# Patient Record
Sex: FEMALE | Race: WHITE | Employment: UNEMPLOYED | URBAN - METROPOLITAN AREA
[De-identification: names, ages, dates, MRNs, and addresses within clinical notes are randomized per-mention and may not be internally consistent; named-entity substitution may affect disease eponyms.]

---

## 2017-01-01 ENCOUNTER — TRANSFERRED RECORDS (OUTPATIENT)
Dept: HEALTH INFORMATION MANAGEMENT | Facility: CLINIC | Age: 82
End: 2017-01-01

## 2018-01-01 ENCOUNTER — CARE COORDINATION (OUTPATIENT)
Dept: CARDIOLOGY | Facility: CLINIC | Age: 83
End: 2018-01-01

## 2018-01-01 ENCOUNTER — APPOINTMENT (OUTPATIENT)
Dept: CT IMAGING | Facility: CLINIC | Age: 83
DRG: 871 | End: 2018-01-01
Attending: INTERNAL MEDICINE
Payer: MEDICARE

## 2018-01-01 ENCOUNTER — APPOINTMENT (OUTPATIENT)
Dept: CT IMAGING | Facility: CLINIC | Age: 83
DRG: 291 | End: 2018-01-01
Attending: EMERGENCY MEDICINE
Payer: MEDICARE

## 2018-01-01 ENCOUNTER — APPOINTMENT (OUTPATIENT)
Dept: CT IMAGING | Facility: CLINIC | Age: 83
DRG: 193 | End: 2018-01-01
Attending: EMERGENCY MEDICINE
Payer: MEDICARE

## 2018-01-01 ENCOUNTER — APPOINTMENT (OUTPATIENT)
Dept: GENERAL RADIOLOGY | Facility: CLINIC | Age: 83
DRG: 193 | End: 2018-01-01
Attending: INTERNAL MEDICINE
Payer: MEDICARE

## 2018-01-01 ENCOUNTER — HOSPITAL ENCOUNTER (INPATIENT)
Facility: CLINIC | Age: 83
LOS: 10 days | Discharge: NURSING, REHAB OR LONG-TERM CARE | DRG: 291 | End: 2018-01-31
Attending: EMERGENCY MEDICINE | Admitting: INTERNAL MEDICINE
Payer: MEDICARE

## 2018-01-01 ENCOUNTER — APPOINTMENT (OUTPATIENT)
Dept: PHYSICAL THERAPY | Facility: CLINIC | Age: 83
DRG: 291 | End: 2018-01-01
Payer: MEDICARE

## 2018-01-01 ENCOUNTER — APPOINTMENT (OUTPATIENT)
Dept: OCCUPATIONAL THERAPY | Facility: CLINIC | Age: 83
DRG: 193 | End: 2018-01-01
Payer: MEDICARE

## 2018-01-01 ENCOUNTER — APPOINTMENT (OUTPATIENT)
Dept: PHYSICAL THERAPY | Facility: CLINIC | Age: 83
DRG: 291 | End: 2018-01-01
Attending: INTERNAL MEDICINE
Payer: MEDICARE

## 2018-01-01 ENCOUNTER — HOSPITAL ENCOUNTER (INPATIENT)
Facility: CLINIC | Age: 83
LOS: 9 days | Discharge: NURSING, REHAB OR LONG-TERM CARE | DRG: 291 | End: 2018-03-23
Attending: EMERGENCY MEDICINE | Admitting: INTERNAL MEDICINE
Payer: MEDICARE

## 2018-01-01 ENCOUNTER — APPOINTMENT (OUTPATIENT)
Dept: GENERAL RADIOLOGY | Facility: CLINIC | Age: 83
DRG: 291 | End: 2018-01-01
Attending: INTERNAL MEDICINE
Payer: MEDICARE

## 2018-01-01 ENCOUNTER — APPOINTMENT (OUTPATIENT)
Dept: CARDIOLOGY | Facility: CLINIC | Age: 83
DRG: 193 | End: 2018-01-01
Attending: INTERNAL MEDICINE
Payer: MEDICARE

## 2018-01-01 ENCOUNTER — APPOINTMENT (OUTPATIENT)
Dept: PHYSICAL THERAPY | Facility: CLINIC | Age: 83
DRG: 193 | End: 2018-01-01
Payer: MEDICARE

## 2018-01-01 ENCOUNTER — APPOINTMENT (OUTPATIENT)
Dept: GENERAL RADIOLOGY | Facility: CLINIC | Age: 83
DRG: 193 | End: 2018-01-01
Attending: EMERGENCY MEDICINE
Payer: MEDICARE

## 2018-01-01 ENCOUNTER — APPOINTMENT (OUTPATIENT)
Dept: GENERAL RADIOLOGY | Facility: CLINIC | Age: 83
DRG: 871 | End: 2018-01-01
Attending: INTERNAL MEDICINE
Payer: MEDICARE

## 2018-01-01 ENCOUNTER — TRANSFERRED RECORDS (OUTPATIENT)
Dept: HEALTH INFORMATION MANAGEMENT | Facility: CLINIC | Age: 83
End: 2018-01-01

## 2018-01-01 ENCOUNTER — HOSPITAL ENCOUNTER (INPATIENT)
Facility: CLINIC | Age: 83
LOS: 8 days | Discharge: NURSING, REHAB OR LONG-TERM CARE | DRG: 193 | End: 2018-01-15
Attending: EMERGENCY MEDICINE | Admitting: INTERNAL MEDICINE
Payer: MEDICARE

## 2018-01-01 ENCOUNTER — HOSPITAL ENCOUNTER (EMERGENCY)
Facility: CLINIC | Age: 83
Discharge: HOME | End: 2018-02-18
Attending: EMERGENCY MEDICINE | Admitting: EMERGENCY MEDICINE
Payer: MEDICARE

## 2018-01-01 ENCOUNTER — APPOINTMENT (OUTPATIENT)
Dept: OCCUPATIONAL THERAPY | Facility: CLINIC | Age: 83
DRG: 193 | End: 2018-01-01
Attending: INTERNAL MEDICINE
Payer: MEDICARE

## 2018-01-01 ENCOUNTER — APPOINTMENT (OUTPATIENT)
Dept: PHYSICAL THERAPY | Facility: CLINIC | Age: 83
DRG: 193 | End: 2018-01-01
Attending: INTERNAL MEDICINE
Payer: MEDICARE

## 2018-01-01 ENCOUNTER — APPOINTMENT (OUTPATIENT)
Dept: GENERAL RADIOLOGY | Facility: CLINIC | Age: 83
DRG: 291 | End: 2018-01-01
Attending: EMERGENCY MEDICINE
Payer: MEDICARE

## 2018-01-01 ENCOUNTER — APPOINTMENT (OUTPATIENT)
Dept: CT IMAGING | Facility: CLINIC | Age: 83
End: 2018-01-01
Attending: EMERGENCY MEDICINE
Payer: MEDICARE

## 2018-01-01 ENCOUNTER — HOSPITAL ENCOUNTER (INPATIENT)
Facility: CLINIC | Age: 83
LOS: 1 days | DRG: 871 | End: 2018-04-14
Attending: INTERNAL MEDICINE | Admitting: INTERNAL MEDICINE
Payer: MEDICARE

## 2018-01-01 VITALS
SYSTOLIC BLOOD PRESSURE: 130 MMHG | BODY MASS INDEX: 31.54 KG/M2 | DIASTOLIC BLOOD PRESSURE: 69 MMHG | TEMPERATURE: 95.8 F | WEIGHT: 208.1 LBS | HEART RATE: 71 BPM | OXYGEN SATURATION: 96 % | RESPIRATION RATE: 20 BRPM | HEIGHT: 68 IN

## 2018-01-01 VITALS
RESPIRATION RATE: 40 BRPM | DIASTOLIC BLOOD PRESSURE: 35 MMHG | SYSTOLIC BLOOD PRESSURE: 99 MMHG | WEIGHT: 205.9 LBS | OXYGEN SATURATION: 87 % | HEIGHT: 68 IN | TEMPERATURE: 97.7 F | BODY MASS INDEX: 31.2 KG/M2

## 2018-01-01 VITALS
RESPIRATION RATE: 16 BRPM | HEART RATE: 84 BPM | WEIGHT: 195.5 LBS | BODY MASS INDEX: 29.63 KG/M2 | DIASTOLIC BLOOD PRESSURE: 77 MMHG | HEIGHT: 68 IN | TEMPERATURE: 97.5 F | SYSTOLIC BLOOD PRESSURE: 171 MMHG | OXYGEN SATURATION: 93 %

## 2018-01-01 VITALS
TEMPERATURE: 97.6 F | SYSTOLIC BLOOD PRESSURE: 125 MMHG | DIASTOLIC BLOOD PRESSURE: 50 MMHG | BODY MASS INDEX: 29.7 KG/M2 | WEIGHT: 196 LBS | OXYGEN SATURATION: 91 % | HEIGHT: 68 IN | HEART RATE: 87 BPM | RESPIRATION RATE: 20 BRPM

## 2018-01-01 VITALS
HEIGHT: 68 IN | OXYGEN SATURATION: 97 % | DIASTOLIC BLOOD PRESSURE: 65 MMHG | RESPIRATION RATE: 18 BRPM | TEMPERATURE: 97.5 F | SYSTOLIC BLOOD PRESSURE: 123 MMHG

## 2018-01-01 DIAGNOSIS — S32.591A PUBIC RAMUS FRACTURE, RIGHT, CLOSED, INITIAL ENCOUNTER (H): ICD-10-CM

## 2018-01-01 DIAGNOSIS — S42.001A CLOSED NONDISPLACED FRACTURE OF RIGHT CLAVICLE, UNSPECIFIED PART OF CLAVICLE, INITIAL ENCOUNTER: ICD-10-CM

## 2018-01-01 DIAGNOSIS — J18.9 HEALTHCARE-ASSOCIATED PNEUMONIA: ICD-10-CM

## 2018-01-01 DIAGNOSIS — I50.31 ACUTE DIASTOLIC CONGESTIVE HEART FAILURE (H): ICD-10-CM

## 2018-01-01 DIAGNOSIS — E87.1 HYPONATREMIA: ICD-10-CM

## 2018-01-01 DIAGNOSIS — I48.91 NEW ONSET ATRIAL FIBRILLATION (H): ICD-10-CM

## 2018-01-01 DIAGNOSIS — N17.9 ACUTE RENAL FAILURE, UNSPECIFIED ACUTE RENAL FAILURE TYPE (H): ICD-10-CM

## 2018-01-01 DIAGNOSIS — J96.01 ACUTE RESPIRATORY FAILURE WITH HYPOXIA (H): ICD-10-CM

## 2018-01-01 DIAGNOSIS — I50.31 ACUTE DIASTOLIC CONGESTIVE HEART FAILURE (H): Primary | ICD-10-CM

## 2018-01-01 DIAGNOSIS — I48.0 PAROXYSMAL ATRIAL FIBRILLATION (H): Primary | ICD-10-CM

## 2018-01-01 DIAGNOSIS — I50.9 ACUTE CONGESTIVE HEART FAILURE, UNSPECIFIED CONGESTIVE HEART FAILURE TYPE: ICD-10-CM

## 2018-01-01 DIAGNOSIS — R10.10 PAIN OF UPPER ABDOMEN: ICD-10-CM

## 2018-01-01 DIAGNOSIS — I50.32 CHRONIC DIASTOLIC CONGESTIVE HEART FAILURE (H): ICD-10-CM

## 2018-01-01 DIAGNOSIS — J18.9 PNEUMONIA OF RIGHT MIDDLE LOBE DUE TO INFECTIOUS ORGANISM: ICD-10-CM

## 2018-01-01 DIAGNOSIS — I48.20 CHRONIC ATRIAL FIBRILLATION (H): ICD-10-CM

## 2018-01-01 LAB
ABO + RH BLD: NORMAL
ABO + RH BLD: NORMAL
ALBUMIN SERPL ELPH-MCNC: 3 G/DL (ref 3.7–5.1)
ALBUMIN SERPL-MCNC: 2.7 G/DL (ref 3.4–5)
ALBUMIN SERPL-MCNC: 2.8 G/DL (ref 3.4–5)
ALBUMIN SERPL-MCNC: 3 G/DL (ref 3.4–5)
ALBUMIN SERPL-MCNC: 3.1 G/DL (ref 3.4–5)
ALBUMIN UR-MCNC: 30 MG/DL
ALBUMIN UR-MCNC: NEGATIVE MG/DL
ALBUMIN UR-MCNC: NEGATIVE MG/DL
ALP SERPL-CCNC: 196 U/L (ref 40–150)
ALP SERPL-CCNC: 275 U/L (ref 40–150)
ALP SERPL-CCNC: 321 U/L (ref 40–150)
ALP SERPL-CCNC: 88 U/L (ref 40–150)
ALPHA1 GLOB SERPL ELPH-MCNC: 0.6 G/DL (ref 0.2–0.4)
ALPHA2 GLOB SERPL ELPH-MCNC: 1 G/DL (ref 0.5–0.9)
ALT SERPL W P-5'-P-CCNC: 18 U/L (ref 0–50)
ALT SERPL W P-5'-P-CCNC: 24 U/L (ref 0–50)
ALT SERPL W P-5'-P-CCNC: 26 U/L (ref 0–50)
ALT SERPL W P-5'-P-CCNC: 60 U/L (ref 0–50)
ANION GAP SERPL CALCULATED.3IONS-SCNC: 10 MMOL/L (ref 3–14)
ANION GAP SERPL CALCULATED.3IONS-SCNC: 10 MMOL/L (ref 3–14)
ANION GAP SERPL CALCULATED.3IONS-SCNC: 11 MMOL/L (ref 6–17)
ANION GAP SERPL CALCULATED.3IONS-SCNC: 13 MMOL/L
ANION GAP SERPL CALCULATED.3IONS-SCNC: 15 MMOL/L (ref 3–14)
ANION GAP SERPL CALCULATED.3IONS-SCNC: 4 MMOL/L (ref 3–14)
ANION GAP SERPL CALCULATED.3IONS-SCNC: 5 MMOL/L (ref 3–14)
ANION GAP SERPL CALCULATED.3IONS-SCNC: 6 MMOL/L (ref 3–14)
ANION GAP SERPL CALCULATED.3IONS-SCNC: 7 MMOL/L (ref 3–14)
ANION GAP SERPL CALCULATED.3IONS-SCNC: 8 MMOL/L (ref 3–14)
ANION GAP SERPL CALCULATED.3IONS-SCNC: 9 MMOL/L (ref 3–14)
ANION GAP SERPL CALCULATED.3IONS-SCNC: 9 MMOL/L (ref 5–18)
ANISOCYTOSIS BLD QL SMEAR: SLIGHT
APPEARANCE UR: ABNORMAL
APPEARANCE UR: CLEAR
APPEARANCE UR: CLEAR
AST SERPL W P-5'-P-CCNC: 18 U/L (ref 0–45)
AST SERPL W P-5'-P-CCNC: 27 U/L (ref 0–45)
AST SERPL W P-5'-P-CCNC: 44 U/L (ref 0–45)
AST SERPL W P-5'-P-CCNC: 48 U/L (ref 0–45)
B-GLOBULIN SERPL ELPH-MCNC: 0.8 G/DL (ref 0.6–1)
BACTERIA #/AREA URNS HPF: ABNORMAL /HPF
BACTERIA SPEC CULT: ABNORMAL
BACTERIA SPEC CULT: NO GROWTH
BASE EXCESS BLDV CALC-SCNC: 3.9 MMOL/L
BASOPHILS # BLD AUTO: 0 10E9/L (ref 0–0.2)
BASOPHILS # BLD AUTO: 0.1 10E9/L (ref 0–0.2)
BASOPHILS # BLD AUTO: 0.1 10E9/L (ref 0–0.2)
BASOPHILS NFR BLD AUTO: 0 %
BASOPHILS NFR BLD AUTO: 0 %
BASOPHILS NFR BLD AUTO: 0.1 %
BASOPHILS NFR BLD AUTO: 0.2 %
BASOPHILS NFR BLD AUTO: 0.4 %
BASOPHILS NFR BLD AUTO: 0.4 %
BILIRUB SERPL-MCNC: 0.2 MG/DL (ref 0.2–1.3)
BILIRUB SERPL-MCNC: 0.3 MG/DL (ref 0.2–1.3)
BILIRUB SERPL-MCNC: 0.5 MG/DL (ref 0.2–1.3)
BILIRUB SERPL-MCNC: 0.7 MG/DL (ref 0.2–1.3)
BILIRUB UR QL STRIP: NEGATIVE
BLD GP AB SCN SERPL QL: NORMAL
BLD PROD TYP BPU: NORMAL
BLD UNIT ID BPU: 0
BLD UNIT ID BPU: 0
BLOOD BANK CMNT PATIENT-IMP: NORMAL
BLOOD PRODUCT CODE: NORMAL
BLOOD PRODUCT CODE: NORMAL
BPU ID: NORMAL
BPU ID: NORMAL
BUN SERPL-MCNC: 10 MG/DL (ref 7–30)
BUN SERPL-MCNC: 16 MG/DL (ref 7–30)
BUN SERPL-MCNC: 17 MG/DL (ref 7–30)
BUN SERPL-MCNC: 18 MG/DL (ref 7–30)
BUN SERPL-MCNC: 19 MG/DL
BUN SERPL-MCNC: 19 MG/DL (ref 7–30)
BUN SERPL-MCNC: 19 MG/DL (ref 7–30)
BUN SERPL-MCNC: 20 MG/DL (ref 7–30)
BUN SERPL-MCNC: 20 MG/DL (ref 7–30)
BUN SERPL-MCNC: 21 MG/DL (ref 7–30)
BUN SERPL-MCNC: 21 MG/DL (ref 7–30)
BUN SERPL-MCNC: 22 MG/DL (ref 7–30)
BUN SERPL-MCNC: 22 MG/DL (ref 8–28)
BUN SERPL-MCNC: 24 MG/DL (ref 7–30)
BUN SERPL-MCNC: 24 MG/DL (ref 7–30)
BUN SERPL-MCNC: 25 MG/DL (ref 7–30)
BUN SERPL-MCNC: 25 MG/DL (ref 7–30)
BUN SERPL-MCNC: 26 MG/DL (ref 7–30)
BUN SERPL-MCNC: 50 MG/DL (ref 7–30)
BUN SERPL-MCNC: 66 MG/DL (ref 7–30)
BUN SERPL-MCNC: 8 MG/DL (ref 7–30)
BUN SERPL-MCNC: 8 MG/DL (ref 7–30)
CA-I BLD-SCNC: 4.3 MG/DL (ref 4.4–5.2)
CALCIUM SERPL-MCNC: 7.4 MG/DL (ref 8.5–10.1)
CALCIUM SERPL-MCNC: 7.7 MG/DL (ref 8.5–10.1)
CALCIUM SERPL-MCNC: 7.9 MG/DL (ref 8.5–10.1)
CALCIUM SERPL-MCNC: 8 MG/DL (ref 8.5–10.1)
CALCIUM SERPL-MCNC: 8.1 MG/DL (ref 8.5–10.1)
CALCIUM SERPL-MCNC: 8.2 MG/DL (ref 8.5–10.1)
CALCIUM SERPL-MCNC: 8.3 MG/DL (ref 8.5–10.1)
CALCIUM SERPL-MCNC: 8.4 MG/DL (ref 8.5–10.1)
CALCIUM SERPL-MCNC: 8.5 MG/DL (ref 8.5–10.1)
CALCIUM SERPL-MCNC: 8.6 MG/DL (ref 8.5–10.1)
CALCIUM SERPL-MCNC: 8.7 MG/DL (ref 8.5–10.1)
CALCIUM SERPL-MCNC: 8.8 MG/DL (ref 8.5–10.1)
CALCIUM SERPL-MCNC: 8.8 MG/DL (ref 8.5–10.1)
CALCIUM SERPL-MCNC: 8.9 MG/DL (ref 8.5–10.1)
CALCIUM SERPL-MCNC: 9 MG/DL (ref 8.5–10.1)
CALCIUM SERPL-MCNC: 9.1 MG/DL (ref 8.5–10.1)
CALCIUM SERPL-MCNC: 9.2 MG/DL (ref 8.5–10.5)
CALCIUM SERPL-MCNC: 9.3 MG/DL (ref 8.5–10.1)
CALCIUM SERPL-MCNC: 9.4 MG/DL
CHLORIDE BLD-SCNC: 89 MMOL/L (ref 94–109)
CHLORIDE SERPL-SCNC: 100 MMOL/L (ref 94–109)
CHLORIDE SERPL-SCNC: 101 MMOL/L (ref 94–109)
CHLORIDE SERPL-SCNC: 90 MMOL/L (ref 94–109)
CHLORIDE SERPL-SCNC: 90 MMOL/L (ref 94–109)
CHLORIDE SERPL-SCNC: 91 MMOL/L (ref 94–109)
CHLORIDE SERPL-SCNC: 92 MMOL/L (ref 94–109)
CHLORIDE SERPL-SCNC: 93 MMOL/L (ref 94–109)
CHLORIDE SERPL-SCNC: 94 MMOL/L (ref 94–109)
CHLORIDE SERPL-SCNC: 95 MMOL/L (ref 94–109)
CHLORIDE SERPL-SCNC: 96 MMOL/L (ref 94–109)
CHLORIDE SERPL-SCNC: 96 MMOL/L (ref 94–109)
CHLORIDE SERPL-SCNC: 97 MMOL/L (ref 94–109)
CHLORIDE SERPL-SCNC: 97 MMOL/L (ref 94–109)
CHLORIDE SERPL-SCNC: 98 MMOL/L (ref 94–109)
CHLORIDE SERPLBLD-SCNC: 95 MMOL/L
CHLORIDE SERPLBLD-SCNC: 99 MMOL/L (ref 98–107)
CO2 BLD-SCNC: 22 MMOL/L (ref 20–32)
CO2 BLDCOV-SCNC: 25 MMOL/L (ref 21–28)
CO2 BLDCOV-SCNC: 25 MMOL/L (ref 21–28)
CO2 SERPL-SCNC: 18 MMOL/L (ref 20–32)
CO2 SERPL-SCNC: 22 MMOL/L (ref 20–32)
CO2 SERPL-SCNC: 23 MMOL/L (ref 20–32)
CO2 SERPL-SCNC: 24 MMOL/L (ref 20–32)
CO2 SERPL-SCNC: 25 MMOL/L (ref 20–32)
CO2 SERPL-SCNC: 26 MMOL/L (ref 20–32)
CO2 SERPL-SCNC: 27 MMOL/L
CO2 SERPL-SCNC: 27 MMOL/L (ref 20–32)
CO2 SERPL-SCNC: 28 MMOL/L (ref 20–32)
CO2 SERPL-SCNC: 28 MMOL/L (ref 22–31)
CO2 SERPL-SCNC: 29 MMOL/L (ref 20–32)
CO2 SERPL-SCNC: 29 MMOL/L (ref 20–32)
CO2 SERPL-SCNC: 30 MMOL/L (ref 20–32)
CO2 SERPL-SCNC: 31 MMOL/L (ref 20–32)
CO2 SERPL-SCNC: 32 MMOL/L (ref 20–32)
CO2 SERPL-SCNC: 33 MMOL/L (ref 20–32)
CO2 SERPL-SCNC: 34 MMOL/L (ref 20–32)
COLOR UR AUTO: NORMAL
COLOR UR AUTO: YELLOW
COLOR UR AUTO: YELLOW
COPATH REPORT: NORMAL
CREAT BLD-MCNC: 1.2 MG/DL (ref 0.52–1.04)
CREAT SERPL-MCNC: 0.65 MG/DL (ref 0.52–1.04)
CREAT SERPL-MCNC: 0.66 MG/DL (ref 0.52–1.04)
CREAT SERPL-MCNC: 0.68 MG/DL (ref 0.52–1.04)
CREAT SERPL-MCNC: 0.71 MG/DL (ref 0.52–1.04)
CREAT SERPL-MCNC: 0.72 MG/DL (ref 0.52–1.04)
CREAT SERPL-MCNC: 0.73 MG/DL (ref 0.52–1.04)
CREAT SERPL-MCNC: 0.73 MG/DL (ref 0.52–1.04)
CREAT SERPL-MCNC: 0.73 MG/DL (ref 0.6–1.1)
CREAT SERPL-MCNC: 0.74 MG/DL
CREAT SERPL-MCNC: 0.76 MG/DL (ref 0.6–1.1)
CREAT SERPL-MCNC: 0.81 MG/DL (ref 0.52–1.04)
CREAT SERPL-MCNC: 0.82 MG/DL (ref 0.52–1.04)
CREAT SERPL-MCNC: 0.83 MG/DL (ref 0.52–1.04)
CREAT SERPL-MCNC: 0.83 MG/DL (ref 0.52–1.04)
CREAT SERPL-MCNC: 0.84 MG/DL (ref 0.52–1.04)
CREAT SERPL-MCNC: 0.84 MG/DL (ref 0.52–1.04)
CREAT SERPL-MCNC: 0.88 MG/DL (ref 0.52–1.04)
CREAT SERPL-MCNC: 0.89 MG/DL (ref 0.52–1.04)
CREAT SERPL-MCNC: 0.89 MG/DL (ref 0.52–1.04)
CREAT SERPL-MCNC: 0.91 MG/DL (ref 0.52–1.04)
CREAT SERPL-MCNC: 0.92 MG/DL (ref 0.52–1.04)
CREAT SERPL-MCNC: 0.93 MG/DL (ref 0.52–1.04)
CREAT SERPL-MCNC: 0.99 MG/DL (ref 0.52–1.04)
CREAT SERPL-MCNC: 1.01 MG/DL (ref 0.52–1.04)
CREAT SERPL-MCNC: 1.03 MG/DL (ref 0.52–1.04)
CREAT SERPL-MCNC: 1.03 MG/DL (ref 0.52–1.04)
CREAT SERPL-MCNC: 1.08 MG/DL (ref 0.52–1.04)
CREAT SERPL-MCNC: 1.08 MG/DL (ref 0.52–1.04)
CREAT SERPL-MCNC: 1.13 MG/DL (ref 0.52–1.04)
CREAT SERPL-MCNC: 1.19 MG/DL (ref 0.52–1.04)
CREAT SERPL-MCNC: 1.57 MG/DL (ref 0.6–1.1)
CREAT SERPL-MCNC: 1.86 MG/DL (ref 0.52–1.04)
CREAT SERPL-MCNC: 2.57 MG/DL (ref 0.52–1.04)
D DIMER PPP FEU-MCNC: 5 UG/ML FEU (ref 0–0.5)
DEPRECATED CALCIDIOL+CALCIFEROL SERPL-MC: 34 UG/L (ref 20–75)
DIFFERENTIAL METHOD BLD: ABNORMAL
EOSINOPHIL # BLD AUTO: 0 10E9/L (ref 0–0.7)
EOSINOPHIL # BLD AUTO: 0.1 10E9/L (ref 0–0.7)
EOSINOPHIL # BLD AUTO: 0.2 10E9/L (ref 0–0.7)
EOSINOPHIL NFR BLD AUTO: 0 %
EOSINOPHIL NFR BLD AUTO: 0.1 %
EOSINOPHIL NFR BLD AUTO: 0.2 %
EOSINOPHIL NFR BLD AUTO: 0.3 %
EOSINOPHIL NFR BLD AUTO: 0.4 %
EOSINOPHIL NFR BLD AUTO: 1.4 %
EOSINOPHIL NFR BLD AUTO: 1.6 %
EOSINOPHIL NFR BLD AUTO: 2 %
EOSINOPHIL NFR BLD AUTO: 2 %
EOSINOPHIL NFR BLD AUTO: 2.6 %
ERYTHROCYTE [DISTWIDTH] IN BLOOD BY AUTOMATED COUNT: 12.8 % (ref 10–15)
ERYTHROCYTE [DISTWIDTH] IN BLOOD BY AUTOMATED COUNT: 12.9 % (ref 10–15)
ERYTHROCYTE [DISTWIDTH] IN BLOOD BY AUTOMATED COUNT: 13 % (ref 10–15)
ERYTHROCYTE [DISTWIDTH] IN BLOOD BY AUTOMATED COUNT: 13 % (ref 10–15)
ERYTHROCYTE [DISTWIDTH] IN BLOOD BY AUTOMATED COUNT: 13.2 % (ref 10–15)
ERYTHROCYTE [DISTWIDTH] IN BLOOD BY AUTOMATED COUNT: 14.3 % (ref 10–15)
ERYTHROCYTE [DISTWIDTH] IN BLOOD BY AUTOMATED COUNT: 14.5 % (ref 10–15)
ERYTHROCYTE [DISTWIDTH] IN BLOOD BY AUTOMATED COUNT: 14.6 % (ref 10–15)
ERYTHROCYTE [DISTWIDTH] IN BLOOD BY AUTOMATED COUNT: 14.9 % (ref 10–15)
ERYTHROCYTE [DISTWIDTH] IN BLOOD BY AUTOMATED COUNT: 15.6 % (ref 10–15)
ERYTHROCYTE [DISTWIDTH] IN BLOOD BY AUTOMATED COUNT: 15.6 % (ref 10–15)
ERYTHROCYTE [DISTWIDTH] IN BLOOD BY AUTOMATED COUNT: 15.7 % (ref 10–15)
ERYTHROCYTE [DISTWIDTH] IN BLOOD BY AUTOMATED COUNT: 16 % (ref 10–15)
ERYTHROCYTE [DISTWIDTH] IN BLOOD BY AUTOMATED COUNT: 16.3 % (ref 10–15)
ERYTHROCYTE [DISTWIDTH] IN BLOOD BY AUTOMATED COUNT: 16.7 % (ref 10–15)
ERYTHROCYTE [DISTWIDTH] IN BLOOD BY AUTOMATED COUNT: 16.9 % (ref 10–15)
FERRITIN SERPL-MCNC: 139 NG/ML (ref 8–252)
FERRITIN SERPL-MCNC: 58 NG/ML (ref 8–252)
FLUAV+FLUBV AG SPEC QL: NEGATIVE
FLUAV+FLUBV AG SPEC QL: NEGATIVE
FLUAV+FLUBV RNA SPEC QL NAA+PROBE: NEGATIVE
FLUAV+FLUBV RNA SPEC QL NAA+PROBE: POSITIVE
FOLATE SERPL-MCNC: 24.8 NG/ML
GAMMA GLOB SERPL ELPH-MCNC: 1 G/DL (ref 0.7–1.6)
GFR SERPL CREATININE-BSD FRML MDRD: 18 ML/MIN/1.7M2
GFR SERPL CREATININE-BSD FRML MDRD: 26 ML/MIN/1.7M2
GFR SERPL CREATININE-BSD FRML MDRD: 31 ML/MIN/1.73M2
GFR SERPL CREATININE-BSD FRML MDRD: 42 ML/MIN/1.7M2
GFR SERPL CREATININE-BSD FRML MDRD: 43 ML/MIN/1.7M2
GFR SERPL CREATININE-BSD FRML MDRD: 46 ML/MIN/1.7M2
GFR SERPL CREATININE-BSD FRML MDRD: 48 ML/MIN/1.7M2
GFR SERPL CREATININE-BSD FRML MDRD: 48 ML/MIN/1.7M2
GFR SERPL CREATININE-BSD FRML MDRD: 51 ML/MIN/1.7M2
GFR SERPL CREATININE-BSD FRML MDRD: 51 ML/MIN/1.7M2
GFR SERPL CREATININE-BSD FRML MDRD: 52 ML/MIN/1.7M2
GFR SERPL CREATININE-BSD FRML MDRD: 53 ML/MIN/1.7M2
GFR SERPL CREATININE-BSD FRML MDRD: 57 ML/MIN/1.7M2
GFR SERPL CREATININE-BSD FRML MDRD: 58 ML/MIN/1.7M2
GFR SERPL CREATININE-BSD FRML MDRD: 58 ML/MIN/1.7M2
GFR SERPL CREATININE-BSD FRML MDRD: 60 ML/MIN/1.7M2
GFR SERPL CREATININE-BSD FRML MDRD: 60 ML/MIN/1.7M2
GFR SERPL CREATININE-BSD FRML MDRD: 61 ML/MIN/1.7M2
GFR SERPL CREATININE-BSD FRML MDRD: 64 ML/MIN/1.7M2
GFR SERPL CREATININE-BSD FRML MDRD: 65 ML/MIN/1.7M2
GFR SERPL CREATININE-BSD FRML MDRD: 66 ML/MIN/1.7M2
GFR SERPL CREATININE-BSD FRML MDRD: 75 ML/MIN/1.7M2
GFR SERPL CREATININE-BSD FRML MDRD: 75 ML/MIN/1.7M2
GFR SERPL CREATININE-BSD FRML MDRD: 77 ML/MIN/1.7M2
GFR SERPL CREATININE-BSD FRML MDRD: 77 ML/MIN/1.7M2
GFR SERPL CREATININE-BSD FRML MDRD: 82 ML/MIN/1.7M2
GFR SERPL CREATININE-BSD FRML MDRD: 85 ML/MIN/1.7M2
GFR SERPL CREATININE-BSD FRML MDRD: 86 ML/MIN/1.7M2
GFR SERPL CREATININE-BSD FRML MDRD: >60 ML/MIN/1.73M2
GLUCOSE BLD-MCNC: 142 MG/DL (ref 70–99)
GLUCOSE BLDC GLUCOMTR-MCNC: 105 MG/DL (ref 70–99)
GLUCOSE BLDC GLUCOMTR-MCNC: 108 MG/DL (ref 70–99)
GLUCOSE BLDC GLUCOMTR-MCNC: 123 MG/DL (ref 70–99)
GLUCOSE BLDC GLUCOMTR-MCNC: 123 MG/DL (ref 70–99)
GLUCOSE BLDC GLUCOMTR-MCNC: 93 MG/DL (ref 70–99)
GLUCOSE BLDC GLUCOMTR-MCNC: 95 MG/DL (ref 70–99)
GLUCOSE BLDC GLUCOMTR-MCNC: 95 MG/DL (ref 70–99)
GLUCOSE SERPL-MCNC: 101 MG/DL (ref 70–99)
GLUCOSE SERPL-MCNC: 115 MG/DL (ref 70–99)
GLUCOSE SERPL-MCNC: 116 MG/DL (ref 70–99)
GLUCOSE SERPL-MCNC: 117 MG/DL (ref 70–99)
GLUCOSE SERPL-MCNC: 119 MG/DL (ref 70–99)
GLUCOSE SERPL-MCNC: 125 MG/DL (ref 70–99)
GLUCOSE SERPL-MCNC: 126 MG/DL (ref 70–99)
GLUCOSE SERPL-MCNC: 126 MG/DL (ref 70–99)
GLUCOSE SERPL-MCNC: 132 MG/DL (ref 70–99)
GLUCOSE SERPL-MCNC: 145 MG/DL (ref 70–99)
GLUCOSE SERPL-MCNC: 151 MG/DL (ref 70–99)
GLUCOSE SERPL-MCNC: 81 MG/DL (ref 70–125)
GLUCOSE SERPL-MCNC: 84 MG/DL (ref 70–99)
GLUCOSE SERPL-MCNC: 85 MG/DL (ref 70–125)
GLUCOSE SERPL-MCNC: 88 MG/DL (ref 70–99)
GLUCOSE SERPL-MCNC: 89 MG/DL (ref 70–99)
GLUCOSE SERPL-MCNC: 89 MG/DL (ref 70–99)
GLUCOSE SERPL-MCNC: 90 MG/DL (ref 70–99)
GLUCOSE SERPL-MCNC: 90 MG/DL (ref 70–99)
GLUCOSE SERPL-MCNC: 92 MG/DL (ref 70–99)
GLUCOSE SERPL-MCNC: 93 MG/DL (ref 70–99)
GLUCOSE SERPL-MCNC: 94 MG/DL (ref 70–125)
GLUCOSE SERPL-MCNC: 95 MG/DL (ref 70–99)
GLUCOSE SERPL-MCNC: 95 MG/DL (ref 70–99)
GLUCOSE SERPL-MCNC: 97 MG/DL (ref 70–99)
GLUCOSE SERPL-MCNC: 98 MG/DL (ref 70–99)
GLUCOSE SERPL-MCNC: 99 MG/DL (ref 70–99)
GLUCOSE SERPL-MCNC: 99 MG/DL (ref 70–99)
GLUCOSE UR STRIP-MCNC: NEGATIVE MG/DL
GRAM STN SPEC: ABNORMAL
HCO3 BLDV-SCNC: 30 MMOL/L (ref 21–28)
HCT VFR BLD AUTO: 21.2 % (ref 35–47)
HCT VFR BLD AUTO: 22.1 % (ref 35–47)
HCT VFR BLD AUTO: 22.7 % (ref 35–47)
HCT VFR BLD AUTO: 23.4 % (ref 35–47)
HCT VFR BLD AUTO: 23.6 % (ref 35–47)
HCT VFR BLD AUTO: 23.7 % (ref 35–47)
HCT VFR BLD AUTO: 25 % (ref 35–47)
HCT VFR BLD AUTO: 25.1 % (ref 35–47)
HCT VFR BLD AUTO: 25.3 % (ref 35–47)
HCT VFR BLD AUTO: 25.3 % (ref 35–47)
HCT VFR BLD AUTO: 26.2 % (ref 35–47)
HCT VFR BLD AUTO: 27.3 % (ref 35–47)
HCT VFR BLD AUTO: 27.5 % (ref 35–47)
HCT VFR BLD AUTO: 29.4 % (ref 35–47)
HCT VFR BLD AUTO: 29.5 % (ref 35–47)
HCT VFR BLD AUTO: 29.5 % (ref 35–47)
HCT VFR BLD AUTO: 29.7 % (ref 35–47)
HCT VFR BLD AUTO: 29.7 % (ref 35–47)
HCT VFR BLD AUTO: 29.8 % (ref 35–47)
HCT VFR BLD AUTO: 29.9 % (ref 35–47)
HCT VFR BLD AUTO: 30.9 % (ref 35–47)
HCT VFR BLD CALC: 24 %PCV (ref 35–47)
HGB BLD CALC-MCNC: 8.2 G/DL (ref 11.7–15.7)
HGB BLD-MCNC: 10 G/DL (ref 11.7–15.7)
HGB BLD-MCNC: 6.3 G/DL (ref 11.7–15.7)
HGB BLD-MCNC: 7.1 G/DL (ref 11.7–15.7)
HGB BLD-MCNC: 7.3 G/DL (ref 11.7–15.7)
HGB BLD-MCNC: 7.5 G/DL (ref 11.7–15.7)
HGB BLD-MCNC: 7.5 G/DL (ref 11.7–15.7)
HGB BLD-MCNC: 7.6 G/DL (ref 11.7–15.7)
HGB BLD-MCNC: 7.6 G/DL (ref 11.7–15.7)
HGB BLD-MCNC: 7.7 G/DL (ref 11.7–15.7)
HGB BLD-MCNC: 7.8 G/DL (ref 11.7–15.7)
HGB BLD-MCNC: 8.2 G/DL (ref 11.7–15.7)
HGB BLD-MCNC: 8.3 G/DL (ref 11.7–15.7)
HGB BLD-MCNC: 8.4 G/DL (ref 11.7–15.7)
HGB BLD-MCNC: 8.4 G/DL (ref 11.7–15.7)
HGB BLD-MCNC: 8.5 G/DL (ref 11.7–15.7)
HGB BLD-MCNC: 8.6 G/DL (ref 11.7–15.7)
HGB BLD-MCNC: 8.6 G/DL (ref 11.7–15.7)
HGB BLD-MCNC: 8.8 G/DL (ref 11.7–15.7)
HGB BLD-MCNC: 9.1 G/DL (ref 11.7–15.7)
HGB BLD-MCNC: 9.3 G/DL (ref 11.7–15.7)
HGB BLD-MCNC: 9.5 G/DL (ref 11.7–15.7)
HGB BLD-MCNC: 9.5 G/DL (ref 11.7–15.7)
HGB BLD-MCNC: 9.6 G/DL (ref 11.7–15.7)
HGB BLD-MCNC: 9.7 G/DL (ref 11.7–15.7)
HGB BLD-MCNC: 9.7 G/DL (ref 11.7–15.7)
HGB BLD-MCNC: 9.9 G/DL (ref 11.7–15.7)
HGB BLD-MCNC: 9.9 G/DL (ref 11.7–15.7)
HGB UR QL STRIP: ABNORMAL
HGB UR QL STRIP: NEGATIVE
HGB UR QL STRIP: NEGATIVE
IMM GRANULOCYTES # BLD: 0 10E9/L (ref 0–0.4)
IMM GRANULOCYTES # BLD: 0.1 10E9/L (ref 0–0.4)
IMM GRANULOCYTES # BLD: 0.2 10E9/L (ref 0–0.4)
IMM GRANULOCYTES # BLD: 0.3 10E9/L (ref 0–0.4)
IMM GRANULOCYTES # BLD: 0.4 10E9/L (ref 0–0.4)
IMM GRANULOCYTES # BLD: 1.2 10E9/L (ref 0–0.4)
IMM GRANULOCYTES NFR BLD: 0.2 %
IMM GRANULOCYTES NFR BLD: 0.2 %
IMM GRANULOCYTES NFR BLD: 0.3 %
IMM GRANULOCYTES NFR BLD: 0.3 %
IMM GRANULOCYTES NFR BLD: 0.5 %
IMM GRANULOCYTES NFR BLD: 0.5 %
IMM GRANULOCYTES NFR BLD: 0.6 %
IMM GRANULOCYTES NFR BLD: 0.6 %
IMM GRANULOCYTES NFR BLD: 0.7 %
IMM GRANULOCYTES NFR BLD: 1 %
IMM GRANULOCYTES NFR BLD: 1 %
IMM GRANULOCYTES NFR BLD: 1.2 %
IMM GRANULOCYTES NFR BLD: 1.2 %
IMM GRANULOCYTES NFR BLD: 1.3 %
IMM GRANULOCYTES NFR BLD: 1.4 %
IMM GRANULOCYTES NFR BLD: 2.6 %
INR PPP: 2.15 (ref 0.86–1.14)
INR PPP: 2.16 (ref 0.86–1.14)
INR PPP: 2.47 (ref 0.86–1.14)
INR PPP: 2.9 (ref 0.86–1.14)
INR PPP: 3.39 (ref 0.86–1.14)
INR PPP: 3.57 (ref 0.86–1.14)
INR PPP: 3.83 (ref 0.86–1.14)
INR PPP: 3.84 (ref 0.86–1.14)
INR PPP: 4.39 (ref 0.86–1.14)
INR PPP: 4.51 (ref 0.86–1.14)
INR PPP: 4.65 (ref 0.86–1.14)
INR PPP: 6.89 (ref 0.86–1.14)
INTERPRETATION ECG - MUSE: NORMAL
IRON SATN MFR SERPL: 9 % (ref 15–46)
IRON SATN MFR SERPL: 9 % (ref 15–46)
IRON SERPL-MCNC: 25 UG/DL (ref 35–180)
IRON SERPL-MCNC: 26 UG/DL (ref 35–180)
KETONES UR STRIP-MCNC: NEGATIVE MG/DL
LACTATE BLD-SCNC: 0.5 MMOL/L (ref 0.7–2)
LACTATE BLD-SCNC: 1.5 MMOL/L (ref 0.7–2)
LACTATE BLD-SCNC: 1.6 MMOL/L (ref 0.7–2)
LACTATE BLD-SCNC: 1.6 MMOL/L (ref 0.7–2.1)
LACTATE BLD-SCNC: 1.7 MMOL/L (ref 0.7–2)
LACTATE BLD-SCNC: 1.8 MMOL/L (ref 0.7–2.1)
LACTATE BLD-SCNC: 1.9 MMOL/L (ref 0.7–2)
LACTATE BLD-SCNC: 2.1 MMOL/L (ref 0.7–2)
LACTATE BLD-SCNC: 2.4 MMOL/L (ref 0.7–2)
LACTATE BLD-SCNC: 3.7 MMOL/L (ref 0.7–2)
LACTATE BLD-SCNC: 4.9 MMOL/L (ref 0.7–2)
LACTATE BLD-SCNC: 8.8 MMOL/L (ref 0.7–2)
LEUKOCYTE ESTERASE UR QL STRIP: ABNORMAL
LEUKOCYTE ESTERASE UR QL STRIP: NEGATIVE
LEUKOCYTE ESTERASE UR QL STRIP: NEGATIVE
LIPASE SERPL-CCNC: 134 U/L (ref 73–393)
LYMPHOCYTES # BLD AUTO: 0.6 10E9/L (ref 0.8–5.3)
LYMPHOCYTES # BLD AUTO: 0.7 10E9/L (ref 0.8–5.3)
LYMPHOCYTES # BLD AUTO: 0.8 10E9/L (ref 0.8–5.3)
LYMPHOCYTES # BLD AUTO: 0.9 10E9/L (ref 0.8–5.3)
LYMPHOCYTES # BLD AUTO: 1 10E9/L (ref 0.8–5.3)
LYMPHOCYTES # BLD AUTO: 1 10E9/L (ref 0.8–5.3)
LYMPHOCYTES # BLD AUTO: 1.1 10E9/L (ref 0.8–5.3)
LYMPHOCYTES # BLD AUTO: 1.2 10E9/L (ref 0.8–5.3)
LYMPHOCYTES # BLD AUTO: 1.5 10E9/L (ref 0.8–5.3)
LYMPHOCYTES # BLD AUTO: 1.5 10E9/L (ref 0.8–5.3)
LYMPHOCYTES # BLD AUTO: 1.8 10E9/L (ref 0.8–5.3)
LYMPHOCYTES # BLD AUTO: 1.8 10E9/L (ref 0.8–5.3)
LYMPHOCYTES # BLD AUTO: 2.1 10E9/L (ref 0.8–5.3)
LYMPHOCYTES # BLD AUTO: 2.2 10E9/L (ref 0.8–5.3)
LYMPHOCYTES # BLD AUTO: 2.9 10E9/L (ref 0.8–5.3)
LYMPHOCYTES # BLD AUTO: 3 10E9/L (ref 0.8–5.3)
LYMPHOCYTES NFR BLD AUTO: 11.2 %
LYMPHOCYTES NFR BLD AUTO: 12.7 %
LYMPHOCYTES NFR BLD AUTO: 13.5 %
LYMPHOCYTES NFR BLD AUTO: 14.5 %
LYMPHOCYTES NFR BLD AUTO: 14.6 %
LYMPHOCYTES NFR BLD AUTO: 15.1 %
LYMPHOCYTES NFR BLD AUTO: 15.6 %
LYMPHOCYTES NFR BLD AUTO: 16.7 %
LYMPHOCYTES NFR BLD AUTO: 17.1 %
LYMPHOCYTES NFR BLD AUTO: 19.9 %
LYMPHOCYTES NFR BLD AUTO: 2 %
LYMPHOCYTES NFR BLD AUTO: 2.2 %
LYMPHOCYTES NFR BLD AUTO: 20.8 %
LYMPHOCYTES NFR BLD AUTO: 24.1 %
LYMPHOCYTES NFR BLD AUTO: 9.4 %
LYMPHOCYTES NFR BLD AUTO: 9.8 %
Lab: ABNORMAL
Lab: NORMAL
M PROTEIN SERPL ELPH-MCNC: 0 G/DL
MAGNESIUM SERPL-MCNC: 2.1 MG/DL (ref 1.6–2.3)
MAGNESIUM SERPL-MCNC: 2.1 MG/DL (ref 1.6–2.3)
MAGNESIUM SERPL-MCNC: 2.3 MG/DL (ref 1.6–2.3)
MAGNESIUM SERPL-MCNC: 2.5 MG/DL (ref 1.6–2.3)
MCH RBC QN AUTO: 29.3 PG (ref 26.5–33)
MCH RBC QN AUTO: 29.5 PG (ref 26.5–33)
MCH RBC QN AUTO: 29.6 PG (ref 26.5–33)
MCH RBC QN AUTO: 30 PG (ref 26.5–33)
MCH RBC QN AUTO: 30.2 PG (ref 26.5–33)
MCH RBC QN AUTO: 30.4 PG (ref 26.5–33)
MCH RBC QN AUTO: 30.4 PG (ref 26.5–33)
MCH RBC QN AUTO: 30.6 PG (ref 26.5–33)
MCH RBC QN AUTO: 30.7 PG (ref 26.5–33)
MCH RBC QN AUTO: 30.7 PG (ref 26.5–33)
MCH RBC QN AUTO: 30.8 PG (ref 26.5–33)
MCH RBC QN AUTO: 30.8 PG (ref 26.5–33)
MCH RBC QN AUTO: 30.9 PG (ref 26.5–33)
MCH RBC QN AUTO: 31 PG (ref 26.5–33)
MCH RBC QN AUTO: 31.2 PG (ref 26.5–33)
MCH RBC QN AUTO: 31.5 PG (ref 26.5–33)
MCH RBC QN AUTO: 31.6 PG (ref 26.5–33)
MCH RBC QN AUTO: 31.6 PG (ref 26.5–33)
MCH RBC QN AUTO: 31.9 PG (ref 26.5–33)
MCHC RBC AUTO-ENTMCNC: 31.5 G/DL (ref 31.5–36.5)
MCHC RBC AUTO-ENTMCNC: 31.9 G/DL (ref 31.5–36.5)
MCHC RBC AUTO-ENTMCNC: 32 G/DL (ref 31.5–36.5)
MCHC RBC AUTO-ENTMCNC: 32.2 G/DL (ref 31.5–36.5)
MCHC RBC AUTO-ENTMCNC: 32.2 G/DL (ref 31.5–36.5)
MCHC RBC AUTO-ENTMCNC: 32.5 G/DL (ref 31.5–36.5)
MCHC RBC AUTO-ENTMCNC: 32.5 G/DL (ref 31.5–36.5)
MCHC RBC AUTO-ENTMCNC: 32.7 G/DL (ref 31.5–36.5)
MCHC RBC AUTO-ENTMCNC: 33 G/DL (ref 31.5–36.5)
MCHC RBC AUTO-ENTMCNC: 33.1 G/DL (ref 31.5–36.5)
MCHC RBC AUTO-ENTMCNC: 33.1 G/DL (ref 31.5–36.5)
MCHC RBC AUTO-ENTMCNC: 33.3 G/DL (ref 31.5–36.5)
MCHC RBC AUTO-ENTMCNC: 33.5 G/DL (ref 31.5–36.5)
MCHC RBC AUTO-ENTMCNC: 33.6 G/DL (ref 31.5–36.5)
MCHC RBC AUTO-ENTMCNC: 33.7 G/DL (ref 31.5–36.5)
MCHC RBC AUTO-ENTMCNC: 33.9 G/DL (ref 31.5–36.5)
MCHC RBC AUTO-ENTMCNC: 34 G/DL (ref 31.5–36.5)
MCHC RBC AUTO-ENTMCNC: 34.3 G/DL (ref 31.5–36.5)
MCHC RBC AUTO-ENTMCNC: 34.8 G/DL (ref 31.5–36.5)
MCV RBC AUTO: 85 FL (ref 78–100)
MCV RBC AUTO: 87 FL (ref 78–100)
MCV RBC AUTO: 91 FL (ref 78–100)
MCV RBC AUTO: 92 FL (ref 78–100)
MCV RBC AUTO: 93 FL (ref 78–100)
MCV RBC AUTO: 93 FL (ref 78–100)
MCV RBC AUTO: 94 FL (ref 78–100)
MCV RBC AUTO: 95 FL (ref 78–100)
MCV RBC AUTO: 97 FL (ref 78–100)
MCV RBC AUTO: 97 FL (ref 78–100)
MCV RBC AUTO: 98 FL (ref 78–100)
MONOCYTES # BLD AUTO: 0.6 10E9/L (ref 0–1.3)
MONOCYTES # BLD AUTO: 0.7 10E9/L (ref 0–1.3)
MONOCYTES # BLD AUTO: 0.8 10E9/L (ref 0–1.3)
MONOCYTES # BLD AUTO: 0.8 10E9/L (ref 0–1.3)
MONOCYTES # BLD AUTO: 1 10E9/L (ref 0–1.3)
MONOCYTES # BLD AUTO: 1 10E9/L (ref 0–1.3)
MONOCYTES # BLD AUTO: 1.1 10E9/L (ref 0–1.3)
MONOCYTES # BLD AUTO: 1.2 10E9/L (ref 0–1.3)
MONOCYTES # BLD AUTO: 1.3 10E9/L (ref 0–1.3)
MONOCYTES # BLD AUTO: 1.3 10E9/L (ref 0–1.3)
MONOCYTES # BLD AUTO: 1.5 10E9/L (ref 0–1.3)
MONOCYTES # BLD AUTO: 1.7 10E9/L (ref 0–1.3)
MONOCYTES # BLD AUTO: 1.8 10E9/L (ref 0–1.3)
MONOCYTES # BLD AUTO: 2.4 10E9/L (ref 0–1.3)
MONOCYTES NFR BLD AUTO: 10 %
MONOCYTES NFR BLD AUTO: 10.2 %
MONOCYTES NFR BLD AUTO: 10.4 %
MONOCYTES NFR BLD AUTO: 10.4 %
MONOCYTES NFR BLD AUTO: 10.9 %
MONOCYTES NFR BLD AUTO: 11.4 %
MONOCYTES NFR BLD AUTO: 14 %
MONOCYTES NFR BLD AUTO: 16 %
MONOCYTES NFR BLD AUTO: 16.1 %
MONOCYTES NFR BLD AUTO: 2.2 %
MONOCYTES NFR BLD AUTO: 4.5 %
MONOCYTES NFR BLD AUTO: 5.2 %
MONOCYTES NFR BLD AUTO: 6.7 %
MONOCYTES NFR BLD AUTO: 9.7 %
MRSA DNA SPEC QL NAA+PROBE: NEGATIVE
MUCOUS THREADS #/AREA URNS LPF: PRESENT /LPF
NEUTROPHILS # BLD AUTO: 11 10E9/L (ref 1.6–8.3)
NEUTROPHILS # BLD AUTO: 11.4 10E9/L (ref 1.6–8.3)
NEUTROPHILS # BLD AUTO: 11.9 10E9/L (ref 1.6–8.3)
NEUTROPHILS # BLD AUTO: 12.4 10E9/L (ref 1.6–8.3)
NEUTROPHILS # BLD AUTO: 17.3 10E9/L (ref 1.6–8.3)
NEUTROPHILS # BLD AUTO: 2.7 10E9/L (ref 1.6–8.3)
NEUTROPHILS # BLD AUTO: 3 10E9/L (ref 1.6–8.3)
NEUTROPHILS # BLD AUTO: 3.5 10E9/L (ref 1.6–8.3)
NEUTROPHILS # BLD AUTO: 3.7 10E9/L (ref 1.6–8.3)
NEUTROPHILS # BLD AUTO: 34.8 10E9/L (ref 1.6–8.3)
NEUTROPHILS # BLD AUTO: 4.1 10E9/L (ref 1.6–8.3)
NEUTROPHILS # BLD AUTO: 41.5 10E9/L (ref 1.6–8.3)
NEUTROPHILS # BLD AUTO: 8 10E9/L (ref 1.6–8.3)
NEUTROPHILS # BLD AUTO: 8.2 10E9/L (ref 1.6–8.3)
NEUTROPHILS # BLD AUTO: 8.6 10E9/L (ref 1.6–8.3)
NEUTROPHILS # BLD AUTO: 9.1 10E9/L (ref 1.6–8.3)
NEUTROPHILS NFR BLD AUTO: 61 %
NEUTROPHILS NFR BLD AUTO: 64.3 %
NEUTROPHILS NFR BLD AUTO: 65.8 %
NEUTROPHILS NFR BLD AUTO: 68 %
NEUTROPHILS NFR BLD AUTO: 68.6 %
NEUTROPHILS NFR BLD AUTO: 69.9 %
NEUTROPHILS NFR BLD AUTO: 71.5 %
NEUTROPHILS NFR BLD AUTO: 71.5 %
NEUTROPHILS NFR BLD AUTO: 75.8 %
NEUTROPHILS NFR BLD AUTO: 75.9 %
NEUTROPHILS NFR BLD AUTO: 77.6 %
NEUTROPHILS NFR BLD AUTO: 78.3 %
NEUTROPHILS NFR BLD AUTO: 78.3 %
NEUTROPHILS NFR BLD AUTO: 82.8 %
NEUTROPHILS NFR BLD AUTO: 91.9 %
NEUTROPHILS NFR BLD AUTO: 93 %
NITRATE UR QL: NEGATIVE
NITRATE UR QL: NEGATIVE
NITRATE UR QL: POSITIVE
NRBC # BLD AUTO: 0 10*3/UL
NRBC BLD AUTO-RTO: 0 /100
NRBC BLD AUTO-RTO: 1 /100
NT-PROBNP SERPL-MCNC: 2899 PG/ML (ref 0–1800)
NT-PROBNP SERPL-MCNC: 4442 PG/ML (ref 0–1800)
NT-PROBNP SERPL-MCNC: 5522 PG/ML (ref 0–1800)
NT-PROBNP SERPL-MCNC: ABNORMAL PG/ML (ref 0–1800)
NUM BPU REQUESTED: 2
O2/TOTAL GAS SETTING VFR VENT: ABNORMAL %
OSMOLALITY SERPL: 277 MMOL/KG (ref 280–301)
OSMOLALITY UR: 269 MMOL/KG (ref 100–1200)
OSMOLALITY UR: 345 MMOL/KG (ref 100–1200)
OXYHGB MFR BLDV: 76 %
PCO2 BLDV: 33 MM HG (ref 40–50)
PCO2 BLDV: 40 MM HG (ref 40–50)
PCO2 BLDV: 53 MM HG (ref 40–50)
PH BLDV: 7.37 PH (ref 7.32–7.43)
PH BLDV: 7.39 PH (ref 7.32–7.43)
PH BLDV: 7.49 PH (ref 7.32–7.43)
PH UR STRIP: 6 PH (ref 5–7)
PH UR STRIP: 7 PH (ref 5–7)
PH UR STRIP: 9 PH (ref 5–7)
PLATELET # BLD AUTO: 117 10E9/L (ref 150–450)
PLATELET # BLD AUTO: 117 10E9/L (ref 150–450)
PLATELET # BLD AUTO: 118 10E9/L (ref 150–450)
PLATELET # BLD AUTO: 135 10E9/L (ref 150–450)
PLATELET # BLD AUTO: 169 10E9/L (ref 150–450)
PLATELET # BLD AUTO: 206 10E9/L (ref 150–450)
PLATELET # BLD AUTO: 263 10E9/L (ref 150–450)
PLATELET # BLD AUTO: 271 10E9/L (ref 150–450)
PLATELET # BLD AUTO: 277 10E9/L (ref 150–450)
PLATELET # BLD AUTO: 289 10E9/L (ref 150–450)
PLATELET # BLD AUTO: 292 10E9/L (ref 150–450)
PLATELET # BLD AUTO: 316 10E9/L (ref 150–450)
PLATELET # BLD AUTO: 337 10E9/L (ref 150–450)
PLATELET # BLD AUTO: 368 10E9/L (ref 150–450)
PLATELET # BLD AUTO: 377 10E9/L (ref 150–450)
PLATELET # BLD AUTO: 382 10E9/L (ref 150–450)
PLATELET # BLD AUTO: 407 10E9/L (ref 150–450)
PLATELET # BLD AUTO: 409 10E9/L (ref 150–450)
PLATELET # BLD AUTO: 434 10E9/L (ref 150–450)
PLATELET # BLD AUTO: 442 10E9/L (ref 150–450)
PLATELET # BLD AUTO: 459 10E9/L (ref 150–450)
PLATELET # BLD AUTO: 462 10E9/L (ref 150–450)
PLATELET # BLD AUTO: 476 10E9/L (ref 150–450)
PLATELET # BLD EST: ABNORMAL 10*3/UL
PO2 BLDV: 28 MM HG (ref 25–47)
PO2 BLDV: 30 MM HG (ref 25–47)
PO2 BLDV: 43 MM HG (ref 25–47)
POTASSIUM BLD-SCNC: 4.5 MMOL/L (ref 3.4–5.3)
POTASSIUM SERPL-SCNC: 3.1 MMOL/L (ref 3.4–5.3)
POTASSIUM SERPL-SCNC: 3.1 MMOL/L (ref 3.4–5.3)
POTASSIUM SERPL-SCNC: 3.4 MMOL/L (ref 3.4–5.3)
POTASSIUM SERPL-SCNC: 3.5 MMOL/L (ref 3.4–5.3)
POTASSIUM SERPL-SCNC: 3.6 MMOL/L (ref 3.4–5.3)
POTASSIUM SERPL-SCNC: 3.7 MMOL/L (ref 3.4–5.3)
POTASSIUM SERPL-SCNC: 3.8 MMOL/L (ref 3.4–5.3)
POTASSIUM SERPL-SCNC: 3.9 MMOL/L (ref 3.4–5.3)
POTASSIUM SERPL-SCNC: 3.9 MMOL/L (ref 3.5–5)
POTASSIUM SERPL-SCNC: 4 MMOL/L (ref 3.4–5.3)
POTASSIUM SERPL-SCNC: 4.1 MMOL/L
POTASSIUM SERPL-SCNC: 4.1 MMOL/L (ref 3.4–5.3)
POTASSIUM SERPL-SCNC: 4.2 MMOL/L (ref 3.4–5.3)
POTASSIUM SERPL-SCNC: 4.2 MMOL/L (ref 3.4–5.3)
POTASSIUM SERPL-SCNC: 4.3 MMOL/L (ref 3.4–5.3)
POTASSIUM SERPL-SCNC: 4.4 MMOL/L (ref 3.4–5.3)
POTASSIUM SERPL-SCNC: 4.5 MMOL/L (ref 3.4–5.3)
POTASSIUM SERPL-SCNC: 4.5 MMOL/L (ref 3.5–5)
POTASSIUM SERPL-SCNC: 4.6 MMOL/L (ref 3.5–5)
POTASSIUM SERPL-SCNC: 4.7 MMOL/L (ref 3.4–5.3)
POTASSIUM SERPL-SCNC: 4.7 MMOL/L (ref 3.4–5.3)
POTASSIUM SERPL-SCNC: 4.8 MMOL/L (ref 3.4–5.3)
POTASSIUM SERPL-SCNC: 4.9 MMOL/L (ref 3.4–5.3)
POTASSIUM SERPL-SCNC: 5.3 MMOL/L (ref 3.4–5.3)
POTASSIUM SERPL-SCNC: 6.3 MMOL/L (ref 3.4–5.3)
PROCALCITONIN SERPL-MCNC: 0.11 NG/ML
PROCALCITONIN SERPL-MCNC: 0.13 NG/ML
PROT PATTERN SERPL ELPH-IMP: ABNORMAL
PROT SERPL-MCNC: 6.5 G/DL (ref 6.8–8.8)
PROT SERPL-MCNC: 7.1 G/DL (ref 6.8–8.8)
PROT SERPL-MCNC: 7.1 G/DL (ref 6.8–8.8)
PROT SERPL-MCNC: 7.3 G/DL (ref 6.8–8.8)
RBC # BLD AUTO: 2.25 10E12/L (ref 3.8–5.2)
RBC # BLD AUTO: 2.31 10E12/L (ref 3.8–5.2)
RBC # BLD AUTO: 2.38 10E12/L (ref 3.8–5.2)
RBC # BLD AUTO: 2.5 10E12/L (ref 3.8–5.2)
RBC # BLD AUTO: 2.52 10E12/L (ref 3.8–5.2)
RBC # BLD AUTO: 2.57 10E12/L (ref 3.8–5.2)
RBC # BLD AUTO: 2.73 10E12/L (ref 3.8–5.2)
RBC # BLD AUTO: 2.77 10E12/L (ref 3.8–5.2)
RBC # BLD AUTO: 2.77 10E12/L (ref 3.8–5.2)
RBC # BLD AUTO: 2.79 10E12/L (ref 3.8–5.2)
RBC # BLD AUTO: 2.92 10E12/L (ref 3.8–5.2)
RBC # BLD AUTO: 3 10E12/L (ref 3.8–5.2)
RBC # BLD AUTO: 3.04 10E12/L (ref 3.8–5.2)
RBC # BLD AUTO: 3.06 10E12/L (ref 3.8–5.2)
RBC # BLD AUTO: 3.1 10E12/L (ref 3.8–5.2)
RBC # BLD AUTO: 3.14 10E12/L (ref 3.8–5.2)
RBC # BLD AUTO: 3.22 10E12/L (ref 3.8–5.2)
RBC # BLD AUTO: 3.23 10E12/L (ref 3.8–5.2)
RBC # BLD AUTO: 3.23 10E12/L (ref 3.8–5.2)
RBC # BLD AUTO: 3.24 10E12/L (ref 3.8–5.2)
RBC # BLD AUTO: 3.28 10E12/L (ref 3.8–5.2)
RBC #/AREA URNS AUTO: 1 /HPF (ref 0–2)
RBC #/AREA URNS AUTO: 20 /HPF (ref 0–2)
RBC #/AREA URNS AUTO: <1 /HPF (ref 0–2)
RETICS # AUTO: 34 10E9/L (ref 25–95)
RETICS # AUTO: 39.4 10E9/L (ref 25–95)
RETICS/RBC NFR AUTO: 1.1 % (ref 0.5–2)
RETICS/RBC NFR AUTO: 1.4 % (ref 0.5–2)
RSV RNA SPEC NAA+PROBE: NEGATIVE
SAO2 % BLDV FROM PO2: 52 %
SAO2 % BLDV FROM PO2: 65 %
SODIUM BLD-SCNC: 122 MMOL/L (ref 133–144)
SODIUM SERPL-SCNC: 122 MMOL/L (ref 133–144)
SODIUM SERPL-SCNC: 123 MMOL/L (ref 133–144)
SODIUM SERPL-SCNC: 125 MMOL/L (ref 133–144)
SODIUM SERPL-SCNC: 126 MMOL/L (ref 133–144)
SODIUM SERPL-SCNC: 127 MMOL/L (ref 133–144)
SODIUM SERPL-SCNC: 128 MMOL/L (ref 133–144)
SODIUM SERPL-SCNC: 129 MMOL/L (ref 133–144)
SODIUM SERPL-SCNC: 130 MMOL/L (ref 133–144)
SODIUM SERPL-SCNC: 131 MMOL/L (ref 133–144)
SODIUM SERPL-SCNC: 132 MMOL/L (ref 133–144)
SODIUM SERPL-SCNC: 133 MMOL/L (ref 133–144)
SODIUM SERPL-SCNC: 133 MMOL/L (ref 133–144)
SODIUM SERPL-SCNC: 134 MMOL/L (ref 133–144)
SODIUM SERPL-SCNC: 134 MMOL/L (ref 133–144)
SODIUM SERPL-SCNC: 135 MMOL/L
SODIUM SERPL-SCNC: 135 MMOL/L (ref 133–144)
SODIUM SERPL-SCNC: 136 MMOL/L (ref 136–145)
SODIUM UR-SCNC: 11 MMOL/L
SODIUM UR-SCNC: 75 MMOL/L
SOURCE: ABNORMAL
SOURCE: NORMAL
SOURCE: NORMAL
SP GR UR STRIP: 1.01 (ref 1–1.03)
SPECIMEN EXP DATE BLD: NORMAL
SPECIMEN SOURCE: ABNORMAL
SPECIMEN SOURCE: NORMAL
SQUAMOUS #/AREA URNS AUTO: <1 /HPF (ref 0–1)
TARGETS BLD QL SMEAR: SLIGHT
TIBC SERPL-MCNC: 278 UG/DL (ref 240–430)
TIBC SERPL-MCNC: 279 UG/DL (ref 240–430)
TRANS CELLS #/AREA URNS HPF: 1 /HPF (ref 0–1)
TRANSFUSION STATUS PATIENT QL: NORMAL
TROPONIN I SERPL-MCNC: <0.015 UG/L (ref 0–0.04)
TSH SERPL DL<=0.005 MIU/L-ACNC: 1.63 MU/L (ref 0.4–4)
TSH SERPL DL<=0.005 MIU/L-ACNC: 2.06 MU/L (ref 0.4–4)
URATE SERPL-MCNC: 3.6 MG/DL (ref 2.6–6)
UROBILINOGEN UR STRIP-MCNC: 0 MG/DL (ref 0–2)
VARIANT LYMPHS BLD QL SMEAR: PRESENT
VIT B12 SERPL-MCNC: 1121 PG/ML (ref 193–986)
VIT B12 SERPL-MCNC: 1650 PG/ML (ref 193–986)
WBC # BLD AUTO: 10.4 10E9/L (ref 4–11)
WBC # BLD AUTO: 11.3 10E9/L (ref 4–11)
WBC # BLD AUTO: 11.5 10E9/L (ref 4–11)
WBC # BLD AUTO: 11.6 10E9/L (ref 4–11)
WBC # BLD AUTO: 12.5 10E9/L (ref 4–11)
WBC # BLD AUTO: 13.7 10E9/L (ref 4–11)
WBC # BLD AUTO: 14.1 10E9/L (ref 4–11)
WBC # BLD AUTO: 14.5 10E9/L (ref 4–11)
WBC # BLD AUTO: 15.3 10E9/L (ref 4–11)
WBC # BLD AUTO: 17.3 10E9/L (ref 4–11)
WBC # BLD AUTO: 22.2 10E9/L (ref 4–11)
WBC # BLD AUTO: 3.9 10E9/L (ref 4–11)
WBC # BLD AUTO: 37.9 10E9/L (ref 4–11)
WBC # BLD AUTO: 4.6 10E9/L (ref 4–11)
WBC # BLD AUTO: 4.9 10E9/L (ref 4–11)
WBC # BLD AUTO: 44.6 10E9/L (ref 4–11)
WBC # BLD AUTO: 5.1 10E9/L (ref 4–11)
WBC # BLD AUTO: 5.6 10E9/L (ref 4–11)
WBC # BLD AUTO: 5.8 10E9/L (ref 4–11)
WBC # BLD AUTO: 9.2 10E9/L (ref 4–11)
WBC # BLD AUTO: 9.9 10E9/L (ref 4–11)
WBC #/AREA URNS AUTO: 1 /HPF (ref 0–2)
WBC #/AREA URNS AUTO: 1 /HPF (ref 0–5)
WBC #/AREA URNS AUTO: 63 /HPF (ref 0–2)
WBC CLUMPS #/AREA URNS HPF: PRESENT /HPF

## 2018-01-01 PROCEDURE — 85014 HEMATOCRIT: CPT

## 2018-01-01 PROCEDURE — A9270 NON-COVERED ITEM OR SERVICE: HCPCS | Mod: GY | Performed by: INTERNAL MEDICINE

## 2018-01-01 PROCEDURE — 97530 THERAPEUTIC ACTIVITIES: CPT | Mod: GP | Performed by: PHYSICAL THERAPIST

## 2018-01-01 PROCEDURE — 25000125 ZZHC RX 250: Performed by: INTERNAL MEDICINE

## 2018-01-01 PROCEDURE — 83605 ASSAY OF LACTIC ACID: CPT

## 2018-01-01 PROCEDURE — 83605 ASSAY OF LACTIC ACID: CPT | Performed by: INTERNAL MEDICINE

## 2018-01-01 PROCEDURE — 83880 ASSAY OF NATRIURETIC PEPTIDE: CPT | Performed by: EMERGENCY MEDICINE

## 2018-01-01 PROCEDURE — 12000007 ZZH R&B INTERMEDIATE

## 2018-01-01 PROCEDURE — 25000132 ZZH RX MED GY IP 250 OP 250 PS 637: Mod: GY | Performed by: INTERNAL MEDICINE

## 2018-01-01 PROCEDURE — A9270 NON-COVERED ITEM OR SERVICE: HCPCS | Mod: GY | Performed by: PHYSICIAN ASSISTANT

## 2018-01-01 PROCEDURE — 99233 SBSQ HOSP IP/OBS HIGH 50: CPT | Performed by: INTERNAL MEDICINE

## 2018-01-01 PROCEDURE — 36415 COLL VENOUS BLD VENIPUNCTURE: CPT | Performed by: INTERNAL MEDICINE

## 2018-01-01 PROCEDURE — 00000146 ZZHCL STATISTIC GLUCOSE BY METER IP

## 2018-01-01 PROCEDURE — 85025 COMPLETE CBC W/AUTO DIFF WBC: CPT | Performed by: PHYSICIAN ASSISTANT

## 2018-01-01 PROCEDURE — 40000275 ZZH STATISTIC RCP TIME EA 10 MIN

## 2018-01-01 PROCEDURE — 40000133 ZZH STATISTIC OT WARD VISIT: Performed by: OCCUPATIONAL THERAPIST

## 2018-01-01 PROCEDURE — 25000132 ZZH RX MED GY IP 250 OP 250 PS 637: Mod: GY | Performed by: PHYSICIAN ASSISTANT

## 2018-01-01 PROCEDURE — 94640 AIRWAY INHALATION TREATMENT: CPT

## 2018-01-01 PROCEDURE — 87040 BLOOD CULTURE FOR BACTERIA: CPT | Performed by: INTERNAL MEDICINE

## 2018-01-01 PROCEDURE — 74176 CT ABD & PELVIS W/O CONTRAST: CPT

## 2018-01-01 PROCEDURE — 99223 1ST HOSP IP/OBS HIGH 75: CPT | Mod: AI | Performed by: INTERNAL MEDICINE

## 2018-01-01 PROCEDURE — 96366 THER/PROPH/DIAG IV INF ADDON: CPT

## 2018-01-01 PROCEDURE — 23570 CLTX SCAPULAR FX W/O MNPJ: CPT | Mod: RT

## 2018-01-01 PROCEDURE — 40000193 ZZH STATISTIC PT WARD VISIT: Performed by: PHYSICAL THERAPIST

## 2018-01-01 PROCEDURE — 25000132 ZZH RX MED GY IP 250 OP 250 PS 637: Mod: GY | Performed by: EMERGENCY MEDICINE

## 2018-01-01 PROCEDURE — 94640 AIRWAY INHALATION TREATMENT: CPT | Mod: 76

## 2018-01-01 PROCEDURE — A9270 NON-COVERED ITEM OR SERVICE: HCPCS | Mod: GY | Performed by: HOSPITALIST

## 2018-01-01 PROCEDURE — 84295 ASSAY OF SERUM SODIUM: CPT | Performed by: INTERNAL MEDICINE

## 2018-01-01 PROCEDURE — 25000128 H RX IP 250 OP 636: Performed by: INTERNAL MEDICINE

## 2018-01-01 PROCEDURE — 71045 X-RAY EXAM CHEST 1 VIEW: CPT

## 2018-01-01 PROCEDURE — 97116 GAIT TRAINING THERAPY: CPT | Mod: GP

## 2018-01-01 PROCEDURE — 83880 ASSAY OF NATRIURETIC PEPTIDE: CPT | Performed by: INTERNAL MEDICINE

## 2018-01-01 PROCEDURE — 40000193 ZZH STATISTIC PT WARD VISIT

## 2018-01-01 PROCEDURE — 80048 BASIC METABOLIC PNL TOTAL CA: CPT | Performed by: PHYSICIAN ASSISTANT

## 2018-01-01 PROCEDURE — 94667 MNPJ CHEST WALL 1ST: CPT

## 2018-01-01 PROCEDURE — 36416 COLLJ CAPILLARY BLOOD SPEC: CPT | Performed by: INTERNAL MEDICINE

## 2018-01-01 PROCEDURE — 80048 BASIC METABOLIC PNL TOTAL CA: CPT | Performed by: INTERNAL MEDICINE

## 2018-01-01 PROCEDURE — 85027 COMPLETE CBC AUTOMATED: CPT | Performed by: INTERNAL MEDICINE

## 2018-01-01 PROCEDURE — 99232 SBSQ HOSP IP/OBS MODERATE 35: CPT | Performed by: INTERNAL MEDICINE

## 2018-01-01 PROCEDURE — 85027 COMPLETE CBC AUTOMATED: CPT | Performed by: PHYSICIAN ASSISTANT

## 2018-01-01 PROCEDURE — 94660 CPAP INITIATION&MGMT: CPT

## 2018-01-01 PROCEDURE — 99285 EMERGENCY DEPT VISIT HI MDM: CPT | Mod: 25

## 2018-01-01 PROCEDURE — 40000894 ZZH STATISTIC OT IP EVAL DEFER: Performed by: STUDENT IN AN ORGANIZED HEALTH CARE EDUCATION/TRAINING PROGRAM

## 2018-01-01 PROCEDURE — 85610 PROTHROMBIN TIME: CPT | Performed by: PHYSICIAN ASSISTANT

## 2018-01-01 PROCEDURE — 99239 HOSP IP/OBS DSCHRG MGMT >30: CPT | Performed by: INTERNAL MEDICINE

## 2018-01-01 PROCEDURE — 84145 PROCALCITONIN (PCT): CPT | Performed by: INTERNAL MEDICINE

## 2018-01-01 PROCEDURE — 85018 HEMOGLOBIN: CPT | Performed by: INTERNAL MEDICINE

## 2018-01-01 PROCEDURE — 97116 GAIT TRAINING THERAPY: CPT | Mod: GP | Performed by: PHYSICAL THERAPIST

## 2018-01-01 PROCEDURE — 83935 ASSAY OF URINE OSMOLALITY: CPT | Performed by: INTERNAL MEDICINE

## 2018-01-01 PROCEDURE — 36415 COLL VENOUS BLD VENIPUNCTURE: CPT | Performed by: PHYSICIAN ASSISTANT

## 2018-01-01 PROCEDURE — 83690 ASSAY OF LIPASE: CPT | Performed by: EMERGENCY MEDICINE

## 2018-01-01 PROCEDURE — 82803 BLOOD GASES ANY COMBINATION: CPT

## 2018-01-01 PROCEDURE — 93306 TTE W/DOPPLER COMPLETE: CPT | Mod: 26 | Performed by: INTERNAL MEDICINE

## 2018-01-01 PROCEDURE — 83735 ASSAY OF MAGNESIUM: CPT | Performed by: EMERGENCY MEDICINE

## 2018-01-01 PROCEDURE — A9270 NON-COVERED ITEM OR SERVICE: HCPCS | Mod: GY | Performed by: EMERGENCY MEDICINE

## 2018-01-01 PROCEDURE — 82728 ASSAY OF FERRITIN: CPT | Performed by: PHYSICIAN ASSISTANT

## 2018-01-01 PROCEDURE — 85049 AUTOMATED PLATELET COUNT: CPT | Performed by: INTERNAL MEDICINE

## 2018-01-01 PROCEDURE — 25000128 H RX IP 250 OP 636: Performed by: HOSPITALIST

## 2018-01-01 PROCEDURE — 25000125 ZZHC RX 250: Performed by: PHYSICIAN ASSISTANT

## 2018-01-01 PROCEDURE — 97535 SELF CARE MNGMENT TRAINING: CPT | Mod: GO | Performed by: OCCUPATIONAL THERAPIST

## 2018-01-01 PROCEDURE — 25000131 ZZH RX MED GY IP 250 OP 636 PS 637: Mod: GY | Performed by: INTERNAL MEDICINE

## 2018-01-01 PROCEDURE — 85025 COMPLETE CBC W/AUTO DIFF WBC: CPT | Performed by: INTERNAL MEDICINE

## 2018-01-01 PROCEDURE — 84484 ASSAY OF TROPONIN QUANT: CPT | Performed by: EMERGENCY MEDICINE

## 2018-01-01 PROCEDURE — 80053 COMPREHEN METABOLIC PANEL: CPT | Performed by: PHYSICIAN ASSISTANT

## 2018-01-01 PROCEDURE — 97110 THERAPEUTIC EXERCISES: CPT | Mod: GP

## 2018-01-01 PROCEDURE — 97530 THERAPEUTIC ACTIVITIES: CPT | Mod: GP

## 2018-01-01 PROCEDURE — 85025 COMPLETE CBC W/AUTO DIFF WBC: CPT | Performed by: EMERGENCY MEDICINE

## 2018-01-01 PROCEDURE — 97110 THERAPEUTIC EXERCISES: CPT | Mod: GP | Performed by: PHYSICAL THERAPIST

## 2018-01-01 PROCEDURE — 83735 ASSAY OF MAGNESIUM: CPT | Performed by: INTERNAL MEDICINE

## 2018-01-01 PROCEDURE — 85045 AUTOMATED RETICULOCYTE COUNT: CPT | Performed by: EMERGENCY MEDICINE

## 2018-01-01 PROCEDURE — 99221 1ST HOSP IP/OBS SF/LOW 40: CPT | Performed by: SURGERY

## 2018-01-01 PROCEDURE — 96360 HYDRATION IV INFUSION INIT: CPT | Mod: 59

## 2018-01-01 PROCEDURE — 84484 ASSAY OF TROPONIN QUANT: CPT | Performed by: INTERNAL MEDICINE

## 2018-01-01 PROCEDURE — 25000128 H RX IP 250 OP 636: Performed by: EMERGENCY MEDICINE

## 2018-01-01 PROCEDURE — 80053 COMPREHEN METABOLIC PANEL: CPT | Performed by: EMERGENCY MEDICINE

## 2018-01-01 PROCEDURE — 84443 ASSAY THYROID STIM HORMONE: CPT | Performed by: PHYSICIAN ASSISTANT

## 2018-01-01 PROCEDURE — 82607 VITAMIN B-12: CPT | Performed by: EMERGENCY MEDICINE

## 2018-01-01 PROCEDURE — 87070 CULTURE OTHR SPECIMN AEROBIC: CPT | Performed by: INTERNAL MEDICINE

## 2018-01-01 PROCEDURE — 96374 THER/PROPH/DIAG INJ IV PUSH: CPT

## 2018-01-01 PROCEDURE — P9016 RBC LEUKOCYTES REDUCED: HCPCS | Performed by: INTERNAL MEDICINE

## 2018-01-01 PROCEDURE — 83540 ASSAY OF IRON: CPT | Performed by: INTERNAL MEDICINE

## 2018-01-01 PROCEDURE — 87631 RESP VIRUS 3-5 TARGETS: CPT | Performed by: INTERNAL MEDICINE

## 2018-01-01 PROCEDURE — 81001 URINALYSIS AUTO W/SCOPE: CPT | Performed by: EMERGENCY MEDICINE

## 2018-01-01 PROCEDURE — 81001 URINALYSIS AUTO W/SCOPE: CPT | Performed by: INTERNAL MEDICINE

## 2018-01-01 PROCEDURE — 82565 ASSAY OF CREATININE: CPT | Performed by: INTERNAL MEDICINE

## 2018-01-01 PROCEDURE — 84132 ASSAY OF SERUM POTASSIUM: CPT | Performed by: INTERNAL MEDICINE

## 2018-01-01 PROCEDURE — 87205 SMEAR GRAM STAIN: CPT | Performed by: INTERNAL MEDICINE

## 2018-01-01 PROCEDURE — 70450 CT HEAD/BRAIN W/O DYE: CPT

## 2018-01-01 PROCEDURE — 82746 ASSAY OF FOLIC ACID SERUM: CPT | Performed by: PHYSICIAN ASSISTANT

## 2018-01-01 PROCEDURE — 25000128 H RX IP 250 OP 636: Performed by: PHYSICIAN ASSISTANT

## 2018-01-01 PROCEDURE — 97161 PT EVAL LOW COMPLEX 20 MIN: CPT | Mod: GP

## 2018-01-01 PROCEDURE — 85610 PROTHROMBIN TIME: CPT | Performed by: INTERNAL MEDICINE

## 2018-01-01 PROCEDURE — 71260 CT THORAX DX C+: CPT

## 2018-01-01 PROCEDURE — 83735 ASSAY OF MAGNESIUM: CPT | Performed by: PHYSICIAN ASSISTANT

## 2018-01-01 PROCEDURE — 97161 PT EVAL LOW COMPLEX 20 MIN: CPT | Mod: GP | Performed by: PHYSICAL THERAPIST

## 2018-01-01 PROCEDURE — 87186 SC STD MICRODIL/AGAR DIL: CPT | Performed by: INTERNAL MEDICINE

## 2018-01-01 PROCEDURE — 71046 X-RAY EXAM CHEST 2 VIEWS: CPT

## 2018-01-01 PROCEDURE — 97162 PT EVAL MOD COMPLEX 30 MIN: CPT | Mod: GP | Performed by: PHYSICAL THERAPIST

## 2018-01-01 PROCEDURE — 40000274 ZZH STATISTIC RCP CONSULT EA 30 MIN

## 2018-01-01 PROCEDURE — 99222 1ST HOSP IP/OBS MODERATE 55: CPT | Mod: AI | Performed by: INTERNAL MEDICINE

## 2018-01-01 PROCEDURE — 93005 ELECTROCARDIOGRAM TRACING: CPT

## 2018-01-01 PROCEDURE — 20000003 ZZH R&B ICU

## 2018-01-01 PROCEDURE — 83930 ASSAY OF BLOOD OSMOLALITY: CPT | Performed by: INTERNAL MEDICINE

## 2018-01-01 PROCEDURE — 83550 IRON BINDING TEST: CPT | Performed by: PHYSICIAN ASSISTANT

## 2018-01-01 PROCEDURE — 97140 MANUAL THERAPY 1/> REGIONS: CPT | Mod: GP

## 2018-01-01 PROCEDURE — 96368 THER/DIAG CONCURRENT INF: CPT

## 2018-01-01 PROCEDURE — 86923 COMPATIBILITY TEST ELECTRIC: CPT | Performed by: INTERNAL MEDICINE

## 2018-01-01 PROCEDURE — 82728 ASSAY OF FERRITIN: CPT | Performed by: EMERGENCY MEDICINE

## 2018-01-01 PROCEDURE — 40000133 ZZH STATISTIC OT WARD VISIT

## 2018-01-01 PROCEDURE — 80048 BASIC METABOLIC PNL TOTAL CA: CPT | Performed by: EMERGENCY MEDICINE

## 2018-01-01 PROCEDURE — 27210300 ZZH CANNULA HIGH FLOW, ADULT

## 2018-01-01 PROCEDURE — 73030 X-RAY EXAM OF SHOULDER: CPT | Mod: RT

## 2018-01-01 PROCEDURE — 00000402 ZZHCL STATISTIC TOTAL PROTEIN: Performed by: INTERNAL MEDICINE

## 2018-01-01 PROCEDURE — 84300 ASSAY OF URINE SODIUM: CPT | Performed by: INTERNAL MEDICINE

## 2018-01-01 PROCEDURE — 86850 RBC ANTIBODY SCREEN: CPT | Performed by: INTERNAL MEDICINE

## 2018-01-01 PROCEDURE — 82306 VITAMIN D 25 HYDROXY: CPT | Performed by: INTERNAL MEDICINE

## 2018-01-01 PROCEDURE — P9047 ALBUMIN (HUMAN), 25%, 50ML: HCPCS | Performed by: INTERNAL MEDICINE

## 2018-01-01 PROCEDURE — 12000047 ZZH R&B IMCU

## 2018-01-01 PROCEDURE — 40000847 ZZHCL STATISTIC MORPHOLOGY W/INTERP HISTOLOGY TC 85060: Performed by: INTERNAL MEDICINE

## 2018-01-01 PROCEDURE — 86900 BLOOD TYPING SEROLOGIC ABO: CPT | Performed by: INTERNAL MEDICINE

## 2018-01-01 PROCEDURE — 83540 ASSAY OF IRON: CPT | Performed by: EMERGENCY MEDICINE

## 2018-01-01 PROCEDURE — 85379 FIBRIN DEGRADATION QUANT: CPT | Performed by: INTERNAL MEDICINE

## 2018-01-01 PROCEDURE — 84550 ASSAY OF BLOOD/URIC ACID: CPT | Performed by: INTERNAL MEDICINE

## 2018-01-01 PROCEDURE — 40000983 ZZH STATISTIC HFNC ADULT NON-CPAP

## 2018-01-01 PROCEDURE — 36415 COLL VENOUS BLD VENIPUNCTURE: CPT | Performed by: EMERGENCY MEDICINE

## 2018-01-01 PROCEDURE — 87088 URINE BACTERIA CULTURE: CPT | Performed by: INTERNAL MEDICINE

## 2018-01-01 PROCEDURE — 87640 STAPH A DNA AMP PROBE: CPT | Performed by: INTERNAL MEDICINE

## 2018-01-01 PROCEDURE — 86901 BLOOD TYPING SEROLOGIC RH(D): CPT | Performed by: INTERNAL MEDICINE

## 2018-01-01 PROCEDURE — 25000125 ZZHC RX 250: Performed by: EMERGENCY MEDICINE

## 2018-01-01 PROCEDURE — 80047 BASIC METABLC PNL IONIZED CA: CPT

## 2018-01-01 PROCEDURE — 73502 X-RAY EXAM HIP UNI 2-3 VIEWS: CPT

## 2018-01-01 PROCEDURE — 87804 INFLUENZA ASSAY W/OPTIC: CPT | Performed by: EMERGENCY MEDICINE

## 2018-01-01 PROCEDURE — 40000281 ZZH STATISTIC TRANSPORT TIME EA 15 MIN

## 2018-01-01 PROCEDURE — 87086 URINE CULTURE/COLONY COUNT: CPT | Performed by: INTERNAL MEDICINE

## 2018-01-01 PROCEDURE — 85610 PROTHROMBIN TIME: CPT | Performed by: EMERGENCY MEDICINE

## 2018-01-01 PROCEDURE — 25000132 ZZH RX MED GY IP 250 OP 250 PS 637: Mod: GY | Performed by: HOSPITALIST

## 2018-01-01 PROCEDURE — 82607 VITAMIN B-12: CPT | Performed by: PHYSICIAN ASSISTANT

## 2018-01-01 PROCEDURE — 94799 UNLISTED PULMONARY SVC/PX: CPT

## 2018-01-01 PROCEDURE — 87040 BLOOD CULTURE FOR BACTERIA: CPT | Performed by: EMERGENCY MEDICINE

## 2018-01-01 PROCEDURE — 84443 ASSAY THYROID STIM HORMONE: CPT | Performed by: EMERGENCY MEDICINE

## 2018-01-01 PROCEDURE — 85060 BLOOD SMEAR INTERPRETATION: CPT | Performed by: INTERNAL MEDICINE

## 2018-01-01 PROCEDURE — 85045 AUTOMATED RETICULOCYTE COUNT: CPT | Performed by: INTERNAL MEDICINE

## 2018-01-01 PROCEDURE — 87641 MR-STAPH DNA AMP PROBE: CPT | Performed by: INTERNAL MEDICINE

## 2018-01-01 PROCEDURE — 96365 THER/PROPH/DIAG IV INF INIT: CPT

## 2018-01-01 PROCEDURE — 83550 IRON BINDING TEST: CPT | Performed by: EMERGENCY MEDICINE

## 2018-01-01 PROCEDURE — 25500064 ZZH RX 255 OP 636: Performed by: INTERNAL MEDICINE

## 2018-01-01 PROCEDURE — 96365 THER/PROPH/DIAG IV INF INIT: CPT | Mod: 59

## 2018-01-01 PROCEDURE — 83540 ASSAY OF IRON: CPT | Performed by: PHYSICIAN ASSISTANT

## 2018-01-01 PROCEDURE — 97535 SELF CARE MNGMENT TRAINING: CPT | Mod: GO

## 2018-01-01 PROCEDURE — 84165 PROTEIN E-PHORESIS SERUM: CPT | Performed by: INTERNAL MEDICINE

## 2018-01-01 PROCEDURE — 40000809 ZZH STATISTIC NO DOCUMENTATION TO SUPPORT CHARGE

## 2018-01-01 PROCEDURE — 97165 OT EVAL LOW COMPLEX 30 MIN: CPT | Mod: GO

## 2018-01-01 PROCEDURE — 82805 BLOOD GASES W/O2 SATURATION: CPT | Performed by: INTERNAL MEDICINE

## 2018-01-01 RX ORDER — ASPIRIN 81 MG/1
TABLET, CHEWABLE ORAL
Status: DISCONTINUED
Start: 2018-01-01 | End: 2018-01-01 | Stop reason: HOSPADM

## 2018-01-01 RX ORDER — LORAZEPAM 0.5 MG/1
0.5 TABLET ORAL AT BEDTIME
Qty: 30 TABLET | Refills: 0 | Status: ON HOLD | OUTPATIENT
Start: 2018-01-01 | End: 2018-01-01

## 2018-01-01 RX ORDER — ROPINIROLE 0.25 MG/1
0.25 TABLET, FILM COATED ORAL DAILY
COMMUNITY

## 2018-01-01 RX ORDER — SODIUM CHLORIDE 9 MG/ML
1000 INJECTION, SOLUTION INTRAVENOUS CONTINUOUS
Status: DISCONTINUED | OUTPATIENT
Start: 2018-01-01 | End: 2018-01-01 | Stop reason: HOSPADM

## 2018-01-01 RX ORDER — FUROSEMIDE 10 MG/ML
20 INJECTION INTRAMUSCULAR; INTRAVENOUS
Status: DISCONTINUED | OUTPATIENT
Start: 2018-01-01 | End: 2018-01-01

## 2018-01-01 RX ORDER — HYDROCODONE BITARTRATE AND ACETAMINOPHEN 5; 325 MG/1; MG/1
1 TABLET ORAL EVERY 4 HOURS PRN
Qty: 30 TABLET | Refills: 0 | Status: SHIPPED | OUTPATIENT
Start: 2018-01-01 | End: 2018-01-01

## 2018-01-01 RX ORDER — WARFARIN SODIUM 2.5 MG/1
2.5 TABLET ORAL
Status: COMPLETED | OUTPATIENT
Start: 2018-01-01 | End: 2018-01-01

## 2018-01-01 RX ORDER — IPRATROPIUM BROMIDE AND ALBUTEROL SULFATE 2.5; .5 MG/3ML; MG/3ML
3 SOLUTION RESPIRATORY (INHALATION) EVERY 4 HOURS PRN
Status: DISCONTINUED | OUTPATIENT
Start: 2018-01-01 | End: 2018-01-01

## 2018-01-01 RX ORDER — ONDANSETRON 2 MG/ML
4 INJECTION INTRAMUSCULAR; INTRAVENOUS EVERY 6 HOURS PRN
Status: DISCONTINUED | OUTPATIENT
Start: 2018-01-01 | End: 2018-01-01 | Stop reason: HOSPADM

## 2018-01-01 RX ORDER — HYDROCHLOROTHIAZIDE 25 MG/1
25 TABLET ORAL ONCE
Status: ON HOLD | COMMUNITY
End: 2018-01-01

## 2018-01-01 RX ORDER — HYDROCODONE BITARTRATE AND ACETAMINOPHEN 5; 325 MG/1; MG/1
1 TABLET ORAL EVERY 4 HOURS PRN
Qty: 30 TABLET | Refills: 0 | Status: ON HOLD | OUTPATIENT
Start: 2018-01-01 | End: 2018-01-01

## 2018-01-01 RX ORDER — METOPROLOL TARTRATE 50 MG
50 TABLET ORAL 2 TIMES DAILY
Qty: 60 TABLET | Refills: 0 | Status: ON HOLD | DISCHARGE
Start: 2018-01-01 | End: 2018-01-01

## 2018-01-01 RX ORDER — SODIUM CHLORIDE 9 MG/ML
INJECTION, SOLUTION INTRAVENOUS CONTINUOUS
Status: DISCONTINUED | OUTPATIENT
Start: 2018-01-01 | End: 2018-01-01

## 2018-01-01 RX ORDER — LIDOCAINE 40 MG/G
CREAM TOPICAL
Status: DISCONTINUED | OUTPATIENT
Start: 2018-01-01 | End: 2018-01-01 | Stop reason: HOSPADM

## 2018-01-01 RX ORDER — PROCHLORPERAZINE MALEATE 5 MG
5 TABLET ORAL EVERY 6 HOURS PRN
Status: DISCONTINUED | OUTPATIENT
Start: 2018-01-01 | End: 2018-01-01 | Stop reason: HOSPADM

## 2018-01-01 RX ORDER — ONDANSETRON 4 MG/1
4 TABLET, ORALLY DISINTEGRATING ORAL EVERY 6 HOURS PRN
Status: DISCONTINUED | OUTPATIENT
Start: 2018-01-01 | End: 2018-01-01 | Stop reason: HOSPADM

## 2018-01-01 RX ORDER — WARFARIN SODIUM 2.5 MG/1
2.5 TABLET ORAL DAILY
COMMUNITY

## 2018-01-01 RX ORDER — CALCIUM CARBONATE 500 MG/1
500-1000 TABLET, CHEWABLE ORAL
Status: DISCONTINUED | OUTPATIENT
Start: 2018-01-01 | End: 2018-01-01 | Stop reason: HOSPADM

## 2018-01-01 RX ORDER — POLYETHYLENE GLYCOL 3350 17 G/17G
17 POWDER, FOR SOLUTION ORAL DAILY PRN
Status: DISCONTINUED | OUTPATIENT
Start: 2018-01-01 | End: 2018-01-01 | Stop reason: HOSPADM

## 2018-01-01 RX ORDER — DIPHENHYDRAMINE HYDROCHLORIDE 50 MG/ML
50 INJECTION INTRAMUSCULAR; INTRAVENOUS
Status: ACTIVE | OUTPATIENT
Start: 2018-01-01 | End: 2018-01-01

## 2018-01-01 RX ORDER — POLYETHYLENE GLYCOL 3350 17 G/17G
17 POWDER, FOR SOLUTION ORAL DAILY PRN
Qty: 30 PACKET | DISCHARGE
Start: 2018-01-01

## 2018-01-01 RX ORDER — NIFEDIPINE 90 MG/1
90 TABLET, FILM COATED, EXTENDED RELEASE ORAL DAILY
Status: ON HOLD | COMMUNITY
End: 2018-01-01

## 2018-01-01 RX ORDER — FUROSEMIDE 10 MG/ML
40 INJECTION INTRAMUSCULAR; INTRAVENOUS ONCE
Status: COMPLETED | OUTPATIENT
Start: 2018-01-01 | End: 2018-01-01

## 2018-01-01 RX ORDER — HYDROCODONE BITARTRATE AND ACETAMINOPHEN 5; 325 MG/1; MG/1
1 TABLET ORAL EVERY 4 HOURS PRN
Status: DISCONTINUED | OUTPATIENT
Start: 2018-01-01 | End: 2018-01-01 | Stop reason: HOSPADM

## 2018-01-01 RX ORDER — IPRATROPIUM BROMIDE AND ALBUTEROL SULFATE 2.5; .5 MG/3ML; MG/3ML
1 SOLUTION RESPIRATORY (INHALATION) 4 TIMES DAILY
COMMUNITY

## 2018-01-01 RX ORDER — PREDNISONE 20 MG/1
40 TABLET ORAL DAILY
Status: COMPLETED | OUTPATIENT
Start: 2018-01-01 | End: 2018-01-01

## 2018-01-01 RX ORDER — AMOXICILLIN 250 MG
2 CAPSULE ORAL 2 TIMES DAILY PRN
Status: DISCONTINUED | OUTPATIENT
Start: 2018-01-01 | End: 2018-01-01 | Stop reason: HOSPADM

## 2018-01-01 RX ORDER — NIFEDIPINE 90 MG/1
90 TABLET, EXTENDED RELEASE ORAL DAILY
Status: DISCONTINUED | OUTPATIENT
Start: 2018-01-01 | End: 2018-01-01 | Stop reason: HOSPADM

## 2018-01-01 RX ORDER — CEFTRIAXONE SODIUM 2 G/50ML
2 INJECTION, SOLUTION INTRAVENOUS ONCE
Status: COMPLETED | OUTPATIENT
Start: 2018-01-01 | End: 2018-01-01

## 2018-01-01 RX ORDER — POLYETHYLENE GLYCOL 3350 17 G/17G
1 POWDER, FOR SOLUTION ORAL DAILY
Qty: 527 G | Refills: 0 | Status: SHIPPED | OUTPATIENT
Start: 2018-01-01 | End: 2018-01-01

## 2018-01-01 RX ORDER — METOPROLOL TARTRATE 50 MG
50 TABLET ORAL 2 TIMES DAILY
Status: DISCONTINUED | OUTPATIENT
Start: 2018-01-01 | End: 2018-01-01 | Stop reason: HOSPADM

## 2018-01-01 RX ORDER — WARFARIN SODIUM 1 MG/1
1 TABLET ORAL
Status: COMPLETED | OUTPATIENT
Start: 2018-01-01 | End: 2018-01-01

## 2018-01-01 RX ORDER — HYDROXYZINE HYDROCHLORIDE 25 MG/1
25 TABLET, FILM COATED ORAL EVERY 6 HOURS PRN
Status: DISCONTINUED | OUTPATIENT
Start: 2018-01-01 | End: 2018-01-01 | Stop reason: HOSPADM

## 2018-01-01 RX ORDER — LORAZEPAM 0.5 MG/1
0.5 TABLET ORAL AT BEDTIME
Status: DISCONTINUED | OUTPATIENT
Start: 2018-01-01 | End: 2018-01-01 | Stop reason: HOSPADM

## 2018-01-01 RX ORDER — CEFTRIAXONE SODIUM 1 G/50ML
1 INJECTION, SOLUTION INTRAVENOUS EVERY 24 HOURS
Status: DISCONTINUED | OUTPATIENT
Start: 2018-01-01 | End: 2018-01-01

## 2018-01-01 RX ORDER — NITROGLYCERIN 0.4 MG/1
0.4 TABLET SUBLINGUAL EVERY 5 MIN PRN
Status: DISCONTINUED | OUTPATIENT
Start: 2018-01-01 | End: 2018-01-01 | Stop reason: HOSPADM

## 2018-01-01 RX ORDER — LIDOCAINE 40 MG/G
CREAM TOPICAL
Status: DISCONTINUED | OUTPATIENT
Start: 2018-01-01 | End: 2018-01-01

## 2018-01-01 RX ORDER — SPIRONOLACTONE 25 MG/1
25 TABLET ORAL DAILY
Qty: 30 TABLET | DISCHARGE
Start: 2018-01-01

## 2018-01-01 RX ORDER — METHYLPREDNISOLONE SODIUM SUCCINATE 125 MG/2ML
125 INJECTION, POWDER, LYOPHILIZED, FOR SOLUTION INTRAMUSCULAR; INTRAVENOUS
Status: ACTIVE | OUTPATIENT
Start: 2018-01-01 | End: 2018-01-01

## 2018-01-01 RX ORDER — TRAMADOL HYDROCHLORIDE 50 MG/1
50 TABLET ORAL ONCE
Status: COMPLETED | OUTPATIENT
Start: 2018-01-01 | End: 2018-01-01

## 2018-01-01 RX ORDER — IPRATROPIUM BROMIDE AND ALBUTEROL SULFATE 2.5; .5 MG/3ML; MG/3ML
3 SOLUTION RESPIRATORY (INHALATION)
Status: DISCONTINUED | OUTPATIENT
Start: 2018-01-01 | End: 2018-01-01

## 2018-01-01 RX ORDER — HYDROCODONE BITARTRATE AND ACETAMINOPHEN 5; 325 MG/1; MG/1
1-2 TABLET ORAL EVERY 4 HOURS PRN
Status: DISCONTINUED | OUTPATIENT
Start: 2018-01-01 | End: 2018-01-01 | Stop reason: HOSPADM

## 2018-01-01 RX ORDER — CEFTRIAXONE SODIUM 1 G/50ML
1 INJECTION, SOLUTION INTRAVENOUS EVERY 24 HOURS
Status: DISCONTINUED | OUTPATIENT
Start: 2018-01-01 | End: 2018-01-01 | Stop reason: HOSPADM

## 2018-01-01 RX ORDER — CALCIUM CARBONATE 500 MG/1
1000 TABLET, CHEWABLE ORAL
Status: DISCONTINUED | OUTPATIENT
Start: 2018-01-01 | End: 2018-01-01

## 2018-01-01 RX ORDER — LOSARTAN POTASSIUM 25 MG/1
12.5 TABLET ORAL DAILY
Qty: 30 TABLET | DISCHARGE
Start: 2018-01-01

## 2018-01-01 RX ORDER — OSELTAMIVIR PHOSPHATE 30 MG/1
30 CAPSULE ORAL DAILY
Status: DISCONTINUED | OUTPATIENT
Start: 2018-01-01 | End: 2018-01-01

## 2018-01-01 RX ORDER — LOSARTAN POTASSIUM 100 MG/1
100 TABLET ORAL DAILY
Status: DISCONTINUED | OUTPATIENT
Start: 2018-01-01 | End: 2018-01-01 | Stop reason: HOSPADM

## 2018-01-01 RX ORDER — ACETAMINOPHEN 325 MG/1
650 TABLET ORAL EVERY 4 HOURS PRN
Status: DISCONTINUED | OUTPATIENT
Start: 2018-01-01 | End: 2018-01-01 | Stop reason: HOSPADM

## 2018-01-01 RX ORDER — FUROSEMIDE 40 MG
40 TABLET ORAL DAILY
Status: DISCONTINUED | OUTPATIENT
Start: 2018-01-01 | End: 2018-01-01 | Stop reason: HOSPADM

## 2018-01-01 RX ORDER — FUROSEMIDE 20 MG
60 TABLET ORAL DAILY
COMMUNITY

## 2018-01-01 RX ORDER — LOSARTAN POTASSIUM 100 MG/1
50 TABLET ORAL DAILY
Status: ON HOLD | COMMUNITY
End: 2018-01-01

## 2018-01-01 RX ORDER — HYDROMORPHONE HYDROCHLORIDE 1 MG/ML
0.2 INJECTION, SOLUTION INTRAMUSCULAR; INTRAVENOUS; SUBCUTANEOUS
Status: DISCONTINUED | OUTPATIENT
Start: 2018-01-01 | End: 2018-01-01 | Stop reason: HOSPADM

## 2018-01-01 RX ORDER — SODIUM CHLORIDE FOR INHALATION 3 %
3 VIAL, NEBULIZER (ML) INHALATION
Status: DISCONTINUED | OUTPATIENT
Start: 2018-01-01 | End: 2018-01-01

## 2018-01-01 RX ORDER — ALBUTEROL SULFATE 0.83 MG/ML
2.5 SOLUTION RESPIRATORY (INHALATION)
Status: DISCONTINUED | OUTPATIENT
Start: 2018-01-01 | End: 2018-01-01 | Stop reason: HOSPADM

## 2018-01-01 RX ORDER — FUROSEMIDE 40 MG
40 TABLET ORAL DAILY
DISCHARGE
Start: 2018-01-01 | End: 2018-01-01

## 2018-01-01 RX ORDER — ALBUTEROL SULFATE 0.83 MG/ML
2.5 SOLUTION RESPIRATORY (INHALATION)
Status: DISCONTINUED | OUTPATIENT
Start: 2018-01-01 | End: 2018-01-01

## 2018-01-01 RX ORDER — FUROSEMIDE 10 MG/ML
40 INJECTION INTRAMUSCULAR; INTRAVENOUS
Status: DISCONTINUED | OUTPATIENT
Start: 2018-01-01 | End: 2018-01-01 | Stop reason: HOSPADM

## 2018-01-01 RX ORDER — FUROSEMIDE 10 MG/ML
20 INJECTION INTRAMUSCULAR; INTRAVENOUS EVERY 8 HOURS
Status: COMPLETED | OUTPATIENT
Start: 2018-01-01 | End: 2018-01-01

## 2018-01-01 RX ORDER — METHYLPREDNISOLONE SODIUM SUCCINATE 125 MG/2ML
60 INJECTION, POWDER, LYOPHILIZED, FOR SOLUTION INTRAMUSCULAR; INTRAVENOUS EVERY 12 HOURS
Status: DISCONTINUED | OUTPATIENT
Start: 2018-01-01 | End: 2018-01-01

## 2018-01-01 RX ORDER — ASPIRIN 81 MG/1
81 TABLET ORAL DAILY
Status: DISCONTINUED | OUTPATIENT
Start: 2018-01-01 | End: 2018-01-01 | Stop reason: HOSPADM

## 2018-01-01 RX ORDER — HYDROCHLOROTHIAZIDE 25 MG/1
25 TABLET ORAL ONCE
Status: DISCONTINUED | OUTPATIENT
Start: 2018-01-01 | End: 2018-01-01 | Stop reason: CLARIF

## 2018-01-01 RX ORDER — NIFEDIPINE 30 MG/1
60 TABLET, EXTENDED RELEASE ORAL DAILY
Status: DISCONTINUED | OUTPATIENT
Start: 2018-01-01 | End: 2018-01-01 | Stop reason: HOSPADM

## 2018-01-01 RX ORDER — HYDROMORPHONE HCL/0.9% NACL/PF 0.2MG/0.2
0.2 SYRINGE (ML) INTRAVENOUS ONCE
Status: COMPLETED | OUTPATIENT
Start: 2018-01-01 | End: 2018-01-01

## 2018-01-01 RX ORDER — FUROSEMIDE 10 MG/ML
40 INJECTION INTRAMUSCULAR; INTRAVENOUS
Status: DISCONTINUED | OUTPATIENT
Start: 2018-01-01 | End: 2018-01-01

## 2018-01-01 RX ORDER — METOPROLOL TARTRATE 50 MG
50 TABLET ORAL 2 TIMES DAILY
Status: DISCONTINUED | OUTPATIENT
Start: 2018-01-01 | End: 2018-01-01

## 2018-01-01 RX ORDER — SODIUM CHLORIDE FOR INHALATION 3 %
5 VIAL, NEBULIZER (ML) INHALATION
Status: DISCONTINUED | OUTPATIENT
Start: 2018-01-01 | End: 2018-01-01 | Stop reason: ALTCHOICE

## 2018-01-01 RX ORDER — ACETAMINOPHEN 325 MG/1
650 TABLET ORAL EVERY 4 HOURS PRN
Status: DISCONTINUED | OUTPATIENT
Start: 2018-01-01 | End: 2018-01-01

## 2018-01-01 RX ORDER — HALOPERIDOL 5 MG/ML
2 INJECTION INTRAMUSCULAR ONCE
Status: COMPLETED | OUTPATIENT
Start: 2018-01-01 | End: 2018-01-01

## 2018-01-01 RX ORDER — HYDROXYZINE HYDROCHLORIDE 50 MG/1
50 TABLET, FILM COATED ORAL EVERY 6 HOURS PRN
Status: DISCONTINUED | OUTPATIENT
Start: 2018-01-01 | End: 2018-01-01 | Stop reason: HOSPADM

## 2018-01-01 RX ORDER — LORAZEPAM 2 MG/ML
1 INJECTION INTRAMUSCULAR EVERY 4 HOURS PRN
Status: DISCONTINUED | OUTPATIENT
Start: 2018-01-01 | End: 2018-01-01 | Stop reason: HOSPADM

## 2018-01-01 RX ORDER — METOPROLOL TARTRATE 100 MG
100 TABLET ORAL 2 TIMES DAILY
Status: DISCONTINUED | OUTPATIENT
Start: 2018-01-01 | End: 2018-01-01 | Stop reason: HOSPADM

## 2018-01-01 RX ORDER — ROPINIROLE 0.25 MG/1
0.25 TABLET, FILM COATED ORAL AT BEDTIME
Status: DISCONTINUED | OUTPATIENT
Start: 2018-01-01 | End: 2018-01-01 | Stop reason: HOSPADM

## 2018-01-01 RX ORDER — TRAMADOL HYDROCHLORIDE 50 MG/1
50 TABLET ORAL 3 TIMES DAILY
Status: DISCONTINUED | OUTPATIENT
Start: 2018-01-01 | End: 2018-01-01

## 2018-01-01 RX ORDER — IPRATROPIUM BROMIDE AND ALBUTEROL SULFATE 2.5; .5 MG/3ML; MG/3ML
3 SOLUTION RESPIRATORY (INHALATION) 4 TIMES DAILY
Status: DISCONTINUED | OUTPATIENT
Start: 2018-01-01 | End: 2018-01-01 | Stop reason: HOSPADM

## 2018-01-01 RX ORDER — PREDNISONE 20 MG/1
40 TABLET ORAL DAILY
Status: DISCONTINUED | OUTPATIENT
Start: 2018-01-01 | End: 2018-01-01

## 2018-01-01 RX ORDER — TRAMADOL HYDROCHLORIDE 50 MG/1
50 TABLET ORAL 3 TIMES DAILY
COMMUNITY

## 2018-01-01 RX ORDER — METOPROLOL TARTRATE 25 MG/1
25 TABLET, FILM COATED ORAL 2 TIMES DAILY
Status: DISCONTINUED | OUTPATIENT
Start: 2018-01-01 | End: 2018-01-01

## 2018-01-01 RX ORDER — POTASSIUM CHLORIDE 7.45 MG/ML
10 INJECTION INTRAVENOUS
Status: DISCONTINUED | OUTPATIENT
Start: 2018-01-01 | End: 2018-01-01 | Stop reason: HOSPADM

## 2018-01-01 RX ORDER — ALUMINA, MAGNESIA, AND SIMETHICONE 2400; 2400; 240 MG/30ML; MG/30ML; MG/30ML
30 SUSPENSION ORAL EVERY 4 HOURS PRN
Status: DISCONTINUED | OUTPATIENT
Start: 2018-01-01 | End: 2018-01-01 | Stop reason: HOSPADM

## 2018-01-01 RX ORDER — TRAMADOL HYDROCHLORIDE 50 MG/1
50 TABLET ORAL EVERY 6 HOURS PRN
Status: DISCONTINUED | OUTPATIENT
Start: 2018-01-01 | End: 2018-01-01

## 2018-01-01 RX ORDER — FUROSEMIDE 40 MG
40 TABLET ORAL DAILY
Status: DISCONTINUED | OUTPATIENT
Start: 2018-01-01 | End: 2018-01-01

## 2018-01-01 RX ORDER — FUROSEMIDE 10 MG/ML
40 INJECTION INTRAMUSCULAR; INTRAVENOUS 2 TIMES DAILY
Status: DISCONTINUED | OUTPATIENT
Start: 2018-01-01 | End: 2018-01-01 | Stop reason: ALTCHOICE

## 2018-01-01 RX ORDER — NALOXONE HYDROCHLORIDE 0.4 MG/ML
.1-.4 INJECTION, SOLUTION INTRAMUSCULAR; INTRAVENOUS; SUBCUTANEOUS
Status: DISCONTINUED | OUTPATIENT
Start: 2018-01-01 | End: 2018-01-01 | Stop reason: HOSPADM

## 2018-01-01 RX ORDER — SODIUM CHLORIDE 9 MG/ML
INJECTION, SOLUTION INTRAVENOUS CONTINUOUS
Status: DISCONTINUED | OUTPATIENT
Start: 2018-01-01 | End: 2018-01-01 | Stop reason: CLARIF

## 2018-01-01 RX ORDER — POTASSIUM CHLORIDE 1.5 G/1.58G
20-40 POWDER, FOR SOLUTION ORAL
Status: DISCONTINUED | OUTPATIENT
Start: 2018-01-01 | End: 2018-01-01 | Stop reason: HOSPADM

## 2018-01-01 RX ORDER — LORAZEPAM 0.5 MG/1
0.5 TABLET ORAL AT BEDTIME
Status: ON HOLD | COMMUNITY
End: 2018-01-01

## 2018-01-01 RX ORDER — IPRATROPIUM BROMIDE AND ALBUTEROL SULFATE 2.5; .5 MG/3ML; MG/3ML
3 SOLUTION RESPIRATORY (INHALATION) EVERY 4 HOURS PRN
Status: DISCONTINUED | OUTPATIENT
Start: 2018-01-01 | End: 2018-01-01 | Stop reason: HOSPADM

## 2018-01-01 RX ORDER — ALBUTEROL SULFATE 0.83 MG/ML
2.5 SOLUTION RESPIRATORY (INHALATION)
Status: DISCONTINUED | OUTPATIENT
Start: 2018-01-01 | End: 2018-01-01 | Stop reason: ALTCHOICE

## 2018-01-01 RX ORDER — MAGNESIUM SULFATE HEPTAHYDRATE 40 MG/ML
4 INJECTION, SOLUTION INTRAVENOUS EVERY 4 HOURS PRN
Status: DISCONTINUED | OUTPATIENT
Start: 2018-01-01 | End: 2018-01-01 | Stop reason: HOSPADM

## 2018-01-01 RX ORDER — FUROSEMIDE 10 MG/ML
40 INJECTION INTRAMUSCULAR; INTRAVENOUS EVERY 8 HOURS
Status: COMPLETED | OUTPATIENT
Start: 2018-01-01 | End: 2018-01-01

## 2018-01-01 RX ORDER — SODIUM POLYSTYRENE SULFONATE 15 G/60ML
30 SUSPENSION ORAL; RECTAL ONCE
Status: DISCONTINUED | OUTPATIENT
Start: 2018-01-01 | End: 2018-01-01

## 2018-01-01 RX ORDER — LEVALBUTEROL INHALATION SOLUTION 0.63 MG/3ML
0.63 SOLUTION RESPIRATORY (INHALATION) 4 TIMES DAILY
Status: DISCONTINUED | OUTPATIENT
Start: 2018-01-01 | End: 2018-01-01

## 2018-01-01 RX ORDER — BENZONATATE 100 MG/1
100 CAPSULE ORAL 3 TIMES DAILY PRN
Status: DISCONTINUED | OUTPATIENT
Start: 2018-01-01 | End: 2018-01-01 | Stop reason: HOSPADM

## 2018-01-01 RX ORDER — HYDROCHLOROTHIAZIDE 25 MG/1
25 TABLET ORAL ONCE
Status: COMPLETED | OUTPATIENT
Start: 2018-01-01 | End: 2018-01-01

## 2018-01-01 RX ORDER — HYDROMORPHONE HYDROCHLORIDE 1 MG/ML
0.2 INJECTION, SOLUTION INTRAMUSCULAR; INTRAVENOUS; SUBCUTANEOUS
Status: DISCONTINUED | OUTPATIENT
Start: 2018-01-01 | End: 2018-01-01

## 2018-01-01 RX ORDER — OSELTAMIVIR PHOSPHATE 75 MG/1
75 CAPSULE ORAL DAILY
COMMUNITY

## 2018-01-01 RX ORDER — PROCHLORPERAZINE 25 MG
12.5 SUPPOSITORY, RECTAL RECTAL EVERY 12 HOURS PRN
Status: DISCONTINUED | OUTPATIENT
Start: 2018-01-01 | End: 2018-01-01 | Stop reason: HOSPADM

## 2018-01-01 RX ORDER — POTASSIUM CHLORIDE 1500 MG/1
20-40 TABLET, EXTENDED RELEASE ORAL
Status: DISCONTINUED | OUTPATIENT
Start: 2018-01-01 | End: 2018-01-01 | Stop reason: HOSPADM

## 2018-01-01 RX ORDER — POTASSIUM CHLORIDE 29.8 MG/ML
20 INJECTION INTRAVENOUS
Status: DISCONTINUED | OUTPATIENT
Start: 2018-01-01 | End: 2018-01-01 | Stop reason: HOSPADM

## 2018-01-01 RX ORDER — LORAZEPAM 2 MG/ML
0.5 INJECTION INTRAMUSCULAR EVERY 4 HOURS PRN
Status: DISCONTINUED | OUTPATIENT
Start: 2018-01-01 | End: 2018-01-01

## 2018-01-01 RX ORDER — FUROSEMIDE 10 MG/ML
40 INJECTION INTRAMUSCULAR; INTRAVENOUS ONCE
Status: DISCONTINUED | OUTPATIENT
Start: 2018-01-01 | End: 2018-01-01

## 2018-01-01 RX ORDER — DOCUSATE SODIUM 100 MG/1
100 CAPSULE, LIQUID FILLED ORAL 2 TIMES DAILY
Status: DISCONTINUED | OUTPATIENT
Start: 2018-01-01 | End: 2018-01-01 | Stop reason: HOSPADM

## 2018-01-01 RX ORDER — FUROSEMIDE 10 MG/ML
20 INJECTION INTRAMUSCULAR; INTRAVENOUS EVERY 8 HOURS
Status: DISCONTINUED | OUTPATIENT
Start: 2018-01-01 | End: 2018-01-01

## 2018-01-01 RX ORDER — SULFAMETHOXAZOLE/TRIMETHOPRIM 800-160 MG
1 TABLET ORAL 2 TIMES DAILY
Qty: 14 TABLET | Refills: 0 | DISCHARGE
Start: 2018-01-01 | End: 2018-01-01

## 2018-01-01 RX ORDER — HYDROMORPHONE HYDROCHLORIDE 1 MG/ML
.2-.4 INJECTION, SOLUTION INTRAMUSCULAR; INTRAVENOUS; SUBCUTANEOUS
Status: DISCONTINUED | OUTPATIENT
Start: 2018-01-01 | End: 2018-01-01

## 2018-01-01 RX ORDER — AZITHROMYCIN 250 MG/1
250 TABLET, FILM COATED ORAL ONCE
Status: COMPLETED | OUTPATIENT
Start: 2018-01-01 | End: 2018-01-01

## 2018-01-01 RX ORDER — LIDOCAINE 4 G/G
1 PATCH TOPICAL
Status: DISCONTINUED | OUTPATIENT
Start: 2018-01-01 | End: 2018-01-01 | Stop reason: HOSPADM

## 2018-01-01 RX ORDER — IPRATROPIUM BROMIDE AND ALBUTEROL SULFATE 2.5; .5 MG/3ML; MG/3ML
3 SOLUTION RESPIRATORY (INHALATION) EVERY 6 HOURS PRN
Status: DISCONTINUED | OUTPATIENT
Start: 2018-01-01 | End: 2018-01-01

## 2018-01-01 RX ORDER — POTASSIUM CL/LIDO/0.9 % NACL 10MEQ/0.1L
10 INTRAVENOUS SOLUTION, PIGGYBACK (ML) INTRAVENOUS
Status: DISCONTINUED | OUTPATIENT
Start: 2018-01-01 | End: 2018-01-01 | Stop reason: HOSPADM

## 2018-01-01 RX ORDER — CALCIUM CARBONATE 500 MG/1
500 TABLET, CHEWABLE ORAL DAILY PRN
Status: DISCONTINUED | OUTPATIENT
Start: 2018-01-01 | End: 2018-01-01 | Stop reason: HOSPADM

## 2018-01-01 RX ORDER — TRAMADOL HYDROCHLORIDE 50 MG/1
TABLET ORAL 3 TIMES DAILY
Status: ON HOLD | COMMUNITY
End: 2018-01-01

## 2018-01-01 RX ORDER — LORAZEPAM 0.5 MG/1
0.25 TABLET ORAL AT BEDTIME
Status: DISCONTINUED | OUTPATIENT
Start: 2018-01-01 | End: 2018-01-01 | Stop reason: HOSPADM

## 2018-01-01 RX ORDER — FUROSEMIDE 20 MG
20 TABLET ORAL DAILY
Qty: 30 TABLET | Refills: 0 | Status: SHIPPED | OUTPATIENT
Start: 2018-01-01 | End: 2018-01-01

## 2018-01-01 RX ORDER — TRAMADOL HYDROCHLORIDE 50 MG/1
50 TABLET ORAL DAILY PRN
Status: DISCONTINUED | OUTPATIENT
Start: 2018-01-01 | End: 2018-01-01

## 2018-01-01 RX ORDER — TRAMADOL HYDROCHLORIDE 50 MG/1
50 TABLET ORAL DAILY PRN
COMMUNITY

## 2018-01-01 RX ORDER — AMOXICILLIN 250 MG
1 CAPSULE ORAL 2 TIMES DAILY PRN
Status: DISCONTINUED | OUTPATIENT
Start: 2018-01-01 | End: 2018-01-01 | Stop reason: HOSPADM

## 2018-01-01 RX ORDER — IPRATROPIUM BROMIDE AND ALBUTEROL SULFATE 2.5; .5 MG/3ML; MG/3ML
1 SOLUTION RESPIRATORY (INHALATION) 3 TIMES DAILY
Qty: 360 ML | DISCHARGE
Start: 2018-01-01 | End: 2018-01-01

## 2018-01-01 RX ORDER — POLYETHYLENE GLYCOL 3350 17 G/17G
1 POWDER, FOR SOLUTION ORAL
Status: ON HOLD | COMMUNITY
End: 2018-01-01

## 2018-01-01 RX ORDER — POTASSIUM CHLORIDE 1.5 G/1.58G
20 POWDER, FOR SOLUTION ORAL ONCE
Status: DISCONTINUED | OUTPATIENT
Start: 2018-01-01 | End: 2018-01-01 | Stop reason: CLARIF

## 2018-01-01 RX ORDER — PREDNISONE 10 MG/1
TABLET ORAL
Qty: 20 TABLET | Refills: 0 | DISCHARGE
Start: 2018-01-01 | End: 2018-01-01

## 2018-01-01 RX ORDER — SPIRONOLACTONE 25 MG/1
25 TABLET ORAL DAILY
Status: DISCONTINUED | OUTPATIENT
Start: 2018-01-01 | End: 2018-01-01 | Stop reason: HOSPADM

## 2018-01-01 RX ORDER — NIFEDIPINE 30 MG/1
90 TABLET, EXTENDED RELEASE ORAL DAILY
Status: DISCONTINUED | OUTPATIENT
Start: 2018-01-01 | End: 2018-01-01 | Stop reason: HOSPADM

## 2018-01-01 RX ORDER — HYDROCHLOROTHIAZIDE 25 MG/1
25 TABLET ORAL DAILY
Status: ON HOLD | COMMUNITY
End: 2018-01-01

## 2018-01-01 RX ORDER — LOSARTAN POTASSIUM 50 MG/1
50 TABLET ORAL DAILY
Status: DISCONTINUED | OUTPATIENT
Start: 2018-01-01 | End: 2018-01-01

## 2018-01-01 RX ORDER — SODIUM CHLORIDE FOR INHALATION 3 %
3 VIAL, NEBULIZER (ML) INHALATION 2 TIMES DAILY
Status: DISCONTINUED | OUTPATIENT
Start: 2018-01-01 | End: 2018-01-01 | Stop reason: HOSPADM

## 2018-01-01 RX ORDER — FUROSEMIDE 10 MG/ML
20 INJECTION INTRAMUSCULAR; INTRAVENOUS ONCE
Status: COMPLETED | OUTPATIENT
Start: 2018-01-01 | End: 2018-01-01

## 2018-01-01 RX ORDER — METOPROLOL TARTRATE 100 MG
100 TABLET ORAL 2 TIMES DAILY
Qty: 60 TABLET | DISCHARGE
Start: 2018-01-01

## 2018-01-01 RX ORDER — IPRATROPIUM BROMIDE AND ALBUTEROL SULFATE 2.5; .5 MG/3ML; MG/3ML
1 SOLUTION RESPIRATORY (INHALATION) 3 TIMES DAILY
Status: DISCONTINUED | OUTPATIENT
Start: 2018-01-01 | End: 2018-01-01 | Stop reason: HOSPADM

## 2018-01-01 RX ORDER — IOPAMIDOL 755 MG/ML
500 INJECTION, SOLUTION INTRAVASCULAR ONCE
Status: COMPLETED | OUTPATIENT
Start: 2018-01-01 | End: 2018-01-01

## 2018-01-01 RX ORDER — ACETAMINOPHEN 325 MG/1
650 TABLET ORAL ONCE
Status: COMPLETED | OUTPATIENT
Start: 2018-01-01 | End: 2018-01-01

## 2018-01-01 RX ORDER — ACETAMINOPHEN 500 MG
1000 TABLET ORAL 3 TIMES DAILY
Status: DISCONTINUED | OUTPATIENT
Start: 2018-01-01 | End: 2018-01-01 | Stop reason: HOSPADM

## 2018-01-01 RX ORDER — BISACODYL 10 MG
10 SUPPOSITORY, RECTAL RECTAL DAILY PRN
Status: DISCONTINUED | OUTPATIENT
Start: 2018-01-01 | End: 2018-01-01 | Stop reason: HOSPADM

## 2018-01-01 RX ORDER — HYDROCHLOROTHIAZIDE 25 MG/1
25 TABLET ORAL DAILY
Status: DISCONTINUED | OUTPATIENT
Start: 2018-01-01 | End: 2018-01-01

## 2018-01-01 RX ORDER — LOSARTAN POTASSIUM 50 MG/1
50 TABLET ORAL DAILY
Status: DISCONTINUED | OUTPATIENT
Start: 2018-01-01 | End: 2018-01-01 | Stop reason: HOSPADM

## 2018-01-01 RX ORDER — IPRATROPIUM BROMIDE AND ALBUTEROL SULFATE 2.5; .5 MG/3ML; MG/3ML
3 SOLUTION RESPIRATORY (INHALATION) ONCE
Status: COMPLETED | OUTPATIENT
Start: 2018-01-01 | End: 2018-01-01

## 2018-01-01 RX ORDER — OSELTAMIVIR PHOSPHATE 75 MG/1
75 CAPSULE ORAL 2 TIMES DAILY
Status: DISCONTINUED | OUTPATIENT
Start: 2018-01-01 | End: 2018-01-01

## 2018-01-01 RX ORDER — ALBUMIN (HUMAN) 12.5 G/50ML
12.5 SOLUTION INTRAVENOUS ONCE
Status: COMPLETED | OUTPATIENT
Start: 2018-01-01 | End: 2018-01-01

## 2018-01-01 RX ORDER — TRAMADOL HYDROCHLORIDE 50 MG/1
50 TABLET ORAL 3 TIMES DAILY PRN
Status: DISCONTINUED | OUTPATIENT
Start: 2018-01-01 | End: 2018-01-01 | Stop reason: HOSPADM

## 2018-01-01 RX ORDER — AZITHROMYCIN 250 MG/1
250 TABLET, FILM COATED ORAL DAILY
Status: DISCONTINUED | OUTPATIENT
Start: 2018-01-01 | End: 2018-01-01

## 2018-01-01 RX ORDER — NIFEDIPINE 30 MG/1
90 TABLET, EXTENDED RELEASE ORAL DAILY
Status: DISCONTINUED | OUTPATIENT
Start: 2018-01-01 | End: 2018-01-01

## 2018-01-01 RX ORDER — CEFTRIAXONE SODIUM 2 G/50ML
2 INJECTION, SOLUTION INTRAVENOUS EVERY 24 HOURS
Status: DISCONTINUED | OUTPATIENT
Start: 2018-01-01 | End: 2018-01-01

## 2018-01-01 RX ORDER — ROPINIROLE 0.25 MG/1
0.25 TABLET, FILM COATED ORAL AT BEDTIME
Qty: 30 TABLET | DISCHARGE
Start: 2018-01-01 | End: 2018-01-01

## 2018-01-01 RX ADMIN — IPRATROPIUM BROMIDE AND ALBUTEROL SULFATE 3 ML: .5; 3 SOLUTION RESPIRATORY (INHALATION) at 15:13

## 2018-01-01 RX ADMIN — IPRATROPIUM BROMIDE AND ALBUTEROL SULFATE 3 ML: .5; 3 SOLUTION RESPIRATORY (INHALATION) at 19:30

## 2018-01-01 RX ADMIN — RANITIDINE 150 MG: 150 TABLET ORAL at 08:32

## 2018-01-01 RX ADMIN — NIFEDIPINE 90 MG: 30 TABLET, FILM COATED, EXTENDED RELEASE ORAL at 08:56

## 2018-01-01 RX ADMIN — HYDROCODONE BITARTRATE AND ACETAMINOPHEN 1 TABLET: 5; 325 TABLET ORAL at 04:14

## 2018-01-01 RX ADMIN — FUROSEMIDE 20 MG: 10 INJECTION, SOLUTION INTRAVENOUS at 21:09

## 2018-01-01 RX ADMIN — LOSARTAN POTASSIUM 12.5 MG: 25 TABLET, FILM COATED ORAL at 09:43

## 2018-01-01 RX ADMIN — IPRATROPIUM BROMIDE AND ALBUTEROL SULFATE 3 ML: .5; 3 SOLUTION RESPIRATORY (INHALATION) at 09:19

## 2018-01-01 RX ADMIN — PREDNISONE 40 MG: 20 TABLET ORAL at 10:29

## 2018-01-01 RX ADMIN — TAZOBACTAM SODIUM AND PIPERACILLIN SODIUM 3.38 G: 375; 3 INJECTION, SOLUTION INTRAVENOUS at 22:49

## 2018-01-01 RX ADMIN — NIFEDIPINE 90 MG: 90 TABLET, FILM COATED, EXTENDED RELEASE ORAL at 08:40

## 2018-01-01 RX ADMIN — NIFEDIPINE 90 MG: 30 TABLET, FILM COATED, EXTENDED RELEASE ORAL at 08:04

## 2018-01-01 RX ADMIN — LORAZEPAM 0.5 MG: 0.5 TABLET ORAL at 21:11

## 2018-01-01 RX ADMIN — HYDROCODONE BITARTRATE AND ACETAMINOPHEN 1 TABLET: 5; 325 TABLET ORAL at 09:01

## 2018-01-01 RX ADMIN — HYDROCODONE BITARTRATE AND ACETAMINOPHEN 2 TABLET: 5; 325 TABLET ORAL at 19:45

## 2018-01-01 RX ADMIN — FUROSEMIDE 20 MG: 10 INJECTION, SOLUTION INTRAVENOUS at 08:04

## 2018-01-01 RX ADMIN — POTASSIUM CHLORIDE 40 MEQ: 1500 TABLET, EXTENDED RELEASE ORAL at 08:56

## 2018-01-01 RX ADMIN — HYDROMORPHONE HYDROCHLORIDE 0.2 MG: 1 INJECTION, SOLUTION INTRAMUSCULAR; INTRAVENOUS; SUBCUTANEOUS at 20:23

## 2018-01-01 RX ADMIN — TAZOBACTAM SODIUM AND PIPERACILLIN SODIUM 4.5 G: 500; 4 INJECTION, SOLUTION INTRAVENOUS at 16:06

## 2018-01-01 RX ADMIN — HYDROCODONE BITARTRATE AND ACETAMINOPHEN 2 TABLET: 5; 325 TABLET ORAL at 04:13

## 2018-01-01 RX ADMIN — SODIUM CHLORIDE SOLN NEBU 3% 3 ML: 3 NEBU SOLN at 07:13

## 2018-01-01 RX ADMIN — SODIUM CHLORIDE: 9 INJECTION, SOLUTION INTRAVENOUS at 01:36

## 2018-01-01 RX ADMIN — IPRATROPIUM BROMIDE AND ALBUTEROL SULFATE 3 ML: .5; 3 SOLUTION RESPIRATORY (INHALATION) at 15:12

## 2018-01-01 RX ADMIN — LOSARTAN POTASSIUM 50 MG: 50 TABLET, FILM COATED ORAL at 08:04

## 2018-01-01 RX ADMIN — AZITHROMYCIN 250 MG: 250 TABLET, FILM COATED ORAL at 08:32

## 2018-01-01 RX ADMIN — LORAZEPAM 0.5 MG: 2 INJECTION INTRAMUSCULAR; INTRAVENOUS at 23:15

## 2018-01-01 RX ADMIN — HYDROCODONE BITARTRATE AND ACETAMINOPHEN 2 TABLET: 5; 325 TABLET ORAL at 08:33

## 2018-01-01 RX ADMIN — RANITIDINE 150 MG: 150 TABLET ORAL at 20:17

## 2018-01-01 RX ADMIN — IRON SUCROSE 200 MG: 20 INJECTION, SOLUTION INTRAVENOUS at 20:26

## 2018-01-01 RX ADMIN — HYDROCODONE BITARTRATE AND ACETAMINOPHEN 1 TABLET: 5; 325 TABLET ORAL at 04:27

## 2018-01-01 RX ADMIN — METOPROLOL TARTRATE 25 MG: 25 TABLET ORAL at 08:41

## 2018-01-01 RX ADMIN — SPIRONOLACTONE 25 MG: 25 TABLET ORAL at 16:36

## 2018-01-01 RX ADMIN — SODIUM CHLORIDE: 9 INJECTION, SOLUTION INTRAVENOUS at 18:35

## 2018-01-01 RX ADMIN — FUROSEMIDE 40 MG: 10 INJECTION, SOLUTION INTRAMUSCULAR; INTRAVENOUS at 16:06

## 2018-01-01 RX ADMIN — FUROSEMIDE 20 MG: 10 INJECTION, SOLUTION INTRAMUSCULAR; INTRAVENOUS at 10:16

## 2018-01-01 RX ADMIN — OMEPRAZOLE 20 MG: 20 CAPSULE, DELAYED RELEASE ORAL at 08:14

## 2018-01-01 RX ADMIN — LORAZEPAM 0.5 MG: 0.5 TABLET ORAL at 21:51

## 2018-01-01 RX ADMIN — HYDROCODONE BITARTRATE AND ACETAMINOPHEN 2 TABLET: 5; 325 TABLET ORAL at 22:46

## 2018-01-01 RX ADMIN — HYDROCODONE BITARTRATE AND ACETAMINOPHEN 1 TABLET: 5; 325 TABLET ORAL at 08:27

## 2018-01-01 RX ADMIN — TAZOBACTAM SODIUM AND PIPERACILLIN SODIUM 3.38 G: 375; 3 INJECTION, SOLUTION INTRAVENOUS at 04:11

## 2018-01-01 RX ADMIN — BENZONATATE 100 MG: 100 CAPSULE ORAL at 10:29

## 2018-01-01 RX ADMIN — HYDROCODONE BITARTRATE AND ACETAMINOPHEN 1 TABLET: 5; 325 TABLET ORAL at 20:23

## 2018-01-01 RX ADMIN — NIFEDIPINE 60 MG: 30 TABLET, FILM COATED, EXTENDED RELEASE ORAL at 09:41

## 2018-01-01 RX ADMIN — AZITHROMYCIN 250 MG: 250 TABLET, FILM COATED ORAL at 11:11

## 2018-01-01 RX ADMIN — DOCUSATE SODIUM 100 MG: 100 CAPSULE, LIQUID FILLED ORAL at 08:14

## 2018-01-01 RX ADMIN — OSELTAMIVIR PHOSPHATE 75 MG: 75 CAPSULE ORAL at 09:01

## 2018-01-01 RX ADMIN — IPRATROPIUM BROMIDE AND ALBUTEROL SULFATE 3 ML: .5; 3 SOLUTION RESPIRATORY (INHALATION) at 15:20

## 2018-01-01 RX ADMIN — IPRATROPIUM BROMIDE AND ALBUTEROL SULFATE 3 ML: .5; 3 SOLUTION RESPIRATORY (INHALATION) at 20:05

## 2018-01-01 RX ADMIN — OMEPRAZOLE 20 MG: 20 CAPSULE, DELAYED RELEASE ORAL at 08:57

## 2018-01-01 RX ADMIN — TAZOBACTAM SODIUM AND PIPERACILLIN SODIUM 4.5 G: 500; 4 INJECTION, SOLUTION INTRAVENOUS at 21:26

## 2018-01-01 RX ADMIN — FLUTICASONE FUROATE AND VILANTEROL TRIFENATATE 1 PUFF: 200; 25 POWDER RESPIRATORY (INHALATION) at 09:19

## 2018-01-01 RX ADMIN — LORAZEPAM 0.5 MG: 0.5 TABLET ORAL at 20:17

## 2018-01-01 RX ADMIN — FUROSEMIDE 40 MG: 10 INJECTION, SOLUTION INTRAMUSCULAR; INTRAVENOUS at 16:03

## 2018-01-01 RX ADMIN — IPRATROPIUM BROMIDE AND ALBUTEROL SULFATE 3 ML: .5; 3 SOLUTION RESPIRATORY (INHALATION) at 11:52

## 2018-01-01 RX ADMIN — ACETAMINOPHEN 1000 MG: 500 TABLET, FILM COATED ORAL at 21:46

## 2018-01-01 RX ADMIN — HYDROCODONE BITARTRATE AND ACETAMINOPHEN 1 TABLET: 5; 325 TABLET ORAL at 22:44

## 2018-01-01 RX ADMIN — METOPROLOL TARTRATE 50 MG: 50 TABLET, FILM COATED ORAL at 08:04

## 2018-01-01 RX ADMIN — POLYETHYLENE GLYCOL 3350 17 G: 17 POWDER, FOR SOLUTION ORAL at 09:10

## 2018-01-01 RX ADMIN — ACETAMINOPHEN 1000 MG: 500 TABLET, FILM COATED ORAL at 08:56

## 2018-01-01 RX ADMIN — LOSARTAN POTASSIUM 50 MG: 50 TABLET, FILM COATED ORAL at 08:57

## 2018-01-01 RX ADMIN — ACETAMINOPHEN 650 MG: 325 TABLET, FILM COATED ORAL at 03:45

## 2018-01-01 RX ADMIN — PREDNISONE 60 MG: 10 TABLET ORAL at 08:14

## 2018-01-01 RX ADMIN — HYDROCODONE BITARTRATE AND ACETAMINOPHEN 1 TABLET: 5; 325 TABLET ORAL at 09:52

## 2018-01-01 RX ADMIN — METOPROLOL TARTRATE 50 MG: 50 TABLET, FILM COATED ORAL at 09:58

## 2018-01-01 RX ADMIN — IPRATROPIUM BROMIDE AND ALBUTEROL SULFATE 3 ML: .5; 3 SOLUTION RESPIRATORY (INHALATION) at 20:24

## 2018-01-01 RX ADMIN — LIDOCAINE 1 PATCH: 560 PATCH PERCUTANEOUS; TOPICAL; TRANSDERMAL at 19:33

## 2018-01-01 RX ADMIN — IPRATROPIUM BROMIDE AND ALBUTEROL SULFATE 3 ML: .5; 3 SOLUTION RESPIRATORY (INHALATION) at 07:12

## 2018-01-01 RX ADMIN — ACETAMINOPHEN 1000 MG: 500 TABLET, FILM COATED ORAL at 16:16

## 2018-01-01 RX ADMIN — POTASSIUM CHLORIDE 20 MEQ: 1500 TABLET, EXTENDED RELEASE ORAL at 14:35

## 2018-01-01 RX ADMIN — DOCUSATE SODIUM 100 MG: 100 CAPSULE, LIQUID FILLED ORAL at 20:19

## 2018-01-01 RX ADMIN — ACETAMINOPHEN 1000 MG: 500 TABLET, FILM COATED ORAL at 07:47

## 2018-01-01 RX ADMIN — TAZOBACTAM SODIUM AND PIPERACILLIN SODIUM 4.5 G: 500; 4 INJECTION, SOLUTION INTRAVENOUS at 15:05

## 2018-01-01 RX ADMIN — OSELTAMIVIR PHOSPHATE 75 MG: 75 CAPSULE ORAL at 21:14

## 2018-01-01 RX ADMIN — SODIUM CHLORIDE SOLN NEBU 3% 3 ML: 3 NEBU SOLN at 20:05

## 2018-01-01 RX ADMIN — LOSARTAN POTASSIUM 100 MG: 100 TABLET, FILM COATED ORAL at 13:26

## 2018-01-01 RX ADMIN — METOPROLOL TARTRATE 50 MG: 50 TABLET, FILM COATED ORAL at 08:32

## 2018-01-01 RX ADMIN — HYDROXYZINE HYDROCHLORIDE 25 MG: 25 TABLET ORAL at 10:30

## 2018-01-01 RX ADMIN — OMEPRAZOLE 20 MG: 20 CAPSULE, DELAYED RELEASE ORAL at 20:29

## 2018-01-01 RX ADMIN — CALCIUM CARBONATE (ANTACID) CHEW TAB 500 MG 1000 MG: 500 CHEW TAB at 03:26

## 2018-01-01 RX ADMIN — LORAZEPAM 0.5 MG: 0.5 TABLET ORAL at 21:50

## 2018-01-01 RX ADMIN — OMEPRAZOLE 20 MG: 20 CAPSULE, DELAYED RELEASE ORAL at 09:12

## 2018-01-01 RX ADMIN — HYDROCODONE BITARTRATE AND ACETAMINOPHEN 2 TABLET: 5; 325 TABLET ORAL at 15:56

## 2018-01-01 RX ADMIN — HYDROCODONE BITARTRATE AND ACETAMINOPHEN 1 TABLET: 5; 325 TABLET ORAL at 17:59

## 2018-01-01 RX ADMIN — OSELTAMIVIR PHOSPHATE 75 MG: 75 CAPSULE ORAL at 21:19

## 2018-01-01 RX ADMIN — IPRATROPIUM BROMIDE AND ALBUTEROL SULFATE 3 ML: .5; 3 SOLUTION RESPIRATORY (INHALATION) at 11:16

## 2018-01-01 RX ADMIN — HYDROCODONE BITARTRATE AND ACETAMINOPHEN 2 TABLET: 5; 325 TABLET ORAL at 05:12

## 2018-01-01 RX ADMIN — METOPROLOL TARTRATE 50 MG: 50 TABLET, FILM COATED ORAL at 08:14

## 2018-01-01 RX ADMIN — ACETAMINOPHEN 1000 MG: 500 TABLET, FILM COATED ORAL at 21:50

## 2018-01-01 RX ADMIN — ACETAMINOPHEN 1000 MG: 500 TABLET, FILM COATED ORAL at 16:06

## 2018-01-01 RX ADMIN — METOPROLOL TARTRATE 50 MG: 50 TABLET, FILM COATED ORAL at 20:29

## 2018-01-01 RX ADMIN — FLUTICASONE FUROATE AND VILANTEROL TRIFENATATE 1 PUFF: 200; 25 POWDER RESPIRATORY (INHALATION) at 07:45

## 2018-01-01 RX ADMIN — SODIUM CHLORIDE SOLN NEBU 3% 3 ML: 3 NEBU SOLN at 20:24

## 2018-01-01 RX ADMIN — SODIUM CHLORIDE SOLN NEBU 3% 3 ML: 3 NEBU SOLN at 07:20

## 2018-01-01 RX ADMIN — LORAZEPAM 0.5 MG: 0.5 TABLET ORAL at 20:26

## 2018-01-01 RX ADMIN — PREDNISONE 40 MG: 20 TABLET ORAL at 08:25

## 2018-01-01 RX ADMIN — SODIUM CHLORIDE SOLN NEBU 3% 3 ML: 3 NEBU SOLN at 07:42

## 2018-01-01 RX ADMIN — SENNOSIDES AND DOCUSATE SODIUM 2 TABLET: 8.6; 5 TABLET ORAL at 21:24

## 2018-01-01 RX ADMIN — ACETAMINOPHEN 1000 MG: 500 TABLET, FILM COATED ORAL at 17:22

## 2018-01-01 RX ADMIN — TRAMADOL HYDROCHLORIDE 50 MG: 50 TABLET, COATED ORAL at 23:56

## 2018-01-01 RX ADMIN — ACETAMINOPHEN 1000 MG: 500 TABLET, FILM COATED ORAL at 16:36

## 2018-01-01 RX ADMIN — MICONAZOLE NITRATE: 2 POWDER TOPICAL at 15:38

## 2018-01-01 RX ADMIN — HYDROCODONE BITARTRATE AND ACETAMINOPHEN 2 TABLET: 5; 325 TABLET ORAL at 15:51

## 2018-01-01 RX ADMIN — METOPROLOL TARTRATE 50 MG: 50 TABLET, FILM COATED ORAL at 07:50

## 2018-01-01 RX ADMIN — HYDROCODONE BITARTRATE AND ACETAMINOPHEN 1 TABLET: 5; 325 TABLET ORAL at 04:47

## 2018-01-01 RX ADMIN — IPRATROPIUM BROMIDE AND ALBUTEROL SULFATE 3 ML: .5; 3 SOLUTION RESPIRATORY (INHALATION) at 19:31

## 2018-01-01 RX ADMIN — ALBUTEROL SULFATE 2.5 MG: 2.5 SOLUTION RESPIRATORY (INHALATION) at 15:03

## 2018-01-01 RX ADMIN — IPRATROPIUM BROMIDE AND ALBUTEROL SULFATE 3 ML: .5; 3 SOLUTION RESPIRATORY (INHALATION) at 16:11

## 2018-01-01 RX ADMIN — LIDOCAINE 1 PATCH: 560 PATCH PERCUTANEOUS; TOPICAL; TRANSDERMAL at 20:16

## 2018-01-01 RX ADMIN — CALCIUM CARBONATE (ANTACID) CHEW TAB 500 MG 500 MG: 500 CHEW TAB at 08:43

## 2018-01-01 RX ADMIN — METOPROLOL TARTRATE 25 MG: 25 TABLET ORAL at 21:14

## 2018-01-01 RX ADMIN — ACETAMINOPHEN 1000 MG: 500 TABLET, FILM COATED ORAL at 15:53

## 2018-01-01 RX ADMIN — LEVALBUTEROL HYDROCHLORIDE 0.63 MG: 0.63 SOLUTION RESPIRATORY (INHALATION) at 07:16

## 2018-01-01 RX ADMIN — POLYETHYLENE GLYCOL 3350 17 G: 17 POWDER, FOR SOLUTION ORAL at 09:13

## 2018-01-01 RX ADMIN — LOSARTAN POTASSIUM 50 MG: 50 TABLET, FILM COATED ORAL at 09:00

## 2018-01-01 RX ADMIN — HYDROCODONE BITARTRATE AND ACETAMINOPHEN 1 TABLET: 5; 325 TABLET ORAL at 00:05

## 2018-01-01 RX ADMIN — Medication 2.5 MG: at 01:31

## 2018-01-01 RX ADMIN — ACETAMINOPHEN 1000 MG: 500 TABLET, FILM COATED ORAL at 22:26

## 2018-01-01 RX ADMIN — LOSARTAN POTASSIUM 100 MG: 100 TABLET, FILM COATED ORAL at 08:44

## 2018-01-01 RX ADMIN — IPRATROPIUM BROMIDE AND ALBUTEROL SULFATE 3 ML: .5; 3 SOLUTION RESPIRATORY (INHALATION) at 07:13

## 2018-01-01 RX ADMIN — SULFUR HEXAFLUORIDE 2 ML: KIT at 08:01

## 2018-01-01 RX ADMIN — AZITHROMYCIN MONOHYDRATE 250 MG: 500 INJECTION, POWDER, LYOPHILIZED, FOR SOLUTION INTRAVENOUS at 13:09

## 2018-01-01 RX ADMIN — OSELTAMIVIR PHOSPHATE 75 MG: 75 CAPSULE ORAL at 08:44

## 2018-01-01 RX ADMIN — LORAZEPAM 0.5 MG: 0.5 TABLET ORAL at 22:49

## 2018-01-01 RX ADMIN — TRAMADOL HYDROCHLORIDE 50 MG: 50 TABLET, COATED ORAL at 18:09

## 2018-01-01 RX ADMIN — ENOXAPARIN SODIUM 90 MG: 100 INJECTION SUBCUTANEOUS at 21:36

## 2018-01-01 RX ADMIN — FUROSEMIDE 40 MG: 10 INJECTION, SOLUTION INTRAMUSCULAR; INTRAVENOUS at 00:57

## 2018-01-01 RX ADMIN — Medication 2.5 MG: at 01:20

## 2018-01-01 RX ADMIN — METOPROLOL TARTRATE 50 MG: 50 TABLET, FILM COATED ORAL at 21:11

## 2018-01-01 RX ADMIN — HYDROCODONE BITARTRATE AND ACETAMINOPHEN 1 TABLET: 5; 325 TABLET ORAL at 00:26

## 2018-01-01 RX ADMIN — LORAZEPAM 0.5 MG: 0.5 TABLET ORAL at 21:06

## 2018-01-01 RX ADMIN — HYDROCODONE BITARTRATE AND ACETAMINOPHEN 1 TABLET: 5; 325 TABLET ORAL at 08:46

## 2018-01-01 RX ADMIN — ACETAMINOPHEN 650 MG: 325 TABLET, FILM COATED ORAL at 00:23

## 2018-01-01 RX ADMIN — ACETAMINOPHEN 1000 MG: 500 TABLET, FILM COATED ORAL at 15:46

## 2018-01-01 RX ADMIN — VANCOMYCIN HYDROCHLORIDE 1750 MG: 1 INJECTION, POWDER, LYOPHILIZED, FOR SOLUTION INTRAVENOUS at 16:48

## 2018-01-01 RX ADMIN — SODIUM CHLORIDE SOLN NEBU 3% 3 ML: 3 NEBU SOLN at 07:12

## 2018-01-01 RX ADMIN — ACETAMINOPHEN 1000 MG: 500 TABLET, FILM COATED ORAL at 08:13

## 2018-01-01 RX ADMIN — ONDANSETRON 4 MG: 2 INJECTION INTRAMUSCULAR; INTRAVENOUS at 16:27

## 2018-01-01 RX ADMIN — IPRATROPIUM BROMIDE AND ALBUTEROL SULFATE 3 ML: .5; 3 SOLUTION RESPIRATORY (INHALATION) at 15:21

## 2018-01-01 RX ADMIN — OMEPRAZOLE 20 MG: 20 CAPSULE, DELAYED RELEASE ORAL at 07:48

## 2018-01-01 RX ADMIN — Medication 2.5 MG: at 22:16

## 2018-01-01 RX ADMIN — FUROSEMIDE 20 MG: 10 INJECTION, SOLUTION INTRAVENOUS at 04:27

## 2018-01-01 RX ADMIN — HYDROCODONE BITARTRATE AND ACETAMINOPHEN 1 TABLET: 5; 325 TABLET ORAL at 08:43

## 2018-01-01 RX ADMIN — METOPROLOL TARTRATE 50 MG: 50 TABLET, FILM COATED ORAL at 08:47

## 2018-01-01 RX ADMIN — POTASSIUM CHLORIDE 20 MEQ: 1500 TABLET, EXTENDED RELEASE ORAL at 14:20

## 2018-01-01 RX ADMIN — FUROSEMIDE 40 MG: 10 INJECTION, SOLUTION INTRAMUSCULAR; INTRAVENOUS at 14:32

## 2018-01-01 RX ADMIN — METOPROLOL TARTRATE 50 MG: 50 TABLET, FILM COATED ORAL at 21:46

## 2018-01-01 RX ADMIN — MICONAZOLE NITRATE: 2 POWDER TOPICAL at 21:19

## 2018-01-01 RX ADMIN — LOSARTAN POTASSIUM 100 MG: 100 TABLET, FILM COATED ORAL at 09:40

## 2018-01-01 RX ADMIN — PREDNISONE 40 MG: 20 TABLET ORAL at 09:00

## 2018-01-01 RX ADMIN — CALCIUM CARBONATE (ANTACID) CHEW TAB 500 MG 1000 MG: 500 CHEW TAB at 17:35

## 2018-01-01 RX ADMIN — SODIUM CHLORIDE: 9 INJECTION, SOLUTION INTRAVENOUS at 09:18

## 2018-01-01 RX ADMIN — HYDROCODONE BITARTRATE AND ACETAMINOPHEN 2 TABLET: 5; 325 TABLET ORAL at 12:56

## 2018-01-01 RX ADMIN — TRAMADOL HYDROCHLORIDE 50 MG: 50 TABLET, COATED ORAL at 20:22

## 2018-01-01 RX ADMIN — LOSARTAN POTASSIUM 50 MG: 50 TABLET, FILM COATED ORAL at 08:49

## 2018-01-01 RX ADMIN — LOSARTAN POTASSIUM 50 MG: 50 TABLET, FILM COATED ORAL at 08:32

## 2018-01-01 RX ADMIN — HYDROCODONE BITARTRATE AND ACETAMINOPHEN 2 TABLET: 5; 325 TABLET ORAL at 09:00

## 2018-01-01 RX ADMIN — IPRATROPIUM BROMIDE AND ALBUTEROL SULFATE 3 ML: .5; 3 SOLUTION RESPIRATORY (INHALATION) at 19:18

## 2018-01-01 RX ADMIN — OMEPRAZOLE 20 MG: 20 CAPSULE, DELAYED RELEASE ORAL at 09:44

## 2018-01-01 RX ADMIN — AZITHROMYCIN MONOHYDRATE 500 MG: 500 INJECTION, POWDER, LYOPHILIZED, FOR SOLUTION INTRAVENOUS at 12:13

## 2018-01-01 RX ADMIN — METOPROLOL TARTRATE 50 MG: 50 TABLET, FILM COATED ORAL at 09:00

## 2018-01-01 RX ADMIN — FUROSEMIDE 20 MG: 10 INJECTION, SOLUTION INTRAVENOUS at 11:55

## 2018-01-01 RX ADMIN — OSELTAMIVIR PHOSPHATE 75 MG: 75 CAPSULE ORAL at 21:03

## 2018-01-01 RX ADMIN — METOPROLOL TARTRATE 50 MG: 50 TABLET, FILM COATED ORAL at 20:05

## 2018-01-01 RX ADMIN — PROCHLORPERAZINE EDISYLATE 5 MG: 5 INJECTION INTRAMUSCULAR; INTRAVENOUS at 05:18

## 2018-01-01 RX ADMIN — IOPAMIDOL 71 ML: 755 INJECTION, SOLUTION INTRAVENOUS at 14:19

## 2018-01-01 RX ADMIN — HYDROCODONE BITARTRATE AND ACETAMINOPHEN 1 TABLET: 5; 325 TABLET ORAL at 21:14

## 2018-01-01 RX ADMIN — DOCUSATE SODIUM 100 MG: 100 CAPSULE, LIQUID FILLED ORAL at 20:05

## 2018-01-01 RX ADMIN — METOPROLOL TARTRATE 50 MG: 50 TABLET, FILM COATED ORAL at 08:01

## 2018-01-01 RX ADMIN — IPRATROPIUM BROMIDE AND ALBUTEROL SULFATE 3 ML: .5; 3 SOLUTION RESPIRATORY (INHALATION) at 07:47

## 2018-01-01 RX ADMIN — ACETAMINOPHEN 1000 MG: 500 TABLET, FILM COATED ORAL at 09:12

## 2018-01-01 RX ADMIN — TAZOBACTAM SODIUM AND PIPERACILLIN SODIUM 4.5 G: 500; 4 INJECTION, SOLUTION INTRAVENOUS at 04:35

## 2018-01-01 RX ADMIN — ENOXAPARIN SODIUM 90 MG: 100 INJECTION SUBCUTANEOUS at 08:36

## 2018-01-01 RX ADMIN — IPRATROPIUM BROMIDE AND ALBUTEROL SULFATE 3 ML: .5; 3 SOLUTION RESPIRATORY (INHALATION) at 15:27

## 2018-01-01 RX ADMIN — OSELTAMIVIR PHOSPHATE 75 MG: 75 CAPSULE ORAL at 09:05

## 2018-01-01 RX ADMIN — NIFEDIPINE 90 MG: 90 TABLET, FILM COATED, EXTENDED RELEASE ORAL at 09:05

## 2018-01-01 RX ADMIN — DOCUSATE SODIUM 100 MG: 100 CAPSULE, LIQUID FILLED ORAL at 08:01

## 2018-01-01 RX ADMIN — IPRATROPIUM BROMIDE AND ALBUTEROL SULFATE 3 ML: .5; 3 SOLUTION RESPIRATORY (INHALATION) at 07:09

## 2018-01-01 RX ADMIN — WARFARIN SODIUM 2.5 MG: 2.5 TABLET ORAL at 19:05

## 2018-01-01 RX ADMIN — OSELTAMIVIR PHOSPHATE 75 MG: 75 CAPSULE ORAL at 21:54

## 2018-01-01 RX ADMIN — IPRATROPIUM BROMIDE AND ALBUTEROL SULFATE 3 ML: .5; 3 SOLUTION RESPIRATORY (INHALATION) at 13:47

## 2018-01-01 RX ADMIN — FUROSEMIDE 40 MG: 10 INJECTION, SOLUTION INTRAMUSCULAR; INTRAVENOUS at 16:10

## 2018-01-01 RX ADMIN — OMEPRAZOLE 20 MG: 20 CAPSULE, DELAYED RELEASE ORAL at 08:13

## 2018-01-01 RX ADMIN — ACETAMINOPHEN 1000 MG: 500 TABLET, FILM COATED ORAL at 21:52

## 2018-01-01 RX ADMIN — TAZOBACTAM SODIUM AND PIPERACILLIN SODIUM 3.38 G: 375; 3 INJECTION, SOLUTION INTRAVENOUS at 03:19

## 2018-01-01 RX ADMIN — ASPIRIN 81 MG: 81 TABLET, COATED ORAL at 08:44

## 2018-01-01 RX ADMIN — LORAZEPAM 0.5 MG: 0.5 TABLET ORAL at 23:04

## 2018-01-01 RX ADMIN — NIFEDIPINE 90 MG: 90 TABLET, FILM COATED, EXTENDED RELEASE ORAL at 08:44

## 2018-01-01 RX ADMIN — ONDANSETRON 4 MG: 4 TABLET, ORALLY DISINTEGRATING ORAL at 17:35

## 2018-01-01 RX ADMIN — AZITHROMYCIN MONOHYDRATE 500 MG: 500 INJECTION, POWDER, LYOPHILIZED, FOR SOLUTION INTRAVENOUS at 16:02

## 2018-01-01 RX ADMIN — TAZOBACTAM SODIUM AND PIPERACILLIN SODIUM 4.5 G: 500; 4 INJECTION, SOLUTION INTRAVENOUS at 22:00

## 2018-01-01 RX ADMIN — SPIRONOLACTONE 25 MG: 25 TABLET ORAL at 09:43

## 2018-01-01 RX ADMIN — FUROSEMIDE 40 MG: 10 INJECTION, SOLUTION INTRAMUSCULAR; INTRAVENOUS at 09:03

## 2018-01-01 RX ADMIN — AMOXICILLIN AND CLAVULANATE POTASSIUM 1 TABLET: 875; 125 TABLET, FILM COATED ORAL at 20:02

## 2018-01-01 RX ADMIN — SODIUM CHLORIDE SOLN NEBU 3% 3 ML: 3 NEBU SOLN at 15:15

## 2018-01-01 RX ADMIN — METOPROLOL TARTRATE 50 MG: 50 TABLET, FILM COATED ORAL at 21:03

## 2018-01-01 RX ADMIN — HYDROCODONE BITARTRATE AND ACETAMINOPHEN 2 TABLET: 5; 325 TABLET ORAL at 16:07

## 2018-01-01 RX ADMIN — HYDROCODONE BITARTRATE AND ACETAMINOPHEN 1 TABLET: 5; 325 TABLET ORAL at 22:53

## 2018-01-01 RX ADMIN — ASPIRIN 81 MG: 81 TABLET, COATED ORAL at 08:02

## 2018-01-01 RX ADMIN — HYDROCODONE BITARTRATE AND ACETAMINOPHEN 1 TABLET: 5; 325 TABLET ORAL at 05:44

## 2018-01-01 RX ADMIN — IPRATROPIUM BROMIDE AND ALBUTEROL SULFATE 3 ML: .5; 3 SOLUTION RESPIRATORY (INHALATION) at 07:21

## 2018-01-01 RX ADMIN — HYDROCODONE BITARTRATE AND ACETAMINOPHEN 1 TABLET: 5; 325 TABLET ORAL at 02:03

## 2018-01-01 RX ADMIN — DOCUSATE SODIUM 100 MG: 100 CAPSULE, LIQUID FILLED ORAL at 21:11

## 2018-01-01 RX ADMIN — HYDROCODONE BITARTRATE AND ACETAMINOPHEN 1 TABLET: 5; 325 TABLET ORAL at 05:10

## 2018-01-01 RX ADMIN — ENOXAPARIN SODIUM 90 MG: 100 INJECTION SUBCUTANEOUS at 08:14

## 2018-01-01 RX ADMIN — IPRATROPIUM BROMIDE AND ALBUTEROL SULFATE 3 ML: .5; 3 SOLUTION RESPIRATORY (INHALATION) at 18:30

## 2018-01-01 RX ADMIN — DOCUSATE SODIUM 100 MG: 100 CAPSULE, LIQUID FILLED ORAL at 08:46

## 2018-01-01 RX ADMIN — NIFEDIPINE 90 MG: 30 TABLET, FILM COATED, EXTENDED RELEASE ORAL at 08:13

## 2018-01-01 RX ADMIN — METOPROLOL TARTRATE 75 MG: 50 TABLET, FILM COATED ORAL at 08:14

## 2018-01-01 RX ADMIN — IPRATROPIUM BROMIDE AND ALBUTEROL SULFATE 3 ML: .5; 3 SOLUTION RESPIRATORY (INHALATION) at 00:39

## 2018-01-01 RX ADMIN — IPRATROPIUM BROMIDE AND ALBUTEROL SULFATE 3 ML: .5; 3 SOLUTION RESPIRATORY (INHALATION) at 16:05

## 2018-01-01 RX ADMIN — METOPROLOL TARTRATE 50 MG: 50 TABLET, FILM COATED ORAL at 08:17

## 2018-01-01 RX ADMIN — TAZOBACTAM SODIUM AND PIPERACILLIN SODIUM 4.5 G: 500; 4 INJECTION, SOLUTION INTRAVENOUS at 10:00

## 2018-01-01 RX ADMIN — HYDROCODONE BITARTRATE AND ACETAMINOPHEN 1 TABLET: 5; 325 TABLET ORAL at 20:40

## 2018-01-01 RX ADMIN — Medication 0.25 MG: at 21:46

## 2018-01-01 RX ADMIN — TRAMADOL HYDROCHLORIDE 50 MG: 50 TABLET, COATED ORAL at 16:23

## 2018-01-01 RX ADMIN — METOPROLOL TARTRATE 50 MG: 50 TABLET, FILM COATED ORAL at 09:12

## 2018-01-01 RX ADMIN — AZITHROMYCIN 250 MG: 250 TABLET, FILM COATED ORAL at 09:00

## 2018-01-01 RX ADMIN — MAGNESIUM HYDROXIDE 30 ML: 400 SUSPENSION ORAL at 12:43

## 2018-01-01 RX ADMIN — PREDNISONE 60 MG: 10 TABLET ORAL at 13:27

## 2018-01-01 RX ADMIN — TAZOBACTAM SODIUM AND PIPERACILLIN SODIUM 4.5 G: 500; 4 INJECTION, SOLUTION INTRAVENOUS at 04:56

## 2018-01-01 RX ADMIN — SODIUM CHLORIDE SOLN NEBU 3% 3 ML: 3 NEBU SOLN at 20:00

## 2018-01-01 RX ADMIN — HALOPERIDOL LACTATE 2 MG: 5 INJECTION, SOLUTION INTRAMUSCULAR at 04:42

## 2018-01-01 RX ADMIN — CALCIUM CARBONATE (ANTACID) CHEW TAB 500 MG 500 MG: 500 CHEW TAB at 02:59

## 2018-01-01 RX ADMIN — IPRATROPIUM BROMIDE AND ALBUTEROL SULFATE 3 ML: .5; 3 SOLUTION RESPIRATORY (INHALATION) at 07:17

## 2018-01-01 RX ADMIN — ACETAMINOPHEN 1000 MG: 500 TABLET, FILM COATED ORAL at 09:00

## 2018-01-01 RX ADMIN — SODIUM CHLORIDE SOLN NEBU 3% 3 ML: 3 NEBU SOLN at 07:48

## 2018-01-01 RX ADMIN — ACETAMINOPHEN 1000 MG: 500 TABLET, FILM COATED ORAL at 16:11

## 2018-01-01 RX ADMIN — ONDANSETRON 4 MG: 2 INJECTION INTRAMUSCULAR; INTRAVENOUS at 04:33

## 2018-01-01 RX ADMIN — IPRATROPIUM BROMIDE AND ALBUTEROL SULFATE 3 ML: .5; 3 SOLUTION RESPIRATORY (INHALATION) at 07:36

## 2018-01-01 RX ADMIN — LORAZEPAM 0.5 MG: 0.5 TABLET ORAL at 20:36

## 2018-01-01 RX ADMIN — FLUTICASONE FUROATE AND VILANTEROL TRIFENATATE 1 PUFF: 200; 25 POWDER RESPIRATORY (INHALATION) at 07:42

## 2018-01-01 RX ADMIN — IPRATROPIUM BROMIDE AND ALBUTEROL SULFATE 3 ML: .5; 3 SOLUTION RESPIRATORY (INHALATION) at 08:21

## 2018-01-01 RX ADMIN — HYDROXYZINE HYDROCHLORIDE 25 MG: 25 TABLET ORAL at 00:46

## 2018-01-01 RX ADMIN — HYDROCODONE BITARTRATE AND ACETAMINOPHEN 1 TABLET: 5; 325 TABLET ORAL at 01:07

## 2018-01-01 RX ADMIN — METOPROLOL TARTRATE 50 MG: 50 TABLET, FILM COATED ORAL at 20:22

## 2018-01-01 RX ADMIN — LORAZEPAM 0.5 MG: 0.5 TABLET ORAL at 20:39

## 2018-01-01 RX ADMIN — TRAMADOL HYDROCHLORIDE 50 MG: 50 TABLET, COATED ORAL at 07:47

## 2018-01-01 RX ADMIN — FUROSEMIDE 40 MG: 10 INJECTION, SOLUTION INTRAMUSCULAR; INTRAVENOUS at 08:57

## 2018-01-01 RX ADMIN — ACETAMINOPHEN 1000 MG: 500 TABLET, FILM COATED ORAL at 16:09

## 2018-01-01 RX ADMIN — TRAMADOL HYDROCHLORIDE 50 MG: 50 TABLET, COATED ORAL at 14:56

## 2018-01-01 RX ADMIN — HYDROCODONE BITARTRATE AND ACETAMINOPHEN 1 TABLET: 5; 325 TABLET ORAL at 08:49

## 2018-01-01 RX ADMIN — NIFEDIPINE 90 MG: 30 TABLET, FILM COATED, EXTENDED RELEASE ORAL at 09:00

## 2018-01-01 RX ADMIN — TRAMADOL HYDROCHLORIDE 50 MG: 50 TABLET, COATED ORAL at 03:40

## 2018-01-01 RX ADMIN — METOPROLOL TARTRATE 25 MG: 25 TABLET ORAL at 20:21

## 2018-01-01 RX ADMIN — IPRATROPIUM BROMIDE AND ALBUTEROL SULFATE 3 ML: .5; 3 SOLUTION RESPIRATORY (INHALATION) at 19:15

## 2018-01-01 RX ADMIN — NIFEDIPINE 90 MG: 90 TABLET, FILM COATED, EXTENDED RELEASE ORAL at 08:02

## 2018-01-01 RX ADMIN — HYDROCODONE BITARTRATE AND ACETAMINOPHEN 2 TABLET: 5; 325 TABLET ORAL at 08:01

## 2018-01-01 RX ADMIN — HYDROCODONE BITARTRATE AND ACETAMINOPHEN 1 TABLET: 5; 325 TABLET ORAL at 04:17

## 2018-01-01 RX ADMIN — SENNOSIDES AND DOCUSATE SODIUM 1 TABLET: 8.6; 5 TABLET ORAL at 21:24

## 2018-01-01 RX ADMIN — TRAMADOL HYDROCHLORIDE 50 MG: 50 TABLET, COATED ORAL at 09:47

## 2018-01-01 RX ADMIN — CEFTRIAXONE SODIUM 1 G: 1 INJECTION, SOLUTION INTRAVENOUS at 15:55

## 2018-01-01 RX ADMIN — IPRATROPIUM BROMIDE AND ALBUTEROL SULFATE 3 ML: .5; 3 SOLUTION RESPIRATORY (INHALATION) at 19:14

## 2018-01-01 RX ADMIN — LOSARTAN POTASSIUM 50 MG: 50 TABLET, FILM COATED ORAL at 08:17

## 2018-01-01 RX ADMIN — ENOXAPARIN SODIUM 90 MG: 100 INJECTION SUBCUTANEOUS at 09:10

## 2018-01-01 RX ADMIN — SODIUM CHLORIDE SOLN NEBU 3% 3 ML: 3 NEBU SOLN at 20:49

## 2018-01-01 RX ADMIN — OSELTAMIVIR PHOSPHATE 75 MG: 75 CAPSULE ORAL at 08:02

## 2018-01-01 RX ADMIN — SODIUM CHLORIDE SOLN NEBU 3% 3 ML: 3 NEBU SOLN at 11:18

## 2018-01-01 RX ADMIN — ONDANSETRON 4 MG: 4 TABLET, ORALLY DISINTEGRATING ORAL at 05:55

## 2018-01-01 RX ADMIN — FLUTICASONE FUROATE AND VILANTEROL TRIFENATATE 1 PUFF: 200; 25 POWDER RESPIRATORY (INHALATION) at 07:21

## 2018-01-01 RX ADMIN — FUROSEMIDE 20 MG: 10 INJECTION, SOLUTION INTRAMUSCULAR; INTRAVENOUS at 08:36

## 2018-01-01 RX ADMIN — AZITHROMYCIN MONOHYDRATE 250 MG: 500 INJECTION, POWDER, LYOPHILIZED, FOR SOLUTION INTRAVENOUS at 11:59

## 2018-01-01 RX ADMIN — ASPIRIN 81 MG: 81 TABLET, COATED ORAL at 09:40

## 2018-01-01 RX ADMIN — METOPROLOL TARTRATE 50 MG: 50 TABLET, FILM COATED ORAL at 21:19

## 2018-01-01 RX ADMIN — SENNOSIDES AND DOCUSATE SODIUM 2 TABLET: 8.6; 5 TABLET ORAL at 22:46

## 2018-01-01 RX ADMIN — IPRATROPIUM BROMIDE AND ALBUTEROL SULFATE 3 ML: .5; 3 SOLUTION RESPIRATORY (INHALATION) at 07:37

## 2018-01-01 RX ADMIN — NIFEDIPINE 90 MG: 90 TABLET, FILM COATED, EXTENDED RELEASE ORAL at 08:25

## 2018-01-01 RX ADMIN — METOPROLOL TARTRATE 50 MG: 50 TABLET, FILM COATED ORAL at 20:20

## 2018-01-01 RX ADMIN — FUROSEMIDE 40 MG: 40 TABLET ORAL at 08:14

## 2018-01-01 RX ADMIN — POTASSIUM CHLORIDE 20 MEQ: 1500 TABLET, EXTENDED RELEASE ORAL at 16:49

## 2018-01-01 RX ADMIN — METOPROLOL TARTRATE 50 MG: 50 TABLET, FILM COATED ORAL at 20:41

## 2018-01-01 RX ADMIN — ALBUTEROL SULFATE 2.5 MG: 2.5 SOLUTION RESPIRATORY (INHALATION) at 20:05

## 2018-01-01 RX ADMIN — OSELTAMIVIR PHOSPHATE 30 MG: 30 CAPSULE ORAL at 09:02

## 2018-01-01 RX ADMIN — ACETAMINOPHEN 1000 MG: 500 TABLET, FILM COATED ORAL at 22:05

## 2018-01-01 RX ADMIN — NIFEDIPINE 90 MG: 30 TABLET, FILM COATED, EXTENDED RELEASE ORAL at 09:12

## 2018-01-01 RX ADMIN — OMEPRAZOLE 20 MG: 20 CAPSULE, DELAYED RELEASE ORAL at 09:58

## 2018-01-01 RX ADMIN — ACETAMINOPHEN 1000 MG: 500 TABLET, FILM COATED ORAL at 21:06

## 2018-01-01 RX ADMIN — PREDNISONE 60 MG: 10 TABLET ORAL at 09:43

## 2018-01-01 RX ADMIN — CEFTRIAXONE SODIUM 1 G: 1 INJECTION, SOLUTION INTRAVENOUS at 16:17

## 2018-01-01 RX ADMIN — SODIUM CHLORIDE SOLN NEBU 3% 3 ML: 3 NEBU SOLN at 07:09

## 2018-01-01 RX ADMIN — POTASSIUM CHLORIDE 20 MEQ: 1500 TABLET, EXTENDED RELEASE ORAL at 09:47

## 2018-01-01 RX ADMIN — RANITIDINE 150 MG: 150 TABLET ORAL at 08:49

## 2018-01-01 RX ADMIN — FUROSEMIDE 40 MG: 10 INJECTION, SOLUTION INTRAMUSCULAR; INTRAVENOUS at 08:13

## 2018-01-01 RX ADMIN — TAZOBACTAM SODIUM AND PIPERACILLIN SODIUM 4.5 G: 500; 4 INJECTION, SOLUTION INTRAVENOUS at 11:42

## 2018-01-01 RX ADMIN — HYDROCODONE BITARTRATE AND ACETAMINOPHEN 1 TABLET: 5; 325 TABLET ORAL at 17:16

## 2018-01-01 RX ADMIN — METOPROLOL TARTRATE 50 MG: 50 TABLET, FILM COATED ORAL at 20:17

## 2018-01-01 RX ADMIN — OSELTAMIVIR PHOSPHATE 75 MG: 75 CAPSULE ORAL at 08:25

## 2018-01-01 RX ADMIN — HYDROCODONE BITARTRATE AND ACETAMINOPHEN 1 TABLET: 5; 325 TABLET ORAL at 21:31

## 2018-01-01 RX ADMIN — ENOXAPARIN SODIUM 90 MG: 100 INJECTION SUBCUTANEOUS at 20:36

## 2018-01-01 RX ADMIN — LORAZEPAM 0.5 MG: 0.5 TABLET ORAL at 21:53

## 2018-01-01 RX ADMIN — IPRATROPIUM BROMIDE AND ALBUTEROL SULFATE 3 ML: .5; 3 SOLUTION RESPIRATORY (INHALATION) at 11:17

## 2018-01-01 RX ADMIN — ACETAMINOPHEN 1000 MG: 500 TABLET, FILM COATED ORAL at 09:58

## 2018-01-01 RX ADMIN — OSELTAMIVIR PHOSPHATE 75 MG: 75 CAPSULE ORAL at 21:07

## 2018-01-01 RX ADMIN — IPRATROPIUM BROMIDE AND ALBUTEROL SULFATE 3 ML: .5; 3 SOLUTION RESPIRATORY (INHALATION) at 12:15

## 2018-01-01 RX ADMIN — IPRATROPIUM BROMIDE AND ALBUTEROL SULFATE 3 ML: .5; 3 SOLUTION RESPIRATORY (INHALATION) at 19:59

## 2018-01-01 RX ADMIN — FUROSEMIDE 40 MG: 40 TABLET ORAL at 08:04

## 2018-01-01 RX ADMIN — TRAMADOL HYDROCHLORIDE 25 MG: 50 TABLET, FILM COATED ORAL at 12:45

## 2018-01-01 RX ADMIN — FUROSEMIDE 20 MG: 10 INJECTION, SOLUTION INTRAVENOUS at 17:04

## 2018-01-01 RX ADMIN — RANITIDINE 150 MG: 150 TABLET ORAL at 08:01

## 2018-01-01 RX ADMIN — HYDROCODONE BITARTRATE AND ACETAMINOPHEN 1 TABLET: 5; 325 TABLET ORAL at 00:32

## 2018-01-01 RX ADMIN — HYDROCODONE BITARTRATE AND ACETAMINOPHEN 1 TABLET: 5; 325 TABLET ORAL at 13:48

## 2018-01-01 RX ADMIN — SODIUM CHLORIDE SOLN NEBU 3% 3 ML: 3 NEBU SOLN at 11:16

## 2018-01-01 RX ADMIN — FUROSEMIDE 40 MG: 10 INJECTION, SOLUTION INTRAMUSCULAR; INTRAVENOUS at 15:53

## 2018-01-01 RX ADMIN — IPRATROPIUM BROMIDE AND ALBUTEROL SULFATE 3 ML: .5; 3 SOLUTION RESPIRATORY (INHALATION) at 20:44

## 2018-01-01 RX ADMIN — NIFEDIPINE 90 MG: 30 TABLET, FILM COATED, EXTENDED RELEASE ORAL at 08:35

## 2018-01-01 RX ADMIN — SODIUM CHLORIDE SOLN NEBU 3% 3 ML: 3 NEBU SOLN at 07:17

## 2018-01-01 RX ADMIN — IRON SUCROSE 200 MG: 20 INJECTION, SOLUTION INTRAVENOUS at 20:54

## 2018-01-01 RX ADMIN — RANITIDINE 150 MG: 150 TABLET ORAL at 20:05

## 2018-01-01 RX ADMIN — FUROSEMIDE 40 MG: 10 INJECTION, SOLUTION INTRAMUSCULAR; INTRAVENOUS at 08:17

## 2018-01-01 RX ADMIN — MAGNESIUM HYDROXIDE 30 ML: 400 SUSPENSION ORAL at 05:46

## 2018-01-01 RX ADMIN — IPRATROPIUM BROMIDE AND ALBUTEROL SULFATE 3 ML: .5; 3 SOLUTION RESPIRATORY (INHALATION) at 13:48

## 2018-01-01 RX ADMIN — HYDROCODONE BITARTRATE AND ACETAMINOPHEN 2 TABLET: 5; 325 TABLET ORAL at 03:58

## 2018-01-01 RX ADMIN — IPRATROPIUM BROMIDE AND ALBUTEROL SULFATE 3 ML: .5; 3 SOLUTION RESPIRATORY (INHALATION) at 11:28

## 2018-01-01 RX ADMIN — IPRATROPIUM BROMIDE AND ALBUTEROL SULFATE 3 ML: .5; 3 SOLUTION RESPIRATORY (INHALATION) at 19:35

## 2018-01-01 RX ADMIN — Medication 2.5 MG: at 00:46

## 2018-01-01 RX ADMIN — RANITIDINE 150 MG: 150 TABLET ORAL at 08:15

## 2018-01-01 RX ADMIN — TAZOBACTAM SODIUM AND PIPERACILLIN SODIUM 4.5 G: 500; 4 INJECTION, SOLUTION INTRAVENOUS at 11:35

## 2018-01-01 RX ADMIN — HYDROCODONE BITARTRATE AND ACETAMINOPHEN 1 TABLET: 5; 325 TABLET ORAL at 03:06

## 2018-01-01 RX ADMIN — FUROSEMIDE 20 MG: 10 INJECTION, SOLUTION INTRAVENOUS at 09:10

## 2018-01-01 RX ADMIN — FUROSEMIDE 20 MG: 10 INJECTION, SOLUTION INTRAMUSCULAR; INTRAVENOUS at 16:11

## 2018-01-01 RX ADMIN — RANITIDINE 150 MG: 150 TABLET ORAL at 08:04

## 2018-01-01 RX ADMIN — LOSARTAN POTASSIUM 50 MG: 50 TABLET, FILM COATED ORAL at 08:13

## 2018-01-01 RX ADMIN — TAZOBACTAM SODIUM AND PIPERACILLIN SODIUM 3.38 G: 375; 3 INJECTION, SOLUTION INTRAVENOUS at 16:20

## 2018-01-01 RX ADMIN — Medication 0.25 MG: at 21:19

## 2018-01-01 RX ADMIN — RANITIDINE 150 MG: 150 TABLET ORAL at 08:05

## 2018-01-01 RX ADMIN — HYDROCODONE BITARTRATE AND ACETAMINOPHEN 1 TABLET: 5; 325 TABLET ORAL at 13:59

## 2018-01-01 RX ADMIN — HYDROCODONE BITARTRATE AND ACETAMINOPHEN 1 TABLET: 5; 325 TABLET ORAL at 13:33

## 2018-01-01 RX ADMIN — METOPROLOL TARTRATE 100 MG: 100 TABLET, FILM COATED ORAL at 09:43

## 2018-01-01 RX ADMIN — HYDROCODONE BITARTRATE AND ACETAMINOPHEN 1 TABLET: 5; 325 TABLET ORAL at 14:39

## 2018-01-01 RX ADMIN — HYDROCODONE BITARTRATE AND ACETAMINOPHEN 2 TABLET: 5; 325 TABLET ORAL at 01:22

## 2018-01-01 RX ADMIN — IPRATROPIUM BROMIDE AND ALBUTEROL SULFATE 3 ML: .5; 3 SOLUTION RESPIRATORY (INHALATION) at 20:16

## 2018-01-01 RX ADMIN — FUROSEMIDE 20 MG: 10 INJECTION, SOLUTION INTRAMUSCULAR; INTRAVENOUS at 16:16

## 2018-01-01 RX ADMIN — METOPROLOL TARTRATE 50 MG: 50 TABLET, FILM COATED ORAL at 21:20

## 2018-01-01 RX ADMIN — FLUTICASONE FUROATE AND VILANTEROL TRIFENATATE 1 PUFF: 200; 25 POWDER RESPIRATORY (INHALATION) at 07:29

## 2018-01-01 RX ADMIN — ACETAMINOPHEN 1000 MG: 500 TABLET, FILM COATED ORAL at 22:49

## 2018-01-01 RX ADMIN — SODIUM CHLORIDE: 9 INJECTION, SOLUTION INTRAVENOUS at 06:31

## 2018-01-01 RX ADMIN — METOPROLOL TARTRATE 50 MG: 50 TABLET, FILM COATED ORAL at 08:44

## 2018-01-01 RX ADMIN — LIDOCAINE 1 PATCH: 560 PATCH PERCUTANEOUS; TOPICAL; TRANSDERMAL at 19:43

## 2018-01-01 RX ADMIN — DOCUSATE SODIUM 100 MG: 100 CAPSULE, LIQUID FILLED ORAL at 20:17

## 2018-01-01 RX ADMIN — METOPROLOL TARTRATE 50 MG: 50 TABLET, FILM COATED ORAL at 08:13

## 2018-01-01 RX ADMIN — ACETAMINOPHEN 650 MG: 325 TABLET, FILM COATED ORAL at 16:07

## 2018-01-01 RX ADMIN — HYDROCODONE BITARTRATE AND ACETAMINOPHEN 2 TABLET: 5; 325 TABLET ORAL at 01:23

## 2018-01-01 RX ADMIN — ASPIRIN 81 MG: 81 TABLET, COATED ORAL at 13:33

## 2018-01-01 RX ADMIN — IPRATROPIUM BROMIDE AND ALBUTEROL SULFATE 3 ML: .5; 3 SOLUTION RESPIRATORY (INHALATION) at 15:18

## 2018-01-01 RX ADMIN — TAZOBACTAM SODIUM AND PIPERACILLIN SODIUM 4.5 G: 500; 4 INJECTION, SOLUTION INTRAVENOUS at 05:07

## 2018-01-01 RX ADMIN — LORAZEPAM 0.5 MG: 0.5 TABLET ORAL at 22:26

## 2018-01-01 RX ADMIN — LOSARTAN POTASSIUM 100 MG: 100 TABLET, FILM COATED ORAL at 09:01

## 2018-01-01 RX ADMIN — AZITHROMYCIN MONOHYDRATE 250 MG: 500 INJECTION, POWDER, LYOPHILIZED, FOR SOLUTION INTRAVENOUS at 12:35

## 2018-01-01 RX ADMIN — SODIUM CHLORIDE, POTASSIUM CHLORIDE, SODIUM LACTATE AND CALCIUM CHLORIDE 500 ML: 600; 310; 30; 20 INJECTION, SOLUTION INTRAVENOUS at 15:59

## 2018-01-01 RX ADMIN — METOPROLOL TARTRATE 50 MG: 50 TABLET, FILM COATED ORAL at 09:01

## 2018-01-01 RX ADMIN — DOCUSATE SODIUM 100 MG: 100 CAPSULE, LIQUID FILLED ORAL at 08:04

## 2018-01-01 RX ADMIN — CALCIUM CARBONATE (ANTACID) CHEW TAB 500 MG 1000 MG: 500 CHEW TAB at 03:57

## 2018-01-01 RX ADMIN — ENOXAPARIN SODIUM 90 MG: 100 INJECTION SUBCUTANEOUS at 11:55

## 2018-01-01 RX ADMIN — HYDROCODONE BITARTRATE AND ACETAMINOPHEN 2 TABLET: 5; 325 TABLET ORAL at 03:26

## 2018-01-01 RX ADMIN — NIFEDIPINE 90 MG: 90 TABLET, FILM COATED, EXTENDED RELEASE ORAL at 07:50

## 2018-01-01 RX ADMIN — HYDROCODONE BITARTRATE AND ACETAMINOPHEN 2 TABLET: 5; 325 TABLET ORAL at 09:05

## 2018-01-01 RX ADMIN — SODIUM CHLORIDE: 9 INJECTION, SOLUTION INTRAVENOUS at 13:36

## 2018-01-01 RX ADMIN — HYDROXYZINE HYDROCHLORIDE 25 MG: 25 TABLET ORAL at 00:25

## 2018-01-01 RX ADMIN — BENZONATATE 100 MG: 100 CAPSULE ORAL at 08:21

## 2018-01-01 RX ADMIN — LOSARTAN POTASSIUM 100 MG: 100 TABLET, FILM COATED ORAL at 08:02

## 2018-01-01 RX ADMIN — SODIUM CHLORIDE SOLN NEBU 3% 3 ML: 3 NEBU SOLN at 19:31

## 2018-01-01 RX ADMIN — HYDROCODONE BITARTRATE AND ACETAMINOPHEN 1 TABLET: 5; 325 TABLET ORAL at 22:06

## 2018-01-01 RX ADMIN — LORAZEPAM 0.5 MG: 0.5 TABLET ORAL at 20:19

## 2018-01-01 RX ADMIN — FUROSEMIDE 20 MG: 10 INJECTION, SOLUTION INTRAVENOUS at 16:06

## 2018-01-01 RX ADMIN — TRAMADOL HYDROCHLORIDE 25 MG: 50 TABLET, FILM COATED ORAL at 01:07

## 2018-01-01 RX ADMIN — HYDROXYZINE HYDROCHLORIDE 25 MG: 25 TABLET ORAL at 05:10

## 2018-01-01 RX ADMIN — Medication 0.25 MG: at 21:02

## 2018-01-01 RX ADMIN — ASPIRIN 81 MG: 81 TABLET, COATED ORAL at 07:50

## 2018-01-01 RX ADMIN — IRON SUCROSE 200 MG: 20 INJECTION, SOLUTION INTRAVENOUS at 20:42

## 2018-01-01 RX ADMIN — IPRATROPIUM BROMIDE AND ALBUTEROL SULFATE 3 ML: .5; 3 SOLUTION RESPIRATORY (INHALATION) at 07:15

## 2018-01-01 RX ADMIN — HYDROXYZINE HYDROCHLORIDE 50 MG: 25 TABLET ORAL at 04:17

## 2018-01-01 RX ADMIN — ALBUMIN HUMAN 12.5 G: 0.25 SOLUTION INTRAVENOUS at 05:55

## 2018-01-01 RX ADMIN — IRON SUCROSE 200 MG: 20 INJECTION, SOLUTION INTRAVENOUS at 22:45

## 2018-01-01 RX ADMIN — CEFTRIAXONE SODIUM 1 G: 1 INJECTION, SOLUTION INTRAVENOUS at 16:06

## 2018-01-01 RX ADMIN — IPRATROPIUM BROMIDE AND ALBUTEROL SULFATE 3 ML: .5; 3 SOLUTION RESPIRATORY (INHALATION) at 11:25

## 2018-01-01 RX ADMIN — WARFARIN SODIUM 2.5 MG: 2.5 TABLET ORAL at 20:29

## 2018-01-01 RX ADMIN — METOPROLOL TARTRATE 50 MG: 50 TABLET, FILM COATED ORAL at 21:37

## 2018-01-01 RX ADMIN — Medication 0.25 MG: at 21:14

## 2018-01-01 RX ADMIN — HYDROCODONE BITARTRATE AND ACETAMINOPHEN 2 TABLET: 5; 325 TABLET ORAL at 16:22

## 2018-01-01 RX ADMIN — METHYLPREDNISOLONE SODIUM SUCCINATE 62.5 MG: 125 INJECTION, POWDER, FOR SOLUTION INTRAMUSCULAR; INTRAVENOUS at 23:17

## 2018-01-01 RX ADMIN — CALCIUM CARBONATE (ANTACID) CHEW TAB 500 MG 1000 MG: 500 CHEW TAB at 09:17

## 2018-01-01 RX ADMIN — SODIUM CHLORIDE 85 ML: 9 INJECTION, SOLUTION INTRAVENOUS at 14:19

## 2018-01-01 RX ADMIN — WARFARIN SODIUM 2.5 MG: 2.5 TABLET ORAL at 17:55

## 2018-01-01 RX ADMIN — HYDROCODONE BITARTRATE AND ACETAMINOPHEN 1 TABLET: 5; 325 TABLET ORAL at 04:42

## 2018-01-01 RX ADMIN — LOSARTAN POTASSIUM 100 MG: 100 TABLET, FILM COATED ORAL at 09:05

## 2018-01-01 RX ADMIN — IPRATROPIUM BROMIDE AND ALBUTEROL SULFATE 3 ML: .5; 3 SOLUTION RESPIRATORY (INHALATION) at 13:50

## 2018-01-01 RX ADMIN — NIFEDIPINE 90 MG: 30 TABLET, FILM COATED, EXTENDED RELEASE ORAL at 08:33

## 2018-01-01 RX ADMIN — IPRATROPIUM BROMIDE AND ALBUTEROL SULFATE 3 ML: .5; 3 SOLUTION RESPIRATORY (INHALATION) at 20:11

## 2018-01-01 RX ADMIN — NIFEDIPINE 90 MG: 90 TABLET, FILM COATED, EXTENDED RELEASE ORAL at 16:27

## 2018-01-01 RX ADMIN — ENOXAPARIN SODIUM 90 MG: 100 INJECTION SUBCUTANEOUS at 20:26

## 2018-01-01 RX ADMIN — IPRATROPIUM BROMIDE AND ALBUTEROL SULFATE 3 ML: .5; 3 SOLUTION RESPIRATORY (INHALATION) at 19:06

## 2018-01-01 RX ADMIN — METOPROLOL TARTRATE 50 MG: 50 TABLET, FILM COATED ORAL at 20:26

## 2018-01-01 RX ADMIN — LOSARTAN POTASSIUM 100 MG: 100 TABLET, FILM COATED ORAL at 07:50

## 2018-01-01 RX ADMIN — LOSARTAN POTASSIUM 50 MG: 50 TABLET, FILM COATED ORAL at 07:48

## 2018-01-01 RX ADMIN — TAZOBACTAM SODIUM AND PIPERACILLIN SODIUM 3.38 G: 375; 3 INJECTION, SOLUTION INTRAVENOUS at 20:26

## 2018-01-01 RX ADMIN — FUROSEMIDE 40 MG: 10 INJECTION, SOLUTION INTRAMUSCULAR; INTRAVENOUS at 21:06

## 2018-01-01 RX ADMIN — TRAMADOL HYDROCHLORIDE 50 MG: 50 TABLET, COATED ORAL at 16:04

## 2018-01-01 RX ADMIN — METOPROLOL TARTRATE 50 MG: 50 TABLET, FILM COATED ORAL at 08:49

## 2018-01-01 RX ADMIN — Medication: at 10:41

## 2018-01-01 RX ADMIN — IPRATROPIUM BROMIDE AND ALBUTEROL SULFATE 3 ML: .5; 3 SOLUTION RESPIRATORY (INHALATION) at 08:05

## 2018-01-01 RX ADMIN — DOCUSATE SODIUM 100 MG: 100 CAPSULE, LIQUID FILLED ORAL at 20:39

## 2018-01-01 RX ADMIN — IPRATROPIUM BROMIDE AND ALBUTEROL SULFATE 3 ML: .5; 3 SOLUTION RESPIRATORY (INHALATION) at 11:24

## 2018-01-01 RX ADMIN — IPRATROPIUM BROMIDE AND ALBUTEROL SULFATE 3 ML: .5; 3 SOLUTION RESPIRATORY (INHALATION) at 15:41

## 2018-01-01 RX ADMIN — DOCUSATE SODIUM 100 MG: 100 CAPSULE, LIQUID FILLED ORAL at 08:49

## 2018-01-01 RX ADMIN — METOPROLOL TARTRATE 50 MG: 50 TABLET, FILM COATED ORAL at 09:10

## 2018-01-01 RX ADMIN — IPRATROPIUM BROMIDE AND ALBUTEROL SULFATE 3 ML: .5; 3 SOLUTION RESPIRATORY (INHALATION) at 12:23

## 2018-01-01 RX ADMIN — METOPROLOL TARTRATE 75 MG: 50 TABLET, FILM COATED ORAL at 20:02

## 2018-01-01 RX ADMIN — POTASSIUM CHLORIDE 40 MEQ: 1500 TABLET, EXTENDED RELEASE ORAL at 13:32

## 2018-01-01 RX ADMIN — TAZOBACTAM SODIUM AND PIPERACILLIN SODIUM 4.5 G: 500; 4 INJECTION, SOLUTION INTRAVENOUS at 17:22

## 2018-01-01 RX ADMIN — CEFTRIAXONE SODIUM 1 G: 1 INJECTION, SOLUTION INTRAVENOUS at 15:20

## 2018-01-01 RX ADMIN — FUROSEMIDE 20 MG: 10 INJECTION, SOLUTION INTRAMUSCULAR; INTRAVENOUS at 09:01

## 2018-01-01 RX ADMIN — Medication 0.25 MG: at 21:07

## 2018-01-01 RX ADMIN — SODIUM CHLORIDE: 9 INJECTION, SOLUTION INTRAVENOUS at 15:27

## 2018-01-01 RX ADMIN — TRAMADOL HYDROCHLORIDE 50 MG: 50 TABLET, COATED ORAL at 15:14

## 2018-01-01 RX ADMIN — PREDNISONE 40 MG: 20 TABLET ORAL at 09:05

## 2018-01-01 RX ADMIN — FLUTICASONE FUROATE AND VILANTEROL TRIFENATATE 1 PUFF: 200; 25 POWDER RESPIRATORY (INHALATION) at 07:37

## 2018-01-01 RX ADMIN — LOSARTAN POTASSIUM 50 MG: 50 TABLET, FILM COATED ORAL at 09:58

## 2018-01-01 RX ADMIN — PREDNISONE 40 MG: 20 TABLET ORAL at 08:44

## 2018-01-01 RX ADMIN — IPRATROPIUM BROMIDE AND ALBUTEROL SULFATE 3 ML: .5; 3 SOLUTION RESPIRATORY (INHALATION) at 07:20

## 2018-01-01 RX ADMIN — NIFEDIPINE 90 MG: 30 TABLET, FILM COATED, EXTENDED RELEASE ORAL at 08:14

## 2018-01-01 RX ADMIN — METOPROLOL TARTRATE 50 MG: 50 TABLET, FILM COATED ORAL at 07:48

## 2018-01-01 RX ADMIN — LOSARTAN POTASSIUM 50 MG: 50 TABLET, FILM COATED ORAL at 08:36

## 2018-01-01 RX ADMIN — HYDROCODONE BITARTRATE AND ACETAMINOPHEN 2 TABLET: 5; 325 TABLET ORAL at 15:57

## 2018-01-01 RX ADMIN — LORAZEPAM 0.5 MG: 0.5 TABLET ORAL at 22:24

## 2018-01-01 RX ADMIN — BISACODYL 10 MG: 10 SUPPOSITORY RECTAL at 12:35

## 2018-01-01 RX ADMIN — HYDROXYZINE HYDROCHLORIDE 50 MG: 25 TABLET ORAL at 01:08

## 2018-01-01 RX ADMIN — OSELTAMIVIR PHOSPHATE 75 MG: 75 CAPSULE ORAL at 08:41

## 2018-01-01 RX ADMIN — RANITIDINE 150 MG: 150 TABLET ORAL at 08:17

## 2018-01-01 RX ADMIN — FUROSEMIDE 20 MG: 10 INJECTION, SOLUTION INTRAMUSCULAR; INTRAVENOUS at 17:23

## 2018-01-01 RX ADMIN — RANITIDINE 150 MG: 150 TABLET ORAL at 09:01

## 2018-01-01 RX ADMIN — Medication: at 22:47

## 2018-01-01 RX ADMIN — RANITIDINE 150 MG: 150 TABLET ORAL at 20:19

## 2018-01-01 RX ADMIN — LORAZEPAM 0.5 MG: 0.5 TABLET ORAL at 20:16

## 2018-01-01 RX ADMIN — FUROSEMIDE 20 MG: 10 INJECTION, SOLUTION INTRAVENOUS at 16:09

## 2018-01-01 RX ADMIN — METOPROLOL TARTRATE 50 MG: 50 TABLET, FILM COATED ORAL at 20:19

## 2018-01-01 RX ADMIN — NIFEDIPINE 90 MG: 30 TABLET, FILM COATED, EXTENDED RELEASE ORAL at 08:49

## 2018-01-01 RX ADMIN — OMEPRAZOLE 20 MG: 20 CAPSULE, DELAYED RELEASE ORAL at 09:01

## 2018-01-01 RX ADMIN — ACETAMINOPHEN 1000 MG: 500 TABLET, FILM COATED ORAL at 21:51

## 2018-01-01 RX ADMIN — ENOXAPARIN SODIUM 40 MG: 40 INJECTION SUBCUTANEOUS at 21:09

## 2018-01-01 RX ADMIN — Medication: at 04:00

## 2018-01-01 RX ADMIN — POLYETHYLENE GLYCOL 3350 17 G: 17 POWDER, FOR SOLUTION ORAL at 21:25

## 2018-01-01 RX ADMIN — IPRATROPIUM BROMIDE AND ALBUTEROL SULFATE 3 ML: .5; 3 SOLUTION RESPIRATORY (INHALATION) at 07:45

## 2018-01-01 RX ADMIN — SODIUM CHLORIDE 500 ML: 9 INJECTION, SOLUTION INTRAVENOUS at 23:21

## 2018-01-01 RX ADMIN — SODIUM CHLORIDE SOLN NEBU 3% 3 ML: 3 NEBU SOLN at 15:03

## 2018-01-01 RX ADMIN — Medication 0.25 MG: at 21:04

## 2018-01-01 RX ADMIN — IPRATROPIUM BROMIDE AND ALBUTEROL SULFATE 3 ML: .5; 3 SOLUTION RESPIRATORY (INHALATION) at 07:29

## 2018-01-01 RX ADMIN — RANITIDINE 150 MG: 150 TABLET ORAL at 21:09

## 2018-01-01 RX ADMIN — TAZOBACTAM SODIUM AND PIPERACILLIN SODIUM 4.5 G: 500; 4 INJECTION, SOLUTION INTRAVENOUS at 11:49

## 2018-01-01 RX ADMIN — METOPROLOL TARTRATE 50 MG: 50 TABLET, FILM COATED ORAL at 23:03

## 2018-01-01 RX ADMIN — IOPAMIDOL 100 ML: 755 INJECTION, SOLUTION INTRAVENOUS at 14:28

## 2018-01-01 RX ADMIN — LOSARTAN POTASSIUM 50 MG: 50 TABLET, FILM COATED ORAL at 09:10

## 2018-01-01 RX ADMIN — LOSARTAN POTASSIUM 50 MG: 50 TABLET, FILM COATED ORAL at 08:01

## 2018-01-01 RX ADMIN — ACETAMINOPHEN 650 MG: 325 TABLET, FILM COATED ORAL at 01:41

## 2018-01-01 RX ADMIN — GUAIFENESIN 10 ML: 100 SOLUTION ORAL at 18:18

## 2018-01-01 RX ADMIN — CEFTRIAXONE SODIUM 2 G: 2 INJECTION, SOLUTION INTRAVENOUS at 16:00

## 2018-01-01 RX ADMIN — SENNOSIDES AND DOCUSATE SODIUM 2 TABLET: 8.6; 5 TABLET ORAL at 22:24

## 2018-01-01 RX ADMIN — NIFEDIPINE 90 MG: 30 TABLET, FILM COATED, EXTENDED RELEASE ORAL at 09:01

## 2018-01-01 RX ADMIN — TRAMADOL HYDROCHLORIDE 50 MG: 50 TABLET, COATED ORAL at 02:38

## 2018-01-01 RX ADMIN — NIFEDIPINE 90 MG: 30 TABLET, FILM COATED, EXTENDED RELEASE ORAL at 07:48

## 2018-01-01 RX ADMIN — RANITIDINE 150 MG: 150 TABLET ORAL at 09:10

## 2018-01-01 RX ADMIN — HYDROCODONE BITARTRATE AND ACETAMINOPHEN 2 TABLET: 5; 325 TABLET ORAL at 18:34

## 2018-01-01 RX ADMIN — ROPINIROLE HYDROCHLORIDE 0.25 MG: 0.25 TABLET, FILM COATED ORAL at 20:36

## 2018-01-01 RX ADMIN — SODIUM CHLORIDE SOLN NEBU 3% 3 ML: 3 NEBU SOLN at 15:42

## 2018-01-01 RX ADMIN — FUROSEMIDE 20 MG: 10 INJECTION, SOLUTION INTRAVENOUS at 09:29

## 2018-01-01 RX ADMIN — DOCUSATE SODIUM 100 MG: 100 CAPSULE, LIQUID FILLED ORAL at 21:37

## 2018-01-01 RX ADMIN — FLUTICASONE FUROATE AND VILANTEROL TRIFENATATE 1 PUFF: 200; 25 POWDER RESPIRATORY (INHALATION) at 07:25

## 2018-01-01 RX ADMIN — RANITIDINE 150 MG: 150 TABLET ORAL at 08:47

## 2018-01-01 RX ADMIN — POTASSIUM CHLORIDE 20 MEQ: 1500 TABLET, EXTENDED RELEASE ORAL at 08:54

## 2018-01-01 RX ADMIN — METOPROLOL TARTRATE 50 MG: 50 TABLET, FILM COATED ORAL at 20:16

## 2018-01-01 RX ADMIN — OSELTAMIVIR PHOSPHATE 75 MG: 75 CAPSULE ORAL at 16:22

## 2018-01-01 RX ADMIN — IPRATROPIUM BROMIDE AND ALBUTEROL SULFATE 3 ML: .5; 3 SOLUTION RESPIRATORY (INHALATION) at 14:00

## 2018-01-01 RX ADMIN — SODIUM CHLORIDE 250 ML: 9 INJECTION, SOLUTION INTRAVENOUS at 15:41

## 2018-01-01 RX ADMIN — LORAZEPAM 0.5 MG: 0.5 TABLET ORAL at 21:37

## 2018-01-01 RX ADMIN — METOPROLOL TARTRATE 50 MG: 50 TABLET, FILM COATED ORAL at 08:05

## 2018-01-01 RX ADMIN — ACETAMINOPHEN 1000 MG: 500 TABLET, FILM COATED ORAL at 09:43

## 2018-01-01 RX ADMIN — METOPROLOL TARTRATE 50 MG: 50 TABLET, FILM COATED ORAL at 08:35

## 2018-01-01 RX ADMIN — IOPAMIDOL 73 ML: 755 INJECTION, SOLUTION INTRAVENOUS at 11:07

## 2018-01-01 RX ADMIN — FUROSEMIDE 40 MG: 10 INJECTION, SOLUTION INTRAMUSCULAR; INTRAVENOUS at 16:04

## 2018-01-01 RX ADMIN — NIFEDIPINE 90 MG: 30 TABLET, FILM COATED, EXTENDED RELEASE ORAL at 08:48

## 2018-01-01 RX ADMIN — IPRATROPIUM BROMIDE AND ALBUTEROL SULFATE 3 ML: .5; 3 SOLUTION RESPIRATORY (INHALATION) at 07:49

## 2018-01-01 RX ADMIN — IPRATROPIUM BROMIDE AND ALBUTEROL SULFATE 3 ML: .5; 3 SOLUTION RESPIRATORY (INHALATION) at 13:52

## 2018-01-01 RX ADMIN — ACETAMINOPHEN 1000 MG: 500 TABLET, FILM COATED ORAL at 22:24

## 2018-01-01 RX ADMIN — HYDROCODONE BITARTRATE AND ACETAMINOPHEN 2 TABLET: 5; 325 TABLET ORAL at 10:29

## 2018-01-01 RX ADMIN — LOSARTAN POTASSIUM 100 MG: 100 TABLET, FILM COATED ORAL at 08:41

## 2018-01-01 RX ADMIN — HYDROCODONE BITARTRATE AND ACETAMINOPHEN 1 TABLET: 5; 325 TABLET ORAL at 20:39

## 2018-01-01 RX ADMIN — TRAMADOL HYDROCHLORIDE 50 MG: 50 TABLET, COATED ORAL at 00:55

## 2018-01-01 RX ADMIN — METOPROLOL TARTRATE 25 MG: 25 TABLET ORAL at 08:25

## 2018-01-01 RX ADMIN — Medication 0.25 MG: at 21:54

## 2018-01-01 RX ADMIN — CALCIUM CARBONATE (ANTACID) CHEW TAB 500 MG 1000 MG: 500 CHEW TAB at 01:58

## 2018-01-01 RX ADMIN — LORAZEPAM 0.5 MG: 0.5 TABLET ORAL at 22:06

## 2018-01-01 RX ADMIN — HYDROCODONE BITARTRATE AND ACETAMINOPHEN 2 TABLET: 5; 325 TABLET ORAL at 16:16

## 2018-01-01 RX ADMIN — NIFEDIPINE 90 MG: 30 TABLET, FILM COATED, EXTENDED RELEASE ORAL at 09:10

## 2018-01-01 RX ADMIN — LIDOCAINE 1 PATCH: 560 PATCH PERCUTANEOUS; TOPICAL; TRANSDERMAL at 20:05

## 2018-01-01 RX ADMIN — LIDOCAINE 1 PATCH: 560 PATCH PERCUTANEOUS; TOPICAL; TRANSDERMAL at 21:10

## 2018-01-01 RX ADMIN — FLUTICASONE FUROATE AND VILANTEROL TRIFENATATE 1 PUFF: 200; 25 POWDER RESPIRATORY (INHALATION) at 07:56

## 2018-01-01 RX ADMIN — IPRATROPIUM BROMIDE AND ALBUTEROL SULFATE 3 ML: .5; 3 SOLUTION RESPIRATORY (INHALATION) at 15:19

## 2018-01-01 RX ADMIN — HYDROCODONE BITARTRATE AND ACETAMINOPHEN 1 TABLET: 5; 325 TABLET ORAL at 20:46

## 2018-01-01 RX ADMIN — METOPROLOL TARTRATE 50 MG: 50 TABLET, FILM COATED ORAL at 20:01

## 2018-01-01 RX ADMIN — MICONAZOLE NITRATE: 2 POWDER TOPICAL at 19:36

## 2018-01-01 RX ADMIN — METOPROLOL TARTRATE 50 MG: 50 TABLET, FILM COATED ORAL at 20:34

## 2018-01-01 RX ADMIN — FUROSEMIDE 40 MG: 40 TABLET ORAL at 09:44

## 2018-01-01 RX ADMIN — NIFEDIPINE 90 MG: 30 TABLET, FILM COATED, EXTENDED RELEASE ORAL at 09:58

## 2018-01-01 RX ADMIN — HYDROCODONE BITARTRATE AND ACETAMINOPHEN 1 TABLET: 5; 325 TABLET ORAL at 15:54

## 2018-01-01 RX ADMIN — SODIUM CHLORIDE SOLN NEBU 3% 3 ML: 3 NEBU SOLN at 19:06

## 2018-01-01 RX ADMIN — LOSARTAN POTASSIUM 100 MG: 100 TABLET, FILM COATED ORAL at 08:25

## 2018-01-01 RX ADMIN — NIFEDIPINE 90 MG: 30 TABLET, FILM COATED, EXTENDED RELEASE ORAL at 08:17

## 2018-01-01 RX ADMIN — ROPINIROLE HYDROCHLORIDE 0.25 MG: 0.25 TABLET, FILM COATED ORAL at 19:30

## 2018-01-01 RX ADMIN — HYDROCODONE BITARTRATE AND ACETAMINOPHEN 2 TABLET: 5; 325 TABLET ORAL at 00:07

## 2018-01-01 RX ADMIN — ASPIRIN 81 MG: 81 TABLET, COATED ORAL at 08:26

## 2018-01-01 RX ADMIN — MAGNESIUM HYDROXIDE 30 ML: 400 SUSPENSION ORAL at 12:34

## 2018-01-01 RX ADMIN — FUROSEMIDE 20 MG: 10 INJECTION, SOLUTION INTRAMUSCULAR; INTRAVENOUS at 07:49

## 2018-01-01 RX ADMIN — ACETAMINOPHEN 1000 MG: 500 TABLET, FILM COATED ORAL at 16:04

## 2018-01-01 RX ADMIN — Medication 0.2 MG: at 16:00

## 2018-01-01 RX ADMIN — METOPROLOL TARTRATE 100 MG: 100 TABLET, FILM COATED ORAL at 20:36

## 2018-01-01 RX ADMIN — METOPROLOL TARTRATE 25 MG: 25 TABLET ORAL at 21:07

## 2018-01-01 RX ADMIN — FUROSEMIDE 40 MG: 10 INJECTION, SOLUTION INTRAVENOUS at 14:00

## 2018-01-01 RX ADMIN — METOPROLOL TARTRATE 50 MG: 50 TABLET, FILM COATED ORAL at 20:36

## 2018-01-01 RX ADMIN — METOPROLOL TARTRATE 50 MG: 50 TABLET, FILM COATED ORAL at 08:57

## 2018-01-01 RX ADMIN — ONDANSETRON 4 MG: 4 TABLET, ORALLY DISINTEGRATING ORAL at 00:07

## 2018-01-01 RX ADMIN — NIFEDIPINE 90 MG: 30 TABLET, FILM COATED, EXTENDED RELEASE ORAL at 08:01

## 2018-01-01 RX ADMIN — LORAZEPAM 0.5 MG: 0.5 TABLET ORAL at 21:46

## 2018-01-01 RX ADMIN — FUROSEMIDE 20 MG: 10 INJECTION, SOLUTION INTRAMUSCULAR; INTRAVENOUS at 00:03

## 2018-01-01 RX ADMIN — POTASSIUM CHLORIDE 20 MEQ: 1500 TABLET, EXTENDED RELEASE ORAL at 14:04

## 2018-01-01 RX ADMIN — SODIUM CHLORIDE SOLN NEBU 3% 3 ML: 3 NEBU SOLN at 19:50

## 2018-01-01 RX ADMIN — ACETAMINOPHEN 1000 MG: 500 TABLET, FILM COATED ORAL at 08:14

## 2018-01-01 RX ADMIN — HYDROCHLOROTHIAZIDE 25 MG: 25 TABLET ORAL at 10:52

## 2018-01-01 RX ADMIN — TRAMADOL HYDROCHLORIDE 50 MG: 50 TABLET, COATED ORAL at 18:23

## 2018-01-01 RX ADMIN — DOCUSATE SODIUM 100 MG: 100 CAPSULE, LIQUID FILLED ORAL at 08:18

## 2018-01-01 RX ADMIN — IPRATROPIUM BROMIDE AND ALBUTEROL SULFATE 3 ML: .5; 3 SOLUTION RESPIRATORY (INHALATION) at 07:42

## 2018-01-01 RX ADMIN — FUROSEMIDE 40 MG: 40 TABLET ORAL at 08:15

## 2018-01-01 RX ADMIN — WARFARIN SODIUM 1 MG: 1 TABLET ORAL at 18:29

## 2018-01-01 RX ADMIN — HYDROCODONE BITARTRATE AND ACETAMINOPHEN 1 TABLET: 5; 325 TABLET ORAL at 23:57

## 2018-01-01 RX ADMIN — IRON SUCROSE 200 MG: 20 INJECTION, SOLUTION INTRAVENOUS at 23:03

## 2018-01-01 RX ADMIN — FUROSEMIDE 40 MG: 10 INJECTION, SOLUTION INTRAMUSCULAR; INTRAVENOUS at 08:46

## 2018-01-01 RX ADMIN — TAZOBACTAM SODIUM AND PIPERACILLIN SODIUM 4.5 G: 500; 4 INJECTION, SOLUTION INTRAVENOUS at 22:26

## 2018-01-01 RX ADMIN — RANITIDINE 150 MG: 150 TABLET ORAL at 21:11

## 2018-01-01 RX ADMIN — ACETAMINOPHEN 650 MG: 325 TABLET, FILM COATED ORAL at 04:33

## 2018-01-01 RX ADMIN — METOPROLOL TARTRATE 50 MG: 50 TABLET, FILM COATED ORAL at 21:06

## 2018-01-01 RX ADMIN — Medication 0.25 MG: at 22:24

## 2018-01-01 RX ADMIN — TRAMADOL HYDROCHLORIDE 50 MG: 50 TABLET, COATED ORAL at 21:20

## 2018-01-01 RX ADMIN — SODIUM CHLORIDE 500 ML: 9 INJECTION, SOLUTION INTRAVENOUS at 08:05

## 2018-01-01 RX ADMIN — FUROSEMIDE 40 MG: 40 TABLET ORAL at 08:49

## 2018-01-01 RX ADMIN — LOSARTAN POTASSIUM 12.5 MG: 25 TABLET, FILM COATED ORAL at 08:14

## 2018-01-01 RX ADMIN — FUROSEMIDE 20 MG: 10 INJECTION, SOLUTION INTRAVENOUS at 09:37

## 2018-01-01 RX ADMIN — FUROSEMIDE 40 MG: 10 INJECTION, SOLUTION INTRAMUSCULAR; INTRAVENOUS at 10:45

## 2018-01-01 RX ADMIN — SODIUM CHLORIDE SOLN NEBU 3% 3 ML: 3 NEBU SOLN at 15:20

## 2018-01-01 RX ADMIN — METOPROLOL TARTRATE 50 MG: 50 TABLET, FILM COATED ORAL at 21:52

## 2018-01-01 RX ADMIN — FUROSEMIDE 40 MG: 10 INJECTION, SOLUTION INTRAMUSCULAR; INTRAVENOUS at 08:01

## 2018-01-01 RX ADMIN — AMOXICILLIN AND CLAVULANATE POTASSIUM 1 TABLET: 875; 125 TABLET, FILM COATED ORAL at 11:11

## 2018-01-01 RX ADMIN — SODIUM CHLORIDE 89 ML: 9 INJECTION, SOLUTION INTRAVENOUS at 14:28

## 2018-01-01 RX ADMIN — TRAMADOL HYDROCHLORIDE 50 MG: 50 TABLET, COATED ORAL at 20:34

## 2018-01-01 RX ADMIN — ACETAMINOPHEN 650 MG: 325 TABLET, FILM COATED ORAL at 17:07

## 2018-01-01 RX ADMIN — LOSARTAN POTASSIUM 50 MG: 50 TABLET, FILM COATED ORAL at 08:46

## 2018-01-01 RX ADMIN — MICONAZOLE NITRATE: 2 POWDER TOPICAL at 18:15

## 2018-01-01 RX ADMIN — LOSARTAN POTASSIUM 50 MG: 50 TABLET, FILM COATED ORAL at 09:12

## 2018-01-01 RX ADMIN — POTASSIUM CHLORIDE 20 MEQ: 1500 TABLET, EXTENDED RELEASE ORAL at 11:53

## 2018-01-01 RX ADMIN — LORAZEPAM 0.5 MG: 0.5 TABLET ORAL at 20:05

## 2018-01-01 RX ADMIN — OSELTAMIVIR PHOSPHATE 75 MG: 75 CAPSULE ORAL at 20:20

## 2018-01-01 RX ADMIN — DOCUSATE SODIUM 100 MG: 100 CAPSULE, LIQUID FILLED ORAL at 20:16

## 2018-01-01 RX ADMIN — METOPROLOL TARTRATE 25 MG: 25 TABLET ORAL at 08:21

## 2018-01-01 RX ADMIN — TRAMADOL HYDROCHLORIDE 50 MG: 50 TABLET, COATED ORAL at 00:04

## 2018-01-01 RX ADMIN — ROPINIROLE HYDROCHLORIDE 0.25 MG: 0.25 TABLET, FILM COATED ORAL at 20:02

## 2018-01-01 RX ADMIN — SODIUM CHLORIDE 92 ML: 9 INJECTION, SOLUTION INTRAVENOUS at 11:08

## 2018-01-01 RX ADMIN — ACETAMINOPHEN 1000 MG: 500 TABLET, FILM COATED ORAL at 08:36

## 2018-01-01 RX ADMIN — METOPROLOL TARTRATE 50 MG: 50 TABLET, FILM COATED ORAL at 09:05

## 2018-01-01 RX ADMIN — HYDROCODONE BITARTRATE AND ACETAMINOPHEN 2 TABLET: 5; 325 TABLET ORAL at 08:44

## 2018-01-01 RX ADMIN — Medication 1.5 MG: at 18:23

## 2018-01-01 RX ADMIN — IPRATROPIUM BROMIDE AND ALBUTEROL SULFATE 3 ML: .5; 3 SOLUTION RESPIRATORY (INHALATION) at 15:15

## 2018-01-01 RX ADMIN — OMEPRAZOLE 20 MG: 20 CAPSULE, DELAYED RELEASE ORAL at 08:36

## 2018-01-01 RX ADMIN — NIFEDIPINE 90 MG: 90 TABLET, FILM COATED, EXTENDED RELEASE ORAL at 09:40

## 2018-01-01 RX ADMIN — HYDROXYZINE HYDROCHLORIDE 25 MG: 25 TABLET ORAL at 17:08

## 2018-01-01 RX ADMIN — HYDROCODONE BITARTRATE AND ACETAMINOPHEN 2 TABLET: 5; 325 TABLET ORAL at 21:53

## 2018-01-01 RX ADMIN — HYDROCODONE BITARTRATE AND ACETAMINOPHEN 1 TABLET: 5; 325 TABLET ORAL at 09:37

## 2018-01-01 RX ADMIN — HYDROCODONE BITARTRATE AND ACETAMINOPHEN 2 TABLET: 5; 325 TABLET ORAL at 07:54

## 2018-01-01 RX ADMIN — HYDROCODONE BITARTRATE AND ACETAMINOPHEN 1 TABLET: 5; 325 TABLET ORAL at 04:41

## 2018-01-01 RX ADMIN — IPRATROPIUM BROMIDE AND ALBUTEROL SULFATE 3 ML: .5; 3 SOLUTION RESPIRATORY (INHALATION) at 11:18

## 2018-01-01 RX ADMIN — LOSARTAN POTASSIUM 50 MG: 50 TABLET, FILM COATED ORAL at 08:15

## 2018-01-01 RX ADMIN — IPRATROPIUM BROMIDE AND ALBUTEROL SULFATE 3 ML: .5; 3 SOLUTION RESPIRATORY (INHALATION) at 19:50

## 2018-01-01 RX ADMIN — NIFEDIPINE 90 MG: 90 TABLET, FILM COATED, EXTENDED RELEASE ORAL at 09:00

## 2018-01-01 RX ADMIN — TRAMADOL HYDROCHLORIDE 50 MG: 50 TABLET, COATED ORAL at 00:02

## 2018-01-01 RX ADMIN — TRAMADOL HYDROCHLORIDE 50 MG: 50 TABLET, COATED ORAL at 00:07

## 2018-01-01 RX ADMIN — TAZOBACTAM SODIUM AND PIPERACILLIN SODIUM 4.5 G: 500; 4 INJECTION, SOLUTION INTRAVENOUS at 16:04

## 2018-01-01 RX ADMIN — HYDROCODONE BITARTRATE AND ACETAMINOPHEN 1 TABLET: 5; 325 TABLET ORAL at 16:05

## 2018-01-01 ASSESSMENT — ACTIVITIES OF DAILY LIVING (ADL)
ADLS_ACUITY_SCORE: 16
ADLS_ACUITY_SCORE: 17
ADLS_ACUITY_SCORE: 15
ADLS_ACUITY_SCORE: 16
ADLS_ACUITY_SCORE: 17
ADLS_ACUITY_SCORE: 17
ADLS_ACUITY_SCORE: 15
ADLS_ACUITY_SCORE: 14
ADLS_ACUITY_SCORE: 15
ADLS_ACUITY_SCORE: 17
ADLS_ACUITY_SCORE: 14
ADLS_ACUITY_SCORE: 18
ADLS_ACUITY_SCORE: 17
ADLS_ACUITY_SCORE: 16
ADLS_ACUITY_SCORE: 22
ADLS_ACUITY_SCORE: 16
ADLS_ACUITY_SCORE: 17
ADLS_ACUITY_SCORE: 21
ADLS_ACUITY_SCORE: 17
ADLS_ACUITY_SCORE: 17
ADLS_ACUITY_SCORE: 15
ADLS_ACUITY_SCORE: 17
ADLS_ACUITY_SCORE: 17
ADLS_ACUITY_SCORE: 14
ADLS_ACUITY_SCORE: 16
ADLS_ACUITY_SCORE: 15
ADLS_ACUITY_SCORE: 15
ADLS_ACUITY_SCORE: 14
ADLS_ACUITY_SCORE: 16
ADLS_ACUITY_SCORE: 15
ADLS_ACUITY_SCORE: 15
ADLS_ACUITY_SCORE: 17
ADLS_ACUITY_SCORE: 17
ADLS_ACUITY_SCORE: 15
ADLS_ACUITY_SCORE: 14
ADLS_ACUITY_SCORE: 18
ADLS_ACUITY_SCORE: 14
ADLS_ACUITY_SCORE: 14
ADLS_ACUITY_SCORE: 15
ADLS_ACUITY_SCORE: 17
ADLS_ACUITY_SCORE: 18
ADLS_ACUITY_SCORE: 14
ADLS_ACUITY_SCORE: 15
ADLS_ACUITY_SCORE: 15
ADLS_ACUITY_SCORE: 17
ADLS_ACUITY_SCORE: 17
ADLS_ACUITY_SCORE: 18
ADLS_ACUITY_SCORE: 17
ADLS_ACUITY_SCORE: 17
ADLS_ACUITY_SCORE: 15
ADLS_ACUITY_SCORE: 14
ADLS_ACUITY_SCORE: 15
ADLS_ACUITY_SCORE: 17
ADLS_ACUITY_SCORE: 14
ADLS_ACUITY_SCORE: 17
ADLS_ACUITY_SCORE: 15
ADLS_ACUITY_SCORE: 17
ADLS_ACUITY_SCORE: 14
ADLS_ACUITY_SCORE: 15
ADLS_ACUITY_SCORE: 14
ADLS_ACUITY_SCORE: 17
ADLS_ACUITY_SCORE: 14
ADLS_ACUITY_SCORE: 17
ADLS_ACUITY_SCORE: 14
ADLS_ACUITY_SCORE: 15
ADLS_ACUITY_SCORE: 17
ADLS_ACUITY_SCORE: 14
ADLS_ACUITY_SCORE: 15
ADLS_ACUITY_SCORE: 14
ADLS_ACUITY_SCORE: 15
ADLS_ACUITY_SCORE: 14
ADLS_ACUITY_SCORE: 14
ADLS_ACUITY_SCORE: 17
ADLS_ACUITY_SCORE: 15
ADLS_ACUITY_SCORE: 14
ADLS_ACUITY_SCORE: 15
ADLS_ACUITY_SCORE: 18
ADLS_ACUITY_SCORE: 15
ADLS_ACUITY_SCORE: 17
ADLS_ACUITY_SCORE: 17
ADLS_ACUITY_SCORE: 16
ADLS_ACUITY_SCORE: 15
ADLS_ACUITY_SCORE: 18
ADLS_ACUITY_SCORE: 18
ADLS_ACUITY_SCORE: 21
ADLS_ACUITY_SCORE: 15
ADLS_ACUITY_SCORE: 14
ADLS_ACUITY_SCORE: 14
ADLS_ACUITY_SCORE: 17
ADLS_ACUITY_SCORE: 18
ADLS_ACUITY_SCORE: 15
ADLS_ACUITY_SCORE: 15
ADLS_ACUITY_SCORE: 22
ADLS_ACUITY_SCORE: 15
ADLS_ACUITY_SCORE: 14
ADLS_ACUITY_SCORE: 17
ADLS_ACUITY_SCORE: 15
ADLS_ACUITY_SCORE: 15
ADLS_ACUITY_SCORE: 17
ADLS_ACUITY_SCORE: 17
ADLS_ACUITY_SCORE: 14
ADLS_ACUITY_SCORE: 15
ADLS_ACUITY_SCORE: 17
ADLS_ACUITY_SCORE: 21
ADLS_ACUITY_SCORE: 15
ADLS_ACUITY_SCORE: 14
ADLS_ACUITY_SCORE: 17
ADLS_ACUITY_SCORE: 15
ADLS_ACUITY_SCORE: 14
ADLS_ACUITY_SCORE: 15
ADLS_ACUITY_SCORE: 17
ADLS_ACUITY_SCORE: 14
ADLS_ACUITY_SCORE: 15
ADLS_ACUITY_SCORE: 14
ADLS_ACUITY_SCORE: 17
ADLS_ACUITY_SCORE: 22
ADLS_ACUITY_SCORE: 14
ADLS_ACUITY_SCORE: 18
ADLS_ACUITY_SCORE: 18
ADLS_ACUITY_SCORE: 14
ADLS_ACUITY_SCORE: 17
ADLS_ACUITY_SCORE: 15
ADLS_ACUITY_SCORE: 14
ADLS_ACUITY_SCORE: 17
ADLS_ACUITY_SCORE: 15
ADLS_ACUITY_SCORE: 15
ADLS_ACUITY_SCORE: 22
ADLS_ACUITY_SCORE: 17
ADLS_ACUITY_SCORE: 16
ADLS_ACUITY_SCORE: 17
ADLS_ACUITY_SCORE: 16
ADLS_ACUITY_SCORE: 15
ADLS_ACUITY_SCORE: 14
ADLS_ACUITY_SCORE: 14
ADLS_ACUITY_SCORE: 18
ADLS_ACUITY_SCORE: 14
ADLS_ACUITY_SCORE: 15
ADLS_ACUITY_SCORE: 15
ADLS_ACUITY_SCORE: 17
ADLS_ACUITY_SCORE: 16
ADLS_ACUITY_SCORE: 14
ADLS_ACUITY_SCORE: 17
ADLS_ACUITY_SCORE: 16
ADLS_ACUITY_SCORE: 18

## 2018-01-01 ASSESSMENT — ENCOUNTER SYMPTOMS
FEVER: 0
FEVER: 0
UNEXPECTED WEIGHT CHANGE: 1
CONSTIPATION: 1
NAUSEA: 0
RECTAL PAIN: 0
FATIGUE: 1
SHORTNESS OF BREATH: 0
VOMITING: 0
FEVER: 0
DYSURIA: 0
DIARRHEA: 0
ABDOMINAL PAIN: 1
WEAKNESS: 1
COUGH: 1
BACK PAIN: 1
FREQUENCY: 1
BACK PAIN: 1
DIZZINESS: 1
FEVER: 1
SHORTNESS OF BREATH: 1
CHILLS: 0
RESPIRATORY NEGATIVE: 1
WHEEZING: 1
ABDOMINAL PAIN: 0
APPETITE CHANGE: 1
DIARRHEA: 1
ARTHRALGIAS: 1
COUGH: 1
BLOOD IN STOOL: 0
COUGH: 1
ARTHRALGIAS: 1
WEAKNESS: 1
VOMITING: 0
UNEXPECTED WEIGHT CHANGE: 1
APPETITE CHANGE: 1
SHORTNESS OF BREATH: 1
DIARRHEA: 1
HEADACHES: 0

## 2018-01-01 ASSESSMENT — PAIN DESCRIPTION - DESCRIPTORS
DESCRIPTORS: SORE
DESCRIPTORS: ACHING;BURNING
DESCRIPTORS: ACHING;DULL
DESCRIPTORS: CONSTANT
DESCRIPTORS: DISCOMFORT
DESCRIPTORS: CONSTANT
DESCRIPTORS: ACHING
DESCRIPTORS: ACHING;DULL
DESCRIPTORS: ACHING
DESCRIPTORS: ACHING;CONSTANT
DESCRIPTORS: DISCOMFORT;SORE
DESCRIPTORS: CONSTANT;ACHING
DESCRIPTORS: CONSTANT

## 2018-01-07 PROBLEM — R09.02 HYPOXIA: Status: ACTIVE | Noted: 2018-01-01

## 2018-01-07 NOTE — IP AVS SNAPSHOT
"[place] [address] MEDICAL SURGICAL: [phone]                                              INTERAGENCY TRANSFER FORM - PHYSICIAN ORDERS   2018                    Hospital Admission Date: 2018  DENISSE GRANT   : 1930  Sex: Female        Attending Provider: (none)    Allergies:  No Known Allergies    Infection:  None   Service:  GENERAL [facility]    Ht:  1.727 m (5' 8\")   Wt:  88.7 kg (195 lb 8 oz)   Admission Wt:  84.4 kg (186 lb)    BMI:  29.73 kg/m 2   BSA:  2.06 m 2            Patient PCP Information     Provider PCP Type    Ricki Blake MD General      ED Clinical Impression     Diagnosis Description Comment Added By Time Added    New onset atrial fibrillation (H) [I48.91] New onset atrial fibrillation (H) [I48.91]  Luis Felipe Lyle MD 2018  9:53 AM    Acute congestive heart failure, unspecified congestive heart failure type (H) [I50.9] Acute congestive heart failure, unspecified congestive heart failure type (H) [I50.9]  Luis Felipe Lyle MD 2018  9:53 AM    Pubic ramus fracture, right, closed, initial encounter (H) [S32.591A] Pubic ramus fracture, right, closed, initial encounter (H) [S32.591A]  Luis Felipe Lyle MD 2018  9:53 AM    Closed nondisplaced fracture of right clavicle, unspecified part of clavicle, initial encounter [S42.001A] Closed nondisplaced fracture of right clavicle, unspecified part of clavicle, initial encounter [S42.001A]  Luis Felipe Lyle MD 2018  9:54 AM      Hospital Problems as of 1/15/2018              Priority Class Noted POA    Hypoxia Medium  2018 Yes      Non-Hospital Problems as of 1/15/2018     None      Code Status History     Date Active Date Inactive Code Status Order ID Comments User Context    1/15/2018 12:11 PM  DNR/DNI [id number]  Hernandez Stovall MD Outpatient    2018 10:49 AM 1/15/2018 12:11 PM DNR/DNI [id number]  Fletcher Houston MD Inpatient    2018 12:53 PM 2018 10:49 AM Full Code [id number]  Hu Loyd" MD JACKI Inpatient         Medication Review      START taking        Dose / Directions Comments    HYDROcodone-acetaminophen 5-325 MG per tablet   Commonly known as:  NORCO   Used for:  Pubic ramus fracture, right, closed, initial encounter (H)        Dose:  1 tablet   Take 1 tablet by mouth every 4 hours as needed for moderate to severe pain   Quantity:  30 tablet   Refills:  0        metoprolol tartrate 50 MG tablet   Commonly known as:  LOPRESSOR   Used for:  New onset atrial fibrillation (H)        Dose:  50 mg   Take 1 tablet (50 mg) by mouth 2 times daily   Quantity:  60 tablet   Refills:  0        sulfamethoxazole-trimethoprim 800-160 MG per tablet   Commonly known as:  BACTRIM DS/SEPTRA DS   Used for:  Closed nondisplaced fracture of right clavicle, unspecified part of clavicle, initial encounter        Dose:  1 tablet   Take 1 tablet by mouth 2 times daily for 3 days   Quantity:  14 tablet   Refills:  0    UTI         CONTINUE these medications which have NOT CHANGED        Dose / Directions Comments    LORazepam 0.5 MG tablet   Commonly known as:  ATIVAN   Used for:  Closed nondisplaced fracture of right clavicle, unspecified part of clavicle, initial encounter        Dose:  0.5 mg   Take 1 tablet (0.5 mg) by mouth At Bedtime   Quantity:  30 tablet   Refills:  0        losartan 100 MG tablet   Commonly known as:  COZAAR        Dose:  100 mg   Take 100 mg by mouth daily   Refills:  0        NIFEdipine ER 90 MG Tb24   Commonly known as:  ADALAT CC        Dose:  90 mg   Take 90 mg by mouth daily   Refills:  0          STOP taking     hydrochlorothiazide 25 MG tablet   Commonly known as:  HYDRODIURIL           traMADol 50 MG tablet   Commonly known as:  ULTRAM                     Further instructions from your care team       Follow up:  Appointments: Dr Blake at [facility] [place] 1[place]18 at 9:45    *please bring your ID and insurance card    Summary of Visit     Reason for your hospital stay       UTI   FALL              After Care     Activity - Up ad ladonna           Advance Diet as Tolerated       Follow this diet upon discharge: Orders Placed This Encounter      Snacks/Supplements Adult: Ensure Plus (Adult); With Meals      Combination Diet Regular Diet Adult       General info for SNF       Length of Stay Estimate: Short Term Care: Estimated # of Days <30  Condition at Discharge: Stable  Level of care:skilled   Rehabilitation Potential: Good  Admission H&P remains valid and up-to-date: Yes  Recent Chemotherapy: N/A  Use Nursing Home Standing Orders: Yes       Mantoux instructions       Give two-step Mantoux (PPD) Per Facility Policy Yes             Referrals     Occupational Therapy Adult Consult       Evaluate and treat as clinically indicated.       Physical Therapy Adult Consult       Evaluate and treat as clinically indicated.             Follow-Up Appointment Instructions     Future Labs/Procedures    Follow Up and recommended labs and tests     Comments:    Follow up with senior living physician.  The following labs/tests are recommended: BMP.  Follow up with primary care provider in 10-14  days.  The following labs/tests are recommended: BMP.      Follow-Up Appointment Instructions     Follow Up and recommended labs and tests       Follow up with senior living physician.  The following labs/tests are recommended: BMP.  Follow up with primary care provider in 10-14  days.  The following labs/tests are recommended: BMP.             Statement of Approval     Ordered          01/15/18 1211  I have reviewed and agree with all the recommendations and orders detailed in this document.  EFFECTIVE NOW     Approved and electronically signed by:  Hernandez Stovall MD

## 2018-01-07 NOTE — LETTER
Key Recommendations:  Pt admitted with hypoxia/Influenza A, new afib and BNP 2899. She was treated with O2 2L, IVF and tamiflu. She is high risk with falls at home.Daughter is present and supportive. She lives at the [facility] and is open to increased services if needed on dc. She will follow up with Dr Blake as scheduled. She was dc'd to [facility] for further therapies and strengthening on dc.    Olivia Win    AVS/Discharge Summary is the source of truth; this is a helpful guide for improved communication of patient story

## 2018-01-07 NOTE — IP AVS SNAPSHOT
"` `           [place] [address] MEDICAL SURGICAL: [phone]                                              INTERAGENCY TRANSFER FORM - NURSING   2018                    Hospital Admission Date: 2018  DENISSE GRANT   : 1930  Sex: Female        Attending Provider: (none)    Allergies:  No Known Allergies    Infection:  None   Service:  GENERAL [facility]    Ht:  1.727 m (5' 8\")   Wt:  88.7 kg (195 lb 8 oz)   Admission Wt:  84.4 kg (186 lb)    BMI:  29.73 kg/m 2   BSA:  2.06 m 2            Patient PCP Information     Provider PCP Type    Ricki Blake MD General      Current Code Status     Date Active Code Status Order ID Comments User Context       Prior      Code Status History     Date Active Date Inactive Code Status Order ID Comments User Context    1/15/2018 12:11 PM  DNR/DNI [id number]  Hernandez Stovall MD Outpatient    2018 10:49 AM 1/15/2018 12:11 PM DNR/DNI [id number]  Fletcher Houston MD Inpatient    2018 12:53 PM 2018 10:49 AM Full Code [id number]  Hu Loyd MD Inpatient      Advance Directives        Does patient have a scanned Advance Directive/ACP document in EPIC?           No        Hospital Problems as of 1/15/2018              Priority Class Noted POA    Hypoxia Medium  2018 Yes      Non-Hospital Problems as of 1/15/2018     None      Immunizations     None         END      ASSESSMENT     Discharge Profile Flowsheet     EXPECTED DISCHARGE     Resources List  Transitional Care 18 1437    Expected Discharge Date  01/15/18 (from the [facility]) 18 0957   Transitional Care  [facility] [phone] 18 1437    DISCHARGE NEEDS ASSESSMENT     PAS Number  [id number] 18 1521    Patient/family verbalizes understanding of discharge plan recommendations?  Yes 18 1108   SKIN      Readmission Within The Last 30 Days  no previous admission in last 30 days 18 1108   Inspection of bony prominences  Full 01/15/18 " "1043    Equipment Currently Used at Home  none 01/10/18 1153   Full except areas not inspected   Buttock, left;Buttock, right;Sacrum;Coccyx 01/14/18 0116    Transportation Available  car;family or friend will provide 01/10/18 1153   Inspection under devices  Full 01/15/18 1043    # of Referrals Placed by CTS  Post Acute Facilities 01/11/18 1108   Skin WDL  ex 01/15/18 1043    Does Patient Need a Referral for Clinic CC  No 01/09/18 1108   Skin Color/Characteristics  bruised (ecchymotic) 01/15/18 1043    GASTROINTESTINAL (ADULT,PEDIATRIC,OB)     Skin Temperature  warm 01/15/18 0123    GI WDL  WDL 01/15/18 1043   Skin Moisture  dry 01/15/18 0123    Abdominal Appearance  obese 01/15/18 0132   Skin Elasticity  quick return to original state 01/14/18 1831    Last Bowel Movement  01/14/18 01/15/18 0132   Skin Integrity  bruise(s) 01/15/18 1043    GI Signs/Symptoms  no gastrointestinal signs/symptoms 01/14/18 1826   SAFETY      Passing flatus  yes 01/15/18 0132   Safety WDL  WDL 01/15/18 0123    COMMUNICATION ASSESSMENT     Safety Factors  ID band on;upper side rails raised x 2;call light in reach;wheels locked;bed in low position 01/15/18 0123    Patient's communication style  spoken language (English or Bilingual) 01/07/18 0743   All Alarms  alarm(s) activated and audible 01/15/18 0132    FINAL RESOURCES                        Assessment WDL (Within Defined Limits) Definitions           Safety WDL     Effective: 09/28/15    Row Information: <b>WDL Definition:</b> Bed in low position, wheels locked; call light in reach; upper side rails up x 2; ID band on<br> <font color=\"gray\"><i>Item=AS safety wdl>>List=AS safety wdl>>Version=F14</i></font>      Skin WDL     Effective: 09/28/15    Row Information: <b>WDL Definition:</b> Warm; dry; intact; elastic; without discoloration; pressure points without redness<br> <font color=\"gray\"><i>Item=AS skin wdl>>List=AS skin wdl>>Version=F14</i></font>      Vitals     Vital Signs " "Flowsheet     VITAL SIGNS     Response to Interventions  Decrease in pain 01/15/18 0848    Temp  97.5  F (36.4  C) 01/15/18 0749   ANALGESIA SIDE EFFECTS MONITORING      Temp src  Oral 01/15/18 0748   Side Effects Monitoring: Respiratory Quality  R 01/15/18 0848    Resp  16 01/15/18 0748   Side Effects Monitoring: Respiratory Depth  N 01/15/18 0848    Pulse  84 01/15/18 0748   Side Effects Monitoring: Sedation Level  1 01/15/18 0848    Heart Rate  85 01/15/18 0110   HEIGHT AND WEIGHT      Pulse/Heart Rate Source  Monitor 01/15/18 0110   Height  1.727 m (5' 8\") 01/09/18 1617    BP  171/77 01/15/18 0748   Height Method  Stated 01/09/18 1617    BP Location  Left arm 01/15/18 0755   Weight  88.7 kg (195 lb 8 oz) 01/13/18 0417    OXYGEN THERAPY     Weight Method  Bed scale 01/13/18 0417    SpO2  93 % 01/15/18 0748   Bed Scale  Standard (fitted sheet, draw sheet/ pad, cover/flat sheet, blanket, two pillows);Pillow (add comment for number);Call light (3 pillows) 01/11/18 0606    O2 Device  None (Room air) 01/15/18 0748   DAILY CARE      Oxygen Delivery  1 LPM 01/14/18 0839   Activity Management  ambulated to bathroom 01/15/18 0625    PAIN/COMFORT     Activity Assistance Provided  assistance, stand-by 01/15/18 0625    Patient Currently in Pain  no 01/15/18 0748   Assistive Device Utilized  walker 01/15/18 0625    Preferred Pain Scale  number (Numeric Rating Pain Scale) 01/15/18 0748   EKG MONITORING      Patient's Stated Pain Goal  No pain 01/15/18 0748   Cardiac Rhythm  Atrial fibrillation 01/07/18 0752    0-10 Pain Scale  8 01/15/18 1256   POSITIONING      Word Pain Scale  8 01/15/18 0522   Body Position  independently positioning 01/15/18 0110    Pain Location  Back 01/15/18 0748   Head of Bed (HOB)  HOB at 20-30 degrees 01/15/18 0110    Pain Orientation  Right;Lower 01/15/18 0748   Chair  -- 01/15/18 0132    Pain Descriptors  Aching 01/15/18 0748   SITTING ORTHOSTATIC BP      Pain Intervention(s)  Medication (See " eMAR) 01/15/18 0754   Sitting Orthostatic BP  149/72 01/10/18 1023            Patient Lines/Drains/Airways Status    Active LINES/DRAINS/AIRWAYS     Name: Placement date: Placement time: Site: Days: Last dressing change:    External Catheter 01/08/18 1311 01/08/18   1311    7     Peripheral IV 01/07/18 Left Upper arm 01/07/18   1039   Upper arm   8             Patient Lines/Drains/Airways Status    Active PICC/CVC     None            Intake/Output Detail Report     Date Intake     Output Net    Shift P.O. I.V. IV Piggyback Total Urine Total       Noc 01/13/18 2300 - 01/14/18 0659 -- -- -- -- 1000 1000 -1000    Day 01/14/18 0700 - 01/14/18 1459 400 -- -- 400 -- -- 400    Wendy 01/14/18 1500 - 01/14/18 2259 -- -- -- -- -- -- 0    Noc 01/14/18 2300 - 01/15/18 0659 -- -- -- -- -- -- 0    Day 01/15/18 0700 - 01/15/18 1459 480 -- -- 480 500 500 -20      Last Void/BM       Most Recent Value    Urine Occurrence 1 at 01/15/2018 0912    Stool Occurrence 1 at 01/14/2018 2144      Case Management/Discharge Planning     Case Management/Discharge Planning Flowsheet     REFERRAL INFORMATION     COPING/STRESS      Admission Type  inpatient 01/09/18 1107   Major Change/Loss/Stressor  none 01/07/18 1505    Arrived From  home or self-care 01/09/18 1107   EXPECTED DISCHARGE      Referral Source  high risk screening 01/09/18 1107   Expected Discharge Date  01/15/18 (from the [facility]) 01/14/18 0957    New Steerage to  Clinics?  No 01/09/18 1107   DISCHARGE PLANNING      # of Referrals Placed by [facility]  Post Acute Facilities 01/11/18 1108   Patient/family verbalizes understanding of discharge plan recommendations?  Yes 01/09/18 1108    Post Acute Facilities  TCU 01/11/18 1108   Readmission Within The Last 30 Days  no previous admission in last 30 days 01/09/18 1108    Reason For Consult  care coordination/care conference;discharge planning 01/09/18 1107   Transportation Available  car;family or friend will provide 01/10/18 4005    Record  Reviewed  clinical discipline documentation;history and physical;medical record;patient profile;plan of care 01/09/18 1107   Does Patient Need a Referral for Clinic CC  No 01/09/18 1108    CTS Assigned to Case  Criss Alfonso 01/11/18 1437   PATIENT PLACEMENT INFORMATION      Primary Care Clinic Name  [facility] 01/09/18 1107   Did the patient choose [place]?  No 01/11/18 1108    Primary Care MD Name  Ricki Clairestanleyneha 01/09/18 1107   Placement Choice Reason  -- [place] 01/11/18 1108    LIVING ENVIRONMENT     Facility 1 Name  [facility] 01/11/18 1437    Lives With  alone 01/10/18 1153   FINAL RESOURCES      Living Arrangements  independent living facility 01/10/18 1153   Equipment Currently Used at Home  none 01/10/18 1153    Provides Primary Care For  no one 01/09/18 1107   Resources List  Transitional Care 01/11/18 1437    Quality Of Family Relationships  supportive;helpful;involved 01/09/18 1107   Transitional Care  [facility] [phone] 01/11/18 1437    Able to Return to Prior Living Arrangements  yes 01/09/18 1107   PAS Number  [id number] 01/11/18 1521    HOME SAFETY     ABUSE RISK SCREEN      Patient Feels Safe Living in Home?  yes 01/09/18 1107   QUESTION TO PATIENT:  Has a member of your family or a partner(now or in the past) intimidated, hurt, manipulated, or controlled you in any way?  no 01/07/18 1505    ASSESSMENT OF FAMILY/SOCIAL SUPPORT     QUESTION TO PATIENT: Do you feel safe going back to the place where you are living?  yes 01/07/18 1505    Who is your support system?  Children 01/09/18 1107   OBSERVATION: Is there reason to believe there has been maltreatment of a vulnerable adult (ie. Physical/Sexual/Emotional abuse, self neglect, lack of adequate food, shelter, medical care, or financial exploitation)?  no 01/07/18 1505    Description of Support System  Supportive;Involved 01/09/18 1107   (R) MENTAL HEALTH SUICIDE RISK      Support Assessment  Adequate family  and caregiver support 01/09/18 1107   Are you depressed or being treated for depression?  No 01/07/18 1501    Quality of Family Relationships  supportive;involved 01/09/18 1106

## 2018-01-07 NOTE — IP AVS SNAPSHOT
` `     [place] [address] MEDICAL SURGICAL: [phone]            Medication Administration Report for Estefany An as of 01/15/18 1323   Legend:    Given Hold Not Given Due Canceled Entry Other Actions    Time Time (Time) Time  Time-Action       Inactive    Active    Linked        Medications 01/09/18 01/10/18 01/11/18 01/12/18 01/13/18 01/14/18 01/15/18    acetaminophen (TYLENOL) tablet 650 mg  Dose: 650 mg Freq: EVERY 4 HOURS PRN Route: PO  PRN Reason: mild pain  Start: 01/07/18 1252   Admin Instructions: Alternate ibuprofen (if ordered) with acetaminophen.  Maximum acetaminophen dose from all sources = 75 mg/kg/day not to exceed 4 grams/day.     0023 (650 mg)-Given                 albuterol neb solution 2.5 mg  Dose: 2.5 mg Freq: EVERY 2 HOURS PRN Route: NEBULIZATION  PRN Reason: other  PRN Comment: dyspnea  Start: 01/10/18 0917              aspirin EC EC tablet 81 mg  Dose: 81 mg Freq: DAILY Route: PO  Start: 01/07/18 1300   Admin Instructions: DO NOT CRUSH.     0802 (81 mg)-Given        (0841)-Not Given        0826 (81 mg)-Given        (0905)-Not Given        (0900)-Not Given        0844 (81 mg)-Given        0750 (81 mg)-Given                  benzonatate (TESSALON) capsule 100 mg  Dose: 100 mg Freq: 3 TIMES DAILY PRN Route: PO  PRN Reason: cough  Start: 01/08/18 1051    0821 (100 mg)-Given        1029 (100 mg)-Given                calcium carbonate (TUMS) chewable tablet 500-1,000 mg  Dose: 500-1,000 mg Freq: EVERY 2 HOURS PRN Route: PO  PRN Reason: heartburn  Start: 01/12/18 0150   Admin Instructions: Do not give if calcium level greater than 10 mg/dL.        0326 (1,000 mg)-Given        0357 (1,000 mg)-Given       0917 (1,000 mg)-Given       1735 (1,000 mg)-Given        0158 (1,000 mg)-Given       0843 (500 mg)-Given            cefTRIAXone in d5w (ROCEPHIN) intermittent infusion 1 g  Dose: 1 g Freq: EVERY 24 HOURS Route: IV  Indications of Use: URINARY TRACT INFECTION  Start: 01/13/18 1530        1617 (1  "g)-New Bag        1606 (1 g)-New Bag        [ ] 1530           HYDROcodone-acetaminophen (NORCO) 5-325 MG per tablet 1-2 tablet  Dose: 1-2 tablet Freq: EVERY 4 HOURS PRN Route: PO  PRN Reason: moderate to severe pain  Start: 01/07/18 1252   Admin Instructions: Start with the lowest dose.    May adjust dose by 1 tablet every 4 hours as needed for pain control or improvement in physical function.  Hold dose for analgesic side effects.  Notify provider to assess for uncontrolled pain or analgesic side effects.  Maximum acetaminophen dose from all sources= 75 mg/kg/day not to exceed 4 grams     0326 (2 tablet)-Given       0801 (2 tablet)-Given       1607 (2 tablet)-Given        0512 (2 tablet)-Given       1029 (2 tablet)-Given       1551 (2 tablet)-Given        0122 (2 tablet)-Given       0833 (2 tablet)-Given       1557 (2 tablet)-Given        0007 (2 tablet)-Given       0905 (2 tablet)-Given       1556 (2 tablet)-Given        0358 (2 tablet)-Given       0900 (2 tablet)-Given       1616 (2 tablet)-Given        0844 (2 tablet)-Given       1945 (2 tablet)-Given        0123 (2 tablet)-Given       0754 (2 tablet)-Given       1256 (2 tablet)-Given           ipratropium - albuterol 0.5 mg/2.5 mg/3 mL (DUONEB) neb solution 3 mL  Dose: 3 mL Freq: EVERY 4 HOURS PRN Route: NEBULIZATION  PRN Reason: wheezing  Start: 01/12/18 0145              lidocaine (LMX4) kit  Freq: EVERY 1 HOUR PRN Route: Top  PRN Reason: pain  PRN Comment: with VAD insertion or accessing implanted port.  Start: 01/07/18 0756   Admin Instructions: Do NOT give if patient has a history of allergy to any local anesthetic or any \"radha\" product.   Apply 30 minutes prior to VAD insertion or port access.  MAX Dose:  2.5 g (  of 5 g tube)               lidocaine 1 % 1 mL  Dose: 1 mL Freq: EVERY 1 HOUR PRN Route: OTHER  PRN Comment: mild pain with VAD insertion or accessing implanted port  Start: 01/07/18 0756   Admin Instructions: Do NOT give if patient has a " "history of allergy to any local anesthetic or any \"radha\" product. MAX dose 1 mL subcutaneous OR intradermal in divided doses.               LORazepam (ATIVAN) half-tab 0.25 mg  Dose: 0.25 mg Freq: AT BEDTIME Route: PO  Start: 01/07/18 2200    2114 (0.25 mg)-Given        2107 (0.25 mg)-Given        2119 (0.25 mg)-Given        2102 (0.25 mg)-Given        2224 (0.25 mg)-Given        2146 (0.25 mg)-Given        [ ] 2200           losartan (COZAAR) tablet 100 mg  Dose: 100 mg Freq: DAILY Route: PO  Start: 01/07/18 1300    0802 (100 mg)-Given        0841 (100 mg)-Given        0825 (100 mg)-Given        0905 (100 mg)-Given        0901 (100 mg)-Given        0844 (100 mg)-Given        0750 (100 mg)-Given                  magnesium hydroxide (MILK OF MAGNESIA) suspension 15-30 mL  Dose: 15-30 mL Freq: DAILY PRN Route: PO  PRN Reason: heartburn  Start: 01/14/18 1125         1234 (30 mL)-Given            magnesium sulfate 4 g in 100 mL sterile water (premade)  Dose: 4 g Freq: EVERY 4 HOURS PRN Route: IV  PRN Reason: magnesium supplementation  Start: 01/07/18 1300   Admin Instructions: For serum Mg++ less than 1.6 mg/dL  Give 4 g and recheck magnesium level 2 hours after dose, and next AM.               metoprolol tartrate (LOPRESSOR) tablet 50 mg  Dose: 50 mg Freq: 2 TIMES DAILY Route: PO  Start: 01/11/18 2100   Admin Instructions: Hold for sbp<110 or hr<60       2119 (50 mg)-Given        0905 (50 mg)-Given       2103 (50 mg)-Given        0900 (50 mg)-Given       2020 (50 mg)-Given        0844 (50 mg)-Given       2146 (50 mg)-Given        0750 (50 mg)-Given              [ ] 2100           naloxone (NARCAN) injection 0.1-0.4 mg  Dose: 0.1-0.4 mg Freq: EVERY 2 MIN PRN Route: IV  PRN Reason: opioid reversal  Start: 01/07/18 1252   Admin Instructions: For respiratory rate LESS than or EQUAL to 8.  Partial reversal dose:  0.1 mg titrated q 2 minutes for Analgesia Side Effects Monitoring Sedation Level of 3 (frequently drowsy, " arousable, drifts to sleep during conversation).Full reversal dose:  0.4 mg bolus for Analgesia Side Effects Monitoring Sedation Level of 4 (somnolent, minimal or no response to stimulation).  For ordered doses up to 2mg give IVP. Give each 0.4mg over 15 seconds in emergency situations. For non-emergent situations further dilute in 9mL of NS to facilitate titration of response.               NIFEdipine ER osmotic (PROCARDIA XL) 24 hr tablet 90 mg  Dose: 90 mg Freq: DAILY Route: PO  Start: 01/07/18 1300   Admin Instructions: DO NOT CRUSH.     0802 (90 mg)-Given        0840 (90 mg)-Given        0825 (90 mg)-Given        0905 (90 mg)-Given        0900 (90 mg)-Given        0844 (90 mg)-Given        0750 (90 mg)-Given                  ondansetron (ZOFRAN-ODT) ODT tab 4 mg  Dose: 4 mg Freq: EVERY 6 HOURS PRN Route: PO  PRN Reasons: nausea,vomiting  Start: 01/07/18 1252   Admin Instructions: This is Step 1 of nausea and vomiting management.  If nausea not resolved in 15 minutes, go to Step 2 prochlorperazine (COMPAZINE). Do not push through foil backing. Peel back foil and gently remove. Place on tongue immediately. Administration with liquid unnecessary        0007 (4 mg)-Given        1735 (4 mg)-Given            Or  ondansetron (ZOFRAN) injection 4 mg  Dose: 4 mg Freq: EVERY 6 HOURS PRN Route: IV  PRN Reasons: nausea,vomiting  Start: 01/07/18 1252   Admin Instructions: This is Step 1 of nausea and vomiting management.  If nausea not resolved in 15 minutes, go to Step 2 prochlorperazine (COMPAZINE).  Irritant. For ordered doses up to 4 mg, give IV Push undiluted over 2-5 minutes.                             polyethylene glycol (MIRALAX/GLYCOLAX) Packet 17 g  Dose: 17 g Freq: DAILY PRN Route: PO  PRN Reason: constipation  Start: 01/07/18 1252   Admin Instructions: Give in 8oz of  water, juice, or soda. Hold for loose stools.  This is the second step of a three step constipation treatment.  1 Packet = 17 grams. Mixed  prescribed dose in 8 ounces of water. Follow with 8 oz. of water.       2125 (17 g)-Given               potassium chloride (KLOR-CON) Packet 20-40 mEq  Dose: 20-40 mEq Freq: EVERY 2 HOURS PRN Route: ORAL OR FEED  PRN Reason: potassium supplementation  Start: 01/07/18 1300   Admin Instructions: Use if unable to tolerate tablets.  If Serum K+ 3.0-3.3, dose = 60 mEq po total dose (40 mEq x1 followed in 2 hours by 20 mEq x1). Recheck K+ level 4 hours after dose and the next AM.  If Serum K+ 2.5-2.9, dose = 80 mEq po total dose (40 mEq Q2H x2). Recheck K+ level 4 hours after dose and the next AM.  If Serum K+ less than 2.5, See IV order.  Dissolve packet contents in 4-8 ounces of cold water or juice.               potassium chloride 10 mEq in 100 mL intermittent infusion with 10 mg lidocaine  Dose: 10 mEq Freq: EVERY 1 HOUR PRN Route: IV  PRN Reason: potassium supplementation  Start: 01/07/18 1300   Admin Instructions: Infuse via PERIPHERAL LINE. Use potassium with lidocaine for pain with peripheral administration.  If Serum K+ 3.0-3.3, dose = 10 mEq/hr x4 doses (40 mEq IV total dose). Recheck K+ level 2 hours after dose and the next AM.  If Serum K+ less than 3.0, dose = 10 mEq/hr x6 doses (60 mEq IV total dose). Recheck K+ level 2 hours after dose and the next AM.               potassium chloride 10 mEq in 100 mL sterile water intermittent infusion (premix)  Dose: 10 mEq Freq: EVERY 1 HOUR PRN Route: IV  PRN Reason: potassium supplementation  Start: 01/07/18 1300   Admin Instructions: Infuse via PERIPHERAL LINE or CENTRAL LINE. Use for central line replacement if patient weight less than 65 kg, if patient is on TPN with high potassium content or if unit does not stock 20 mEq bags.   If Serum K+ 3.0-3.3, dose = 10 mEq/hr x4 doses (40 mEq IV total dose). Recheck K+ level 2 hours after dose and the next AM.   If Serum K+ less than 3.0, dose = 10 mEq/hr x6 doses (60 mEq IV total dose). Recheck K+ level 2 hours after dose  and the next AM.               potassium chloride 20 mEq in 50 mL intermittent infusion  Dose: 20 mEq Freq: EVERY 1 HOUR PRN Route: IV  PRN Reason: potassium supplementation  Start: 01/07/18 1300   Admin Instructions: Infuse via CENTRAL LINE Only. May need EKG if less than 65 kg or on TPN - Max rate is 0.3 mEq/kg/hr for patients not on EKG monitoring.   If Serum K+ 3.0-3.3, dose = 20 mEq/hr x2 doses (40 mEq IV total dose). Recheck K+ level 2 hours after dose and the next AM.  If Serum K+ less than 3.0, dose = 20 mEq/hr x3 doses (60 mEq IV total dose). Recheck K+ level 2 hours after dose and the next AM.               potassium chloride SA (K-DUR/KLOR-CON M) CR tablet 20-40 mEq  Dose: 20-40 mEq Freq: EVERY 2 HOURS PRN Route: PO  PRN Reason: potassium supplementation  Start: 01/07/18 1300   Admin Instructions: Use if able to take PO.   If Serum K+ 3.0-3.3, dose = 60 mEq po total dose (40 mEq x1 followed in 2 hours by 20 mEq x1). Recheck K+ level 4 hours after dose and the next AM.  If Serum K+ 2.5-2.9, dose = 80 mEq po total dose (40 mEq Q2H x2). Recheck K+ level 4 hours after dose and the next AM.  If Serum K+ less than 2.5, See IV order.  DO NOT CRUSH               prochlorperazine (COMPAZINE) injection 5 mg  Dose: 5 mg Freq: EVERY 6 HOURS PRN Route: IV  PRN Reasons: nausea,vomiting  Start: 01/12/18 0506   Admin Instructions: For ordered doses up to 10 mg, give IV Push undiluted. Each 5mg over 1 minute.        0518 (5 mg)-Given             Or  prochlorperazine (COMPAZINE) tablet 5 mg  Dose: 5 mg Freq: EVERY 6 HOURS PRN Route: PO  PRN Reasons: nausea,vomiting  Start: 01/12/18 0506                    Or  prochlorperazine (COMPAZINE) Suppository 12.5 mg  Dose: 12.5 mg Freq: EVERY 12 HOURS PRN Route: RE  PRN Reasons: nausea,vomiting  Start: 01/12/18 0506                     senna-docusate (SENOKOT-S;PERICOLACE) 8.6-50 MG per tablet 1 tablet  Dose: 1 tablet Freq: 2 TIMES DAILY PRN Route: PO  PRN Reason:  constipation  Start: 01/07/18 1252   Admin Instructions: If no bowel movement in 24 hours, increase to 2 tablets PO.  Hold for loose stools.  This is the first step of a three step constipation treatment.      2124 (1 tablet)-Given                             Or  senna-docusate (SENOKOT-S;PERICOLACE) 8.6-50 MG per tablet 2 tablet  Dose: 2 tablet Freq: 2 TIMES DAILY PRN Route: PO  PRN Reason: constipation  Start: 01/07/18 1252   Admin Instructions: Hold for loose stools.  This is the first step of a three step constipation treatment.              2124 (2 tablet)-Given         2224 (2 tablet)-Given             sodium chloride (PF) 0.9% PF flush 3 mL  Dose: 3 mL Freq: EVERY 8 HOURS Route: IK  Start: 01/07/18 0759   Admin Instructions: And Q1H PRN, to lock peripheral IV dormant line.     (0802)-Not Given       (1611)-Not Given        (0245)-Not Given       0909 (3 mL)-Given       1536 (3 mL)-Given       2347 (3 mL)-Given        0826 (3 mL)-Given       1557 (3 mL)-Given               0915 (3 mL)-Given       1557 (3 mL)-Given        0103 (3 mL)-Given       0901 (3 mL)-Given       1619 (3 mL)-Given               0843 (3 mL)-Given       1606 (3 mL)-Given        0111 (3 mL)-Given       0751 (3 mL)-Given       [ ] 1559       [ ] 2359           sodium chloride (PF) 0.9% PF flush 3 mL  Dose: 3 mL Freq: EVERY 1 HOUR PRN Route: IK  PRN Reason: line flush  PRN Comment: for peripheral IV flush post IV meds  Start: 01/07/18 0756      2120 (3 mL)-Given              Completed Medications  Medications 01/09/18 01/10/18 01/11/18 01/12/18 01/13/18 01/14/18 01/15/18         Dose: 40 mg Freq: ONCE Route: IV  Start: 01/13/18 1130   End: 01/13/18 1402   Admin Instructions: For ordered doses up to 40 mg, give IV Push undiluted over 1-2 minutes.         1400 (40 mg)-Given               Dose: 40 mg Freq: DAILY Route: PO  Start: 01/12/18 0900   End: 01/14/18 0844       0905 (40 mg)-Given        0900 (40 mg)-Given        0844 (40 mg)-Given            Discontinued Medications  Medications 01/09/18 01/10/18 01/11/18 01/12/18 01/13/18 01/14/18 01/15/18         Dose: 75 mg Freq: 2 TIMES DAILY Route: PO  Indications of Use: INFLUENZA  Start: 01/08/18 1600   End: 01/14/18 1111    0802 (75 mg)-Given       2114 (75 mg)-Given        0841 (75 mg)-Given       2107 (75 mg)-Given        0825 (75 mg)-Given       2119 (75 mg)-Given        0905 (75 mg)-Given       2103 (75 mg)-Given        0901 (75 mg)-Given       2020 (75 mg)-Given        0844 (75 mg)-Given       1111-Med Discontinued

## 2018-01-07 NOTE — H&P
[facility]    History and Physical  Hospitalist       Date of Admission:  1/7/2018  Date of Service (when I saw the patient): 01/07/18    Assessment & Plan   Estefany An is a 87 year old female with a history of hypertension and osteoarthritis who now presents with weakness, dizziness and fall resulting in a left clavicular fracture, pubic rami fracture, hypoxemia, and atrial fibrillation.    #1.  Atrial fibrillation with controlled ventricular rate.    Will monitor on telemetry    Obtain echocardiogram    Replace potassium and follow    TSH is normal    Based on ejection fraction will then tailor medication management    Consider metoprolol for rate control if necessary    Patient has had multiple falls resulting in fractures including shoulder clavicle and pelvis not a good candidate for warfarin or other anticoagulation at this time.    Aspirin, may consider Plavix    #2.  Hypoxemia    Patient with hypoxemia upon presentation with saturations in the low 80s    Chest x-ray negative, CT scan for pulmonary embolus negative    BNP is elevated but there are no radiographic or clinical findings of acute heart failure.    Echocardiogram as above    O2 to keep sats 90% or greater, wean as tolerated    Suspect some of this might be due to atelectasis and will recommend incentive spirometry    #3.  Left clavicular fracture and right pubic rami fracture    We'll treat symptomatically and weightbearing as tolerated    We'll consult orthopedics due to the fall with trauma    Therapy in a.m.    #4.  Hypertension    Resume, we'll hold hydrochlorothiazide due to hyponatremia    #5.  Hyponatremia    Suspect this is due to recent respiratory infection    Could in part also be due to hydrochlorothiazide and will therefore hold for the time being    IV fluids    Repeat basic metabolic panel in a.m.    #6.  Hypokalemia     Suspect due to hydrochlorothiazide     replace per protocol and follow    #7.  Anemia    Patient  "with hemoglobin of 10 most recent hemoglobin is 12.5 from 4 years ago and, MCV is 92 we'll add iron studies and peripheral smear and    This could be due to the recent trauma and pubic rami fracture although I think this is less likely    Repeat hemoglobin in a.m.    DVT Prophylaxis: Pneumatic Compression Devices  Code Status: Full Code    Disposition: Expected discharge 3 days.    Hu Loyd MD    Primary Care Physician   Ricki Blake    Chief Complaint   I fell this morning.    History is obtained from the patient, electronic health record and emergency department physician    History of Present Illness   Estefany An is a 87 year old female with a history of hypertension, arthritis, now presents with weakness, dizziness and a fall this morning.  Patient states that she has not been feeling well for the last couple weeks and has had the \"flu\".  She's had a cough but no fevers sweats or chills.  The cough is nonproductive blue, loose and has persisted.  Her appetite is been okay when she's been eating and drinking.  This morning she got up and felt a little lightheaded and dizzy and fell down hitting her right hip and shoulder.  She was recently diagnosed with influenza and has undergone treatment as best I understand..  She fell forward and then had some hip discomfort.  The pain is better now.  She denies any loss of consciousness headache shortness of breath chest pain or pressure abdominal pain nausea vomiting or diarrhea.  In the emergency department she was noted to have a rhythm of atrial fibrillation which was rate controlled    Past Medical History    I have reviewed this patient's medical history and updated it with pertinent information if needed.   Past Medical History:   Diagnosis Date     Anxiety      Arthritis      Hypertension        Past Surgical History   I have reviewed this patient's surgical history and updated it with pertinent information if needed.  Past Surgical History:   Procedure " Laterality Date     CHOLECYSTECTOMY         Prior to Admission Medications   Prior to Admission Medications   Prescriptions Last Dose Informant Patient Reported? Taking?   LORazepam (ATIVAN) 0.5 MG tablet 1/6/2018 at Unknown time Daughter Yes Yes   Sig: Take 0.5 mg by mouth At Bedtime   NIFEdipine ER (ADALAT CC) 90 MG TB24 1/6/2018 at Unknown time Daughter Yes Yes   Sig: Take 90 mg by mouth daily   hydrochlorothiazide (HYDRODIURIL) 25 MG tablet 1/6/2018 at Unknown time Daughter Yes Yes   Sig: Take 25 mg by mouth daily   losartan (COZAAR) 100 MG tablet 1/6/2018 at Unknown time Daughter Yes Yes   Sig: Take 100 mg by mouth daily   traMADol (ULTRAM) 50 MG tablet 1/6/2018 at Unknown time Daughter Yes Yes   Sig: Take by mouth 3 times daily      Facility-Administered Medications: None     Allergies   No Known Allergies    Social History   I have reviewed this patient's social history and updated it with pertinent information if needed. Estefany An  reports that she has never smoked. She does not have any smokeless tobacco history on file. She reports that she does not drink alcohol.    Family History   I have reviewed this patient's family history and updated it with pertinent information if needed.   No family history on file.  No pertinent family history related to this admission.    Review of Systems   The 10 point Review of Systems is negative other than noted in the HPI or here.     Physical Exam   Temp: 96.5  F (35.8  C) Temp src: Temporal BP: 120/59 Pulse: 85 Heart Rate: 70 Resp: 12 SpO2: 96 % O2 Device: None (Room air)    Vital Signs with Ranges  Temp:  [96.5  F (35.8  C)] 96.5  F (35.8  C)  Pulse:  [85] 85  Heart Rate:  [68-74] 70  Resp:  [12] 12  BP: (114-161)/(59-75) 120/59  SpO2:  [81 %-97 %] 96 %  0 lbs 0 oz    Constitutional: Awake, alert, cooperative, no apparent distress.  Does appear tired   Eyes: Conjunctiva and pupils examined and normal.  HEENT: Moist mucous membranes, normal dentition.  Respiratory:  Clear to auscultation bilaterally, no crackles or wheezing.  Loose cough is noted  Cardiovascular: IRR, normal S1 and S2, and no murmur noted.  GI: Soft, non-distended, non-tender, normal bowel sounds.  Lymph/Hematologic: No anterior cervical or supraclavicular adenopathy.  Skin: No rashes, no cyanosis, no edema.  Musculoskeletal: No joint swelling, erythema or tenderness.  Neurologic: Alert and oriented ×3 moving all extremities unable to test gait or balance  Psychiatric: Alert, oriented to person, place and time, no obvious anxiety or depression.    Data   Data reviewed today:  I personally reviewed the chest x-ray image(s) showing Clear lung fields no effusions did not appreciate current megaly.  The EKG G is pending but telemetry revealed atrial fibrillation with a rate in the 70s.    Recent Labs  Lab 01/07/18  0759   WBC 15.3*   HGB 10.0*   MCV 92      *   POTASSIUM 3.1*   CHLORIDE 90*   CO2 30   BUN 10   CR 0.71   ANIONGAP 7   KACI 8.3*   *   ALBUMIN 3.1*   PROTTOTAL 7.1   BILITOTAL 0.2   ALKPHOS 88   ALT 24   AST 27   TROPI <0.015       Imaging:  Recent Results (from the past 24 hour(s))   CT Head w/o Contrast    Narrative    CT SCAN OF THE HEAD WITHOUT CONTRAST   1/7/2018 9:26 AM     HISTORY: trauma;     TECHNIQUE:  Axial images of the head and coronal reformations without  IV contrast material. Radiation dose for this scan was reduced using  automated exposure control, adjustment of the mA and/or kV according  to patient size, or iterative reconstruction technique.    COMPARISON: None.    FINDINGS:  There is generalized atrophy of the brain.  White matter  changes are present in the cerebral hemispheres that are consistent  with small vessel ischemic disease in this age patient. There is no  evidence of intracranial hemorrhage, mass, acute infarct or anomaly.  The left sphenoid sinus is nearly completely opacified no bleed or  fractures are identified.      Impression    IMPRESSION: No  intracranial hemorrhage or skull fractures are  identified.     XR Shoulder Right G/E 3 Views    Narrative    XR SHOULDER RT G/E 3 VW 1/7/2018 9:45 AM    COMPARISON: None.    HISTORY: Trauma.      Impression    IMPRESSION:   1. Slightly comminuted and mildly displaced distal right clavicular  fracture is seen. No other findings concerning for fracture in the  right shoulder.  2. Severe glenohumeral osteoarthritis with extensive remodeling about  the glenoid and the humeral head.    ALEX MOSQUERA MD   XR Pelvis and Hip Right 2 Views    Narrative    XR PELVIS AND HIP RIGHT 2 VIEWS 1/7/2018 9:46 AM    COMPARISON: None.    HISTORY: Trauma.      Impression    IMPRESSION: Mildly displaced fractures involving the right superior  and inferior pubic rami. No other definite pelvic fractures are seen.  Osteopenia about the pelvis and hips.    ALEX MOSQUERA MD   XR Chest 1 View    Narrative    XR CHEST 1 VW 1/7/2018 9:47 AM    COMPARISON: 11/2/2014    HISTORY: Shortness of breath, weakness and hypoxia.      Impression    IMPRESSION: Mild left basilar atelectasis. No pleural effusion or  pneumothorax. Right clavicular fracture better evaluated on dedicated  radiographs of the shoulder. Old left humeral fracture deformity  partially visualized.    ALEX MOSQUERA MD   CT Chest Pulmonary Embolism w Contrast    Narrative    Exam: CT CHEST PULMONARY EMBOLISM W CONTRAST 1/7/2018 11:18 AM    Comparison: Chest x-ray on the same day and CT dated 12/3/2013.     Clinical History: Chest pain.    Technique: Volumetric helical acquisition of the chest were obtained  following pulmonary embolism protocol. Three-dimensional (3D)  post-processed angiographic images were reconstructed, archived to  PACS and used in interpretation of this study. Radiation dose for this  scan was reduced using automated exposure control, adjustment of the  mA and/or kV according to patient size, or iterative reconstruction  technique.    Contrast: 73mL  Isovue-370    Findings:  Contrast bolus timing is adequate for evaluation for pulmonary emboli.  There is no evidence of pulmonary emboli.  There is no evidence of  right heart strain.     Dependent atelectasis. Lungs otherwise clear. No evidence of pleural  effusion is noted.    The heart size and great vessels are normal in caliber. No evidence of  axillary, mediastinal or hilar lymphadenopathy is seen.     Upper abdomen: Evaluation of the upper abdomen is limited by contrast  bolus timing and coverage.    Bones: Wedge compression deformity involving the T12 vertebral body.      Impression    Impression:   1. No evidence of pulmonary embolus.  2. Wedge compression deformity involving the T12 vertebral body is  age-indeterminate, but new since chest x-ray dated 11/2/2014.    ALEX MOSQUERA MD

## 2018-01-07 NOTE — IP AVS SNAPSHOT
` ` Patient Information     Patient Name Sex     Estefany An ([id number]) Female 1930       Room Bed    351      Patient Demographics     Address Phone    [address]  [address]  [facility] [id number] [phone] (Home)  NONE (Work)  [phone] (Mobile)      Patient Ethnicity & Race     Ethnic Group Patient Race    American White      Emergency Contact(s)     Name Relation Home Work Mobile    Maylin Bravo Daughter [phone] none [phone]    Samantha Bravo Grandchild [phone] NONE [phone]      Documents on File        Status Date Received Description       Documents for the Patient    Consent for Services - Hospital/Clinic Received () 13     Consent for EHR Access Received 13     Privacy Notice - [place] Received 13     Insurance Card Received 14     External Medication Information Consent       Patient ID Received 14     [facility] Specified Other       Consent for Services/Privacy Notice - Hospital/Clinic Received 18     Care Everywhere Prospective Auth Received 18        Documents for the Encounter    CMS IM for Patient Signature Received 18     CMS IM for Patient Signature Received 18 2nd IMM      Admission Information     Attending Provider Admitting Provider Admission Type Admission Date/Time      Emergency 18  0740    Discharge Date Hospital Service Auth/Cert Status Service Area     General Medicine [facility]    Unit Room/Bed Admission Status        3 MEDICAL SURGICAL  Admission (Confirmed)       Admission     Complaint    Hypoxia      Hospital Account     Name Acct ID Class Status Primary Coverage    Estefany An [id number] Inpatient Open MEDICARE - MEDICARE            Guarantor Account (for Hospital Account #[id number])     Name Relation to Pt Service Area Active? Acct Type    Estefany An Self FCS Yes Personal/Family    Address Phone          [address]  [address]  [place] [address] [phone](H)  NONE(O)              Coverage Information (for Hospital Account #[id number])     1. MEDICARE/MEDICARE     F/O Payor/Plan Precert #    MEDICARE/MEDICARE     Subscriber Subscriber #    Estefany An [id number]    Address Phone    [employer]  [address]  [place] [address] [phone]          2. [employer]     F/O Payor/Plan Precert #    [employer]     Subscriber Subscriber #    Estefany An [id number]    Address Phone    [address] [address]  [place] [address] [phone]

## 2018-01-07 NOTE — PLAN OF CARE
Problem: Patient Care Overview  Goal: Plan of Care/Patient Progress Review  Outcome: No Change  /64 (BP Location: Right arm)  Pulse 85  Temp 96.4  F (35.8  C) (Oral)  Resp 14  SpO2 94%  Neuro: A&O, Lethargic  Pain: c/o pain d/t Fx, relief with PO narco  Resp: LS diminished, on 2L via NC  Cardiac: Bp WDL, Tele A Fib CVR, new onset  GI/: bowel sounds positive, unable to void on bedpan, next shift to  and posibly transfer to commode  Diet: regular diet  Skin/mobility: bruising, able to roll side to side in bed  Tx/plan: NS, K replacement, tele, narco for pain, ortho and PT consult  Will continue to monitor and provide supportive care.

## 2018-01-07 NOTE — ED PROVIDER NOTES
History     Chief Complaint:  Fall    HPI   Estefany An is a 87 year old female, with a history of CAD, arthritis, and right shoulder fracture, who presents with her family to the ED for evaluation of fall. Per nurse s report, the patient is arriving from [facility]. The patient was dizzy today and slid out of her bed, hitting her right hip and shoulder. She was recently diagnosed with influenza and had a new onset of a-fib today. The patient s daughter reports the patient has been dizzier and weaker with a decreased appetite for the past 2 weeks; she has had a productive cough with green phlegm as well. The daughter notes the patient seemed fine yesterday, still feeling dizzy and weak. The patient reports she was walking back to her bed after using the bathroom when she felt dizzy and fell; she did hit her head. The patient notes her shoulder and hip pain has improved while in the ED. The patient denies any loss of consciousness, headache, shortness of breath, chest pain, abdominal pain, vomiting, diarrhea, or other pain.     Allergies:  No known drug allergies    Medications:    Lisinopril   Lorazepam   Tramadol   Nifedipine   Losartan potassium  Hydrochlorothiazide   Calcium + D  Fish oil  Flaxseed oil  Prilosec     Past Medical History:    HTN  Anxiety  Arthritis    Right shoulder fracture   Hip osteoarthritis  Insomnia  Cataract   CAD    Past Surgical History:    Cholecystectomy    Right cataract removal     Family History:    History reviewed. No pertinent family history.     Social History:  Smoking status: Never smoker  Alcohol use: No  Presents to ED with family   Marital Status:   [5]     Review of Systems   Constitutional: Positive for appetite change.   Respiratory: Positive for cough. Negative for shortness of breath.    Cardiovascular: Negative for chest pain.   Gastrointestinal: Negative for abdominal pain, diarrhea and vomiting.   Musculoskeletal: Positive for arthralgias.    Neurological: Positive for dizziness and weakness. Negative for syncope and headaches.   All other systems reviewed and are negative.    Physical Exam     Patient Vitals for the past 24 hrs:   BP Temp Temp src Pulse Heart Rate Resp SpO2 Weight   01/07/18 1015 129/67 - - - - - - -   01/07/18 1000 129/68 - - - - - - -   01/07/18 0900 147/75 - - - 72 - 93 % -   01/07/18 0845 149/67 - - - 74 - 96 % -   01/07/18 0830 136/63 - - - 68 - 97 % -   01/07/18 0815 144/68 - - - 70 - 95 % -   01/07/18 0800 136/61 - - - 68 - 90 % -   01/07/18 0745 161/72 - - - - - (!) 81 % -   01/07/18 0743 148/64 96.5  F (35.8  C) Temporal 85 - 12 (!) 84 % -     Physical Exam  General: Patient is alert and cooperative.  Tired appearing.  HENT:  Normal nose, oropharynx. No trauma.   Eyes: EOMI. Normal conjunctiva.  Neck:  Normal range of motion and appearance.   Cardiovascular:  Normal rate, irregular rhythm and normal heart sounds.   Pulmonary/Chest:  Effort normal. Coarse rhonchi, no wheezing.   Abdominal: Soft. No distension or tenderness.     Musculoskeletal: Normal range of motion. No edema or tenderness.  Tender right shoulder/clavicle.  No RLE shortening or rotation.   Neurological: oriented.  No focal weakness.  Skin: Warm and dry. No rash or bruising.   Psychiatric: Normal mood and affect. Normal behavior and judgement.    Emergency Department Course     ECG (7:45:47):  Rate 79 bpm. NM interval *. QRS duration 86. QT/QTc 380/435. P-R-T axes * 25 129. Atrial fibrillation. Nonspecific ST and T wave abnormality. Abnormal ECG. Interpreted at 0745 by Luis Felipe Lyle MD.    Imaging:  Radiographic findings were communicated with the patient and family who voiced understanding of the findings.    CT Head w/o Contrast  IMPRESSION: No intracranial hemorrhage or skull fractures are  Identified. As read by Radiology.     XR Chest 1 View  IMPRESSION: Mild left basilar atelectasis. No pleural effusion or  pneumothorax. Right clavicular fracture  better evaluated on dedicated  radiographs of the shoulder. Old left humeral fracture deformity  partially visualized. As read by Radiology.     XR Pelvis & Hip Right 2 Views  IMPRESSION: Mildly displaced fractures involving the right superior  and inferior pubic rami. No other definite pelvic fractures are seen.  Osteopenia about the pelvis and hips. As read by Radiology.     XR Shoulder Right G/E 3 Views  IMPRESSION:   1. Slightly comminuted and mildly displaced distal right clavicular  fracture is seen. No other findings concerning for fracture in the  right shoulder.  2. Severe glenohumeral osteoarthritis with extensive remodeling about  the glenoid and the humeral head.  As read by Radiology.     CT Chest Pulmonary Embolism w Contrast  Impression:   1. No evidence of pulmonary embolus.  2. Wedge compression deformity involving the T12 vertebral body is  age-indeterminate, but new since chest x-ray dated 11/2/2014.   As read by Radiology.     Laboratory:  Nt Pro BNP: 2899(H)  TSH: 1.63  Troponin I (0759): <0.015  Magnesium: 2.1  Influenza A/B antigen: Negative   CBC: WBC 15.3(H), HGB 10.0(L), o/w WNL ()  CMP: (L), Potassium 3.1(L), Chloride 90(L), Glucose 151(H), Calcium 8.3(L), Albumin 3.1(L), o/w WNL (Creatinine 0.71)    Blood cultures: In process     Interventions:  1108: NS 92mLs Bolus IV    Emergency Department Course:  Patient arrived by EMS.    0742: I performed an exam of the patient and obtained history, as documented above.    Past medical records, nursing notes, and vitals reviewed.    IV inserted and blood drawn.    The patient was sent for a head CT, chest x-ray, right hip & pelvis x-ray, and right shoulder x-ray while in the emergency department, findings above.    0959: I rechecked the patient. Explained findings to patient and family.    1017: I spoke to Dr. Loyd of the hospitalist service who accepts the patient for admission.     The patient was sent for a chest CT while in the  "emergency department, findings above.    Findings and plan explained to the Patient and family who consents to admission. Discussed the patient with Dr. Loyd, who will admit the patient to a cardiac telemetry bed for further monitoring, evaluation, and treatment.     Impression & Plan      Medical Decision Making:  Estefany An, 87 year old female arrives by EMS from her assisted living facility after suffering an unwitnessed fall onto her right side. Daughter assisted with history and described progressive generalized weakness over the past several weeks or so due to the \"flu.\" It sounds like she has had some mild URI symptoms including a productive cough. Did strike her head but denies loss of consciousness and is complaining of right shoulder and hip area discomfort. On arrival here, she's tired in appearance, but alert and hemodynamically stable. She's known to be hypoxic on room air with sats in the low to mid 80's. Her evaluation has included 12 lead EKG which depicts rate controlled atrial fibrillation with no ischemic appearing changes. There's no documented prior history of atrial fibrillation or other heart disease. Given her unwitnessed fall, a number of imaging tests were performed. She had a negative CT head without contrast. Plain films of the pelvis and right hip show right superior and inferior pubic rami fractures. Right shoulder film show a distal right clavicle fracture. Chest x-ray demonstrates mild basilar atelectasis with no obvious pneumonia or vascular congestion. Given her presenting hypoxia with an unremarkable x-ray, a CT scan of the chest with contrast was undertaken to exclude pulmonary embolism. That study is negative. There's no acute pulmonary process seen. Laboratory test include elevated white blood cell count, negative rapid influenza test, BNP of 2899, and CMP demonstrating some electrolyte abnormalities as detailed above. She has mild hyponatremia which has been followed in the " clinic for several weeks now and a potasium of 3.1. She was placed on a cardiac monitor, continuous pulse oximeter, and supplemental oxygen applied. She requires admission for further management. I've spoken with the hospitalist service, who' s agreeable with admission to a telemetry bed given the apparent new onset of atrial fibrillation. It sounds like she has a viral respiratory illness which may have led to her progressive weakness and fall. With respect to the new onset a-fib, she has no associated chest pain to suggest underlying ischemic process, but requires further cardiac workup for this, and ultimate placement into a proper facility to manage her pubic fracture which will likely simply be supportive.     Diagnosis:    ICD-10-CM    1. New onset atrial fibrillation (H) I48.91 Blood culture     Blood culture   2. Acute congestive heart failure, unspecified congestive heart failure type (H) I50.9    3. Pubic ramus fracture, right, closed, initial encounter (H) S32.591A    4. Closed nondisplaced fracture of right clavicle, unspecified part of clavicle, initial encounter S42.001A      Disposition: Patient admitted to a cardiac telemetry bed by Dr. Rach Michael  1/7/2018   [facility] EMERGENCY DEPARTMENT    I, Abi Michael, am serving as a scribe at 7:42 AM on 1/7/2018 to document services personally performed by Luis Felipe Lyle MD based on my observations and the provider's statements to me.        Luis Felipe Lyle MD  01/07/18 6701

## 2018-01-07 NOTE — ED NOTES
[facility]  ED Nurse Handoff Report    Estefany An is a 87 year old female   ED Chief complaint: Dizziness; Fall; and Flu Symptoms  . ED Diagnosis:   Final diagnoses:   New onset atrial fibrillation (H)   Acute congestive heart failure, unspecified congestive heart failure type (H)   Pubic ramus fracture, right, closed, initial encounter (H)   Closed nondisplaced fracture of right clavicle, unspecified part of clavicle, initial encounter     Allergies: No Known Allergies    Code Status: unknown. Nursing home sent nothing with pt.   Activity level - Baseline/Home:  Independent. Activity Level - Current:   Stand with Assist. Lift room needed: No. Bariatric: No   Needed: No   Isolation: Yes. Infection: influenza came back negative but it was reported by EMS that she was diagnosed with it.   Influenza.     Vital Signs:   Vitals:    01/07/18 0845 01/07/18 0900 01/07/18 1000 01/07/18 1015   BP: 149/67 147/75 129/68 129/67   Pulse:       Resp:       Temp:       TempSrc:       SpO2: 96% 93%         Cardiac Rhythm:  ,   Cardiac  Cardiac Rhythm: Atrial fibrillation  Pain level:    Patient confused: No. Patient Falls Risk: Yes.   Elimination Status: Unable to void   Patient Report - Initial Complaint: fall, right shoulder and hip pain, new onset a fib, weakness. Focused Assessment: Chief Complaint:  Fall     HPI   Estefany An is a 87 year old female, with a history of CAD, arthritis, and right shoulder fracture, who presents with her family to the ED for evaluation of fall. Per nurse s report, the patient is arriving from [facility]. The patient was dizzy today and slid out of her bed, hitting her right hip and shoulder. She was recently diagnosed with influenza and had a new onset of a-fib today. The patient s daughter reports the patient has been dizzier and weaker with a decreased appetite for the past 2 weeks; she has had a productive cough with green phlegm as well. The daughter notes  the patient seemed fine yesterday, still feeling dizzy and weak. The patient reports she was walking back to her bed after using the bathroom when she felt dizzy and fell; she did hit her head. The patient notes her shoulder and hip pain has improved while in the ED. The patient denies any loss of consciousness, headache, shortness of breath, chest pain, abdominal pain, vomiting, diarrhea, or other pain.    Tests Performed: lab, xray, ct. Abnormal Results:  TSH Resulted TSH: 1.63 mU/L [Ref Range: 0.40 - 4.00]  Collected: 1/7/2018 07:59  Last updated: 1/7/2018 08:36 FI     08:31 Troponin I Resulted Troponin I ES: <0.015 ug/L [Ref Range: 0.000 - 0.045] (The 99th percentile for upper reference range is 0.045 ug/L. Troponin values   in the range of 0.045 - 0.120 ug/L may be associated with risks of adverse   clinical events.   )  Collected: 1/7/2018 07:59  Last updated: 1/7/2018 08:31 FI    08:31 Comprehensive metabolic panel Resulted Abnormal Result -   Sodium: 127 mmol/L [Ref Range: 133 - 144]  Potassium: 3.1 mmol/L [Ref Range: 3.4 - 5.3]  Chloride: 90 mmol/L [Ref Range: 94 - 109]  Carbon Dioxide: 30 mmol/L [Ref Range: 20 - 32]  Anion Gap: 7 mmol/L [Ref Range: 3 - 14]  Glucose: 151 mg/dL [Ref Range: 70 - 99]  Urea Nitrogen: 10 mg/dL [Ref Range: 7 - 30]  Creatinine: 0.71 mg/dL [Ref Range: 0.52 - 1.04]  GFR Estimate: 77 mL/min/1.7m2 [Ref Range: >60] (Non  GFR Calc)  GFR Estimate If Black: >90 mL/min/1.7m2 [Ref Range: >60] ( GFR Calc)  Calcium: 8.3 mg/dL [Ref Range: 8.5 - 10.1]  Bilirubin Total: 0.2 mg/dL [Ref Range: 0.2 - 1.3]  Albumin: 3.1 g/dL [Ref Range: 3.4 - 5.0]  Protein Total: 7.1 g/dL [Ref Range: 6.8 - 8.8]  Alkaline Phosphatase: 88 U/L [Ref Range: 40 - 150]  ALT: 24 U/L [Ref Range: 0 - 50]  AST: 27 U/L [Ref Range: 0 - 45]  Collected: 1/7/2018 07:59  Last updated: 1/7/2018 08:31 FI    08:31 Magnesium Resulted Magnesium: 2.1 mg/dL [Ref Range: 1.6 - 2.3]  Collected: 1/7/2018  07:59  Last updated: 1/7/2018 08:31 FI    08:31 Nt probnp inpatient (BNP) Resulted Abnormal Result -   N-Terminal Pro BNP Inpatient: 2899 pg/mL [Ref Range: 0 - 1800] (   Reference range shown and results flagged as abnormal are suggested inpatient   cut points for confirming diagnosis if CHF in an acute setting. Establishing a   baseline value for each individual patient is useful for follow-up. An   inpatient or emergency department NT-proPBNP <300 pg/mL effectively rules out   acute CHF, with 99% negative predictive value.   The outpatient non-acute reference range for ruling out CHF is:   0-125 pg/mL (age 18 to less than 75)   0-450 pg/mL (age 75 yrs and older)   )  Collected: 1/7/2018 07:59  Last updated: 1/7/2018 08:31 FI    08:31 Influenza A/B antigen Resulted Influenza A/B Agn Specimen: Nares  Influenza A: Negative [Ref Range: NEG^Negative]  Influenza B: Negative [Ref Range: NEG^Negative] (Test results must be correlated with clinical data. If necessary, results   should be confirmed by a molecular assay or viral culture.   )  Collected: 1/7/2018 08:05  Last updated: 1/7/2018 08:31          CBC with platelets differential Resulted Abnormal Result -   WBC: 15.3 10e9/L [Ref Range: 4.0 - 11.0]  RBC Count: 3.24 10e12/L [Ref Range: 3.8 - 5.2]  Hemoglobin: 10.0 g/dL [Ref Range: 11.7 - 15.7]  Hematocrit: 29.7 % [Ref Range: 35.0 - 47.0]  MCV: 92 fl [Ref Range: 78 - 100]  MCH: 30.9 pg [Ref Range: 26.5 - 33.0]  MCHC: 33.7 g/dL [Ref Range: 31.5 - 36.5]  RDW: 12.8 % [Ref Range: 10.0 - 15.0]  Platelet Count: 271 10e9/L [Ref Range: 150 - 450]  Diff Method: Automated Method  % Neutrophils: 77.6 %  % Lymphocytes: 14.5 %  % Monocytes: 6.7 %  % Eosinophils: 0.1 %  % Basophils: 0.1 %  % Immature Granulocytes: 1.0 %  Nucleated RBCs: 0 /100 [Ref Range: 0]  Absolute Neutrophil: 11.9 10e9/L [Ref Range: 1.6 - 8.3]  Absolute Lymphocytes: 2.2 10e9/L [Ref Range: 0.8 - 5.3]  Absolute Monocytes: 1.0 10e9/L [Ref Range: 0.0 -  1.3]  Absolute Eosinophils: 0.0 10e9/L [Ref Range: 0.0 - 0.7]  Absolute Basophils: 0.0 10e9/L [Ref Range: 0.0 - 0.2]  Abs Immature Granulocytes: 0.2 10e9/L [Ref Range: 0 - 0.4]  Absolute Nucleated RBC: 0.0  Collected: 1/7/2018 07:59  Last updated: 1/7/2018 08:12 FI    08:05 Collect Influenza A/B antigen Completed Influenza A/B antigen - Type: Nares      IMPRESSION: Mild left basilar atelectasis. No pleural effusion or  pneumothorax. Right clavicular fracture better evaluated on dedicated  radiographs of the shoulder. Old left humeral fracture deformity  partially visualized.     ALEX MOSQUERA MD.   IMPRESSION: Mildly displaced fractures involving the right superior  and inferior pubic rami. No other definite pelvic fractures are seen.  Osteopenia about the pelvis and hips.      IMPRESSION:   1. Slightly comminuted and mildly displaced distal right clavicular  fracture is seen. No other findings concerning for fracture in the  right shoulder.  2. Severe glenohumeral osteoarthritis with extensive remodeling about  the glenoid and the humeral head.  FINDINGS:  There is generalized atrophy of the brain.  White matter  changes are present in the cerebral hemispheres that are consistent  with small vessel ischemic disease in this age patient. There is no  evidence of intracranial hemorrhage, mass, acute infarct or anomaly.  The left sphenoid sinus is nearly completely opacified no bleed or  fractures are identified.         IMPRESSION: No intracranial hemorrhage or skull fractures are  Identified.  FINDINGS:  There is generalized atrophy of the brain.  White matter  changes are present in the cerebral hemispheres that are consistent  with small vessel ischemic disease in this age patient. There is no  evidence of intracranial hemorrhage, mass, acute infarct or anomaly.  The left sphenoid sinus is nearly completely opacified no bleed or  fractures are identified.         IMPRESSION: No intracranial hemorrhage or skull  fractures are  identified.  Treatments provided:   Family Comments: at bedside  OBS brochure/video discussed/provided to patient:  No  ED Medications:   Medications   lidocaine 1 % 1 mL (not administered)   lidocaine (LMX4) kit (not administered)   sodium chloride (PF) 0.9% PF flush 3 mL (not administered)   sodium chloride (PF) 0.9% PF flush 3 mL (not administered)     Drips infusing:  No  For the majority of the shift, the patient's behavior Green. Interventions performed were na.     Severe Sepsis OR Septic Shock Diagnosis Present: No      ED Nurse Name/Phone Number: Lilli Mcdowell,   10:18 AM    RECEIVING UNIT ED HANDOFF REVIEW    Above ED Nurse Handoff Report was reviewed: Yes  Reviewed by: Geovany Coronado on January 7, 2018 at 12:26 PM

## 2018-01-07 NOTE — ED NOTES
Pt arrives via EMS after she slid out of bed this morning. She complained of right hip and shoulder pain. Staff also noted the pt to be in a fib, which is new for her.

## 2018-01-07 NOTE — PHARMACY-ADMISSION MEDICATION HISTORY
Admission medication history interview status for this patient is complete. See [facility] admission navigator for allergy information, prior to admission medications and immunization status.     Medication history interview source(s):Daughter  Medication history resources (including written lists, pill bottles, clinic record):Med list from 12.19.2017  [facility]    Changes made to PTA medication list:  Added: All  Deleted: Lisinopril  Changed: None    Actions taken by pharmacist (provider contacted, etc):None     Additional medication history information:Spoke with daughter who picks up all her mother's medications. The daughter stated that she probably took all her medications yesterday. The patient takes her own meds but I was unable to speak with her. Daughter states that she currently takes only 5 medications and no other vitamins, supplements, minerals, or herbs.    Medication reconciliation/reorder completed by provider prior to medication history? No    Do you take OTC medications (eg tylenol, ibuprofen, fish oil, eye/ear drops, etc)? N(Y/N)    For patients on insulin therapy: N (Y/N)        Prior to Admission medications    Medication Sig Last Dose Taking? Auth Provider   hydrochlorothiazide (HYDRODIURIL) 25 MG tablet Take 25 mg by mouth daily 1/6/2018 at Unknown time Yes Unknown, Entered By History   LORazepam (ATIVAN) 0.5 MG tablet Take 0.5 mg by mouth At Bedtime 1/6/2018 at Unknown time Yes Unknown, Entered By History   losartan (COZAAR) 100 MG tablet Take 100 mg by mouth daily 1/6/2018 at Unknown time Yes Unknown, Entered By History   NIFEdipine ER (ADALAT CC) 90 MG TB24 Take 90 mg by mouth daily 1/6/2018 at Unknown time Yes Unknown, Entered By History   traMADol (ULTRAM) 50 MG tablet Take by mouth 3 times daily 1/6/2018 at Unknown time Yes Unknown, Entered By History

## 2018-01-07 NOTE — LETTER
Transition Communication Hand-off for Care Transitions to Next Level of Care Provider    Name: Estefany An  MRN #: [id number]  Primary Care Provider: Ricki Blake  Primary Care MD Name: Ricki Blake  Primary Clinic: [facility] [id number]  [facility] [id number]  Primary Care Clinic Name: [facility]  Reason for Hospitalization:  New onset atrial fibrillation (H) [I48.91]  Pubic ramus fracture, right, closed, initial encounter (H) [S32.591A]  Acute congestive heart failure, unspecified congestive heart failure type (H) [I50.9]  Closed nondisplaced fracture of right clavicle, unspecified part of clavicle, initial encounter [S42.001A]  Admit Date/Time: 1/7/2018  7:40 AM  Discharge Date: 1/15  Payor Source: Payor: MEDICARE / Plan: MEDICARE / Product Type: Medicare /     Readmission Assessment Measure (ODALYS) Risk Score/category: Avg    Reason for Communication Hand-off Referral: Fragility Influenza A    Discharge Plan: [facility]  Discharge Needs Assessment:  Needs       Most Recent Value    Equipment Currently Used at Home none    Transportation Available car, family or friend will provide    # of Referrals Placed by CTS Post Acute Facilities    Transitional Care [facility] [phone]    PAS Number [id number]        Follow-up plan:  No future appointments.    Any outstanding tests or procedures:        Referrals     Future Labs/Procedures    Occupational Therapy Adult Consult     Comments:    Evaluate and treat as clinically indicated.    Physical Therapy Adult Consult     Comments:    Evaluate and treat as clinically indicated.              Key Recommendations:  Pt admitted with hypoxia/Influenza A, new afib and BNP 2899. She was treated with O2 2L, IVF and tamiflu. She is high risk with falls at home .Daughter is present and supportive. She lives at the [facility] and is open to increased services if needed on dc. She will follow up with Dr Blake as scheduled. She  was dc'd to [facility] for further therapies and strengthening on dc. New meds include norco, metoprolol, septra and her HCTZ and tramadol was stopped.    Olivia Win RN CTS    AVS/Discharge Summary is the source of truth; this is a helpful guide for improved communication of patient story              Olivia Win    AVS/Discharge Summary is the source of truth; this is a helpful guide for improved communication of patient story

## 2018-01-07 NOTE — IP AVS SNAPSHOT
"          [place] [address] MEDICAL SURGICAL: [phone]                                              INTERAGENCY TRANSFER FORM - LAB / IMAGING / EKG / EMG RESULTS   2018                    Hospital Admission Date: 2018  DENISSE GRANT   : 1930  Sex: Female        Attending Provider: (none)    Allergies:  No Known Allergies    Infection:  None   Service:  GENERAL MEDI    Ht:  1.727 m (5' 8\")   Wt:  88.7 kg (195 lb 8 oz)   Admission Wt:  84.4 kg (186 lb)    BMI:  29.73 kg/m 2   BSA:  2.06 m 2            Patient PCP Information     Provider PCP Type    Ricki Blake MD General         Lab Results - 3 Days      Urine Culture Aerobic Bacterial [[id number]] (Abnormal)  Resulted: 01/15/18 1247, Result status: Preliminary result    Ordering provider: Hu Loyd MD  18 1217 Resulting lab: [facility]    Specimen Information    Type Source Collected On   Midstream Urine  18 1217          Components       Value Reference Range Flag Lab   Specimen Description Midstream Urine      Special Requests Specimen received in preservative   75   Culture Micro --  A 75   Result:         >100,000 colonies/mL  Proteus mirabilis  Susceptibility testing in progress     Culture Micro --   75   Result:         <10,000 colonies/mL  urogenital jennifer  Susceptibility testing not routinely done              Basic metabolic panel [[id number]] (Abnormal)  Resulted: 01/15/18 0753, Result status: Final result    Ordering provider: Hernandez Stovall MD  01/15/18 0000 Resulting lab: [facility]    Specimen Information    Type Source Collected On   Blood  01/15/18 0650          Components       Value Reference Range Flag Lab   Sodium 127 133 - 144 mmol/L L FrRdHs   Potassium 4.0 3.4 - 5.3 mmol/L  FrRdHs   Chloride 93 94 - 109 mmol/L L FrRdHs   Carbon Dioxide 26 20 - 32 mmol/L  FrRdHs   Anion Gap 8 3 - 14 mmol/L  FrRdHs   Glucose 88 70 - 99 mg/dL  FrRdHs   Urea Nitrogen 17 7 - 30 " mg/dL  FrRdHs   Creatinine 0.73 0.52 - 1.04 mg/dL  FrRdHs   GFR Estimate 75 >60 mL/min/1.7m2  FrRdHs   Comment:  Non  GFR Calc   GFR Estimate If Black >90 >60 mL/min/1.7m2  FrRdHs   Comment:  African American GFR Calc   Calcium 8.2 8.5 - 10.1 mg/dL L FrRdHs            CBC with platelets differential [[id number]] (Abnormal)  Resulted: 01/15/18 0737, Result status: Final result    Ordering provider: Hernandez Stovall MD  01/15/18 0000 Resulting lab: [facility]    Specimen Information    Type Source Collected On   Blood  01/15/18 0650          Components       Value Reference Range Flag Lab   WBC 12.5 4.0 - 11.0 10e9/L H FrRdHs   RBC Count 2.57 3.8 - 5.2 10e12/L L FrRdHs   Hemoglobin 7.8 11.7 - 15.7 g/dL L FrRdHs   Hematocrit 23.4 35.0 - 47.0 % L FrRdHs   MCV 91 78 - 100 fl  FrRdHs   MCH 30.4 26.5 - 33.0 pg  FrRdHs   MCHC 33.3 31.5 - 36.5 g/dL  FrRdHs   RDW 13.0 10.0 - 15.0 %  FrRdHs   Platelet Count 459 150 - 450 10e9/L H FrRdHs   Diff Method Automated Method   FrRdHs   % Neutrophils 64.3 %  FrRdHs   % Lymphocytes 24.1 %  FrRdHs   % Monocytes 10.4 %  FrRdHs   % Eosinophils 0.1 %  FrRdHs   % Basophils 0.1 %  FrRdHs   % Immature Granulocytes 1.0 %  FrRdHs   Nucleated RBCs 0 0 /100  FrRdHs   Absolute Neutrophil 8.0 1.6 - 8.3 10e9/L  FrRdHs   Absolute Lymphocytes 3.0 0.8 - 5.3 10e9/L  FrRdHs   Absolute Monocytes 1.3 0.0 - 1.3 10e9/L  FrRdHs   Absolute Eosinophils 0.0 0.0 - 0.7 10e9/L  FrRdHs   Absolute Basophils 0.0 0.0 - 0.2 10e9/L  FrRdHs   Abs Immature Granulocytes 0.1 0 - 0.4 10e9/L  FrRdHs   Absolute Nucleated RBC 0.0   FrRdHs            Blood culture [[id number]]  Resulted: 01/15/18 0249, Result status: Preliminary result    Ordering provider: Hernandez Stovall MD  01/13/18 1115 Resulting lab: [facility]    Specimen Information    Type Source Collected On   Blood  01/13/18 8204   Comment:  Right Arm          Components       Value Reference Range Flag Lab    Specimen Description Blood Right Arm      Special Requests Aerobic and anaerobic bottles received   75   Culture Micro No growth after 2 days   75            Blood culture [[id number]]  Resulted: 01/15/18 0249, Result status: Preliminary result    Ordering provider: Hernandez Stovall MD  01/13/18 1115 Resulting lab: [facility]    Specimen Information    Type Source Collected On   Blood  01/13/18 1153   Comment:  Left Hand          Components       Value Reference Range Flag Lab   Specimen Description Blood Left Hand      Culture Micro No growth after 2 days   75            Basic metabolic panel [[id number]] (Abnormal)  Resulted: 01/14/18 0724, Result status: Final result    Ordering provider: Hernandez Stovall MD  01/14/18 0000 Resulting lab: [facility]    Specimen Information    Type Source Collected On   Blood  01/14/18 0651          Components       Value Reference Range Flag Lab   Sodium 125 133 - 144 mmol/L L FrRdHs   Potassium 4.0 3.4 - 5.3 mmol/L  FrRdHs   Chloride 91 94 - 109 mmol/L L FrRdHs   Carbon Dioxide 28 20 - 32 mmol/L  FrRdHs   Anion Gap 6 3 - 14 mmol/L  FrRdHs   Glucose 95 70 - 99 mg/dL  FrRdHs   Urea Nitrogen 18 7 - 30 mg/dL  FrRdHs   Creatinine 0.72 0.52 - 1.04 mg/dL  FrRdHs   GFR Estimate 77 >60 mL/min/1.7m2  FrRdHs   Comment:  Non  GFR Calc   GFR Estimate If Black >90 >60 mL/min/1.7m2  FrRdHs   Comment:  African American GFR Calc   Calcium 8.7 8.5 - 10.1 mg/dL  FrRdHs            CBC with platelets differential [[id number]] (Abnormal)  Resulted: 01/14/18 0703, Result status: Final result    Ordering provider: Hernandez Stovall MD  01/14/18 0000 Resulting lab: [facility]    Specimen Information    Type Source Collected On   Blood  01/14/18 0651          Components       Value Reference Range Flag Lab   WBC 17.3 4.0 - 11.0 10e9/L H FrRdHs   RBC Count 2.77 3.8 - 5.2 10e12/L L FrRdHs   Hemoglobin 8.5 11.7 - 15.7 g/dL L  FrRdHs   Hematocrit 25.3 35.0 - 47.0 % L FrRdHs   MCV 91 78 - 100 fl  FrRdHs   MCH 30.7 26.5 - 33.0 pg  FrRdHs   MCHC 33.6 31.5 - 36.5 g/dL  FrRdHs   RDW 12.9 10.0 - 15.0 %  FrRdHs   Platelet Count 476 150 - 450 10e9/L H FrRdHs   Diff Method Automated Method   FrRdHs   % Neutrophils 71.5 %  FrRdHs   % Lymphocytes 16.7 %  FrRdHs   % Monocytes 10.4 %  FrRdHs   % Eosinophils 0.0 %  FrRdHs   % Basophils 0.1 %  FrRdHs   % Immature Granulocytes 1.3 %  FrRdHs   Nucleated RBCs 0 0 /100  FrRdHs   Absolute Neutrophil 12.4 1.6 - 8.3 10e9/L H FrRdHs   Absolute Lymphocytes 2.9 0.8 - 5.3 10e9/L  FrRdHs   Absolute Monocytes 1.8 0.0 - 1.3 10e9/L H FrRdHs   Absolute Eosinophils 0.0 0.0 - 0.7 10e9/L  FrRdHs   Absolute Basophils 0.0 0.0 - 0.2 10e9/L  FrRdHs   Abs Immature Granulocytes 0.2 0 - 0.4 10e9/L  FrRdHs   Absolute Nucleated RBC 0.0   FrRdHs            Sodium [[id number]] (Abnormal)  Resulted: 01/13/18 1605, Result status: Final result    Ordering provider: Hernandez Stovall MD  01/13/18 0800 Resulting lab: [facility]    Specimen Information    Type Source Collected On   Blood  01/13/18 1519          Components       Value Reference Range Flag Lab   Sodium 126 133 - 144 mmol/L L FrRdHs            UA with Microscopic reflex to Culture [[id number]] (Abnormal)  Resulted: 01/13/18 1256, Result status: Final result    Ordering provider: Hernandez Stovall MD  01/13/18 1115 Resulting lab: [facility]    Specimen Information    Type Source Collected On   Midstream Urine Urine clean catch 01/13/18 1217          Components       Value Reference Range Flag Lab   Color Urine Yellow   FrRdHs   Appearance Urine Cloudy   FrRdHs   Glucose Urine Negative NEG^Negative mg/dL  FrRdHs   Bilirubin Urine Negative NEG^Negative  FrRdHs   Ketones Urine Negative NEG^Negative mg/dL  FrRdHs   Specific Gravity Urine 1.013 1.003 - 1.035  FrRdHs   Blood Urine Small NEG^Negative A FrRdHs   pH Urine 9.0 5.0 - 7.0 pH H FrRdHs   Protein  Albumin Urine 30 NEG^Negative mg/dL A FrRdHs   Urobilinogen mg/dL 0.0 0.0 - 2.0 mg/dL  FrRdHs   Nitrite Urine Positive NEG^Negative A FrRdHs   Leukocyte Esterase Urine Large NEG^Negative A FrRdHs   Source Midstream Urine   FrRdHs   WBC Urine 63 0 - 2 /HPF H FrRdHs   RBC Urine 20 0 - 2 /HPF H FrRdHs   WBC Clumps Present NEG^Negative /HPF A FrRdHs   Bacteria Urine Few NEG^Negative /HPF A FrRdHs   Squamous Epithelial /HPF Urine <1 0 - 1 /HPF  FrRdHs   Transitional Epi 1 0 - 1 /HPF  FrRdHs   Mucous Urine Present NEG^Negative /LPF A FrRdHs            Basic metabolic panel [[id number]] (Abnormal)  Resulted: 01/13/18 0642, Result status: Final result    Ordering provider: Hernandez Stovall MD  01/13/18 0000 Resulting lab: [facility]    Specimen Information    Type Source Collected On   Blood  01/13/18 0604          Components       Value Reference Range Flag Lab   Sodium 128 133 - 144 mmol/L L FrRdHs   Potassium 4.3 3.4 - 5.3 mmol/L  FrRdHs   Chloride 94 94 - 109 mmol/L  FrRdHs   Carbon Dioxide 26 20 - 32 mmol/L  FrRdHs   Anion Gap 8 3 - 14 mmol/L  FrRdHs   Glucose 98 70 - 99 mg/dL  FrRdHs   Urea Nitrogen 26 7 - 30 mg/dL  FrRdHs   Creatinine 0.73 0.52 - 1.04 mg/dL  FrRdHs   GFR Estimate 75 >60 mL/min/1.7m2  FrRdHs   Comment:  Non  GFR Calc   GFR Estimate If Black >90 >60 mL/min/1.7m2  FrRdHs   Comment:  African American GFR Calc   Calcium 8.0 8.5 - 10.1 mg/dL L FrRdHs            CBC with platelets differential [[id number]] (Abnormal)  Resulted: 01/13/18 0629, Result status: Final result    Ordering provider: Hernandez Stovall MD  01/13/18 0000 Resulting lab: [facility]    Specimen Information    Type Source Collected On   Blood  01/13/18 0604          Components       Value Reference Range Flag Lab   WBC 22.2 4.0 - 11.0 10e9/L H FrRdHs   RBC Count 2.73 3.8 - 5.2 10e12/L L FrRdHs   Hemoglobin 8.4 11.7 - 15.7 g/dL L [facility]   Hematocrit 25.0 35.0 - 47.0 % L [facility]   MCV 92 78 - 100  fl  FrRdHs   MCH 30.8 26.5 - 33.0 pg  FrRdHs   MCHC 33.6 31.5 - 36.5 g/dL  FrRdHs   RDW 13.2 10.0 - 15.0 %  FrRdHs   Platelet Count 442 150 - 450 10e9/L  FrRdHs   Diff Method Automated Method   FrRdHs   % Neutrophils 78.3 %  FrRdHs   % Lymphocytes 9.4 %  FrRdHs   % Monocytes 10.9 %  FrRdHs   % Eosinophils 0.0 %  FrRdHs   % Basophils 0.0 %  FrRdHs   % Immature Granulocytes 1.4 %  FrRdHs   Nucleated RBCs 0 0 /100  FrRdHs   Absolute Neutrophil 17.3 1.6 - 8.3 10e9/L H FrRdHs   Absolute Lymphocytes 2.1 0.8 - 5.3 10e9/L  FrRdHs   Absolute Monocytes 2.4 0.0 - 1.3 10e9/L H FrRdHs   Absolute Eosinophils 0.0 0.0 - 0.7 10e9/L  FrRdHs   Absolute Basophils 0.0 0.0 - 0.2 10e9/L  FrRdHs   Abs Immature Granulocytes 0.3 0 - 0.4 10e9/L  FrRdHs   Absolute Nucleated RBC 0.0   FrRdHs            Blood culture [[id number]]  Resulted: 01/13/18 0328, Result status: Final result    Ordering provider: Luis Felipe Lyle MD  01/07/18 0758 Resulting lab: [facility]    Specimen Information    Type Source Collected On   Blood Arm 01/07/18 0759   Comment:  Right Arm          Components       Value Reference Range Flag Lab   Specimen Description Blood Right Arm      Special Requests Aerobic and anaerobic bottles received   FrRdHs   Culture Micro No growth   75            Blood culture [[id number]]  Resulted: 01/13/18 0328, Result status: Final result    Ordering provider: Luis Felipe Lyle MD  01/07/18 0801 Resulting lab: [facility]    Specimen Information    Type Source Collected On   Blood  01/07/18 0824   Comment:  Right Arm          Components       Value Reference Range Flag Lab   Specimen Description Blood Right Arm      Special Requests Aerobic and anaerobic bottles received   75   Culture Micro No growth   75            Sodium [[id number]] (Abnormal)  Resulted: 01/12/18 2241, Result status: Final result    Ordering provider: Hernandez Stovall MD  01/12/18 1600 Resulting lab:  [facility]    Specimen Information    Type Source Collected On   Blood  01/12/18 2222          Components       Value Reference Range Flag Lab   Sodium 126 133 - 144 mmol/L L FrRdHs            Osmolality urine [[id number]]  Resulted: 01/12/18 2107, Result status: Final result    Ordering provider: Hernandez Stovall MD  01/12/18 0974 Resulting lab: [facility]    Specimen Information    Type Source Collected On   Urine  01/12/18 1515          Components       Value Reference Range Flag Lab   Urine Osmolality 345 100 - 1200 mmol/kg  FrStHsLb            Osmolality [[id number]] (Abnormal)  Resulted: 01/12/18 1719, Result status: Final result    Ordering provider: Hernandez Stovall MD  01/12/18 0982 Resulting lab: [facility]    Specimen Information    Type Source Collected On   Blood  01/12/18 1140          Components       Value Reference Range Flag Lab   Osmolality 277 280 - 301 mmol/kg L FrStHsLb            Sodium random urine [[id number]]  Resulted: 01/12/18 1626, Result status: Final result    Ordering provider: Hernandez Stovall MD  01/12/18 0979 Resulting lab: [facility]    Specimen Information    Type Source Collected On   Urine  01/12/18 1515          Components       Value Reference Range Flag Lab   Sodium Urine mmol/L 11 mmol/L  FrRdHs            Sodium [[id number]] (Abnormal)  Resulted: 01/12/18 1436, Result status: Final result    Ordering provider: Hernandez Stovall MD  01/12/18 0978 Resulting lab: [facility]    Specimen Information    Type Source Collected On   Blood  01/12/18 1420          Components       Value Reference Range Flag Lab   Sodium 126 133 - 144 mmol/L L FrRdHs            Uric acid: Level [[id number]]  Resulted: 01/12/18 1228, Result status: Final result    Ordering provider: Hernandez Stovall MD  01/12/18 0967 Resulting lab: [facility]    Specimen Information    Type Source Collected On   Blood   "01/12/18 1140          Components       Value Reference Range Flag Lab   Uric Acid 3.6 2.6 - 6.0 mg/dL  FrRd            Sodium [[id number]] (Abnormal)  Resulted: 01/12/18 0849, Result status: Final result    Ordering provider: Naya Hsieh MD  01/12/18 0001 Resulting lab: [facility]    Specimen Information    Type Source Collected On   Blood  01/12/18 0827          Components       Value Reference Range Flag Lab   Sodium 126 133 - 144 mmol/L L FrRd            Hemoglobin [[id number]] (Abnormal)  Resulted: 01/12/18 0839, Result status: Final result    Ordering provider: Naya Hsieh MD  01/12/18 0001 Resulting lab: [facility]    Specimen Information    Type Source Collected On   Blood  01/12/18 0827          Components       Value Reference Range Flag Lab   Hemoglobin 8.3 11.7 - 15.7 g/dL L Rd            Testing Performed By     Lab - Abbreviation Name Director Address Valid Date Range    12 - [facility] Unknown [address] [facility] [address] 05/08/15 1057 - Present    14 - FrStHsLb [facility] Unknown [address] [place] [address] 05/08/15 1057 - Present    75 - Unknown [facility] Unknown [address] [facility] [address] 01/15/15 1019 - Present            Unresulted Labs (24h ago through future)    Start       Ordered    Unscheduled  Potassium  (Potassium Replacement - \"Standard\" - For K levels less than 3.4 mmol/L - UU,UR,UA,RH,SH,PH,WY )  CONDITIONAL (SPECIFY),   Routine     Comments:  Obtain Potassium Level for these conditions:  *IF no potassium result within 24 hours before initiation of order set, draw potassium level with next lab collect.    *2 HOURS AFTER last IV potassium replacement dose and 4 hours after an oral replacement dose.  *Next morning after potassium dose.     Repeat Potassium Replacement if necessary.    01/07/18 1300    Unscheduled  Magnesium  (Magnesium Replacement -  Adult - " "\"Standard\" - Replacement for all levels less than 1.6 mg/dL )  CONDITIONAL (SPECIFY),   Routine     Comments:  Obtain Magnesium Level for these conditions:  *IF no magnesium result within 24 hrs before initiation of order set, draw magnesium level with next lab collect.    *2 HOURS AFTER last magnesium replacement dose when magnesium replacement given for level less than 1.6   *Next morning after magnesium dose.     Repeat Magnesium Replacement if necessary.    01/07/18 1300         Imaging Results - 3 Days      XR Chest Port 1 View [[id number]]  Resulted: 01/12/18 1045, Result status: Final result    Ordering provider: Hernandez Stovall MD  01/12/18 0957 Resulted by: Luís Saucedo MD    Performed: 01/12/18 1005 - 01/12/18 1035 Resulting lab: RADIOLOGY RESULTS    Narrative:       XR CHEST PORT 1 VW 1/12/2018 10:35 AM    HISTORY: Hypoxia.    COMPARISON: 1/7/2018    FINDINGS: No airspace consolidation, pleural effusion or pneumothorax.  Normal heart size. Marked degenerative change at both shoulders.      Impression:       IMPRESSION: No acute pulmonary abnormality.    LUÍS SAUCEDO MD      Testing Performed By     Lab - Abbreviation Name Director Address Valid Date Range    104 - [facility] RADIOLOGY RESULTS Unknown Unknown 02/16/05 1553 - Present               ECG/EMG Results      Echocardiogram Complete [[id number]]  Resulted: 01/08/18 0729, Result status: In process    Ordering provider: Hu Loyd MD  01/07/18 1253 Performed: 01/08/18 0729 - 01/08/18 0729    Resulting lab: [facility]             Echo Complete with Lumason [[id number]]  Resulted: 01/08/18 0741, Result status: Edited Result - FINAL    Ordering provider: Hu Loyd MD  01/07/18 1253 Resulted by: Sharan Rosa MD    Performed: 01/08/18 0729 - 01/08/18 0754 Resulting lab: RADIOLOGY RESULTS    Narrative:       [id number]  [facility]  [id number]  [id number]^PREMA^HU^JACKI           [facility]  Echocardiography Laboratory  [address] [facility]  [place] [address]        Name: DENISSE GRANT  MRN: [id number]  : 1930  Study Date: 2018 07:41 AM  Age: 87 yrs  Gender: Female  Patient Location: [facility]  Reason For Study: Atrial Flutter  Ordering Physician: LISA LLOYD  Referring Physician: Ricki Blake  Performed By: Goldie Allen RDCS     BSA: 1.9 m2  Height: 68 in  Weight: 175 lb  HR: 100  BP: 116/70 mmHg  _____________________________________________________________________________  __        Procedure  Complete Portable Echo Adult. Contrast Lumason.  _____________________________________________________________________________  __        Interpretation Summary     The visual ejection fraction is estimated at 55-60%.  The right ventricle is normal in size and function.  Normal left atrial size.  The study was technically difficult. There is no comparison study available.  _____________________________________________________________________________  __        Left Ventricle  The left ventricle is normal in size. Proximal septal thickening is noted. The  visual ejection fraction is estimated at 55-60%. E by E prime ratio is between  8 and 15, which is indeterminate for assessment of left ventricular filling  pressures. No regional wall motion abnormalities noted.     Right Ventricle  The right ventricle is normal in size and function. The right ventricle is not  well visualized.     Atria  Normal left atrial size. Right atrial size is normal. There is no atrial shunt  seen.     Mitral Valve  The mitral valve leaflets appear thickened, but open well. There is trace to  mild mitral regurgitation.        Tricuspid Valve  The tricuspid valve is normal in structure and function. There is trace  tricuspid regurgitation. The right ventricular systolic pressure is  approximated at 30.1 mmHg plus the right atrial pressure.     Aortic Valve  The aortic valve is not well visualized. No aortic regurgitation is present.  No hemodynamically  significant valvular aortic stenosis.     Pulmonic Valve  The pulmonic valve is not well seen, but is grossly normal. There is no  pulmonic valvular regurgitation.     Vessels  Normal size aorta. The aortic root is normal size. Dilated inferior vena cava.     Pericardium  There is no pericardial effusion.        Rhythm  The rhythm was undetermined.  _____________________________________________________________________________  __  MMode/2D Measurements & Calculations  IVSd: 1.2 cm     LVIDd: 3.7 cm  LVIDs: 2.5 cm  LVPWd: 0.94 cm  FS: 32.2 %  EDV(Teich): 57.8 ml  ESV(Teich): 22.3 ml  LV mass(C)d: 121.3 grams  LV mass(C)dI: 62.8 grams/m2  Ao root diam: 3.4 cm  LA dimension: 3.4 cm  asc Aorta Diam: 3.3 cm  LA/Ao: 1.0  LA Volume (BP): 54.0 ml  LA Volume Index (BP): 28.0 ml/m2  RWT: 0.51  TAPSE: 1.7 cm           Doppler Measurements & Calculations  MV E max halle: 119.5 cm/sec  MV dec time: 0.19 sec  TR max halle: 274.1 cm/sec  TR max P.1 mmHg  E/E' avg: 10.4  Lateral E/e': 10.7  Medial E/e': 10.2           _____________________________________________________________________________  __           Report approved by: Sharan Sky 2018 09:02 AM       1    Type Source Collected On     18 0741          View Image (below)              Encounter-Level Documents:     There are no encounter-level documents.      Order-Level Documents:     There are no order-level documents.

## 2018-01-07 NOTE — IP AVS SNAPSHOT
` `           [place] [address] MEDICAL SURGICAL: [phone]                 INTERAGENCY TRANSFER FORM - NOTES (H&P, Discharge Summary, Consults, Procedures, Therapies)   2018                    Hospital Admission Date: 2018  ESTEFANY GRANT   : 1930  Sex: Female        Patient PCP Information     Provider PCP Type    Ricki Blake MD General         History & Physicals      H&P by Hu Loyd MD at 2018 11:52 AM     Author:  Hu Loyd MD Service:  Hospitalist Author Type:  Physician    Filed:  2018 12:13 PM Date of Service:  2018 11:52 AM Creation Time:  2018 11:52 AM    Status:  Signed :  Hu Loyd MD (Physician)         [facility]    History and Physical  Hospitalist       Date of Admission:  2018  Date of Service (when I saw the patient):[PK1.1] 18    Assessment & Plan[PK1.2]   Estefany Grant is a 87 year old female with a history of hypertension and osteoarthritis who now presents with weakness, dizziness and fall resulting in a left clavicular fracture, pubic rami fracture, hypoxemia, and atrial fibrillation.    #1.  Atrial fibrillation with controlled ventricular rate.    Will monitor on telemetry    Obtain echocardiogram    Replace potassium and follow    TSH is normal    Based on ejection fraction will then tailor medication management    Consider metoprolol for rate control if necessary    Patient has had multiple falls resulting in fractures including shoulder clavicle and pelvis not a good candidate for warfarin or other anticoagulation at this time.    Aspirin, may consider Plavix    #2.  Hypoxemia    Patient with hypoxemia upon presentation with saturations in the low 80s    Chest x-ray negative, CT scan for pulmonary embolus negative    BNP is elevated but there are no radiographic or clinical findings of acute heart failure.    Echocardiogram as above    O2 to keep sats 90% or greater, wean as tolerated    Suspect some of  "this might be due to atelectasis and will recommend incentive spirometry    #3.  Left clavicular fracture and right pubic rami fracture    We'll treat symptomatically and weightbearing as tolerated    We'll consult orthopedics due to the fall with trauma    Therapy in a.m.    #4.  Hypertension    Resume, we'll hold hydrochlorothiazide due to hyponatremia    #5.  Hyponatremia    Suspect this is due to recent respiratory infection    Could in part also be due to hydrochlorothiazide and will therefore hold for the time being    IV fluids    Repeat basic metabolic panel in a.m.    #6.  Hypokalemia     Suspect due to hydrochlorothiazide     replace per protocol and follow    #7.  Anemia    Patient with hemoglobin of 10 most recent hemoglobin is 12.5 from 4 years ago and, MCV is 92 we'll add iron studies and peripheral smear and    This could be due to the recent trauma and pubic rami fracture although I think this is less likely    Repeat hemoglobin in a.m.    DVT Prophylaxis: Pneumatic Compression Devices  Code Status: Full Code    Disposition: Expected discharge 3 days.[PK1.1]    Hu Loyd[PK1.2], MD[PK1.1]    Primary Care Physician   Ricki Blake    Chief Complaint[PK1.2]   I fell this morning.    History is obtained from the patient, electronic health record and emergency department physician[PK1.1]    History of Present Illness[PK1.2]   Estefany An is a 87 year old female with a history of hypertension, arthritis, now presents with weakness, dizziness and a fall this morning.  Patient states that she has not been feeling well for the last couple weeks and has had the \"flu\".  She's had a cough but no fevers sweats or chills.  The cough is nonproductive blue, loose and has persisted.  Her appetite is been okay when she's been eating and drinking.  This morning she got up and felt a little lightheaded and dizzy and fell down hitting her right hip and shoulder.  She was recently diagnosed with influenza and has " undergone treatment as best I understand..  She fell forward and then had some hip discomfort.  The pain is better now.  She denies any loss of consciousness headache shortness of breath chest pain or pressure abdominal pain nausea vomiting or diarrhea.  In the emergency department she was noted to have a rhythm of atrial fibrillation which was rate controlled[PK1.1]    Past Medical History[PK1.2]    I have reviewed this patient's medical history and updated it with pertinent information if needed.[PK1.1]   Past Medical History:   Diagnosis Date     Anxiety      Arthritis      Hypertension[PK1.3]        Past Surgical History[PK1.2]   I have reviewed this patient's surgical history and updated it with pertinent information if needed.[PK1.1]  Past Surgical History:   Procedure Laterality Date     CHOLECYSTECTOMY[PK1.3]         Prior to Admission Medications   Prior to Admission Medications   Prescriptions Last Dose Informant Patient Reported? Taking?   LORazepam (ATIVAN) 0.5 MG tablet 1/6/2018 at Unknown time Daughter Yes Yes   Sig: Take 0.5 mg by mouth At Bedtime   NIFEdipine ER (ADALAT CC) 90 MG TB24 1/6/2018 at Unknown time Daughter Yes Yes   Sig: Take 90 mg by mouth daily   hydrochlorothiazide (HYDRODIURIL) 25 MG tablet 1/6/2018 at Unknown time Daughter Yes Yes   Sig: Take 25 mg by mouth daily   losartan (COZAAR) 100 MG tablet 1/6/2018 at Unknown time Daughter Yes Yes   Sig: Take 100 mg by mouth daily   traMADol (ULTRAM) 50 MG tablet 1/6/2018 at Unknown time Daughter Yes Yes   Sig: Take by mouth 3 times daily      Facility-Administered Medications: None     Allergies   No Known Allergies    Social History[PK1.2]   I have reviewed this patient's social history and updated it with pertinent information if needed. Estefany An[PK1.1]  reports that she has never smoked. She does not have any smokeless tobacco history on file. She reports that she does not drink alcohol.[PK1.3]    Family History[PK1.2]   I have  reviewed this patient's family history and updated it with pertinent information if needed.[PK1.1]   No family history on file.[PK1.3]  No pertinent family history related to this admission.[PK1.1]    Review of Systems[PK1.2]   The 10 point Review of Systems is negative other than noted in the HPI or here.[PK1.1]     Physical Exam   Temp: 96.5  F (35.8  C) Temp src: Temporal BP: 120/59 Pulse: 85 Heart Rate: 70 Resp: 12 SpO2: 96 % O2 Device: None (Room air)[PK1.2]    Vital Signs with Ranges[PK1.1]  Temp:  [96.5  F (35.8  C)] 96.5  F (35.8  C)  Pulse:  [85] 85  Heart Rate:  [68-74] 70  Resp:  [12] 12  BP: (114-161)/(59-75) 120/59  SpO2:  [81 %-97 %] 96 %  0 lbs 0 oz[PK1.2]    Constitutional: Awake, alert, cooperative, no apparent distress.  Does appear tired   Eyes: Conjunctiva and pupils examined and normal.  HEENT: Moist mucous membranes, normal dentition.  Respiratory: Clear to auscultation bilaterally, no crackles or wheezing.  Loose cough is noted  Cardiovascular: IRR, normal S1 and S2, and no murmur noted.  GI: Soft, non-distended, non-tender, normal bowel sounds.  Lymph/Hematologic: No anterior cervical or supraclavicular adenopathy.  Skin: No rashes, no cyanosis, no edema.  Musculoskeletal: No joint swelling, erythema or tenderness.  Neurologic: Alert and oriented ×3 moving all extremities unable to test gait or balance  Psychiatric: Alert, oriented to person, place and time, no obvious anxiety or depression.[PK1.1]    Data[PK1.2]   Data reviewed today:  I personally reviewed the chest x-ray image(s) showing Clear lung fields no effusions did not appreciate current megaly.  The EKG G is pending but telemetry revealed atrial fibrillation with a rate in the 70s.[PK1.1]    Recent Labs  Lab 01/07/18  0759   WBC 15.3*   HGB 10.0*   MCV 92      *   POTASSIUM 3.1*   CHLORIDE 90*   CO2 30   BUN 10   CR 0.71   ANIONGAP 7   KACI 8.3*   *   ALBUMIN 3.1*   PROTTOTAL 7.1   BILITOTAL 0.2   ALKPHOS 88    ALT 24   AST 27   TROPI <0.015[PK1.2]       Imaging:[PK1.1]  Recent Results (from the past 24 hour(s))   CT Head w/o Contrast    Narrative    CT SCAN OF THE HEAD WITHOUT CONTRAST   1/7/2018 9:26 AM     HISTORY: trauma;     TECHNIQUE:  Axial images of the head and coronal reformations without  IV contrast material. Radiation dose for this scan was reduced using  automated exposure control, adjustment of the mA and/or kV according  to patient size, or iterative reconstruction technique.    COMPARISON: None.    FINDINGS:  There is generalized atrophy of the brain.  White matter  changes are present in the cerebral hemispheres that are consistent  with small vessel ischemic disease in this age patient. There is no  evidence of intracranial hemorrhage, mass, acute infarct or anomaly.  The left sphenoid sinus is nearly completely opacified no bleed or  fractures are identified.      Impression    IMPRESSION: No intracranial hemorrhage or skull fractures are  identified.     XR Shoulder Right G/E 3 Views    Narrative    XR SHOULDER RT G/E 3 VW 1/7/2018 9:45 AM    COMPARISON: None.    HISTORY: Trauma.      Impression    IMPRESSION:   1. Slightly comminuted and mildly displaced distal right clavicular  fracture is seen. No other findings concerning for fracture in the  right shoulder.  2. Severe glenohumeral osteoarthritis with extensive remodeling about  the glenoid and the humeral head.    ALEX MOSQUERA MD   XR Pelvis and Hip Right 2 Views    Narrative    XR PELVIS AND HIP RIGHT 2 VIEWS 1/7/2018 9:46 AM    COMPARISON: None.    HISTORY: Trauma.      Impression    IMPRESSION: Mildly displaced fractures involving the right superior  and inferior pubic rami. No other definite pelvic fractures are seen.  Osteopenia about the pelvis and hips.    ALEX MOSQUERA MD   XR Chest 1 View    Narrative    XR CHEST 1 VW 1/7/2018 9:47 AM    COMPARISON: 11/2/2014    HISTORY: Shortness of breath, weakness and hypoxia.      Impression     IMPRESSION: Mild left basilar atelectasis. No pleural effusion or  pneumothorax. Right clavicular fracture better evaluated on dedicated  radiographs of the shoulder. Old left humeral fracture deformity  partially visualized.    ALEX MOSQUERA MD   CT Chest Pulmonary Embolism w Contrast    Narrative    Exam: CT CHEST PULMONARY EMBOLISM W CONTRAST 1/7/2018 11:18 AM    Comparison: Chest x-ray on the same day and CT dated 12/3/2013.     Clinical History: Chest pain.    Technique: Volumetric helical acquisition of the chest were obtained  following pulmonary embolism protocol. Three-dimensional (3D)  post-processed angiographic images were reconstructed, archived to  PACS and used in interpretation of this study. Radiation dose for this  scan was reduced using automated exposure control, adjustment of the  mA and/or kV according to patient size, or iterative reconstruction  technique.    Contrast: 73mL Isovue-370    Findings:  Contrast bolus timing is adequate for evaluation for pulmonary emboli.  There is no evidence of pulmonary emboli.  There is no evidence of  right heart strain.     Dependent atelectasis. Lungs otherwise clear. No evidence of pleural  effusion is noted.    The heart size and great vessels are normal in caliber. No evidence of  axillary, mediastinal or hilar lymphadenopathy is seen.     Upper abdomen: Evaluation of the upper abdomen is limited by contrast  bolus timing and coverage.    Bones: Wedge compression deformity involving the T12 vertebral body.      Impression    Impression:   1. No evidence of pulmonary embolus.  2. Wedge compression deformity involving the T12 vertebral body is  age-indeterminate, but new since chest x-ray dated 11/2/2014.    ALEX MOSQUERA MD[PK1.2]          Revision History        User Key Date/Time User Provider Type Action    > PK1.3 1/7/2018 12:13 PM Hu Loyd MD Physician Sign     PK1.2 1/7/2018 11:53 AM Hu Loyd MD Physician      PK1.1 1/7/2018 11:52  "AM Hu Loyd MD Physician                   Discharge Summaries     No notes of this type exist for this encounter.         Consult Notes      Consults by Abi Link RD, LD at 1/13/2018 11:21 AM     Author:  Abi Link RD, LD Service:  Nutrition Author Type:  Registered Dietitian    Filed:  1/13/2018 11:27 AM Date of Service:  1/13/2018 11:21 AM Creation Time:  1/13/2018 11:16 AM    Status:  Signed :  Abi Link RD, LD (Registered Dietitian)         CLINICAL NUTRITION SERVICES  -  ASSESSMENT NOTE      Malnutrition:[MS1.1] Does not meet criteria at this time[MS1.2]        REASON FOR ASSESSMENT  Estefany An is a 87 year old female seen by Registered Dietitian for[MS1.1] LOS.[MS1.2]      NUTRITION HISTORY[MS1.1]  - Information obtained from patient and chart.  - Regular diet at home, meals TID.   - Denies a decrease in appetite/changes to meal frequency PTA - \"I was eating good\".[MS1.2]   - Resided independently PTA.[MS1.1]   - NKFA.[MS1.2]      CURRENT NUTRITION ORDERS  Diet Order:     Regular    Current Intake/Tolerance:  Mostly 100% oral intakes documented though meals ordered may be quite small.  Likely meeting <75% needs x 6 days since admission.  Limiting factors including nausea (one recent episode of emesis noted), and pain impacting.[MS1.1]  Patient herself verbalizes feeling overall \"unwell\".[MS1.2]      PHYSICAL FINDINGS  Observed[MS1.1]  See below[MS1.2]   Obtained from Chart/Interdisciplinary Team  Last BM this AM    ANTHROPOMETRICS  Height: 5' 8\"  Weight: 88.6 kg (195#)  Body mass index is 29.73 kg/(m^2).  Weight Status:  Overweight BMI 25-29.9  IBW: 70 kg (154#)  % IBW: 127%  Weight History:[MS1.1]  Wt Readings from Last 10 Encounters:   01/13/18 88.7 kg (195 lb 8 oz)   11/02/14 81.6 kg (180 lb)   12/03/13 81.6 kg (180 lb)[MS1.3]   -[MS1.1] Patient denies recent wt changes.[MS1.2]    LABS  Labs reviewed    MEDICATIONS  Medications reviewed    Dosing Weight 74.7 " kg - adjusted weight     ASSESSED NUTRITION NEEDS PER APPROVED PRACTICE GUIDELINES:  Estimated Energy Needs:[MS1.1] 5568-1272[MS1.2] kcals ([MS1.1]25-30 Kcal/Kg[MS1.2])  Justification:[MS1.1] maintenance[MS1.2]  Estimated Protein Needs:[MS1.1] [MS1.2] grams protein ([MS1.1]1.2-1.5 g pro/Kg[MS1.2])  Justification:[MS1.1] preservation of lean body mass[MS1.2]  Estimated Fluid Needs:[MS1.1] 9153-5014[MS1.2] mL ([MS1.1]1 mL/Kcal[MS1.2])  Justification:[MS1.1] maintenance and per provider pending fluid status[MS1.2]    MALNUTRITION:  % Weight Loss:[MS1.1]  None noted[MS1.2]  % Intake:[MS1.1]  Decreased intake does not meet criteria for malnutrition[MS1.2]   Subcutaneous Fat Loss:[MS1.1]  None observed[MS1.2]  Muscle Loss:[MS1.1]  None observed[MS1.2]  Fluid Retention:[MS1.1]  None noted[MS1.2]    Malnutrition Diagnosis:[MS1.1] Patient does not meet two of the above criteria necessary for diagnosing malnutrition[MS1.2]    NUTRITION DIAGNOSIS:[MS1.1]  Inadequate oral intake[MS1.2] related to[MS1.1] decreased appetite, N/V[MS1.2] as evidenced by[MS1.1] meeting <75% needs x 6 days since admission.[MS1.2]      NUTRITION INTERVENTIONS  Recommendations / Nutrition Prescription[MS1.1]  Continue regular diet order.     Ok for prn Ensure.[MS1.2]      Implementation  Nutrition education:[MS1.1] Provided education on ability for small/frequent meals, how to order Ensure, and protein sources.     Medical Food Supplement: As above.[MS1.2]       Nutrition Goals[MS1.1]  Patient to consume at least 75% of meals TID.[MS1.2]      MONITORING AND EVALUATION:[MS1.1]  Progress towards goals will be monitored and evaluated per protocol and Practice Guidelines[MS1.2]        Abi Link RD, LD  Clinical Dietitian  3rd floor/ICU: [phone]  All other floors: [phone]  Weekend/holiday: [phone][MS1.1]     Revision History        User Key Date/Time User Provider Type Action    > MS1.2 1/13/2018 11:27 AM Abi Link,  NATALI WHITMORE Registered Dietitian Sign     MS1.3 1/13/2018 11:18 AM Abi Link RD, LD Registered Dietitian      MS1.1 1/13/2018 11:16 AM Abi Link RD, LD Registered Dietitian             Consults by Olivia Win, RN at 1/9/2018 11:19 AM     Author:  Olivia Win RN Service:  (none) Author Type:      Filed:  1/9/2018 11:19 AM Date of Service:  1/9/2018 11:19 AM Creation Time:  1/9/2018 11:08 AM    Status:  Signed :  Olivia Win RN ()     Consult Orders:    1. Care Coordinator IP Consult [[id number]] ordered by Naya Hsieh MD at 01/09/18 1101                Care Transition Initial Assessment - RN    Reason For Consult: care coordination/care conference, discharge planning   Met with: Patient and daughter    DATA   Active Problems:    Hypoxia       Cognitive Status: alert and oriented.  Primary Care Clinic Name: [facility]  Primary Care MD Name: Ricki Blake  Contact information and PCP information verified: Yes    Lives With: alone  Living Arrangements: independent living facility  Quality Of Family Relationships: supportive, helpful, involved  Description of Support System: Supportive, Involved   Who is your support system?: Children   Support Assessment: Adequate family and caregiver support     Insurance concerns: No Insurance issues identified    ASSESSMENT  Patient currently receives the following services:  None  Met with pt and daughterMaylin, at bedside to discuss plan of care. Pt admitted with new afib/influenza A/elevated BNP and is identified as moderate risk for readmission. Pt states she lives at the [facility]. She currently does not receive any home care services. She receives housekeeping and laundry assistance. She has morning/noon meals in the deli and PM meal in the dining area. She is not on a specific diet. She is able to get increased services on dc if needed/recommended. She does not use home oxygen. She uses a walker when out in public but otherwise  is independent around her appt. She does her own medications.    She does not have any questions or concerns with dc. No PT/OT consults in hospital at this time. She is open to increasing services at her ILF if needed. If TCU needed on dc she would like to go to [facility].        Identified issues/concerns regarding health management: unclear if pt will have needs on dc at this time    PLAN  Patient given options and choices for discharge yes .  Patient/family is agreeable to the plan?  Yes  Patient anticipates discharging to ILF with increases services vs TCU .        Patient anticipates needs for home equipment: No    Appointments: Dr Blake at [facility] on Thurs 1/18 at 9:45    [facility]    Care  (CTS) will continue to follow as needed. Will cont to follow for dc plan of care. SW updated.    Olivia Win RN CTS  Care Transitions  [phone][AH1.1]           Revision History        User Key Date/Time User Provider Type Action    > AH1.1 1/9/2018 11:19 AM Olivia Win, RN Case Manager Sign                     Progress Notes - Physician (Notes from 01/12/18 through 01/15/18)      Progress Notes by Hernandez Stovall MD at 1/14/2018  2:41 PM     Author:  Hernandez Stovall MD Service:  Hospitalist Author Type:  Physician    Filed:  1/14/2018  2:43 PM Date of Service:  1/14/2018  2:41 PM Creation Time:  1/14/2018  2:41 PM    Status:  Signed :  Hernandez Stovall MD (Physician)                        [facility]  Hospitalist Progress Note  Name: Estefany An    MRN: [id number]  YOB: 1930    Age: 87 year old  Date of admission: 1/7/2018  Primary care provider: Ricki Blake      Reason for Stay (Diagnosis):  Fall with left clavicular/right pubic rami fracture/atrial fibrillation         Assessment and Plan:      Summary of Stay:  Estefany An is a 87 year old female with a history of hypertension and osteoarthritis who now presents with weakness,  dizziness and fall resulting in a left clavicular fracture, pubic rami fracture, hypoxemia, and atrial fibrillation.    Problem List/Plan:  1. Atrial fibrillation with controlled ventricular rate: Heart rate currently controlled.  Given multiple falls resulting in clavicular and pelvic fracture, not a good candidate for Coumadin.  TSH was otherwise normal and echocardiogram demonstrated a normal EF with normal size and no significant valvular disease.  Continue aspirin for now.  Increased metoprolol to 50 mg p.o. twice daily.    2. Mild hypoxemia secondary to influenza A: Chest x-ray was otherwise negative and CT scan for PE was negative.  Although BNP is elevated, there are no radiographic or clinical findings to suggest acute heart failure.  O2 as needed.  Continue Tamiflu.  Still some notable wheezing and chest congestion.  Continue prednisonsupplementation e 40 mg p.o. daily with scheduled DuoNeb's.  --Chest x-ray showed no evidence of acute pulmonary infiltrates  3. Left clavicular fracture and right pubic rami fracture: Symptomatic treatment for now.  PT/OT to evaluate and treat.    4. Hyponatremia: Likely secondary to respiratory tract infection is suspicious that hydrochlorothiazide is playing a major role.  Continue to hold hydrochlorothiazide for now.  Urine sodium is 11, continue to monitor sodium for now, if sodium level remains stable patient can be followed as an outpatient  5. Hypokalemia: Secondary to hydrochlorothiazide.  Replace per protocol.  6. Acute anemia without evidence of acute blood loss anemia: Probably in part dilution with a chronic component.  Monitor for now  Hemoglobin   Date Value Ref Range Status   01/14/2018 8.5 (L) 11.7 - 15.7 g/dL Final   ]    7. Urinary tract infection: Patient on ceftriaxone, cultures pending, continue antibiotic and monitor cultures.      DVT Prophylaxis: Pneumatic Compression Devices  Code Status: DNR / DNI  Discharge Dispo: TCU.  Patient's preferences for  "[facility] as patient is from the [facility]  Estimated Disch Date / # of Days until Disch: Continue care for now, check complete blood count tomorrow and see if sepsis revolves patient can be discharged to TCU in the next 1 or 2 days        Interval History (Subjective):        Patient was seen and examined by me this morning.  She has some dyspepsia but denies any fever or shortness of breath.  Pain is controlled with pain medications.  Patient is ambulatory.  Her sodium is 125 today slightly down from 126 yesterday after Lasix.  Will hold Lasix for now, continue to monitor serum sodium.  Continue antibiotic, will check sodium level in the morning, I may discharge to TCU tomorrow if her serum sodium remains stable.                             Physical Exam:      Vital signs:  Temp: 97.6  F (36.4  C) Temp src: Oral BP: 147/60   Heart Rate: 77 Resp: 20 SpO2: 94 % O2 Device: None (Room air) Oxygen Delivery: 1 LPM Height: 172.7 cm (5' 8\") Weight: 88.7 kg (195 lb 8 oz)  Estimated body mass index is 29.73 kg/(m^2) as calculated from the following:    Height as of this encounter: 1.727 m (5' 8\").    Weight as of this encounter: 88.7 kg (195 lb 8 oz).      I/O last 3 completed shifts:  In: 900 [P.O.:840; I.V.:60]  Out: 1600 [Urine:1600]  Vitals:    01/08/18 0643 01/10/18 0451 01/11/18 0605 01/12/18 0708   Weight: 84.4 kg (186 lb) 90.7 kg (200 lb) 90.5 kg (199 lb 8 oz) 90.9 kg (200 lb 8 oz)    01/13/18 0416   Weight: 88.7 kg (195 lb 8 oz)       Constitutional: Awake, alert, cooperative, no apparent distress   Respiratory: Nl work of breathing. Clear to auscultation bilaterally, no crackles or wheezing   Cardiovascular:  Irregularly irregular, normal S1 and S2, and no murmur noted   Abdomen: Normal bowel sounds, soft, non-distended, non-tender   Skin: No rashes, no cyanosis, dry to touch   Neuro: CN 2-12 intact, no localizing weakness   Extremities: No edema, some right hip tenderness with range of " motion as well as left clavicular tenderness with palpation.   HEENT Normocephalic, atraumatic, normal nasal turbinates; oropharynx clear   Neck Supple; nl inspection; trachea midline; no thryomegaly   Psychiatric: A+O x3. Normal affect          Medications:      All current medications were reviewed with changes reflected in problem list.         Data:      All new lab and imaging data was reviewed.   Labs:    Recent Labs  Lab 01/14/18  0651 01/13/18  0604 01/12/18  0827  01/10/18  0635   WBC 17.3* 22.2*  --   --  14.1*   HGB 8.5* 8.4* 8.3*  < > 8.6*   HCT 25.3* 25.0*  --   --  25.3*   MCV 91 92  --   --  91   * 442  --   --  316   < > = values in this interval not displayed.    Recent Labs  Lab 01/14/18  0651 01/13/18  1519 01/13/18  0604  01/11/18  0622   * 126* 128*  < > 129*   POTASSIUM 4.0  --  4.3  --  4.9   CHLORIDE 91*  --  94  --  95   CO2 28  --  26  --  26   ANIONGAP 6  --  8  --  8   GLC 95  --  98  --  116*   BUN 18  --  26  --  16   CR 0.72  --  0.73  --  0.68   GFRESTIMATED 77  --  75  --  82   GFRESTBLACK >90  --  >90  --  >90   KACI 8.7  --  8.0*  --  8.5   < > = values in this interval not displayed.   Imaging:   No results found for this or any previous visit (from the past 24 hour(s)).[MA1.1]       Revision History        User Key Date/Time User Provider Type Action    > MA1.1 1/14/2018  2:43 PM Hernandez Stovall MD Physician Sign            Progress Notes by Hernandez Stovall MD at 1/14/2018 11:23 AM     Author:  Hernandez Stovall MD Service:  Hospitalist Author Type:  Physician    Filed:  1/14/2018 11:24 AM Date of Service:  1/14/2018 11:23 AM Creation Time:  1/14/2018 11:23 AM    Status:  Signed :  Hernandez Stovall MD (Physician)         Patient was seen and examined by me this morning.  She has some dyspepsia but denies any fever or shortness of breath.  Pain is controlled with pain medications.  Patient is ambulatory.  Her sodium is 125 today slightly down from 126  yesterday after Lasix.  Will hold Lasix for now, continue to monitor serum sodium.  Continue antibiotic, will check sodium level in the morning, I may discharge to TCU tomorrow if her serum sodium remains stable.[MA1.1]     Revision History        User Key Date/Time User Provider Type Action    > MA1.1 1/14/2018 11:24 AM Hernandez Stovall MD Physician Sign            Progress Notes by Hernandez Stovall MD at 1/13/2018  5:05 PM     Author:  Hernandez Stovall MD Service:  Hospitalist Author Type:  Physician    Filed:  1/13/2018  5:07 PM Date of Service:  1/13/2018  5:05 PM Creation Time:  1/13/2018  5:05 PM    Status:  Signed :  Hernandez Stovall MD (Physician)                        [facility]  Hospitalist Progress Note  Name: Estefany An    MRN: [id number]  YOB: 1930    Age: 87 year old  Date of admission: 1/7/2018  Primary care provider: Ricki Blake      Reason for Stay (Diagnosis):  Fall with left clavicular/right pubic rami fracture/atrial fibrillation         Assessment and Plan:      Summary of Stay:  Estefany An is a 87 year old female with a history of hypertension and osteoarthritis who now presents with weakness, dizziness and fall resulting in a left clavicular fracture, pubic rami fracture, hypoxemia, and atrial fibrillation.    Problem List/Plan:  1. Atrial fibrillation with controlled ventricular rate: Heart rate currently controlled.  Given multiple falls resulting in clavicular and pelvic fracture, not a good candidate for Coumadin.  TSH was otherwise normal and echocardiogram demonstrated a normal EF with normal size and no significant valvular disease.  Continue aspirin for now.  Increased metoprolol to 50 mg p.o. twice daily.    2. Mild hypoxemia secondary to influenza A: Chest x-ray was otherwise negative and CT scan for PE was negative.  Although BNP is elevated, there are no radiographic or clinical findings to suggest acute heart failure.  O2  supplementation as needed.  Continue Tamiflu.  Still some notable wheezing and chest congestion.  Continue prednisone 40 mg p.o. daily with scheduled DuoNeb's.  --Chest x-ray showed no evidence of acute pulmonary infiltrates  3. Left clavicular fracture and right pubic rami fracture: Symptomatic treatment for now.  PT/OT to evaluate and treat.    4. Hyponatremia: Likely secondary to respiratory tract infection is suspicious that hydrochlorothiazide is playing a major role.  Continue to hold hydrochlorothiazide for now.  Continue to monitor serum sodium.  Will check urine studies including urine sodium, osmolality, intake and output monitoring  5. Hypokalemia: Secondary to hydrochlorothiazide.  Replace per protocol.  6. Acute anemia without evidence of acute blood loss anemia: Probably in part dilution with a chronic component.  Monitor for now  7. Leukocytosis, elevated lactic acid level: Further evaluation revealed evidence of urinary tract infection, urine was cultured and patient was started on ceftriaxone, continue to monitor.  2 sets of blood culture obtained    DVT Prophylaxis: Pneumatic Compression Devices  Code Status: DNR / DNI  Discharge Dispo: TCU.  Patient's preferences for [facility] as patient is [facility] the [facility]  Estimated Disch Date / # of Days until Disch: Continue care for now, check complete blood count tomorrow and see if sepsis revolves patient can be discharged to TCU in the next 1 or 2 days        Interval History (Subjective):        Patient was seen and examined by me this morning.  She has right shoulder pain and back pain but denies any fever or chills.  Shortness of breath is better.  Oxygen saturation 89% on room air.  She has no abdominal pain.  Afebrile, labs reviewed and indicated sodium of 128 and WBC count significantly elevated at 22.  No indication for severe sepsis but is possible.  --We will get urinalysis culture if positive  --2 sets of blood  "culture  --Arm sling for pain control  --Continue to monitor another day in the hospital           Physical Exam:      Vital signs:  Temp: 97.6  F (36.4  C) Temp src: Oral BP: 146/59   Heart Rate: 91 Resp: 20 SpO2: 93 % O2 Device: Nasal cannula Oxygen Delivery: 1 LPM Height: 172.7 cm (5' 8\") Weight: 88.7 kg (195 lb 8 oz)  Estimated body mass index is 29.73 kg/(m^2) as calculated from the following:    Height as of this encounter: 1.727 m (5' 8\").    Weight as of this encounter: 88.7 kg (195 lb 8 oz).      I/O last 3 completed shifts:  In: 600 [P.O.:600]  Out: 2400 [Urine:2400]  Vitals:    01/08/18 0643 01/10/18 0451 01/11/18 0605 01/12/18 0708   Weight: 84.4 kg (186 lb) 90.7 kg (200 lb) 90.5 kg (199 lb 8 oz) 90.9 kg (200 lb 8 oz)    01/13/18 0416   Weight: 88.7 kg (195 lb 8 oz)       Constitutional: Awake, alert, cooperative, no apparent distress   Respiratory: Nl work of breathing. Clear to auscultation bilaterally, no crackles or wheezing   Cardiovascular:  Irregularly irregular, normal S1 and S2, and no murmur noted   Abdomen: Normal bowel sounds, soft, non-distended, non-tender   Skin: No rashes, no cyanosis, dry to touch   Neuro: CN 2-12 intact, no localizing weakness   Extremities: No edema, some right hip tenderness with range of motion as well as left clavicular tenderness with palpation.   HEENT Normocephalic, atraumatic, normal nasal turbinates; oropharynx clear   Neck Supple; nl inspection; trachea midline; no thryomegaly   Psychiatric: A+O x3. Normal affect          Medications:      All current medications were reviewed with changes reflected in problem list.         Data:      All new lab and imaging data was reviewed.   Labs:    Recent Labs  Lab 01/13/18  0604 01/12/18  0827 01/11/18  0622 01/10/18  0635 01/09/18  0632   WBC 22.2*  --   --  14.1* 14.5*   HGB 8.4* 8.3* 8.3* 8.6* 8.6*   HCT 25.0*  --   --  25.3* 25.1*   MCV 92  --   --  91 91     --   --  123 550       Recent Labs  Lab " 01/13/18  1519 01/13/18  0604 01/12/18  2222  01/11/18  0622  01/09/18  0632  01/07/18  0759   * 128* 126*  < > 129*  < > 129*  < > 127*   POTASSIUM  --  4.3  --   --  4.9  --  4.7  < > 3.1*   CHLORIDE  --  94  --   --  95  --  97  < > 90*   CO2  --  26  --   --  26  --  24  < > 30   ANIONGAP  --  8  --   --  8  --  8  < > 7   GLC  --  98  --   --  116*  --  115*  < > 151*   BUN  --  26  --   --  16  --  8  < > 10   CR  --  0.73  --   --  0.68  --  0.66  < > 0.71   GFRESTIMATED  --  75  --   --  82  --  85  < > 77   GFRESTBLACK  --  >90  --   --  >90  --  >90  < > >90   KACI  --  8.0*  --   --  8.5  --  8.2*  < > 8.3*   MAG  --   --   --   --   --   --   --   --  2.1   PROTTOTAL  --   --   --   --   --   --   --   --  7.1   ALBUMIN  --   --   --   --   --   --   --   --  3.1*   BILITOTAL  --   --   --   --   --   --   --   --  0.2   ALKPHOS  --   --   --   --   --   --   --   --  88   AST  --   --   --   --   --   --   --   --  27   ALT  --   --   --   --   --   --   --   --  24   < > = values in this interval not displayed.   Imaging:   No results found for this or any previous visit (from the past 24 hour(s)).[MA1.1]       Revision History        User Key Date/Time User Provider Type Action    > MA1.1 1/13/2018  5:07 PM Hernandez Stovall MD Physician Sign            Progress Notes by Sheila Hayes LSW at 1/13/2018  2:25 PM     Author:  Sheila Hayes LSW Service:  Social Work Author Type:      Filed:  1/13/2018  2:27 PM Date of Service:  1/13/2018  2:25 PM Creation Time:  1/13/2018  2:25 PM    Status:  Signed :  Sheila Hayes LSW ()         D: SWS has been following to coordinate d/c. Pt has been accepted at [facility].   P: SW will continue to follow to coordinate d/c to TCU.[TM1.1]     Sheila Hayes[TM1.2], MSW  Casual SW [phone][TM1.1]     Revision History        User Key Date/Time User Provider Type Action    > TM1.2 1/13/2018  2:27 PM Sheila Hayes LSW  Sign      TM1.1 1/13/2018  2:25 PM Sheila Hayes LSW              Progress Notes by Hernandez Stovall MD at 1/13/2018 11:11 AM     Author:  Hernandez Stovall MD Service:  Hospitalist Author Type:  Physician    Filed:  1/13/2018 11:15 AM Date of Service:  1/13/2018 11:11 AM Creation Time:  1/13/2018 11:11 AM    Status:  Signed :  Hernandez Stovall MD (Physician)         Patient was seen and examined by me this morning.  She has right shoulder pain and back pain but denies any fever or chills.  Shortness of breath is better.  Oxygen saturation 89% on room air.  She has no abdominal pain.  Afebrile, labs reviewed and indicated sodium of 128 and WBC count significantly elevated at 22.  No indication for severe sepsis but is possible.  --We will get urinalysis culture if positive  --2 sets of blood culture  --Arm sling for pain control  --Continue to monitor another day in the hospital[MA1.1]  Wt Readings from Last 5 Encounters:   01/13/18 88.7 kg (195 lb 8 oz)   11/02/14 81.6 kg (180 lb)   12/03/13 81.6 kg (180 lb)[MA1.2]   -- lasix[MA1.1]        Revision History        User Key Date/Time User Provider Type Action    > MA1.2 1/13/2018 11:15 AM Hernandez Stovall MD Physician Sign     MA1.1 1/13/2018 11:11 AM Hernandez Stovall MD Physician             Progress Notes by Hernandez Stovall MD at 1/12/2018  4:39 PM     Author:  Hernandez Stovall MD Service:  Hospitalist Author Type:  Physician    Filed:  1/12/2018  4:44 PM Date of Service:  1/12/2018  4:39 PM Creation Time:  1/12/2018  4:39 PM    Status:  Signed :  Hernandez Stovall MD (Physician)                        [facility]  Hospitalist Progress Note  Name: Estefany An    MRN: [id number]  YOB: 1930    Age: 87 year old  Date of admission: 1/7/2018  Primary care provider: Ricki Blake      Reason for Stay (Diagnosis):  Fall with left clavicular/right pubic rami fracture/atrial fibrillation         Assessment and  Plan:      Summary of Stay:  Estefany An is a 87 year old female with a history of hypertension and osteoarthritis who now presents with weakness, dizziness and fall resulting in a left clavicular fracture, pubic rami fracture, hypoxemia, and atrial fibrillation.    Problem List/Plan:  1. Atrial fibrillation with controlled ventricular rate: Heart rate currently controlled.  Given multiple falls resulting in clavicular and pelvic fracture, not a good candidate for Coumadin.  TSH was otherwise normal and echocardiogram demonstrated a normal EF with normal size and no significant valvular disease.  Continue aspirin for now.  Increased metoprolol to 50 mg p.o. twice daily.    2. Mild hypoxemia secondary to influenza A: Chest x-ray was otherwise negative and CT scan for PE was negative.  Although BNP is elevated, there are no radiographic or clinical findings to suggest acute heart failure.  O2 supplementation as needed.  Continue Tamiflu.  Still some notable wheezing and chest congestion.  Continue prednisone 40 mg p.o. daily with scheduled DuoNeb's.  --Chest x-ray showed no evidence of acute pulmonary infiltrates  3. Left clavicular fracture and right pubic rami fracture: Symptomatic treatment for now.  PT/OT to evaluate and treat.    4. Hyponatremia: Likely secondary to respiratory tract infection is suspicious that hydrochlorothiazide is playing a major role.  Continue to hold hydrochlorothiazide for now.  Continue to monitor serum sodium.  Will check urine studies including urine sodium, osmolality, intake and output monitoring  5. Hypokalemia: Secondary to hydrochlorothiazide.  Replace per protocol.  6. Acute anemia without evidence of acute blood loss anemia: Probably in part dilution with a chronic component.  Monitor for now    DVT Prophylaxis: Pneumatic Compression Devices  Code Status: DNR / DNI  Discharge Dispo: TCU.  [facility] as [facility]  Estimated Disch Date / # of Days until Disch: Patient may discharge tomorrow to TCU.        Interval History (Subjective):      Patient was seen and examined by me this morning.  She is complaining of congestion cough but denies any chest pain.  She has no fever.  Chest x-ray obtained showed no acute pulmonary infiltrates.  Serum sodium is worsening.         Physical Exam:      Vital signs:  Temp: 96.6  F (35.9  C) Temp src: Axillary BP: 132/63 Pulse: 97 Heart Rate: 92 Resp: 20 SpO2: 93 % O2 Device: Nasal cannula Oxygen Delivery: 1 LPM Height: 172.7 cm (5' 8\") Weight: 90.9 kg (200 lb 8 oz)  Estimated body mass index is 30.49 kg/(m^2) as calculated from the following:    Height as of this encounter: 1.727 m (5' 8\").    Weight as of this encounter: 90.9 kg (200 lb 8 oz).      I/O last 3 completed shifts:  In: 480 [P.O.:480]  Out: 500 [Urine:500]  Vitals:    01/08/18 0643 01/10/18 0451 01/11/18 0605 01/12/18 0708   Weight: 84.4 kg (186 lb) 90.7 kg (200 lb) 90.5 kg (199 lb 8 oz) 90.9 kg (200 lb 8 oz)       Constitutional: Awake, alert, cooperative, no apparent distress   Respiratory: Nl work of breathing. Clear to auscultation bilaterally, no crackles or wheezing   Cardiovascular:  Irregularly irregular, normal S1 and S2, and no murmur noted   Abdomen: Normal bowel sounds, soft, non-distended, non-tender   Skin: No rashes, no cyanosis, dry to touch   Neuro: CN 2-12 intact, no localizing weakness   Extremities: No edema, some right hip tenderness with range of motion as well as left clavicular tenderness with palpation.   HEENT Normocephalic, atraumatic, normal nasal turbinates; oropharynx clear   Neck Supple; nl inspection; trachea midline; no thryomegaly   Psychiatric: A+O x3. Normal affect          Medications:      All current medications were reviewed with changes reflected in problem list.         Data:      All new lab and imaging data was reviewed.   Labs:    Recent Labs  Lab 01/12/18  0827 01/11/18  0622 " 01/10/18  0635 01/09/18  0632 01/08/18 0900   WBC  --   --  14.1* 14.5* 13.7*   HGB 8.3* 8.3* 8.6* 8.6* 9.1*   HCT  --   --  25.3* 25.1* 27.5*   MCV  --   --  91 91 94   PLT  --   --  316 289 263       Recent Labs  Lab 01/12/18  1420 01/12/18  0827 01/11/18  0622  01/09/18  0632 01/08/18  0900 01/07/18  0759   * 126* 129*  < > 129* 128*  --  127*   POTASSIUM  --   --  4.9  --  4.7 3.9  < > 3.1*   CHLORIDE  --   --  95  --  97 94  --  90*   CO2  --   --  26  --  24 24  --  30   ANIONGAP  --   --  8  --  8 10  --  7   GLC  --   --  116*  --  115* 145*  --  151*   BUN  --   --  16  --  8 8  --  10   CR  --   --  0.68  --  0.66 0.65  --  0.71   GFRESTIMATED  --   --  82  --  85 86  --  77   GFRESTBLACK  --   --  >90  --  >90 >90  --  >90   KACI  --   --  8.5  --  8.2* 8.4*  --  8.3*   MAG  --   --   --   --   --   --   --  2.1   PROTTOTAL  --   --   --   --   --   --   --  7.1   ALBUMIN  --   --   --   --   --   --   --  3.1*   BILITOTAL  --   --   --   --   --   --   --  0.2   ALKPHOS  --   --   --   --   --   --   --  88   AST  --   --   --   --   --   --   --  27   ALT  --   --   --   --   --   --   --  24   < > = values in this interval not displayed.   Imaging:   No results found for this or any previous visit (from the past 24 hour(s)).[MA1.1]       Revision History        User Key Date/Time User Provider Type Action    > MA1.1 1/12/2018  4:44 PM Hernandez Stovall MD Physician Sign            Progress Notes by Criss Alfonso LSW at 1/12/2018 11:10 AM     Author:  Criss Alfonso LSW Service:  (none) Author Type:      Filed:  1/12/2018 11:16 AM Date of Service:  1/12/2018 11:10 AM Creation Time:  1/12/2018 11:10 AM    Status:  Signed :  Criss Alfonso LSW ()         SWS:  D: discharge planning   A: SW notified that pt dc is cancelled today. SW spoke with [facility] and they anticipate having a bed over the weekend for pt. SW left message with pt daughter Maylin requesting  return call to discuss dc plan.  P: Anticipate pt will dc in the next 1-2 days. SW will continue to follow.[LH1.1]     Revision History        User Key Date/Time User Provider Type Action    > LH1.1 1/12/2018 11:16 AM Criss Alfonso LSW  Sign            Progress Notes by Hernandez Stovall MD at 1/12/2018  9:59 AM     Author:  Hernandez Sotvall MD Service:  Hospitalist Author Type:  Physician    Filed:  1/12/2018 10:00 AM Date of Service:  1/12/2018  9:59 AM Creation Time:  1/12/2018  9:59 AM    Status:  Signed :  Hernandez Stovall MD (Physician)         Patient was seen and examined by me this morning.  She is 87-year-old female with trauma following a fall with left clavicle fracture, pubic ramus fracture and hypoxemia and atrial fibrillation new onset.  Patient is currently complaining of congestion, she is hypoxic but denies any chest pain she has shoulder pain and pelvic pain.  Chest exam revealed evidence of diffuse wheezing and crackles posterior lung fields bilaterally.  Patient's labs showed evidence of worsening hyponatremia.  Plan is to cancel discharge plan today, get hyponatremia workup, chest x-ray, and may discharge in the next 1-2 days if she remains stable.[MA1.1]     Revision History        User Key Date/Time User Provider Type Action    > MA1.1 1/12/2018 10:00 AM Hernandez Stovall MD Physician Sign            Progress Notes by Lucila Tate RT at 1/12/2018  1:36 AM     Author:  Lucila Tate RT Service:  Respiratory Therapy Author Type:  Respiratory Therapist    Filed:  1/12/2018  1:38 AM Date of Service:  1/12/2018  1:36 AM Creation Time:  1/12/2018  1:36 AM    Status:  Signed :  Lucila Tate RT (Respiratory Therapist)         [facility]     Date: 1/12/18    Admission Dx: Hypoxia with AFIB    Pulmonary History None    Home Nebulizer/MDI Use: None    Home Oxygen: None    Acuity Level (RCAT flow sheet): 4    Aerosol Therapy initiated: Duoneb Q4 prn      Pulmonary  "Hygiene initiated: Deep Breathe and Cough      Volume Expansion initiated: IS      Current Oxygen Requirements: 2L  Current SpO2: 96%    Re-evaluation date: 1-15-18    Patient Education: Patient educated on bronchodilators      See \"RT Assessments\" flow sheet for patient assessment scoring and Acuity Level Details.[KU1.1]                Revision History        User Key Date/Time User Provider Type Action    > KU1.1 1/12/2018  1:38 AM Lucila Tate RT Respiratory Therapist Sign                  Procedure Notes     No notes of this type exist for this encounter.         Progress Notes - Therapies (Notes from 01/12/18 through 01/15/18)      Progress Notes by Lucila Tate RT at 1/12/2018  1:36 AM     Author:  Lucila Tate RT Service:  Respiratory Therapy Author Type:  Respiratory Therapist    Filed:  1/12/2018  1:38 AM Date of Service:  1/12/2018  1:36 AM Creation Time:  1/12/2018  1:36 AM    Status:  Signed :  Lucila Tate RT (Respiratory Therapist)         [facility]     Date: 1/12/18    Admission Dx: Hypoxia with AFIB    Pulmonary History None    Home Nebulizer/MDI Use: None    Home Oxygen: None    Acuity Level (RCAT flow sheet): 4    Aerosol Therapy initiated: Duoneb Q4 prn      Pulmonary Hygiene initiated: Deep Breathe and Cough      Volume Expansion initiated: IS      Current Oxygen Requirements: 2L  Current SpO2: 96%    Re-evaluation date: 1-15-18    Patient Education: Patient educated on bronchodilators      See \"RT Assessments\" flow sheet for patient assessment scoring and Acuity Level Details.[KU1.1]                Revision History        User Key Date/Time User Provider Type Action    > KU1.1 1/12/2018  1:38 AM Lucila Tate RT Respiratory Therapist Sign            "

## 2018-01-07 NOTE — ED NOTES
Potassium ordered by MD due to RN not wanting to take pt on floor with out having potassium leveled out. Pt refuses to drink due to feeling ill and only wants ice chips at this time.

## 2018-01-07 NOTE — IP AVS SNAPSHOT
MRN:[id number]                      After Visit Summary   1/7/2018    Estefany An    MRN: [id number]           Thank you!     Thank you for choosing [facility] for your care. Our goal is always to provide you with excellent care. Hearing back from our patients is one way we can continue to improve our services. Please take a few minutes to complete the written survey that you may receive in the mail after you visit. If you would like to speak to someone directly about your visit please contact Patient Relations at [phone]. Thank you!          Patient Information     Date Of Birth          9/20/1930        Designated Caregiver       Most Recent Value    Caregiver    Will someone help with your care after discharge? yes    Name of designated caregiver Maylin Bravo (daughter)    Phone number of caregiver [phone]    Caregiver address [place], [place]      About your hospital stay     You were admitted on:  January 7, 2018 You last received care in the:  [facility] [place] [facility]    You were discharged on:  January 15, 2018        Reason for your hospital stay       UTI   FALL                  Who to Call     For medical emergencies, please call 911.  For non-urgent questions about your medical care, please call your primary care provider or clinic, [phone]          Attending Provider     Provider Specialty    Luis Felipe Lyle MD Emergency Medicine    Hu Loyd MD Internal Medicine       Primary Care Provider Office Phone # Fax #    Ricki Blake [phone]      After Care Instructions     Activity - Up ad ladonna           Advance Diet as Tolerated       Follow this diet upon discharge: Orders Placed This Encounter      Snacks/Supplements Adult: Ensure Plus (Adult); With Meals      Combination Diet Regular Diet Adult            General info for SNF       Length of Stay Estimate: Short Term Care: Estimated # of Days <30  Condition at  "Discharge: Stable  Level of care:skilled   Rehabilitation Potential: Good  Admission H&P remains valid and up-to-date: Yes  Recent Chemotherapy: N/A  Use Nursing Home Standing Orders: Yes            Mantoux instructions       Give two-step Mantoux (PPD) Per Facility Policy Yes                  Follow-up Appointments     Follow Up and recommended labs and tests       Follow up with halfway physician.  The following labs/tests are recommended: BMP.  Follow up with primary care provider in 10-14  days.  The following labs/tests are recommended: BMP.                  Additional Services     Occupational Therapy Adult Consult       Evaluate and treat as clinically indicated.            Physical Therapy Adult Consult       Evaluate and treat as clinically indicated.                  Further instructions from your care team       Follow up:  Appointments: Dr Blake at [facility] on Thurs 1/18 at 9:45    *please bring your ID and insurance card    Pending Results     Date and Time Order Name Status Description    1/13/2018 1217 Urine Culture Aerobic Bacterial Preliminary     1/13/2018 1115 Blood culture Preliminary     1/13/2018 1115 Blood culture Preliminary             Statement of Approval     Ordered          01/15/18 1211  I have reviewed and agree with all the recommendations and orders detailed in this document.  EFFECTIVE NOW     Approved and electronically signed by:  Hernandez Stovall MD             Admission Information     Date & Time Department Dept. Phone    1/7/2018 [place] [address] [facility] [phone]      Your Vitals Were     Blood Pressure Pulse Temperature Respirations Height Weight    171/77 (BP Location: Left arm) 84 97.5  F (36.4  C) (Oral) 16 1.727 m (5' 8\") 88.7 kg (195 lb 8 oz)    Pulse Oximetry BMI (Body Mass Index)                93% 29.73 kg/m2          MyChart Information     [facility] lets you send messages to your doctor, view your test results, renew your prescriptions, schedule " "appointments and more. To sign up, go to [email or web address] . Click on \"Log in\" on the left side of the screen, which will take you to the Welcome page. Then click on \"Sign up Now\" on the right side of the page.     You will be asked to enter the access code listed below, as well as some personal information. Please follow the directions to create your username and password.     Your access code is: [email or web address]  Expires: 2018 11:37 AM     Your access code will  in 90 days. If you need help or a new code, please call your [facility] clinic or [phone].        Care EveryWhere ID     This is your Care EveryWhere ID. This could be used by other organizations to access your [facility] medical records  [id number]        Equal Access to Services     MAHSA VARGAS : Anastacio Smith, beatriz argueta, paul barajas, daniella swanson . [facility] [phone].    ATENCIÓN: Si habla español, tiene a carter disposición servicios gratuitos de asistencia lingüística. Franny al [phone].    We comply with applicable federal civil rights laws and [place] laws. We do not discriminate on the basis of race, color, national origin, age, disability, sex, sexual orientation, or gender identity.               Review of your medicines      START taking        Dose / Directions    HYDROcodone-acetaminophen 5-325 MG per tablet   Commonly known as:  NORCO   Used for:  Pubic ramus fracture, right, closed, initial encounter (H)        Dose:  1 tablet   Take 1 tablet by mouth every 4 hours as needed for moderate to severe pain   Quantity:  30 tablet   Refills:  0       metoprolol tartrate 50 MG tablet   Commonly known as:  LOPRESSOR   Used for:  New onset atrial fibrillation (H)        Dose:  50 mg   Take 1 tablet (50 mg) by mouth 2 times daily   Quantity:  60 tablet   Refills:  0       sulfamethoxazole-trimethoprim 800-160 MG per tablet   Commonly known as:  BACTRIM DS/SEPTRA DS "   Used for:  Closed nondisplaced fracture of right clavicle, unspecified part of clavicle, initial encounter        Dose:  1 tablet   Take 1 tablet by mouth 2 times daily for 3 days   Quantity:  14 tablet   Refills:  0         CONTINUE these medicines which have NOT CHANGED        Dose / Directions    LORazepam 0.5 MG tablet   Commonly known as:  ATIVAN   Used for:  Closed nondisplaced fracture of right clavicle, unspecified part of clavicle, initial encounter        Dose:  0.5 mg   Take 1 tablet (0.5 mg) by mouth At Bedtime   Quantity:  30 tablet   Refills:  0       losartan 100 MG tablet   Commonly known as:  COZAAR        Dose:  100 mg   Take 100 mg by mouth daily   Refills:  0       NIFEdipine ER 90 MG Tb24   Commonly known as:  ADALAT CC        Dose:  90 mg   Take 90 mg by mouth daily   Refills:  0         STOP taking     hydrochlorothiazide 25 MG tablet   Commonly known as:  HYDRODIURIL           traMADol 50 MG tablet   Commonly known as:  ULTRAM                Where to get your medicines      Some of these will need a paper prescription and others can be bought over the counter. Ask your nurse if you have questions.     Bring a paper prescription for each of these medications     HYDROcodone-acetaminophen 5-325 MG per tablet    LORazepam 0.5 MG tablet       You don't need a prescription for these medications     metoprolol tartrate 50 MG tablet    sulfamethoxazole-trimethoprim 800-160 MG per tablet               ANTIBIOTIC INSTRUCTION     You've Been Prescribed an Antibiotic - Now What?  Your healthcare team thinks that you or your loved one might have an infection. Some infections can be treated with antibiotics, which are powerful, life-saving drugs. Like all medications, antibiotics have side effects and should only be used when necessary. There are some important things you should know about your antibiotic treatment.      Your healthcare team may run tests before you start taking an antibiotic.    Your  team may take samples (e.g., from your blood, urine or other areas) to run tests to look for bacteria. These test can be important to determine if you need an antibiotic at all and, if you do, which antibiotic will work best.      Within a few days, your healthcare team might change or even stop your antibiotic.    Your team may start you on an antibiotic while they are working to find out what is making you sick.    Your team might change your antibiotic because test results show that a different antibiotic would be better to treat your infection.    In some cases, once your team has more information, they learn that you do not need an antibiotic at all. They may find out that you don't have an infection, or that the antibiotic you're taking won't work against your infection. For example, an infection caused by a virus can't be treated with antibiotics. Staying on an antibiotic when you don't need it is more likely to be harmful than helpful.      You may experience side effects from your antibiotic.    Like all medications, antibiotics have side effects. Some of these can be serious.    Let you healthcare team know if you have any known allergies when you are admitted to the hospital.    One significant side effect of nearly all antibiotics is the risk of severe and sometimes deadly diarrhea caused by Clostridium difficile (C. Difficile). This occurs when a person takes antibiotics because some good germs are destroyed. Antibiotic use allows C. diificile to take over, putting patients at high risk for this serious infection.    As a patient or caregiver, it is important to understand your or your loved one's antibiotic treatment. It is especially important for caregivers to speak up when patients can't speak for themselves. Here are some important questions to ask your healthcare team.    What infection is this antibiotic treating and how do you know I have that infection?    What side effects might occur from  this antibiotic?    How long will I need to take this antibiotic?    Is it safe to take this antibiotic with other medications or supplements (e.g., vitamins) that I am taking?     Are there any special directions I need to know about taking this antibiotic? For example, should I take it with food?    How will I be monitored to know whether my infection is responding to the antibiotic?    What tests may help to make sure the right antibiotic is prescribed for me?      Information provided by:  www.cdc.gov/getsmart  U.S. Department of Health and Human Services  Centers for disease Control and Prevention  National Center for Emerging and Zoonotic Infectious Diseases  Division of Healthcare Quality Promotion         Protect others around you: Learn how to safely use, store and throw away your medicines at www.disposemymeds.org.             Medication List: This is a list of all your medications and when to take them. Check marks below indicate your daily home schedule. Keep this list as a reference.      Medications           Morning Afternoon Evening Bedtime As Needed    HYDROcodone-acetaminophen 5-325 MG per tablet   Commonly known as:  NORCO   Take 1 tablet by mouth every 4 hours as needed for moderate to severe pain   Last time this was given:  2 tablets on 1/15/2018 12:56 PM                                   LORazepam 0.5 MG tablet   Commonly known as:  ATIVAN   Take 1 tablet (0.5 mg) by mouth At Bedtime   Last time this was given:  0.25 mg on 1/14/2018  9:46 PM                                   losartan 100 MG tablet   Commonly known as:  COZAAR   Take 100 mg by mouth daily   Last time this was given:  100 mg on 1/15/2018  7:50 AM                                   metoprolol tartrate 50 MG tablet   Commonly known as:  LOPRESSOR   Take 1 tablet (50 mg) by mouth 2 times daily   Last time this was given:  50 mg on 1/15/2018  7:50 AM                                   NIFEdipine ER 90 MG Tb24   Commonly known as:   ADALAT CC   Take 90 mg by mouth daily   Last time this was given:  90 mg on 1/15/2018  7:50 AM                                   sulfamethoxazole-trimethoprim 800-160 MG per tablet   Commonly known as:  BACTRIM DS/SEPTRA DS   Take 1 tablet by mouth 2 times daily for 3 days

## 2018-01-07 NOTE — IP AVS SNAPSHOT
[place] [address] [facility]    [address]    [facility] [address]    Phone:  [phone]    Fax:  [phone]                                       After Visit Summary   1/7/2018    Estefany An    MRN: [id number]           After Visit Summary Signature Page     I have received my discharge instructions, and my questions have been answered. I have discussed any challenges I see with this plan with the nurse or doctor.    ..........................................................................................................................................  Patient/Patient Representative Signature      ..........................................................................................................................................  Patient Representative Print Name and Relationship to Patient    ..................................................               ................................................  Date                                            Time    ..........................................................................................................................................  Reviewed by Signature/Title    ...................................................              ..............................................  Date                                                            Time

## 2018-01-08 NOTE — PLAN OF CARE
Problem: Patient Care Overview  Goal: Plan of Care/Patient Progress Review  Outcome: No Change  Orientation: Alert and oriented x4.   VSS. 97% on 2 LPM NC, Afebrile.  IVF: NS 50 ml/hr  Tele: Afib CVR.   LS: Coarse crackles all lobes   GI: Passing gas. No BM.c/o nausea, PRN zofran given, Tolerating clear liquids.   : Purwick applied for I & O reference, Adequate urine output. Clear yellow.   Skin: bruising noted, Skin otherwise intact.   Activity: SBA to assist of 1. Pt slept comfortably throughout shift.   Pain: 8/10 hip/shoulder pain. No PRN interventions utilized. Position change and legs elevated, Pt sleeping.   DC Plan: Continue with current cares.

## 2018-01-08 NOTE — PROVIDER NOTIFICATION
1543:   Influenza PCR result back, positive for influenza A.  Thanks    Dr. Houston paged.    1558:  Order for tamiflu, see MAR

## 2018-01-08 NOTE — PLAN OF CARE
Problem: Patient Care Overview  Goal: Plan of Care/Patient Progress Review  Outcome: No Change  Pt A/O cooperative, heavy assist of two pivot to bedside comode. Incontinent of urine. VSS, LS crackles coarse. Tele A-Fib CVR HR(60-80's). NORCO given x2 for right hip and Right shoulder pain with pt report decrease in pain. Ice to right shoulder. LBM 1/5/18 Senna given. Zofran x1 for c/o nausea with pt report relief. IVF infusing. CMS intact in upper and lower extremity. K+ replaced recheck 4.3.

## 2018-01-08 NOTE — PROGRESS NOTES
D:  SW received a call from Cindy,  at [facility] ([phone]), who stated that Estefany is a resident there and is not receiving any services. Her daughter is involved.

## 2018-01-08 NOTE — PLAN OF CARE
Problem: Patient Care Overview  Goal: Plan of Care/Patient Progress Review  Outcome: No Change  Vss, no co pain/cp/sob.   Tele AFIB CVR.  IVF continue, BA intact for safety, PT/OT consulted to see the pt, echo done with ef 55-60%, DNR/DNI.  K+ 3.9, Na 128, lactic 1.9.  Influenza A/B/RSV PCR pending, initial from ED came back negative. +CMS to extremities, clavicle and hip fx from fall.  Encouraged use of IS, poor tolerance, barely able to make 500.  PCDs on while in bed, use of purewick catheter, changed this shift at 1311.  Plan for another 2 days before dc. Continue poc and monitoring.

## 2018-01-08 NOTE — PROGRESS NOTES
[facility]  Hospitalist Progress Note  Fletcher Houston MD, MD 01/08/2018  (Text Page)  Reason for Stay (Diagnosis): dizziness, fall, URI suspect influenza         Assessment and Plan:      Summary of Stay:     Estefany An is a 87 year old female with a history of hypertension and osteoarthritis who now presents with weakness, dizziness and fall resulting in a left clavicular fracture, pubic rami fracture, hypoxemia, and atrial fibrillation.     #1.  Atrial fibrillation with controlled ventricular rate.    Continue telemetry    Obtain echocardiogram- showed normal EF, normal in size, no significant valvular issues    Replace potassium and follow    TSH is normal    Consider metoprolol for rate control if necessary, currently rate controlled    Patient has had multiple falls resulting in fractures including shoulder clavicle and pelvis not a good candidate for warfarin or other anticoagulation at this time.    Aspirin for now. Estefany is hesitant about further anticoagulation due to  Bleeding risks     #2.  Hypoxemia    Patient with hypoxemia upon presentation with saturations in the low 80s    Chest x-ray negative, CT scan for pulmonary embolus negative    BNP is elevated but there are no radiographic or clinical findings of acute heart failure.    O2 to keep sats 90% or greater, wean as tolerated    Suspect some of this might be due to atelectasis and will recommend incentive spirometry    She has URI symptoms, stated as well that influenza/ URI is rampant on her living facility. Earlier swab is negative, will add-on PCR for rSV and influenza    ADDENDUM: Influenza PCR positive. Will start with tamiflu     #3.  Left clavicular fracture and right pubic rami fracture    We'll treat symptomatically and weightbearing as tolerated    Pending orthopedics input due to the fall with trauma    PT/OT     #4.  Hypertension    Resume home meds except HCTZ given ongoing hyponaremia     #5.   Hyponatremia    Suspect this is due to recent respiratory infection    Could in part also be due to hydrochlorothiazide and will therefore hold for the time being    IV fluids    Repeat basic metabolic panel in a.m.     #6.  Hypokalemia     Suspect due to hydrochlorothiazide     replace per protocol and follow     #7.  Anemia    Patient with hemoglobin of 10 most recent hemoglobin is 12.5 from 4 years ago and, MCV is 92 we'll add iron studies and peripheral smear and    This could be due to the recent trauma and pubic rami fracture although I think this is less likely. Likely this is from chronic anemia. Stable Hgb today       DVT Prophylaxis: Pneumatic Compression Devices  Code Status: Full Code     Disposition: Expected discharge 2 days.        Interval History (Subjective):      Assumed care today  Seen and examined.  Estefany is not having any acute pain. Still with coughing spells. Afebrile. Stable hemodynamics  Not much appetite but no nausea/vomiting.  Stable hemodynamics, no SOB, chest pain.                  Physical Exam:      Last Vital Signs:  /59 (BP Location: Right arm)  Pulse 85  Temp 96.8  F (36  C) (Oral)  Resp 22  Wt 84.4 kg (186 lb)  SpO2 95%  BMI 27.47 kg/m2    I/O last 3 completed shifts:  In: 2502 [P.O.:1650; I.V.:852]  Out: 1100 [Urine:1100]  Wt Readings from Last 1 Encounters:   01/08/18 84.4 kg (186 lb)     Vitals:    01/08/18 0643   Weight: 84.4 kg (186 lb)       Constitutional: Awake, alert, cooperative, no apparent distress   Respiratory: Fair air entry   Cardiovascular: Regular rate and rhythm, normal S1 and S2, and no murmur noted   Abdomen: Normal bowel sounds, soft, non-distended, non-tender   Skin: No rashes, no cyanosis, dry to touch   Neuro: Alert and oriented x3, no weakness, spontaneous and coherent speech   Extremities: No edema, normal range of motion   Other(s): Euthymic mood, not agitated       All other systems: Negative          Medications:      All current  medications were reviewed with changes reflected in problem list.         Data:      All new lab and imaging data was reviewed.   Labs:    Recent Labs  Lab 01/07/18  0824 01/07/18  0759   CULT No growth after 20 hours No growth after 20 hours       Recent Labs  Lab 01/08/18  0900 01/07/18  1926 01/07/18  0759   *  --  127*   POTASSIUM 3.9 4.3 3.1*   CHLORIDE 94  --  90*   CO2 24  --  30   ANIONGAP 10  --  7   *  --  151*   BUN 8  --  10   CR 0.65  --  0.71   GFRESTIMATED 86  --  77   GFRESTBLACK >90  --  >90   KACI 8.4*  --  8.3*       Recent Labs  Lab 01/08/18 0900 01/07/18  0759   WBC 13.7* 15.3*   HGB 9.1* 10.0*   HCT 27.5* 29.7*   MCV 94 92    271       Recent Labs  Lab 01/08/18 0900 01/07/18  0759   * 151*       Recent Labs  Lab 01/07/18  0759   TROPI <0.015     No results for input(s): COLOR, APPEARANCE, URINEGLC, URINEBILI, URINEKETONE, SG, UBLD, URINEPH, PROTEIN, UROBILINOGEN, NITRITE, LEUKEST, RBCU, WBCU in the last 168 hours.   Imaging:   Results for orders placed or performed during the hospital encounter of 01/07/18   CT Head w/o Contrast    Narrative    CT SCAN OF THE HEAD WITHOUT CONTRAST   1/7/2018 9:26 AM     HISTORY: trauma;     TECHNIQUE:  Axial images of the head and coronal reformations without  IV contrast material. Radiation dose for this scan was reduced using  automated exposure control, adjustment of the mA and/or kV according  to patient size, or iterative reconstruction technique.    COMPARISON: None.    FINDINGS:  There is generalized atrophy of the brain.  White matter  changes are present in the cerebral hemispheres that are consistent  with small vessel ischemic disease in this age patient. There is no  evidence of intracranial hemorrhage, mass, acute infarct or anomaly.  The left sphenoid sinus is nearly completely opacified no bleed or  fractures are identified.      Impression    IMPRESSION: No intracranial hemorrhage or skull fractures  are  identified.      BEN CHÁVEZ MD   XR Pelvis and Hip Right 2 Views    Narrative    XR PELVIS AND HIP RIGHT 2 VIEWS 1/7/2018 9:46 AM    COMPARISON: None.    HISTORY: Trauma.      Impression    IMPRESSION: Mildly displaced fractures involving the right superior  and inferior pubic rami. No other definite pelvic fractures are seen.  Osteopenia about the pelvis and hips.    ALEX MOSQUERA MD   XR Shoulder Right G/E 3 Views    Narrative    XR SHOULDER RT G/E 3 VW 1/7/2018 9:45 AM    COMPARISON: None.    HISTORY: Trauma.      Impression    IMPRESSION:   1. Slightly comminuted and mildly displaced distal right clavicular  fracture is seen. No other findings concerning for fracture in the  right shoulder.  2. Severe glenohumeral osteoarthritis with extensive remodeling about  the glenoid and the humeral head.    ALEX MOSQUERA MD   XR Chest 1 View    Narrative    XR CHEST 1 VW 1/7/2018 9:47 AM    COMPARISON: 11/2/2014    HISTORY: Shortness of breath, weakness and hypoxia.      Impression    IMPRESSION: Mild left basilar atelectasis. No pleural effusion or  pneumothorax. Right clavicular fracture better evaluated on dedicated  radiographs of the shoulder. Old left humeral fracture deformity  partially visualized.    ALEX MOSQUERA MD   CT Chest Pulmonary Embolism w Contrast    Narrative    Exam: CT CHEST PULMONARY EMBOLISM W CONTRAST 1/7/2018 11:18 AM    Comparison: Chest x-ray on the same day and CT dated 12/3/2013.     Clinical History: Chest pain.    Technique: Volumetric helical acquisition of the chest were obtained  following pulmonary embolism protocol. Three-dimensional (3D)  post-processed angiographic images were reconstructed, archived to  PACS and used in interpretation of this study. Radiation dose for this  scan was reduced using automated exposure control, adjustment of the  mA and/or kV according to patient size, or iterative reconstruction  technique.    Contrast: 73mL Isovue-370    Findings:  Contrast bolus  timing is adequate for evaluation for pulmonary emboli.  There is no evidence of pulmonary emboli.  There is no evidence of  right heart strain.     Dependent atelectasis. Lungs otherwise clear. No evidence of pleural  effusion is noted.    The heart size and great vessels are normal in caliber. No evidence of  axillary, mediastinal or hilar lymphadenopathy is seen.     Upper abdomen: Evaluation of the upper abdomen is limited by contrast  bolus timing and coverage.    Bones: Wedge compression deformity involving the T12 vertebral body.      Impression    Impression:   1. No evidence of pulmonary embolus.  2. Wedge compression deformity involving the T12 vertebral body is  age-indeterminate, but new since chest x-ray dated 11/2/2014.    ALEX MOSQUERA MD

## 2018-01-09 NOTE — CONSULTS
Care Transition Initial Assessment - RN    Reason For Consult: care coordination/care conference, discharge planning   Met with: Patient and daughter    DATA   Active Problems:    Hypoxia       Cognitive Status: alert and oriented.  Primary Care Clinic Name: [facility]  Primary Care MD Name: Ricki Rangelkat  Contact information and PCP information verified: Yes    Lives With: alone  Living Arrangements: independent living facility  Quality Of Family Relationships: supportive, helpful, involved  Description of Support System: Supportive, Involved   Who is your support system?: Children   Support Assessment: Adequate family and caregiver support     Insurance concerns: No Insurance issues identified    ASSESSMENT  Patient currently receives the following services:  None  Met with pt and daughter, Maylin, at bedside to discuss plan of care. Pt admitted with new afib/influenza A/elevated BNP and is identified as moderate risk for readmission. Pt states she lives at the [facility]. She currently does not receive any home care services. She receives housekeeping and laundry assistance. She has morning/noon meals in the deli and PM meal in the dining area. She is not on a specific diet. She is able to get increased services on dc if needed/recommended. She does not use home oxygen. She uses a walker when out in public but otherwise is independent around her appt. She does her own medications.    She does not have any questions or concerns with dc. No PT/OT consults in hospital at this time. She is open to increasing services at her [facility] if needed. If TCU needed on dc she would like to go to [facility].        Identified issues/concerns regarding health management: unclear if pt will have needs on dc at this time    PLAN  Patient given options and choices for discharge yes .  Patient/family is agreeable to the plan?  Yes  Patient anticipates discharging to [facility] with increases services vs TCU .        Patient anticipates needs for home  equipment: No    Appointments: Dr Blake at [facility] on Thurs 1/18 at 9:45    Daughter will transport to appt    Care  (CTS) will continue to follow as needed. Will cont to follow for dc plan of care. SW updated.    Olivia Win RN CTS  Care Transitions  [phone]

## 2018-01-09 NOTE — PLAN OF CARE
Problem: Patient Care Overview  Goal: Plan of Care/Patient Progress Review    [place]: A&O x4. CMS intact.   VSS: -180s/50-70s. Afebrile. Tele shows Afib, CVR.  LS: LS coarse crackles on 2L NC.  GI: active, +flatus, last BM 1/5  : purewick output 650cc. Purewick replaced this shift. Skin red, cleansed, powder applied  Skin: intact  Activity: x2, pt in bed this shift, unable to get up.   Diet: reg   Pain: c/o 8/10 arthritis pain, taking Norco PRN  Plan: Tamiflu, IV fluids, PT/OT, poss d/c to [facility] in 2 days  Lab: influenza A positive

## 2018-01-09 NOTE — DISCHARGE INSTRUCTIONS
Follow up:  Appointments: Dr Blake at [facility] on Thurs 1/18 at 9:45    *please bring your ID and insurance card

## 2018-01-09 NOTE — PROGRESS NOTES
[facility]  Hospitalist Progress Note  Name: Estefany An    MRN: [id number]  YOB: 1930    Age: 87 year old  Date of admission: 1/7/2018  Primary care provider: Rikci Blake      Reason for Stay (Diagnosis): Fall with left clavicular/right pubic rami fracture/atrial fibrillation         Assessment and Plan:      Summary of Stay:  Estefany An is a 87 year old female with a history of hypertension and osteoarthritis who now presents with weakness, dizziness and fall resulting in a left clavicular fracture, pubic rami fracture, hypoxemia, and atrial fibrillation.    Problem List/Plan:  1. Atrial fibrillation with controlled ventricular rate: Heart rate currently controlled.  Given multiple falls resulting in clavicular and pelvic fracture, not a good candidate for Coumadin.  TSH was otherwise normal and echocardiogram demonstrated a normal EF with normal size and no significant valvular disease.  Continue aspirin for now.   2. Mild hypoxemia secondary to influenza A: Chest x-ray was otherwise negative and CT scan for PE was negative.  Although BNP is elevated, there are no radiographic or clinical findings to suggest acute heart failure.  O2 supplementation as needed.  Continue Tamiflu.  Still some notable wheezing and chest congestion.  Will start patient on prednisone 40 mg p.o. daily with scheduled DuoNeb's.  3. Left clavicular fracture and right pubic rami fracture: Symptomatic treatment for now.  PT/OT to evaluate and treat.    4. Hyponatremia: Likely secondary to respiratory tract infection is suspicious that hydrochlorothiazide is playing a major role.  Continue to hold hydrochlorothiazide for now.  5. Hypokalemia: Secondary to hydrochlorothiazide.  Replace per protocol.  6. Acute anemia without evidence of acute blood loss anemia: Probably in part dilution with a chronic component.  Monitor for now      DVT Prophylaxis: Pneumatic Compression Devices  Code Status:  "DNR / DNI  Discharge Dispo: To be determined based on progress with PT/OT  Estimated Disch Date / # of Days until Disch: 1-2 more days depending on progress with pain management, hypoxemia, and assessments by PT/OT        Interval History (Subjective):      Patient denies any left clavicular pain but reports more so back pain.  Biggest complaint is about the bed comfortability.           Physical Exam:      Vital signs:  Temp: 96  F (35.6  C) Temp src: Oral BP: 144/70   Heart Rate: 71 Resp: 18 SpO2: 96 % O2 Device: Nasal cannula Oxygen Delivery: 2 LPM   Weight: 84.4 kg (186 lb)  Estimated body mass index is 27.47 kg/(m^2) as calculated from the following:    Height as of 11/2/14: 1.753 m (5' 9\").    Weight as of this encounter: 84.4 kg (186 lb).      I/O last 3 completed shifts:  In: 1815 [P.O.:880; I.V.:935]  Out: 600 [Urine:600]  Vitals:    01/08/18 0643   Weight: 84.4 kg (186 lb)       Constitutional: Awake, alert, cooperative, no apparent distress   Respiratory: Nl work of breathing. Clear to auscultation bilaterally, no crackles or wheezing   Cardiovascular:  Irregularly irregular, normal S1 and S2, and no murmur noted   Abdomen: Normal bowel sounds, soft, non-distended, non-tender   Skin: No rashes, no cyanosis, dry to touch   Neuro: CN 2-12 intact, no localizing weakness   Extremities: No edema, some right hip tenderness with range of motion as well as left clavicular tenderness with palpation.   HEENT Normocephalic, atraumatic, normal nasal turbinates; oropharynx clear   Neck Supple; nl inspection; trachea midline; no thryomegaly   Psychiatric: A+O x3. Normal affect          Medications:      All current medications were reviewed with changes reflected in problem list.         Data:      All new lab and imaging data was reviewed.   Labs:    Recent Labs  Lab 01/09/18  0632 01/08/18  0900 01/07/18  0759   WBC 14.5* 13.7* 15.3*   HGB 8.6* 9.1* 10.0*   HCT 25.1* 27.5* 29.7*   MCV 91 94 92    263 271 "       Recent Labs  Lab 01/09/18  0632 01/08/18  0900 01/07/18  1926 01/07/18  0759   * 128*  --  127*   POTASSIUM 4.7 3.9 4.3 3.1*   CHLORIDE 97 94  --  90*   CO2 24 24  --  30   ANIONGAP 8 10  --  7   * 145*  --  151*   BUN 8 8  --  10   CR 0.66 0.65  --  0.71   GFRESTIMATED 85 86  --  77   GFRESTBLACK >90 >90  --  >90   KACI 8.2* 8.4*  --  8.3*   MAG  --   --   --  2.1   PROTTOTAL  --   --   --  7.1   ALBUMIN  --   --   --  3.1*   BILITOTAL  --   --   --  0.2   ALKPHOS  --   --   --  88   AST  --   --   --  27   ALT  --   --   --  24      Imaging:   No results found for this or any previous visit (from the past 24 hour(s)).    Naya Hsieh [phone]   on while he could but like I said

## 2018-01-09 NOTE — PLAN OF CARE
Problem: Patient Care Overview  Goal: Plan of Care/Patient Progress Review  Outcome: No Change  Orientation: Alert and oriented x4.   VSS. 95% on RA. Afebrile.  IVF: NS 50 ml/hr  Tele: Afib CVR.   LS: Coarse crackles throughout.   GI: Passing gas. No BM. Denies N/V. BS active.   : Purwick cath in use, Adequate urine output. Clear yellow.   Skin: bruising noted, Skin otherwise intact.   Activity: assist x2, w/lift   Pain: 5/10 shoulder pain. No PRN interventions utilized.   DC Plan: Continue with current cares.

## 2018-01-09 NOTE — PLAN OF CARE
Problem: Patient Care Overview  Goal: Plan of Care/Patient Progress Review  Outcome: No Change  Orientation: Alert and oriented x4.   VSS. 95% on 2 LPM NC, Afebrile.  IVF: NS 50 ml/hr   Tele: Afib CVR  LS: Coarse crackles throughout  GI: Passing gas. No BM. Denies N/V. Tolerating clear liquids.   : Adequate urine output. Clear yellow.   Skin: bruising noted, Skin otherwise intact.   Activity: SBA to assist of 1. Pt slept comfortably throughout shift.   Pain: 8/10 hip/shoulder pain. Norco and Tylenol given, Pt sleeping.   DC Plan: Continue with current cares.

## 2018-01-09 NOTE — PROGRESS NOTES
Infection Prevention:    Patient requires Droplet precautions because of Influenza. Please contact Infection Prevention with any questions/concerns at *[phone].    Aspen Mari, ICP

## 2018-01-10 NOTE — PLAN OF CARE
Problem: Patient Care Overview  Goal: Plan of Care/Patient Progress Review  OT orders received, eval completed and treatment initiated. Pt is an 87-year-old female admitted following a fall resulting in a pelvis and L. clavicular fracture also dx with flu. Pt is ind at baseline with ADL's, uses a cane to ambulate in her home at times and a 4WW in the community. Pt's daughter lives nearby and A with some financial management, otherwise pt ind in IADL's as well.    Discharge Planner OT   Patient plan for discharge: Home vs TCU  Current status: Pt is CGA and FWW   Barriers to return to prior living situation: Pt is typically Ind at her [facility], and is requiring supervision and CGA for safe transfers.   Recommendations for discharge: TCU  Rationale for recommendations: Pt would benefit from continued skilled therapeutic intervention to build strength and endurance prior to returning to her retirement.       Entered by: Nicky Mariscal 01/10/2018 11:16 AM        pt would benefit from skilled OT intervention in order to increase safety and ind

## 2018-01-10 NOTE — PROGRESS NOTES
01/10/18 1100   Quick Adds   Type of Visit Initial PT Evaluation   Living Environment   Lives With alone   Living Arrangements independent living facility   Home Accessibility no concerns   Number of Stairs to Enter Home 0   Number of Stairs Within Home 0   Stair Railings at Home none   Transportation Available car;family or friend will provide   Living Environment Comment Pt resides in an accessible apartment   Self-Care   Dominant Hand right   Usual Activity Tolerance moderate   Current Activity Tolerance moderate   Regular Exercise yes   Activity/Exercise Type walking   Exercise Amount/Frequency 10 mins   Equipment Currently Used at Home none   Activity/Exercise/Self-Care Comment use walker when she leaves apt, but generally no AD in apt   Functional Level Prior   Ambulation 1-->assistive equipment   Transferring 0-->independent   Toileting 0-->independent   Bathing 0-->independent   Dressing 0-->independent   Eating 0-->independent   Communication 0-->understands/communicates without difficulty   Swallowing 0-->swallows foods/liquids without difficulty   Cognition 0 - no cognition issues reported   Fall history within last six months yes   Number of times patient has fallen within last six months 1   Which of the above functional risks had a recent onset or change? ambulation;transferring   General Information   Onset of Illness/Injury or Date of Surgery - Date 01/09/18   Referring Physician Naya Hsieh   Patient/Family Goals Statement pt agrees she may need TCU   Pertinent History of Current Problem (include personal factors and/or comorbidities that impact the POC) Estefany An is a 87 year old female with a history of hypertension and osteoarthritis who now presents with weakness, dizziness and fall resulting in a left clavicular fracture, pubic rami fracture, hypoxemia, and atrial fibrillation   Precautions/Limitations fall precautions;oxygen therapy device and L/min  (on 2 lpm )   Weight-Bearing Status - FABRICE  weight-bearing as tolerated   Weight-Bearing Status - RUE full weight-bearing   Weight-Bearing Status - LLE full weight-bearing   Weight-Bearing Status - RLE weight-bearing as tolerated   General Info Comments Per x ray review, clavical fx on R   Cognitive Status Examination   Orientation orientation to person, place and time   Level of Consciousness alert   Follows Commands and Answers Questions 100% of the time   Personal Safety and Judgment intact   Pain Assessment   Patient Currently in Pain Yes, see Vital Sign flowsheet  (8/10)   Range of Motion (ROM)   ROM Comment R and L shoulder limited by pain, R hip limited by pain   Strength   Strength Comments B LE decreased strength, limited by pain, able to lift antigravity with increased effort   Bed Mobility   Bed Mobility Comments bedmob with min Bill CGA   Transfer Skills   Transfer Comments sit to stand with FWW with cues for hand placement and min A   Gait   Gait Comments amb with FWW with CGA 10'   Balance   Balance Comments impaired standing dynamic balance requires B UE support to maintain, 1  LOB with initial standing in post.   Coordination   Coordination no deficits were identified   General Therapy Interventions   Planned Therapy Interventions bed mobility training;balance training;ROM;strengthening;stretching   Clinical Impression   Criteria for Skilled Therapeutic Intervention yes, treatment indicated   PT Diagnosis difficulty wakling and tranferring   Influenced by the following impairments pain with fx clavical R, and pubic rami on R, decreased strength, balance, ROM and act juan, respiratory compromise, cardiac issues   Functional limitations due to impairments impaired functional mob I and safety   Clinical Presentation Evolving/Changing   Clinical Presentation Rationale 3-5 deficits   Clinical Decision Making (Complexity) Moderate complexity   Therapy Frequency` daily   Predicted Duration of Therapy Intervention (days/wks) 5 days   Anticipated  "Discharge Disposition Transitional Care Facility   Risk & Benefits of therapy have been explained Yes   Patient, Family & other staff in agreement with plan of care Yes   [facility] AM-PAC TM \"6 Clicks\"   2016, Trustees of [facility], under license to [employer].  All rights reserved.   6 Clicks Short Forms Basic Mobility Inpatient Short Form   [facility] AM-PAC  \"6 Clicks\" V.2 Basic Mobility Inpatient Short Form   1. Turning from your back to your side while in a flat bed without using bedrails? 3 - A Little   2. Moving from lying on your back to sitting on the side of a flat bed without using bedrails? 3 - A Little   3. Moving to and from a bed to a chair (including a wheelchair)? 3 - A Little   4. Standing up from a chair using your arms (e.g., wheelchair, or bedside chair)? 3 - A Little   5. To walk in hospital room? 4 - None   6. Climbing 3-5 steps with a railing? 3 - A Little   Basic Mobility Raw Score (Score out of 24.Lower scores equate to lower levels of function) 19   Total Evaluation Time   Total Evaluation Time (Minutes) 15     "

## 2018-01-10 NOTE — PLAN OF CARE
"Problem: Patient Care Overview  Goal: Plan of Care/Patient Progress Review  Outcome: No Change  Vitals: /54 (BP Location: Right arm)  Pulse 73  Temp 96.8  F (36  C) (Axillary)  Resp 18  Ht 1.727 m (5' 8\")  Wt 90.7 kg (200 lb)  SpO2 94%  BMI 30.41 kg/m2   Oxygen/lungs: 94% on 2L, Pt verbalized slight SOB, slight crackles bilat upper lobes, did not want prn neb.  LOC: A/O x4  Pain: Denied pain at start of shift, later c/o pian 8/10 generalized back, left shoulder- PRN norco given with relief.  Ambulation: Assist x1 and walker. Repositioned throughout the night.   Tele/Cardiac: Nontele known hx of A-Fib, HR 84, irregular rhythm  Neuro: CMS intact, radial and pedal pulses +2  GI: Active x4  / I/O's: Has purwick in place, 800 ml. On NS IV 50cc/hr.  Skin: Clean, dry, intact.   Precautions: On droplet for positive Influenza A  Plan: To dc to [facility] vs TCU. Will continue to monitor.        "

## 2018-01-10 NOTE — PLAN OF CARE
Problem: Patient Care Overview  Goal: Plan of Care/Patient Progress Review  Outcome: No Change  Norco for R shoulder and back pain given, patient refused to reposition, stating it is painful to be on side or have pillow on side. Refused to get up. Purwick catheter in place. Continue to monitor.

## 2018-01-10 NOTE — PLAN OF CARE
Problem: Patient Care Overview  Goal: Plan of Care/Patient Progress Review  Outcome: No Change  Vss, no co cp/sob, tylenol for pain.  IVF dc'd, BA intact for safety, PT/OT consulted to see the pt, DNR/DNI.  Na 130, Influenza + on droplet isolation on Tamiflu, LS coarse, prednisone started. +CMS to extremities, clavicle and hip fx from fall.  Encouraged use of IS, poor tolerance.  PCDs on while in bed, use of purewick catheter, changed this shift at 1210, up with Ax1+walker, in chair for meals.  Not yet ready for dc. Continue poc and monitoring.

## 2018-01-10 NOTE — PLAN OF CARE
Problem: Patient Care Overview  Goal: Plan of Care/Patient Progress Review  PT eval completed and treatment initiated,  Pt admitted for waekness dizziness and fall with Right clavical fx and R superior and inferior pubi rami fxs.  These are being managed conservatively.  No restrictions noted.  Pt's previous level of function was I to mod I at I living facility.  Pt amb with FWW outside of apt and nothing or SEC inside apt.     Discharge Planner PT   Patient plan for discharge: likely TCU, pending improvement during length of stay  Current status: pt requires min A for transfers with FWW, CGA with FWW for amb 38', limited by R shoulder and hip pain.  Barriers to return to prior living situation: lives alone, functional mob Iimited due to pain, fatigue and some SOB.  Fall risk  Recommendations for discharge: TCU  Rationale for recommendations: to regain strength and ability for amb functional distance and I with all mob to allow return to PLOF       Entered by: TALIB OLIVA 01/10/2018 12:18 PM

## 2018-01-11 NOTE — PROGRESS NOTES
SWS:  SW received call from [facility] and they are able to accept pt to TCU tomorrow. Pt updated.  SW will arrange transport tomorrow once MD has rounded.

## 2018-01-11 NOTE — PLAN OF CARE
Problem: Patient Care Overview  Goal: Plan of Care/Patient Progress Review  Discharge Planner PT   Patient plan for discharge: TCU  Current status: SBA for bed mob, min A sit to stand with FWW, cont with c/o pain in R shoulder and R hip, amb 100' with FWW and CGA.  Barriers to return to prior living situation: level of  A required, lives alone, SOB, fall risk  Recommendations for discharge: TCU  Rationale for recommendations: to maximize functional mob I and safety       Entered by: TALIB OLIVA 01/11/2018 1:31 PM

## 2018-01-11 NOTE — PROGRESS NOTES
[facility]  Hospitalist Progress Note  Name: Estefany nA    MRN: [id number]  YOB: 1930    Age: 87 year old  Date of admission: 1/7/2018  Primary care provider: Ricki Blake      Reason for Stay (Diagnosis): Fall with left clavicular/right pubic rami fracture/atrial fibrillation         Assessment and Plan:      Summary of Stay:  Estefany An is a 87 year old female with a history of hypertension and osteoarthritis who now presents with weakness, dizziness and fall resulting in a left clavicular fracture, pubic rami fracture, hypoxemia, and atrial fibrillation.    Problem List/Plan:  1. Atrial fibrillation with controlled ventricular rate: Heart rate currently controlled.  Given multiple falls resulting in clavicular and pelvic fracture, not a good candidate for Coumadin.  TSH was otherwise normal and echocardiogram demonstrated a normal EF with normal size and no significant valvular disease.  Continue aspirin for now.  Increase metoprolol to 50 mg p.o. twice daily.  2. Mild hypoxemia secondary to influenza A: Chest x-ray was otherwise negative and CT scan for PE was negative.  Although BNP is elevated, there are no radiographic or clinical findings to suggest acute heart failure.  O2 supplementation as needed.  Continue Tamiflu.  Still some notable wheezing and chest congestion.  Continue prednisone 40 mg p.o. daily with scheduled DuoNeb's.  3. Left clavicular fracture and right pubic rami fracture: Symptomatic treatment for now.  PT/OT to evaluate and treat.    4. Hyponatremia: Likely secondary to respiratory tract infection is suspicious that hydrochlorothiazide is playing a major role.  Continue to hold hydrochlorothiazide for now.  5. Hypokalemia: Secondary to hydrochlorothiazide.  Replace per protocol.  6. Acute anemia without evidence of acute blood loss anemia: Probably in part dilution with a chronic component.  Monitor for now      DVT Prophylaxis: Pneumatic  "Compression Devices  Code Status: DNR / DNI  Discharge Dispo: TCU.  Patient's preferences for [facility] as patient is from the [facility]  Estimated Disch Date / # of Days until Disch: Tomorrow if TCU bed is available.        Interval History (Subjective):      No specific complaints.  He denies any left clavicular pain  but does report some right hip pain with range of motion as well as chronic back pain.         Physical Exam:      Vital signs:  Temp: 97.6  F (36.4  C) Temp src: Axillary BP: 150/72   Heart Rate: 82 Resp: 17 SpO2: 96 % O2 Device: Nasal cannula Oxygen Delivery: 2 LPM Height: 172.7 cm (5' 8\") Weight: 90.5 kg (199 lb 8 oz)  Estimated body mass index is 30.33 kg/(m^2) as calculated from the following:    Height as of this encounter: 1.727 m (5' 8\").    Weight as of this encounter: 90.5 kg (199 lb 8 oz).      I/O last 3 completed shifts:  In: 884 [P.O.:680; I.V.:204]  Out: 2000 [Urine:2000]  Vitals:    01/08/18 0643 01/10/18 0451 01/11/18 0605   Weight: 84.4 kg (186 lb) 90.7 kg (200 lb) 90.5 kg (199 lb 8 oz)       Constitutional: Awake, alert, cooperative, no apparent distress   Respiratory: Nl work of breathing. Clear to auscultation bilaterally, no crackles or wheezing   Cardiovascular:  Irregularly irregular, normal S1 and S2, and no murmur noted   Abdomen: Normal bowel sounds, soft, non-distended, non-tender   Skin: No rashes, no cyanosis, dry to touch   Neuro: CN 2-12 intact, no localizing weakness   Extremities: No edema, some right hip tenderness with range of motion as well as left clavicular tenderness with palpation.   HEENT Normocephalic, atraumatic, normal nasal turbinates; oropharynx clear   Neck Supple; nl inspection; trachea midline; no thryomegaly   Psychiatric: A+O x3. Normal affect          Medications:      All current medications were reviewed with changes reflected in problem list.         Data:      All new lab and imaging data was reviewed. "   Labs:    Recent Labs  Lab 01/11/18  0622 01/10/18  0635 01/09/18  0632 01/08/18  0900   WBC  --  14.1* 14.5* 13.7*   HGB 8.3* 8.6* 8.6* 9.1*   HCT  --  25.3* 25.1* 27.5*   MCV  --  91 91 94   PLT  --  316 289 263       Recent Labs  Lab 01/11/18  0622 01/10/18  0635 01/09/18  0632 01/08/18  0900 01/07/18  0759   * 130* 129* 128*  --  127*   POTASSIUM 4.9  --  4.7 3.9  < > 3.1*   CHLORIDE 95  --  97 94  --  90*   CO2 26  --  24 24  --  30   ANIONGAP 8  --  8 10  --  7   *  --  115* 145*  --  151*   BUN 16  --  8 8  --  10   CR 0.68  --  0.66 0.65  --  0.71   GFRESTIMATED 82  --  85 86  --  77   GFRESTBLACK >90  --  >90 >90  --  >90   KACI 8.5  --  8.2* 8.4*  --  8.3*   MAG  --   --   --   --   --  2.1   PROTTOTAL  --   --   --   --   --  7.1   ALBUMIN  --   --   --   --   --  3.1*   BILITOTAL  --   --   --   --   --  0.2   ALKPHOS  --   --   --   --   --  88   AST  --   --   --   --   --  27   ALT  --   --   --   --   --  24   < > = values in this interval not displayed.   Imaging:   No results found for this or any previous visit (from the past 24 hour(s)).    Naya Hsieh [phone]   on while he could but like I said

## 2018-01-11 NOTE — PLAN OF CARE
Problem: Patient Care Overview  Goal: Plan of Care/Patient Progress Review  Outcome: No Change  Pt. AxOx4, continues on droplet Iso, up with assist of 1 and walker but has been resting in bed most of the shift. Pt. C/o back and leg pain and given Norco with decrease in pain. O2 at 2L with sat's at 96%. Lung sounds diminished with coarse crackles. Purewick intact. Pt. Denies any needs at this time. Will continue with POC.

## 2018-01-11 NOTE — PROGRESS NOTES
Care Coordination:  Met with pt at bedside to discuss plan of care and recommendation for TCU. Pt agreeable to TCU and would like [facility] since she is from the [facility]. We discussed private vs shared room and cost associated. She would like a private room. She also asked about transportation and what the cost would be. We discussed wc transportation with [facility] and the private pay cost. She would like to have transportation set up on dc. Referral sent to [facility]. SW updated.    Olivia Win RN CTS

## 2018-01-11 NOTE — PLAN OF CARE
Problem: Patient Care Overview  Goal: Plan of Care/Patient Progress Review  Outcome: Improving  VSS. On RA, purwick catheter in place, A/O, Assist of 1, on Tamiflu plan to discharge in 2 days.

## 2018-01-11 NOTE — PROGRESS NOTES
Your information has been submitted on January 11th, 2018 at 03:20:39 PM CST. The confirmation number is [id number]

## 2018-01-12 NOTE — PROGRESS NOTES
Patient was seen and examined by me this morning.  She is 87-year-old female with trauma following a fall with left clavicle fracture, pubic ramus fracture and hypoxemia and atrial fibrillation new onset.  Patient is currently complaining of congestion, she is hypoxic but denies any chest pain she has shoulder pain and pelvic pain.  Chest exam revealed evidence of diffuse wheezing and crackles posterior lung fields bilaterally.  Patient's labs showed evidence of worsening hyponatremia.  Plan is to cancel discharge plan today, get hyponatremia workup, chest x-ray, and may discharge in the next 1-2 days if she remains stable.

## 2018-01-12 NOTE — PLAN OF CARE
Problem: Patient Care Overview  Goal: Plan of Care/Patient Progress Review  Outcome: Improving  Pt A&O,forgetful at times. Vss, LS coarse but diminished on 2L O2. Up with Assist of one and walker. Pt up to chair for 1 hour mara. New purewick placed 2200. Pt had small BM this shift as well.

## 2018-01-12 NOTE — PLAN OF CARE
Problem: Patient Care Overview  Goal: Plan of Care/Patient Progress Review  Outcome: Improving  ao-forgetful. Vss. Up with one and walker

## 2018-01-12 NOTE — PROGRESS NOTES
"[facility] RCAT     Date: 1/12/18    Admission Dx: Hypoxia with AFIB    Pulmonary History None    Home Nebulizer/MDI Use: None    Home Oxygen: None    Acuity Level (RCAT flow sheet): 4    Aerosol Therapy initiated: Duoneb Q4 prn      Pulmonary Hygiene initiated: Deep Breathe and Cough      Volume Expansion initiated: IS      Current Oxygen Requirements: 2L  Current SpO2: 96%    Re-evaluation date: 1-15-18    Patient Education: Patient educated on bronchodilators      See \"RT Assessments\" flow sheet for patient assessment scoring and Acuity Level Details.             "

## 2018-01-12 NOTE — PROGRESS NOTES
SWS:  D: discharge planning   A: LEON notified that pt dc is cancelled today. SW spoke with [facility] and they anticipate having a bed over the weekend for pt. SW left message with pt daughter Maylin requesting return call to discuss dc plan.  P: Anticipate pt will dc in the next 1-2 days. SW will continue to follow.

## 2018-01-12 NOTE — PLAN OF CARE
Problem: Patient Care Overview  Goal: Plan of Care/Patient Progress Review  Outcome: Improving  A&O x4. VSS, 2L O2 nc. Ax2 w/walker when oob. Pt c/o pain in the Rt shoulder/back, norco given this shift with relief. Incontinent, purewick in place. BS audible/active x4, nauseated, zofran given without relief, compazine given with relief, pt resting comfortably since intervention. Pt had a small, brown emesis, pt noted she had two prune juices last night and could be the reason for the brown color, will monitor. Pt should d/c to [facility] today. Continue with plan of care.

## 2018-01-12 NOTE — PROGRESS NOTES
[facility]  Hospitalist Progress Note  Name: Estefany An    MRN: [id number]  YOB: 1930    Age: 87 year old  Date of admission: 1/7/2018  Primary care provider: Ricki Blake      Reason for Stay (Diagnosis):  Fall with left clavicular/right pubic rami fracture/atrial fibrillation         Assessment and Plan:      Summary of Stay:  Estefany An is a 87 year old female with a history of hypertension and osteoarthritis who now presents with weakness, dizziness and fall resulting in a left clavicular fracture, pubic rami fracture, hypoxemia, and atrial fibrillation.    Problem List/Plan:  1. Atrial fibrillation with controlled ventricular rate: Heart rate currently controlled.  Given multiple falls resulting in clavicular and pelvic fracture, not a good candidate for Coumadin.  TSH was otherwise normal and echocardiogram demonstrated a normal EF with normal size and no significant valvular disease.  Continue aspirin for now.  Increased metoprolol to 50 mg p.o. twice daily.    2. Mild hypoxemia secondary to influenza A: Chest x-ray was otherwise negative and CT scan for PE was negative.  Although BNP is elevated, there are no radiographic or clinical findings to suggest acute heart failure.  O2 supplementation as needed.  Continue Tamiflu.  Still some notable wheezing and chest congestion.  Continue prednisone 40 mg p.o. daily with scheduled DuoNeb's.  --Chest x-ray showed no evidence of acute pulmonary infiltrates  3. Left clavicular fracture and right pubic rami fracture: Symptomatic treatment for now.  PT/OT to evaluate and treat.    4. Hyponatremia: Likely secondary to respiratory tract infection is suspicious that hydrochlorothiazide is playing a major role.  Continue to hold hydrochlorothiazide for now.  Continue to monitor serum sodium.  Will check urine studies including urine sodium, osmolality, intake and output monitoring  5. Hypokalemia: Secondary to  "hydrochlorothiazide.  Replace per protocol.  6. Acute anemia without evidence of acute blood loss anemia: Probably in part dilution with a chronic component.  Monitor for now    DVT Prophylaxis: Pneumatic Compression Devices  Code Status: DNR / DNI  Discharge Dispo: TCU.  Patient's preferences for [facility] as patient is from the [facility]  Estimated Disch Date / # of Days until Disch: Patient may discharge tomorrow to TCU.        Interval History (Subjective):      Patient was seen and examined by me this morning.  She is complaining of congestion cough but denies any chest pain.  She has no fever.  Chest x-ray obtained showed no acute pulmonary infiltrates.  Serum sodium is worsening.         Physical Exam:      Vital signs:  Temp: 96.6  F (35.9  C) Temp src: Axillary BP: 132/63 Pulse: 97 Heart Rate: 92 Resp: 20 SpO2: 93 % O2 Device: Nasal cannula Oxygen Delivery: 1 LPM Height: 172.7 cm (5' 8\") Weight: 90.9 kg (200 lb 8 oz)  Estimated body mass index is 30.49 kg/(m^2) as calculated from the following:    Height as of this encounter: 1.727 m (5' 8\").    Weight as of this encounter: 90.9 kg (200 lb 8 oz).      I/O last 3 completed shifts:  In: 480 [P.O.:480]  Out: 500 [Urine:500]  Vitals:    01/08/18 0643 01/10/18 0451 01/11/18 0605 01/12/18 0708   Weight: 84.4 kg (186 lb) 90.7 kg (200 lb) 90.5 kg (199 lb 8 oz) 90.9 kg (200 lb 8 oz)       Constitutional: Awake, alert, cooperative, no apparent distress   Respiratory: Nl work of breathing. Clear to auscultation bilaterally, no crackles or wheezing   Cardiovascular:  Irregularly irregular, normal S1 and S2, and no murmur noted   Abdomen: Normal bowel sounds, soft, non-distended, non-tender   Skin: No rashes, no cyanosis, dry to touch   Neuro: CN 2-12 intact, no localizing weakness   Extremities: No edema, some right hip tenderness with range of motion as well as left clavicular tenderness with palpation.   HEENT Normocephalic, atraumatic, " normal nasal turbinates; oropharynx clear   Neck Supple; nl inspection; trachea midline; no thryomegaly   Psychiatric: A+O x3. Normal affect          Medications:      All current medications were reviewed with changes reflected in problem list.         Data:      All new lab and imaging data was reviewed.   Labs:    Recent Labs  Lab 01/12/18  0827 01/11/18  0622 01/10/18  0635 01/09/18  0632 01/08/18  0900   WBC  --   --  14.1* 14.5* 13.7*   HGB 8.3* 8.3* 8.6* 8.6* 9.1*   HCT  --   --  25.3* 25.1* 27.5*   MCV  --   --  91 91 94   PLT  --   --  316 289 263       Recent Labs  Lab 01/12/18  1420 01/12/18 0827 01/11/18 0622  01/09/18  0632 01/08/18  0900  01/07/18  0759   * 126* 129*  < > 129* 128*  --  127*   POTASSIUM  --   --  4.9  --  4.7 3.9  < > 3.1*   CHLORIDE  --   --  95  --  97 94  --  90*   CO2  --   --  26  --  24 24  --  30   ANIONGAP  --   --  8  --  8 10  --  7   GLC  --   --  116*  --  115* 145*  --  151*   BUN  --   --  16  --  8 8  --  10   CR  --   --  0.68  --  0.66 0.65  --  0.71   GFRESTIMATED  --   --  82  --  85 86  --  77   GFRESTBLACK  --   --  >90  --  >90 >90  --  >90   KACI  --   --  8.5  --  8.2* 8.4*  --  8.3*   MAG  --   --   --   --   --   --   --  2.1   PROTTOTAL  --   --   --   --   --   --   --  7.1   ALBUMIN  --   --   --   --   --   --   --  3.1*   BILITOTAL  --   --   --   --   --   --   --  0.2   ALKPHOS  --   --   --   --   --   --   --  88   AST  --   --   --   --   --   --   --  27   ALT  --   --   --   --   --   --   --  24   < > = values in this interval not displayed.   Imaging:   No results found for this or any previous visit (from the past 24 hour(s)).

## 2018-01-13 NOTE — PLAN OF CARE
Problem: Infection, Risk/Actual (Adult)  Goal: Identify Related Risk Factors and Signs and Symptoms  Related risk factors and signs and symptoms are identified upon initiation of Human Response Clinical Practice Guideline (CPG).    Outcome: No Change  A&OX4,  VSS. On regular diet. Up with assist of one. Tele-none. PO Norco administered this shift for back pain. On 1 L nasal cannula oxygen. Incontinent this shift.

## 2018-01-13 NOTE — PROGRESS NOTES
Patient was seen and examined by me this morning.  She has right shoulder pain and back pain but denies any fever or chills.  Shortness of breath is better.  Oxygen saturation 89% on room air.  She has no abdominal pain.  Afebrile, labs reviewed and indicated sodium of 128 and WBC count significantly elevated at 22.  No indication for severe sepsis but is possible.  --We will get urinalysis culture if positive  --2 sets of blood culture  --Arm sling for pain control  --Continue to monitor another day in the hospital  Wt Readings from Last 5 Encounters:   01/13/18 88.7 kg (195 lb 8 oz)   11/02/14 81.6 kg (180 lb)   12/03/13 81.6 kg (180 lb)   -- lasix

## 2018-01-13 NOTE — CONSULTS
"CLINICAL NUTRITION SERVICES  -  ASSESSMENT NOTE      Malnutrition: Does not meet criteria at this time        REASON FOR ASSESSMENT  Estefany An is a 87 year old female seen by Registered Dietitian for LOS.      NUTRITION HISTORY  - Information obtained from patient and chart.  - Regular diet at home, meals TID.   - Denies a decrease in appetite/changes to meal frequency PTA - \"I was eating good\".   - Resided independently PTA.   - NKFA.      CURRENT NUTRITION ORDERS  Diet Order:     Regular    Current Intake/Tolerance:  Mostly 100% oral intakes documented though meals ordered may be quite small.  Likely meeting <75% needs x 6 days since admission.  Limiting factors including nausea (one recent episode of emesis noted), and pain impacting.  Patient herself verbalizes feeling overall \"unwell\".      PHYSICAL FINDINGS  Observed  See below   Obtained from Chart/Interdisciplinary Team  Last BM this AM    ANTHROPOMETRICS  Height: 5' 8\"  Weight: 88.6 kg (195#)  Body mass index is 29.73 kg/(m^2).  Weight Status:  Overweight BMI 25-29.9  IBW: 70 kg (154#)  % IBW: 127%  Weight History:  Wt Readings from Last 10 Encounters:   01/13/18 88.7 kg (195 lb 8 oz)   11/02/14 81.6 kg (180 lb)   12/03/13 81.6 kg (180 lb)   - Patient denies recent wt changes.    LABS  Labs reviewed    MEDICATIONS  Medications reviewed    Dosing Weight 74.7 kg - adjusted weight     ASSESSED NUTRITION NEEDS PER APPROVED PRACTICE GUIDELINES:  Estimated Energy Needs: 1194-7078 kcals (25-30 Kcal/Kg)  Justification: maintenance  Estimated Protein Needs:  grams protein (1.2-1.5 g pro/Kg)  Justification: preservation of lean body mass  Estimated Fluid Needs: 4242-3629 mL (1 mL/Kcal)  Justification: maintenance and per provider pending fluid status    MALNUTRITION:  % Weight Loss:  None noted  % Intake:  Decreased intake does not meet criteria for malnutrition   Subcutaneous Fat Loss:  None observed  Muscle Loss:  None observed  Fluid Retention:  None " noted    Malnutrition Diagnosis: Patient does not meet two of the above criteria necessary for diagnosing malnutrition    NUTRITION DIAGNOSIS:  Inadequate oral intake related to decreased appetite, N/V as evidenced by meeting <75% needs x 6 days since admission.      NUTRITION INTERVENTIONS  Recommendations / Nutrition Prescription  Continue regular diet order.     Ok for prn Ensure.      Implementation  Nutrition education: Provided education on ability for small/frequent meals, how to order Ensure, and protein sources.     Medical Food Supplement: As above.       Nutrition Goals  Patient to consume at least 75% of meals TID.      MONITORING AND EVALUATION:  Progress towards goals will be monitored and evaluated per protocol and Practice Guidelines        Abi Link RD, LD  Clinical Dietitian  3rd floor/ICU: [phone]  All other floors: [phone]  Weekend/holiday: [phone]

## 2018-01-13 NOTE — PLAN OF CARE
Problem: Cardiac: Heart Failure (Adult)  Goal: Signs and Symptoms of Listed Potential Problems Will be Absent, Minimized or Managed (Cardiac: Heart Failure)  Signs and symptoms of listed potential problems will be absent, minimized or managed by discharge/transition of care (reference Cardiac: Heart Failure (Adult) CPG).   Outcome: Improving  VSS. A&OX4. On 1 L of O2 with sats at 93-95%. Dyspneic on exertion. Lung sounds have expiratory wheezes. 2+ edema to bilateral LE's. Norco given X1 for left shoulder/back pain. Purewick in place for I&O's. Will discharge to TCU when medically stable.

## 2018-01-13 NOTE — PLAN OF CARE
Problem: Patient Care Overview  Goal: Plan of Care/Patient Progress Review  Discharge Planner OT   Patient plan for discharge: Home vs TCU  Current status: Pt in bed, willing to participate.  Came to EOB with SBA and verbal cues.  Stood with SBA.  Used walker to ambulate into bathroom to stand to wash face and brush teeth.  Declined using the toilet.  Used walker to walk around room twice, ending up in chair for lunch.  Pt notably wheezy, coughing a wet cough fairly frequently.  Fatigued by end of session.  Barriers to return to prior living situation: Current medical condition, O2 needs  Recommendations for discharge: TCU  Rationale for recommendations: Pt would benefit from continued skilled therapeutic intervention to build strength and endurance prior to returning to her [facility].       Entered by: Sohan Sorto 01/13/2018 2:09 PM        pt would benefit from skilled OT intervention in order to increase safety and ind

## 2018-01-13 NOTE — PROGRESS NOTES
[facility]  Hospitalist Progress Note  Name: Estefany An    MRN: [id number]  YOB: 1930    Age: 87 year old  Date of admission: 1/7/2018  Primary care provider: Ricki Blake      Reason for Stay (Diagnosis):  Fall with left clavicular/right pubic rami fracture/atrial fibrillation         Assessment and Plan:      Summary of Stay:  Estefany An is a 87 year old female with a history of hypertension and osteoarthritis who now presents with weakness, dizziness and fall resulting in a left clavicular fracture, pubic rami fracture, hypoxemia, and atrial fibrillation.    Problem List/Plan:  1. Atrial fibrillation with controlled ventricular rate: Heart rate currently controlled.  Given multiple falls resulting in clavicular and pelvic fracture, not a good candidate for Coumadin.  TSH was otherwise normal and echocardiogram demonstrated a normal EF with normal size and no significant valvular disease.  Continue aspirin for now.  Increased metoprolol to 50 mg p.o. twice daily.    2. Mild hypoxemia secondary to influenza A: Chest x-ray was otherwise negative and CT scan for PE was negative.  Although BNP is elevated, there are no radiographic or clinical findings to suggest acute heart failure.  O2 supplementation as needed.  Continue Tamiflu.  Still some notable wheezing and chest congestion.  Continue prednisone 40 mg p.o. daily with scheduled DuoNeb's.  --Chest x-ray showed no evidence of acute pulmonary infiltrates  3. Left clavicular fracture and right pubic rami fracture: Symptomatic treatment for now.  PT/OT to evaluate and treat.    4. Hyponatremia: Likely secondary to respiratory tract infection is suspicious that hydrochlorothiazide is playing a major role.  Continue to hold hydrochlorothiazide for now.  Continue to monitor serum sodium.  Will check urine studies including urine sodium, osmolality, intake and output monitoring  5. Hypokalemia: Secondary to  "hydrochlorothiazide.  Replace per protocol.  6. Acute anemia without evidence of acute blood loss anemia: Probably in part dilution with a chronic component.  Monitor for now  7. Leukocytosis, elevated lactic acid level: Further evaluation revealed evidence of urinary tract infection, urine was cultured and patient was started on ceftriaxone, continue to monitor.  2 sets of blood culture obtained    DVT Prophylaxis: Pneumatic Compression Devices  Code Status: DNR / DNI  Discharge Dispo: TCU.  Patient's preferences for [facility] as [facility] the [facility]  Estimated Disch Date / # of Days until Disch: Continue care for now, check complete blood count tomorrow and see if sepsis revolves patient can be discharged to TCU in the next 1 or 2 days        Interval History (Subjective):        Patient was seen and examined by me this morning.  She has right shoulder pain and back pain but denies any fever or chills.  Shortness of breath is better.  Oxygen saturation 89% on room air.  She has no abdominal pain.  Afebrile, labs reviewed and indicated sodium of 128 and WBC count significantly elevated at 22.  No indication for severe sepsis but is possible.  --We will get urinalysis culture if positive  --2 sets of blood culture  --Arm sling for pain control  --Continue to monitor another day in the hospital           Physical Exam:      Vital signs:  Temp: 97.6  F (36.4  C) Temp src: Oral BP: 146/59   Heart Rate: 91 Resp: 20 SpO2: 93 % O2 Device: Nasal cannula Oxygen Delivery: 1 LPM Height: 172.7 cm (5' 8\") Weight: 88.7 kg (195 lb 8 oz)  Estimated body mass index is 29.73 kg/(m^2) as calculated from the following:    Height as of this encounter: 1.727 m (5' 8\").    Weight as of this encounter: 88.7 kg (195 lb 8 oz).      I/O last 3 completed shifts:  In: 600 [P.O.:600]  Out: 2400 [Urine:2400]  Vitals:    01/08/18 0643 01/10/18 0451 01/11/18 0605 01/12/18 0708   Weight: 84.4 kg (186 lb) 90.7 kg " (200 lb) 90.5 kg (199 lb 8 oz) 90.9 kg (200 lb 8 oz)    01/13/18 0416   Weight: 88.7 kg (195 lb 8 oz)       Constitutional: Awake, alert, cooperative, no apparent distress   Respiratory: Nl work of breathing. Clear to auscultation bilaterally, no crackles or wheezing   Cardiovascular:  Irregularly irregular, normal S1 and S2, and no murmur noted   Abdomen: Normal bowel sounds, soft, non-distended, non-tender   Skin: No rashes, no cyanosis, dry to touch   Neuro: CN 2-12 intact, no localizing weakness   Extremities: No edema, some right hip tenderness with range of motion as well as left clavicular tenderness with palpation.   HEENT Normocephalic, atraumatic, normal nasal turbinates; oropharynx clear   Neck Supple; nl inspection; trachea midline; no thryomegaly   Psychiatric: A+O x3. Normal affect          Medications:      All current medications were reviewed with changes reflected in problem list.         Data:      All new lab and imaging data was reviewed.   Labs:    Recent Labs  Lab 01/13/18  0604 01/12/18  0827 01/11/18  0622 01/10/18  0635 01/09/18  0632   WBC 22.2*  --   --  14.1* 14.5*   HGB 8.4* 8.3* 8.3* 8.6* 8.6*   HCT 25.0*  --   --  25.3* 25.1*   MCV 92  --   --  91 91     --   --  316 289       Recent Labs  Lab 01/13/18  1519 01/13/18  0604 01/12/18  2222  01/11/18  0622  01/09/18  0632  01/07/18  0759   * 128* 126*  < > 129*  < > 129*  < > 127*   POTASSIUM  --  4.3  --   --  4.9  --  4.7  < > 3.1*   CHLORIDE  --  94  --   --  95  --  97  < > 90*   CO2  --  26  --   --  26  --  24  < > 30   ANIONGAP  --  8  --   --  8  --  8  < > 7   GLC  --  98  --   --  116*  --  115*  < > 151*   BUN  --  26  --   --  16  --  8  < > 10   CR  --  0.73  --   --  0.68  --  0.66  < > 0.71   GFRESTIMATED  --  75  --   --  82  --  85  < > 77   GFRESTBLACK  --  >90  --   --  >90  --  >90  < > >90   KACI  --  8.0*  --   --  8.5  --  8.2*  < > 8.3*   MAG  --   --   --   --   --   --   --   --  2.1   PROTTOTAL  --    --   --   --   --   --   --   --  7.1   ALBUMIN  --   --   --   --   --   --   --   --  3.1*   BILITOTAL  --   --   --   --   --   --   --   --  0.2   ALKPHOS  --   --   --   --   --   --   --   --  88   AST  --   --   --   --   --   --   --   --  27   ALT  --   --   --   --   --   --   --   --  24   < > = values in this interval not displayed.   Imaging:   No results found for this or any previous visit (from the past 24 hour(s)).

## 2018-01-13 NOTE — PLAN OF CARE
Problem: Goal/Outcome  Goal: Goal Outcome Summary  Outcome: Improving  A&OX4,  VSS. On regular diet. Up with assist of one. Tele-none. PO Norco administered this shift for back pain. On 1 L nasal cannula oxygen, LS coarse with exp wheezes. Incontinent this shift, purewick in place.  UA positive, starting rocephin.  Pt needs sling, please order and place on her for arm.

## 2018-01-14 NOTE — PLAN OF CARE
Problem: Patient Care Overview  Goal: Plan of Care/Patient Progress Review  Discharge Planner OT   Patient plan for discharge: TCU  Current status: Pt in bed, sling on her right arm.  Complaining of pain in right shoulder and back but willing to get out of bed for grooming and hygiene tasks and sit up for breakfast.  Min A to EOB, SBA to stand and get to sink.  Encouraged pt to use right arm as a stabilizer and to manipulate objects while doing bigger movements with her left UE.  Completed washing face and brushing teeth.  Assisted patient to order breakfast once seated in bedside chair.   Barriers to return to prior living situation: Current medical condition, O2 needs  Recommendations for discharge: TCU  Rationale for recommendations: Pt would benefit from continued skilled therapeutic intervention to build strength and endurance prior to returning to her [facility].       Entered by: Sohan Sorto 01/14/2018 8:31 AM        pt would benefit from skilled OT intervention in order to increase safety and ind

## 2018-01-14 NOTE — PLAN OF CARE
Problem: Patient Care Overview  Goal: Plan of Care/Patient Progress Review  Outcome: Improving  A&OX4,  VSS. On regular diet. Up with assist of one. Tele-none. PO Norco administered this shift for back pain. Pt on RA since 10am, sats 93, LS coarse with exp wheezes. Incontinent this shift, purewick in place.  UA positive, started on rocephin yesterday.

## 2018-01-14 NOTE — PROGRESS NOTES
Patient was seen and examined by me this morning.  She has some dyspepsia but denies any fever or shortness of breath.  Pain is controlled with pain medications.  Patient is ambulatory.  Her sodium is 125 today slightly down from 126 yesterday after Lasix.  Will hold Lasix for now, continue to monitor serum sodium.  Continue antibiotic, will check sodium level in the morning, I may discharge to TCU tomorrow if her serum sodium remains stable.

## 2018-01-14 NOTE — PLAN OF CARE
Problem: Patient Care Overview  Goal: Plan of Care/Patient Progress Review    Marsing: A&Ox4.  VSS: afebrile.  LS: coarse, exp wheezes on 1L NC  GI: active, last BM 1/12, pt taking PRN senna per request, c/o nausea-zofran x1 with relief  : purewick in place, output 600cc, per pt purewick was changed this afternoon by day nurse  Skin: intact, bruised  Activity: x1, walker, arm sling on right side   Diet: reg diet   Pain: c/o 9/10 all over body pain, taking Norco  Plan: Poss d/c to TCU in 1-2 days

## 2018-01-14 NOTE — PLAN OF CARE
Problem: Patient Care Overview  Goal: Plan of Care/Patient Progress Review  Outcome: No Change  A&OX4, VSS. Tele-none. Denies pain, on regular diet. Up with assist of one with walker. On 1 L nasal cannula sating in the mid 90's.  Irwin used this shift, sling on the right arm.Tums administered this shift.

## 2018-01-15 NOTE — PLAN OF CARE
Problem: Patient Care Overview  Goal: Plan of Care/Patient Progress Review  Physical Therapy Discharge Summary    Reason for therapy discharge:    Discharged to transitional care facility.    Progress towards therapy goal(s). See goals on Care Plan in [facility] electronic health record for goal details.  Goals not met.  Barriers to achieving goals:   discharge from facility and sba for bed mob, min A for transfers, CGA for 100 feet of ambulation.    Therapy recommendation(s):    Continued therapy is recommended.  Rationale/Recommendations:  .. Pt is below baseline for functional mobility and strength. Pt would benefit from continued skilled therapy to address these deficits.

## 2018-01-15 NOTE — PLAN OF CARE
Problem: Infection, Risk/Actual (Adult)  Goal: Identify Related Risk Factors and Signs and Symptoms  Related risk factors and signs and symptoms are identified upon initiation of Human Response Clinical Practice Guideline (CPG).    Outcome: No Change  A&OX4, VSS. Denies SOB.PO Norco administered this shift for 8/10 generalized pain with good relief  .     Tele- none. On regular diet. Up with assist of 1 with walker. On IV Rocephin. Incontinent, dakota used this shift.

## 2018-01-15 NOTE — PLAN OF CARE
Problem: Patient Care Overview  Goal: Plan of Care/Patient Progress Review  Occupational Therapy Discharge Summary    Reason for therapy discharge:    Discharged to transitional care facility.    Progress towards therapy goal(s). See goals on Care Plan in [facility] electronic health record for goal details.  Goals not met.  Barriers to achieving goals:   discharge from facility.    Therapy recommendation(s):    Continued therapy is recommended.  Rationale/Recommendations:  eval and treat at TCU to maximize safety and indep in ADLs and mobility tasks. .

## 2018-01-15 NOTE — PLAN OF CARE
Problem: Patient Care Overview  Goal: Plan of Care/Patient Progress Review  Outcome: Improving    Roxana: A&O x4.   VSS: afebrile.  LS: coarse on RA, cough  GI: active, last BM today, incontinent  : incontinent most of shift, new purewick placed at 2145   Skin: intact  Activity: x1, walker, right arm sling in place   Diet: reg   Pain: c/o 7-9 generalized pain, taking Norco with relief  Plan: IV rocephin, poss d/c to TCU tmrrw

## 2018-01-15 NOTE — PLAN OF CARE
Problem: Patient Care Overview  Goal: Plan of Care/Patient Progress Review  Outcome: Improving  RN - VSS. HR irregular. Pt c/o hip and right shoulder pain - relieved with PO norco. Pt tolerating PO intake. Productive cough observed. Incontinent of urine - purewick removed and briefs applied. Pt Na level improving. Pt receiving discharge orders to TCU. Ride arranged at 1400. Discharge instructions reviewed with pt - pt verbalizing understanding of instructions.

## 2018-01-16 NOTE — PROGRESS NOTES
Transition Communication Hand-off for Care Transitions to Next Level of Care Provider    Name: Estefany An  MRN #: [id number]  Primary Care Provider: Ricki Blake  Primary Care MD Name: Ricki Blake  Primary Clinic: [facility] [id number]  [facility] [id number]  Primary Care Clinic Name: [facility]  Reason for Hospitalization:  New onset atrial fibrillation (H) [I48.91]  Pubic ramus fracture, right, closed, initial encounter (H) [S32.591A]  Acute congestive heart failure, unspecified congestive heart failure type (H) [I50.9]  Closed nondisplaced fracture of right clavicle, unspecified part of clavicle, initial encounter [S42.001A]  Admit Date/Time: 1/7/2018  7:40 AM  Discharge Date:1/15  Payor Source: Payor: MEDICARE / Plan: MEDICARE / Product Type: Medicare /     Readmission Assessment Measure (ODALYS) Risk Score/category: Avg    Reason for Communication Hand-off Referral: Fragility Influenza A    Discharge Plan: [facility]  Discharge Needs Assessment:  Needs       Most Recent Value    Equipment Currently Used at Home none    Transportation Available car, family or friend will provide    # of Referrals Placed by CTS Post Acute Facilities    Transitional Care [facility] [phone]    PAS Number [id number]        Follow-up plan:  No future appointments.    Any outstanding tests or procedures:        Referrals     Future Labs/Procedures    Occupational Therapy Adult Consult     Comments:    Evaluate and treat as clinically indicated.    Physical Therapy Adult Consult     Comments:    Evaluate and treat as clinically indicated.              Key Recommendations:  Pt admitted with hypoxia/Influenza A, new afib and BNP 2899. She was treated with O2 2L, IVF and tamiflu. She is high risk with falls at home .Daughter is present and supportive. She lives at the [facility] and is open to increased services if needed on dc. She will follow up with Dr Blake as scheduled. She  was dc'd to [facility] TCU for further therapies and strengthening on dc. New meds include norco, metoprolol, septra and her HCTZ and tramadol was stopped.    Olivia Win RN CTS    AVS/Discharge Summary is the source of truth; this is a helpful guide for improved communication of patient story

## 2018-01-16 NOTE — DISCHARGE SUMMARY
"Physician Discharge Summary     Name: Estefany An    MRN: [id number]     YOB: 1930    Age: 87 year old                                                 Primary care provider: Ricki Blake      Admit date:  1/7/2018      Discharge date and time: 1/15/2018  2:20 PM       Discharge Physician:  Hernandez Stovall        Discharge Diagnosis and brief summary        #1.  Mild acute respiratory failure with hypoxia secondary to influenza A infection: Improved significantly with no oxygen supplement needed on discharge.    #2.  Left clavicle fracture secondary to a fall: Nonoperative,    #3.  Right pubic Ramus fracture: Traumatic, nonoperative    #4.  Hyponatremia: Suspect multifactorial etiology including hydrochlorothiazide, volume depletion.  Sodium trending up, asymptomatic and to be followed as an outpatient    #5.  Acute anemia: Likely acute on chronic, hemodilution related    #6.  Acute urinary tract infection with SIRS, treated with intravenous ceftriaxone and discharged on oral antibiotic.    #7.  Generalized physical deconditioning: Requiring rehab          Secondary Diagnosis /chronic medical conditions        Past Medical History:   Diagnosis Date     Anxiety      Arthritis      Hypertension        Past Surgical History:    Past Surgical History:   Procedure Laterality Date     CHOLECYSTECTOMY                     Brief Summary of Hospital stay :       Please refer to  Admission H&P note for full details of patient presentation.      Admission Condition: fair     Discharged Condition: stable    /77 (BP Location: Left arm)  Pulse 84  Temp 97.5  F (36.4  C) (Oral)  Resp 16  Ht 1.727 m (5' 8\")  Wt 88.7 kg (195 lb 8 oz)  SpO2 93%  BMI 29.73 kg/m2       Presenting problem/signs and symptoms:    Fall    Brief Hospital Summary:    Patient is a 87-year-old female with multiple medical problems including hypertension, arthritis who presents with dizziness, lightheadedness as well as mechanical " fall on the day of admission.  Patient unfortunately had multiple fractures including left clavicular as well as pubic ramus fracture.  He was admitted and was also found to have atrial fibrillation was controlled ventricular response.  For her atrial fibrillation patient was monitored on telemetry, echo echocardiogram was obtained.  Due to risk for fall and bleeding anticoagulated and his Coumadin was not started.  Echocardiogram showed evidence of normal ejection fraction was normal in size and no significant valvular disease.  Patient was started on aspirin and her metoprolol was increased to 50 twice a day.  Patient remained stable and was discharged in A. fib which was rate controlled.    Here influenza A was complicated by hypoxia which was mild responded well to treatment ,no Acute CHF.    Hyponatremia was trending up on the day of discharge it was 127, hydrochlorothiazide was stopped and patient was hydrated.  Patient was recommended to get outpatient follow-up to get tested for sodium again.      Patient also had chronic anemia which is worse but no evidence of acute bleeding.  Patient was monitored and had no symptoms.  Patient's hemoglobin need to be monitored and further workup is left to outpatient provider.    Patient's left clavicular and suprapubic ramus fracture did not require any surgical intervention but rather recommended supportive care and nonoperative management and follow-up with her primary care physician as well as orthopedic physician as an outpatient.    Electrolyte abnormalities including hypokalemia likely from her diuretic therapy was also replaced.    Patient developed leukocytosis and further evaluation showed evidence of a tract infection which was treated with ceftriaxone and patient remained stable.  Patient is discharged in stable condition to have a follow-up visit with her primary care provider for further assessment and recommendation.    Patient was discharged to  transitional care facility for rehab.        Consultations during hospital stay         CARE COORDINATOR IP CONSULT  PHYSICAL THERAPY ADULT IP CONSULT  OCCUPATIONAL THERAPY ADULT IP CONSULT  OCCUPATIONAL THERAPY ADULT IP CONSULT  PHYSICAL THERAPY ADULT IP CONSULT      Major procedure performed/  Significant Diagnostic Studies              Results for orders placed or performed during the hospital encounter of 01/07/18   CT Head w/o Contrast    Narrative    CT SCAN OF THE HEAD WITHOUT CONTRAST   1/7/2018 9:26 AM     HISTORY: trauma;     TECHNIQUE:  Axial images of the head and coronal reformations without  IV contrast material. Radiation dose for this scan was reduced using  automated exposure control, adjustment of the mA and/or kV according  to patient size, or iterative reconstruction technique.    COMPARISON: None.    FINDINGS:  There is generalized atrophy of the brain.  White matter  changes are present in the cerebral hemispheres that are consistent  with small vessel ischemic disease in this age patient. There is no  evidence of intracranial hemorrhage, mass, acute infarct or anomaly.  The left sphenoid sinus is nearly completely opacified no bleed or  fractures are identified.      Impression    IMPRESSION: No intracranial hemorrhage or skull fractures are  identified.      BEN CHÁVEZ MD   XR Pelvis and Hip Right 2 Views    Narrative    XR PELVIS AND HIP RIGHT 2 VIEWS 1/7/2018 9:46 AM    COMPARISON: None.    HISTORY: Trauma.      Impression    IMPRESSION: Mildly displaced fractures involving the right superior  and inferior pubic rami. No other definite pelvic fractures are seen.  Osteopenia about the pelvis and hips.    ALEX MOSQUERA MD   XR Shoulder Right G/E 3 Views    Narrative    XR SHOULDER RT G/E 3 VW 1/7/2018 9:45 AM    COMPARISON: None.    HISTORY: Trauma.      Impression    IMPRESSION:   1. Slightly comminuted and mildly displaced distal right clavicular  fracture is seen. No other findings  concerning for fracture in the  right shoulder.  2. Severe glenohumeral osteoarthritis with extensive remodeling about  the glenoid and the humeral head.    ALEX MOSQUERA MD   XR Chest 1 View    Narrative    XR CHEST 1 VW 1/7/2018 9:47 AM    COMPARISON: 11/2/2014    HISTORY: Shortness of breath, weakness and hypoxia.      Impression    IMPRESSION: Mild left basilar atelectasis. No pleural effusion or  pneumothorax. Right clavicular fracture better evaluated on dedicated  radiographs of the shoulder. Old left humeral fracture deformity  partially visualized.    ALEX MOSQUERA MD   CT Chest Pulmonary Embolism w Contrast    Narrative    Exam: CT CHEST PULMONARY EMBOLISM W CONTRAST 1/7/2018 11:18 AM    Comparison: Chest x-ray on the same day and CT dated 12/3/2013.     Clinical History: Chest pain.    Technique: Volumetric helical acquisition of the chest were obtained  following pulmonary embolism protocol. Three-dimensional (3D)  post-processed angiographic images were reconstructed, archived to  PACS and used in interpretation of this study. Radiation dose for this  scan was reduced using automated exposure control, adjustment of the  mA and/or kV according to patient size, or iterative reconstruction  technique.    Contrast: 73mL Isovue-370    Findings:  Contrast bolus timing is adequate for evaluation for pulmonary emboli.  There is no evidence of pulmonary emboli.  There is no evidence of  right heart strain.     Dependent atelectasis. Lungs otherwise clear. No evidence of pleural  effusion is noted.    The heart size and great vessels are normal in caliber. No evidence of  axillary, mediastinal or hilar lymphadenopathy is seen.     Upper abdomen: Evaluation of the upper abdomen is limited by contrast  bolus timing and coverage.    Bones: Wedge compression deformity involving the T12 vertebral body.      Impression    Impression:   1. No evidence of pulmonary embolus.  2. Wedge compression deformity involving the  T12 vertebral body is  age-indeterminate, but new since chest x-ray dated 11/2/2014.    ALEX MOSQUERA MD   XR Chest Port 1 View    Narrative    XR CHEST PORT 1 VW 1/12/2018 10:35 AM    HISTORY: Hypoxia.    COMPARISON: 1/7/2018    FINDINGS: No airspace consolidation, pleural effusion or pneumothorax.  Normal heart size. Marked degenerative change at both shoulders.      Impression    IMPRESSION: No acute pulmonary abnormality.    TORY SABILLON MD         Recent Labs  Lab 01/15/18  0650 01/14/18  0651 01/13/18  0604   WBC 12.5* 17.3* 22.2*   HGB 7.8* 8.5* 8.4*   HCT 23.4* 25.3* 25.0*   MCV 91 91 92   * 476* 442       Recent Labs  Lab 01/13/18  1217 01/13/18  1153 01/13/18  1139   CULT >100,000 colonies/mLProteus mirabilis*  <10,000 colonies/mLurogenital floraSusceptibility testing not routinely done No growth after 3 days No growth after 3 days       Recent Labs  Lab 01/15/18  0650 01/14/18  0651 01/13/18  1519 01/13/18  0604   * 125* 126* 128*   POTASSIUM 4.0 4.0  --  4.3   CHLORIDE 93* 91*  --  94   CO2 26 28  --  26   ANIONGAP 8 6  --  8   GLC 88 95  --  98   BUN 17 18  --  26   CR 0.73 0.72  --  0.73   GFRESTIMATED 75 77  --  75   GFRESTBLACK >90 >90  --  >90   KACI 8.2* 8.7  --  8.0*         Recent Labs  Lab 01/15/18  0650 01/14/18  0651 01/13/18  0604 01/11/18  0622   GLC 88 95 98 116*           No results for input(s): INR in the last 168 hours.            Pending Results           Unresulted Labs Ordered in the Past 30 Days of this Admission     Date and Time Order Name Status Description    1/13/2018 1115 Blood culture Preliminary     1/13/2018 1115 Blood culture Preliminary               Disposition         SNF      Allergies       No Known Allergies         Patient Instructions and Discharge Medications              Review of your medicines      START taking       Dose / Directions    HYDROcodone-acetaminophen 5-325 MG per tablet   Commonly known as:  NORCO   Used for:  Pubic ramus fracture,  right, closed, initial encounter (H)        Dose:  1 tablet   Take 1 tablet by mouth every 4 hours as needed for moderate to severe pain   Quantity:  30 tablet   Refills:  0       metoprolol tartrate 50 MG tablet   Commonly known as:  LOPRESSOR   Used for:  New onset atrial fibrillation (H)        Dose:  50 mg   Take 1 tablet (50 mg) by mouth 2 times daily   Quantity:  60 tablet   Refills:  0       sulfamethoxazole-trimethoprim 800-160 MG per tablet   Commonly known as:  BACTRIM DS/SEPTRA DS   Used for:  Closed nondisplaced fracture of right clavicle, unspecified part of clavicle, initial encounter        Dose:  1 tablet   Take 1 tablet by mouth 2 times daily for 3 days   Quantity:  14 tablet   Refills:  0         CONTINUE these medicines which have NOT CHANGED       Dose / Directions    LORazepam 0.5 MG tablet   Commonly known as:  ATIVAN   Used for:  Closed nondisplaced fracture of right clavicle, unspecified part of clavicle, initial encounter        Dose:  0.5 mg   Take 1 tablet (0.5 mg) by mouth At Bedtime   Quantity:  30 tablet   Refills:  0       losartan 100 MG tablet   Commonly known as:  COZAAR        Dose:  100 mg   Take 100 mg by mouth daily   Refills:  0       NIFEdipine ER 90 MG Tb24   Commonly known as:  ADALAT CC        Dose:  90 mg   Take 90 mg by mouth daily   Refills:  0         STOP taking          hydrochlorothiazide 25 MG tablet   Commonly known as:  HYDRODIURIL           traMADol 50 MG tablet   Commonly known as:  ULTRAM                Where to get your medicines      Some of these will need a paper prescription and others can be bought over the counter. Ask your nurse if you have questions.     Bring a paper prescription for each of these medications      HYDROcodone-acetaminophen 5-325 MG per tablet     LORazepam 0.5 MG tablet       You don't need a prescription for these medications      metoprolol tartrate 50 MG tablet     sulfamethoxazole-trimethoprim 800-160 MG per tablet               Discharge diet:  Active Diet Order      Advance Diet as Tolerated  cardiac diet        Discharge activity:Activity as tolerated      Discharge follow-up:    Follow up with primary care provider in 14 days or earlier if symptoms return or gets worse.    Follow up with consultant as instructed     Other instructions:    We discussed with Patient/family about detail discharge instructions as well as discharge medications above including potential risks,side effects and benefits.Patient/family understood benefits and potential serious side effects of taking these medications and need to follow up with PCP if the patient develops complications.  Patient is also advised to see a doctor immediately for severe symptoms.          I saw and evaluated the patient on day of discharge and  discharge instructions reviewed  and  all the patient's questions and concerns addressed.Over 30 minutes spent on discharge and coordination of discharge process for this patient.          Disclaimer: This note consists of symbols derived from keyboarding, dictation and/or voice recognition software. As a result, there may be errors in the script that have gone undetected. Please consider this when interpreting information found in this chart

## 2018-01-18 ENCOUNTER — RECORDS - HEALTHEAST (OUTPATIENT)
Dept: LAB | Facility: CLINIC | Age: 83
End: 2018-01-18

## 2018-01-18 LAB
ANION GAP SERPL CALCULATED.3IONS-SCNC: 9 MMOL/L (ref 5–18)
BUN SERPL-MCNC: 17 MG/DL (ref 8–28)
CALCIUM SERPL-MCNC: 8.8 MG/DL (ref 8.5–10.5)
CHLORIDE BLD-SCNC: 92 MMOL/L (ref 98–107)
CO2 SERPL-SCNC: 25 MMOL/L (ref 22–31)
CREAT SERPL-MCNC: 0.91 MG/DL (ref 0.6–1.1)
GFR SERPL CREATININE-BSD FRML MDRD: 58 ML/MIN/1.73M2
GLUCOSE BLD-MCNC: 115 MG/DL (ref 70–125)
HGB BLD-MCNC: 8.6 G/DL (ref 12–16)
POTASSIUM BLD-SCNC: 4.2 MMOL/L (ref 3.5–5)
SODIUM SERPL-SCNC: 126 MMOL/L (ref 136–145)

## 2018-01-21 PROBLEM — J96.21 ACUTE ON CHRONIC RESPIRATORY FAILURE WITH HYPOXIA (H): Status: ACTIVE | Noted: 2018-01-01

## 2018-01-21 NOTE — PHARMACY-ADMISSION MEDICATION HISTORY
Admission medication history interview status for this patient is complete. See [facility] admission navigator for allergy information, prior to admission medications and immunization status.     Medication history interview source(s):Patient and Caregiver  Medication history resources (including written lists, pill bottles, clinic record): Nursing home MAR  Primary pharmacy:[facility] (off [facility] and [place])    Changes made to PTA medication list:  Added: Tamiflu, Tylenol  Deleted: none  Changed: metoprolol (decreased from 100 mg per dose to 50 mg per dose per MAR)    Actions taken by pharmacist (provider contacted, etc): Reviewed med list with Estefany and her daughter in the room. Estefany was unable to confirm meds, couldn't recall her blood pressure meds. I contacted [facility] (off [address]) who faxed a MAR. Listed updated per MAR.     Additional medication history information:None    Medication reconciliation/reorder completed by provider prior to medication history? No    Do you take OTC medications (eg tylenol, ibuprofen, fish oil, eye/ear drops, etc)? Y     Prior to Admission medications    Medication Sig Last Dose Taking? Auth Provider   Oseltamivir Phosphate (TAMIFLU PO) Take 30 mg by mouth daily 01/16/2018 through 01/29/2018 1/21/2018 at 0800 Yes Reported, Patient   Acetaminophen (TYLENOL PO) Take 650 mg by mouth 3 times daily 1/21/2018 at 1200 (2nd dose) Yes Reported, Patient   HYDROcodone-acetaminophen (NORCO) 5-325 MG per tablet Take 1 tablet by mouth every 4 hours as needed for moderate to severe pain 1/21/2018 at 1103 (2nd dose) Yes Hernandez Stovall MD   LORazepam (ATIVAN) 0.5 MG tablet Take 1 tablet (0.5 mg) by mouth At Bedtime 1/20/2018 at 2000 Yes Hernandez Stovall MD   metoprolol tartrate (LOPRESSOR) 50 MG tablet Take 1 tablet (50 mg) by mouth 2 times daily 1/21/2018 at 0800 Yes Hernandez Stovall MD   losartan (COZAAR) 100 MG tablet Take 50 mg by mouth daily  1/21/2018 at  0800 Yes Unknown, Entered By History   NIFEdipine ER (ADALAT CC) 90 MG TB24 Take 90 mg by mouth daily 1/21/2018 at 0800 Yes Unknown, Entered By History

## 2018-01-21 NOTE — ED NOTES
[facility]  ED Nurse Handoff Report    Estefany An is a 87 year old female   ED Chief complaint: Shortness of Breath  . ED Diagnosis:   Final diagnoses:   Acute respiratory failure with hypoxia (H)   Healthcare-associated pneumonia   Hyponatremia     Allergies: No Known Allergies    Code Status: Full Code  Activity level - Baseline/Home:  Stand with Assist of 2- Uses mostly wheelchair with PT sessions due to closed pelvic fx 2 weeks ago  . Activity Level - Current:   Stand with Assist of 2. Lift room needed: No. Bariatric: No   Needed: No   Isolation: Yes. Infection: Not Applicable  Influenza.     Vital Signs:   Vitals:    01/21/18 1345 01/21/18 1348 01/21/18 1354 01/21/18 1429   BP:       Pulse:   66    Resp: 19      Temp:       TempSrc:       SpO2: 96% 95% 90% 94%       Cardiac Rhythm:  ,      Pain level:    Patient confused: No. Patient Falls Risk: Yes.   Elimination Status:    Patient Report - Initial Complaint: SOB. Focused Assessment:   13:38 Skin Color/Condition Skin - Skin Comment: Skin is warm and dry. Bruising to the right shoulder.  MS    13:37 Neurological Cognitive/Perceptual/Neuro - Cognitive/Neuro/Behavioral WDL:  (Alert and oriented times four) MS    13:36 Respiratory Respiratory - Respiratory WDL:  (Expiratory wheezes more on left than right. Respirations are regular. Oxygen saturations on room air in the low 80's. )     Closed pelvic fx 2 weeks ago. Has been at [facility] for Rehab.    Tests Performed: CT, Xray, Labs. Abnormal Results:   Labs Ordered and Resulted from Time of ED Arrival Up to the Time of Departure from the ED   CBC WITH PLATELETS DIFFERENTIAL - Abnormal; Notable for the following:        Result Value    WBC 11.3 (*)     RBC Count 2.38 (*)     Hemoglobin 7.5 (*)     Hematocrit 22.1 (*)     Absolute Neutrophil 8.6 (*)     All other components within normal limits   COMPREHENSIVE METABOLIC PANEL - Abnormal; Notable for the following:     Sodium 122 (*)      Chloride 91 (*)     Glucose 132 (*)     Creatinine 1.08 (*)     GFR Estimate 48 (*)     GFR Estimate If Black 58 (*)     Calcium 7.4 (*)     Albumin 2.7 (*)     Protein Total 6.5 (*)     Alkaline Phosphatase 196 (*)     All other components within normal limits   NT PROBNP INPATIENT - Abnormal; Notable for the following:     N-Terminal Pro BNP Inpatient 4442 (*)     All other components within normal limits   ISTAT  GASES LACTATE MITALI POCT - Abnormal; Notable for the following:     Ph Venous 7.49 (*)     PCO2 Venous 33 (*)     All other components within normal limits   ISTAT BASIC MET ICA HCT POCT - Abnormal; Notable for the following:     Sodium 122 (*)     Chloride 89 (*)     Glucose 142 (*)     Creatinine 1.2 (*)     GFR Estimate 42 (*)     GFR Estimate If Black 51 (*)     Calcium Ionized 4.3 (*)     Hemoglobin 8.2 (*)     Hematocrit - POCT 24 (*)     All other components within normal limits   TROPONIN I   ROUTINE UA WITH MICROSCOPIC   PULSE OXIMETRY NURSING   CARDIAC CONTINUOUS MONITORING   PERIPHERAL IV CATHETER   STRICT INTAKE AND OUTPUT     .   Treatments provided: Meds, Abx  Family Comments:   OBS brochure/video discussed/provided to patient:  N/A  ED Medications:   Medications   lidocaine 1 % 1 mL (not administered)   lidocaine (LMX4) kit (not administered)   sodium chloride (PF) 0.9% PF flush 3 mL (not administered)   sodium chloride (PF) 0.9% PF flush 3 mL (not administered)   cefTRIAXone IN D5W (ROCEPHIN) intermittent infusion 2 g (not administered)   azithromycin (ZITHROMAX) 500 mg in NaCl 0.9 % 250 mL intermittent infusion (not administered)   ipratropium - albuterol 0.5 mg/2.5 mg/3 mL (DUONEB) neb solution 3 mL (3 mLs Nebulization Given 1/21/18 1348)   0.9% sodium chloride BOLUS (0 mLs Intravenous Stopped 1/21/18 1426)   iopamidol (ISOVUE-370) solution 500 mL (71 mLs Intravenous Given 1/21/18 1419)     Drips infusing:  Yes- Abx  For the majority of the shift, the patient's behavior Green.  Interventions performed were .     Severe Sepsis OR Septic Shock Diagnosis Present: No      ED Nurse Name/Phone Number: Adrienne Torres,   3:25 PM

## 2018-01-21 NOTE — IP AVS SNAPSHOT
[place] [address] [facility]: [phone]                                              INTERAGENCY TRANSFER FORM - PHYSICIAN ORDERS   1/21/2018                   CHF Orders/Instructions for Nursing Home/TCU     CHF Orders and Instructions for Nursing Home/TCU  1.  Have the patient get a scale to put in their room and then use at home.  2.  Post a weight chart or calendar in room next to the scale.  3.  Ask patient to weigh themselves daily first thing when they get up in the morning, before breakfast, with only their night gown on and record on the chart/calendar (if able).  4.  Record NH/TCU admission weight.  5.  Record daily am weight, with same clothes every day. If patient is in a wheelchair, note weight of wheel chair.   6.  Provide patient CHF education booklet and review with patient and family.  7.  Vital signs twice daily and prn. Check oxygen sats prn dyspnea.  8.  Apply Oxygen 2 or 3 liters/min by nasal cannula prn O2 Sat < 90%.   9.  Notify NP/MD:   1. If HR <50 bpm, SBP <90mmHg, or O2 Sat < 90%.   2. If weight is up more than 2 lbs. in one day or 5 lbs. in one week from their admission weight OR if patient has signs or symptoms of CHF, such as worsening dyspnea or leg edema.  10.  ACE-wraps or support stockings (knee high) to bilateral lower extremities prn edema. On in am and off at HS.   11.  Diet: 2 gm sodium cardiac diet, with additional diet modifications if ordered elsewhere.  12.  Fluid restriction:  1500cc/day, if hospital discharge sodium < 134.  13.  Dietician: CHF education  14.  PT/OT to Evaluate and Treat for deconditioning and CHF.  12.  Activity as tolerated   13.  PT to notify RN if wheelchair equipment has been modified (change in weight should also be noted on the patients weight calendar).    Heart Failure Follow-up Appointments and Instructions for TCU Discharge   _____An appointment to enroll the patient into [facility] may already have been made (see section  Your  "next appointments already scheduled ).  If there is a question about the appointment or you would like to speak with a [facility] nurse, please call one of the following locations:     [facility]):                                                    [phone]    [facility] ([place]):                                                   [phone]    [facility] ([place]):                   [phone]    _____If no [facility] appointment was made, please call one of the locations and schedule:    NP/PA appointment 14 days after hospital discharge if still in TCU    NP/PA appointment for 3-5 days after discharge from TCU    _____Have patient bring their med list and daily vitals/weight chart with them to appointment.    _____At discharge from the TCU give the patient the \"General Recommendations to Control Heart Failure When You Get Home  information for them to take home and their scale.  _____Upon discharge from the TCU reinforce the importance of home management of CHF including follow up in [facility].  What is the [facility]?  Cardiomyopathy  Optimization  Rehabilitation  Education  The [facility] is a heart failure specialty clinic within [facility].  It is an outpatient disease management program that is based on a phase-by-phase approach, which is tailored to each patient s individual needs.  The cardiologist, nurse practitioner, physician assistant and nurses provide an ongoing outpatient care and treatment plan that guides heart failure and cardiomyopathy patients from evaluation and education to stabilization. This team works with the current primary care doctor and cardiologist to help patients:    Avoid hospitalizations    Slow the progression of the disease    Improve length and quality of life    Know who and when to call if heart failure symptoms appear    Receive easy access to quality health care and " "advice    Better understand your condition and treatment    Decrease the tremendous cost burden of heart failure care    Detect future heart problems before they become life threatening         Hospital Admission Date: 2018  DENISSE GRANT   : 1930  Sex: Female        Attending Provider: (none)    Allergies:  No Known Allergies    Infection:  None   Service:  HOSPITALIST    Ht:  1.727 m (5' 7.99\")   Wt:  88.9 kg (196 lb)   Admission Wt:  88.7 kg (195 lb 8.8 oz)    BMI:  29.81 kg/m 2   BSA:  2.07 m 2            Patient PCP Information     Provider PCP Type    Ricki Blake MD General      ED Clinical Impression     Diagnosis Description Comment Added By Time Added    Acute respiratory failure with hypoxia (H) [J96.01] Acute respiratory failure with hypoxia (H) [J96.01]  Tati Morley MD 2018  3:23 PM    Healthcare-associated pneumonia [J18.9] Healthcare-associated pneumonia [J18.9]  Tati Morley MD 2018  3:25 PM    Hyponatremia [E87.1] Hyponatremia [E87.1]  Tati Morley MD 2018  3:25 PM      Hospital Problems as of 2018              Priority Class Noted POA    Acute on chronic respiratory failure with hypoxia (H) Medium  2018 Yes      Non-Hospital Problems as of 2018              Priority Class Noted    Hypoxia Medium  2018      Code Status History     Date Active Date Inactive Code Status Order ID Comments User Context    2018  8:05 AM 2018  7:50 AM DNR/DNI [id number]  Aguila Moody MD Outpatient    2018  8:16 PM 2018  8:05 AM DNR/DNI [id number]  Carrillo Bhagat MD Inpatient    1/15/2018 12:11 PM 2018  8:16 PM DNR/DNI [id number]  Hernandez Stovall MD Outpatient    2018 10:49 AM 1/15/2018 12:11 PM DNR/DNI [id number]  Fletcher Houston MD Inpatient    2018 12:53 PM 2018 10:49 AM Full Code [id number]  Hu Loyd MD Inpatient         Medication Review      START taking        " Dose / Directions Comments    furosemide 40 MG tablet   Commonly known as:  LASIX   Used for:  Chronic diastolic congestive heart failure (H)        Dose:  40 mg   Take 1 tablet (40 mg) by mouth daily   Refills:  0        ipratropium - albuterol 0.5 mg/2.5 mg/3 mL 0.5-2.5 (3) MG/3ML neb solution   Commonly known as:  DUONEB   Used for:  Acute respiratory failure with hypoxia (H)        Dose:  1 vial   Take 1 vial (3 mLs) by nebulization 3 times daily   Quantity:  360 mL   Refills:  0          CONTINUE these medications which may have CHANGED, or have new prescriptions. If we are uncertain of the size of tablets/capsules you have at home, strength may be listed as something that might have changed.        Dose / Directions Comments    HYDROcodone-acetaminophen 5-325 MG per tablet   Commonly known as:  NORCO   This may have changed:  additional instructions   Used for:  Pubic ramus fracture, right, closed, initial encounter (H)        Dose:  1 tablet   Take 1 tablet by mouth every 4 hours as needed for moderate to severe pain (Max 3 does per day)   Quantity:  30 tablet   Refills:  0          CONTINUE these medications which have NOT CHANGED        Dose / Directions Comments    LORazepam 0.5 MG tablet   Commonly known as:  ATIVAN   Used for:  Closed nondisplaced fracture of right clavicle, unspecified part of clavicle, initial encounter        Dose:  0.5 mg   Take 1 tablet (0.5 mg) by mouth At Bedtime   Quantity:  30 tablet   Refills:  0        losartan 100 MG tablet   Commonly known as:  COZAAR        Dose:  50 mg   Take 50 mg by mouth daily   Refills:  0        metoprolol tartrate 50 MG tablet   Commonly known as:  LOPRESSOR   Used for:  New onset atrial fibrillation (H)        Dose:  50 mg   Take 1 tablet (50 mg) by mouth 2 times daily   Quantity:  60 tablet   Refills:  0        NIFEdipine ER 90 MG Tb24   Commonly known as:  ADALAT CC        Dose:  90 mg   Take 90 mg by mouth daily   Refills:  0        TYLENOL PO         Dose:  650 mg   Take 650 mg by mouth 3 times daily   Refills:  0          STOP taking     TAMIFLU PO                   Summary of Visit     Reason for your hospital stay       You were admitted for respiratory failure which was treated with diuretics.             After Care     Activity       Your activity upon discharge: activity as tolerated       Activity - Up with nursing assistance           Daily weights       Call Provider for weight gain of more than 2 pounds per day or 4 pounds per week.       Diet       Follow this diet upon discharge:       Combination Diet: 2 gm NA Diet       Discharge Instructions       Max of 3000 mg acetaminophen all sources in 24 hours.       Fall precautions           General info for SNF       Length of Stay Estimate: Short Term Care: Estimated # of Days 31-90  Condition at Discharge: Improving  Level of care:skilled   Rehabilitation Potential: Good  Admission H&P remains valid and up-to-date: Yes  Recent Chemotherapy: N/A  Use Nursing Home Standing Orders: Yes, but need facility review when the PCP see's patient.       Mantoux instructions       Give two-step Mantoux (PPD) Per Facility Policy Yes if needs again per policy             Referrals     Home care nursing referral       RN skilled nursing visit. RN to assess vital signs and weight, respiratory and cardiac status, patients ability to take and record daily blood pressure, temp and weight, hydration, nutrition and bowel status and home safety.    Your provider has ordered home care nursing services. If you have not been contacted within 2 days of your discharge please call the inpatient department phone number at [phone] .       Occupational Therapy Adult Consult       Evaluate and treat as clinically indicated.    Reason:  Deconditioning       Physical Therapy Adult Consult       Evaluate and treat as clinically indicated.    Reason:  Deconditioning             Follow-Up Appointment Instructions     Future  Labs/Procedures    Follow-up and recommended labs and tests      Comments:    Follow up with primary care provider/care center provider within 7 days to evaluate medication change and for hospital follow up  The following labs/tests are recommended: recheck basic metabolic panel and hemoglobin.      Follow-Up Appointment Instructions     Follow-up and recommended labs and tests        Follow up with primary care provider/care center provider within 7 days to evaluate medication change and for hospital follow up  The following labs/tests are recommended: recheck basic metabolic panel and hemoglobin.             Statement of Approval     Ordered          01/31/18 1255  I have reviewed and agree with all the recommendations and orders detailed in this document.  EFFECTIVE NOW     Approved and electronically signed by:  Jarocho Antoine, DO               General Recommendations To Control Heart Failure When You Get Home (Give at DC from U)       Heart Failure Instructions for Patients and Families: Please read and check off each of these important instructions as you do them when you get home.          Weight and Symptoms       ___ Put a scale in your bathroom.    ___ Post a weight chart or calendar next to your scale.    ___ Weigh yourself everyday as soon as you get up in the morning (before breakfast).  You should only be wearing your pajamas.  Write your weight on the chart/calendar.  ___ Bring your weight chart/calendar with you to all appointments.  ___ Call your doctor or nurse practitioner if you gain 2 pounds (in 1 day) or 5 pounds in (1 week) from your goal  good  weight.  Your good weight is also called your  dry  weight.  Your doctor or nurse will tell you what your good weight should be.    ___ Call your doctor or nurse practitioner if you have shortness of breath that gets worse over time, leg swelling or fatigue.       Medications and Diet       ___ Make sure to take your medication as prescribed.    ___  Bring a current list of your medication and all of your medicine bottles with you to all appointments.    ___ Limit fluids if you still have swelling or shortness of breath, or if your doctor tells you to do so.    ___ Eat less than 2000 mg of sodium (salt) every day. Read food labels, and do not add salt to meals. Remember, if you eat less salt you retain less fluid.  ___ Follow a heart healthy diet that is low in saturated fat.        Activity and Suggested Lifestyle Changes      ___ Stay active. Talk to your doctor about an exercise program that is safe for your heart.  ___ Stop smoking. Reduce alcohol use.      ___ Lose weight if you are overweight. Extra weight puts a lot of stress on the heart.                 Control for Leg Swelling     ___ Keep your legs elevated (raised) as needed for swelling. If swelling is uncomfortable or elevation doesn t help, ask your doctor about using ACE wraps or support stockings.        What is the [facility]?  Cardiomyopathy  Optimization  Rehabilitation  Education    The [facility] is a heart failure specialty clinic within [facility].  It is an outpatient disease management program that is based on a phase-by-phase approach, which is tailored to each patient s individual needs.  The cardiologist, nurse practitioner, physician assistant and nurses provide an ongoing outpatient care and treatment plan that guides heart failure and cardiomyopathy patients from evaluation and education to stabilization. This team works with your current primary care doctor and cardiologist to help you:    - Avoid hospitalizations  - Slow the progression of the disease  - Improve length and quality of life  - Know who and when to call if heart failure symptoms appear  - Receive easy access to quality health care and advice  - Better understand your condition and treatment  - Decrease the tremendous cost burden of heart failure care  - Detect future heart problems before they  become life threatening                                 Follow-up Appointments     ___ An appointment with the [facility] may already have been made for you (see section   Your next appointments already scheduled ).  If you have a question about your appointment, would like to speak with a [facility] nurse, or would like to become a [facility] patient, please call one of the following locations:     - [facility]):                                             [phone]  - [facility]):                                            [phone]  - [facility] ([place]):                 [phone]      ___ Your [facility] Clinic Team will continue to educate you on your heart failure and may adjust medications based on your vital signs, lab work, and how you are feeling.  Therefore, it is very important to bring the following to all [facility] appointments:    - An accurate list of your medications  - Your medicine bottles  - Your weight chart/calendar

## 2018-01-21 NOTE — IP AVS SNAPSHOT
[place] [address] [facility]    [address]    [place] [address]    Phone:  [phone]    Fax:  [phone]                                       After Visit Summary   1/21/2018    Estefany An    MRN: [id number]           After Visit Summary Signature Page     I have received my discharge instructions, and my questions have been answered. I have discussed any challenges I see with this plan with the nurse or doctor.    ..........................................................................................................................................  Patient/Patient Representative Signature      ..........................................................................................................................................  Patient Representative Print Name and Relationship to Patient    ..................................................               ................................................  Date                                            Time    ..........................................................................................................................................  Reviewed by Signature/Title    ...................................................              ..............................................  Date                                                            Time

## 2018-01-21 NOTE — IP AVS SNAPSHOT
` `     [place] [address] MEDICAL SURGICAL: [phone]            Medication Administration Report for Estefany An as of 01/31/18 0830   Legend:    Given Hold Not Given Due Canceled Entry Other Actions    Time Time (Time) Time  Time-Action       Inactive    Active    Linked        Medications 01/25/18 01/26/18 01/27/18 01/28/18 01/29/18 01/30/18 01/31/18    acetaminophen (TYLENOL) tablet 650 mg  Dose: 650 mg  Freq: EVERY 4 HOURS PRN Route: PO  PRN Reason: mild pain  Start: 01/21/18 2016   Admin Instructions: Alternate ibuprofen (if ordered) with acetaminophen.  Maximum acetaminophen dose from all sources = 75 mg/kg/day not to exceed 4 grams/day.     0141 (650 mg)-Given        1707 (650 mg)-Given                albuterol neb solution 2.5 mg  Dose: 2.5 mg  Freq: EVERY 2 HOURS PRN Route: NEBULIZATION  PRN Reason: other  PRN Comment: dyspnea  Start: 01/22/18 0934              alum & mag hydroxide-simethicone (MYLANTA ES/MAALOX  ES) suspension 30 mL  Dose: 30 mL  Freq: EVERY 4 HOURS PRN Route: PO  PRN Reason: indigestion  Start: 01/27/18 0814   Admin Instructions: Shake well.               bisacodyl (DULCOLAX) Suppository 10 mg  Dose: 10 mg  Freq: DAILY PRN Route: RE  PRN Reason: constipation  Start: 01/24/18 2038   Admin Instructions: Hold for loose stools.  This is the third step of a three step constipation treatment.     1235 (10 mg)-Given                 calcium carbonate (TUMS) chewable tablet 500 mg  Dose: 500 mg  Freq: DAILY PRN Route: PO  PRN Reason: heartburn  Start: 01/26/18 0600      0259 (500 mg)-Given               docusate sodium (COLACE) capsule 100 mg  Dose: 100 mg  Freq: 2 TIMES DAILY Route: PO  Start: 01/24/18 2100   Admin Instructions: To prevent constipation.  Hold for loose stools.     0814 (100 mg)-Given       2039 (100 mg)-Given        0849 (100 mg)-Given       2016 (100 mg)-Given        0804 (100 mg)-Given       2017 (100 mg)-Given        0804 (100 mg)-Given       2019 (100 mg)-Given         0846 (100 mg)-Given       2005 (100 mg)-Given        0818 (100 mg)-Given       2111 (100 mg)-Given        0801 (100 mg)-Given       [ ] 2100           furosemide (LASIX) injection 40 mg  Dose: 40 mg  Freq: 2 TIMES DAILY. Route: IV  Start: 01/29/18 0900   Admin Instructions: For ordered doses up to 40 mg, give IV Push undiluted over 1-2 minutes.         1045 (40 mg)-Given       1603 (40 mg)-Given        0817 (40 mg)-Given       1604 (40 mg)-Given        0801 (40 mg)-Given       [ ] 1600           guaiFENesin (ROBITUSSIN) 20 mg/mL solution 10 mL  Dose: 10 mL  Freq: EVERY 4 HOURS PRN Route: PO  PRN Reason: cough  Start: 01/30/18 1741         1818 (10 mL)-Given            HYDROcodone-acetaminophen (NORCO) 5-325 MG per tablet 1 tablet  Dose: 1 tablet  Freq: EVERY 4 HOURS PRN Route: PO  PRN Reason: moderate to severe pain  Start: 01/21/18 2016   Admin Instructions: Maximum acetaminophen dose from all sources= 75 mg/kg/day not to exceed 4 grams     0441 (1 tablet)-Given       0843 (1 tablet)-Given       2039 (1 tablet)-Given       2357 (1 tablet)-Given        0414 (1 tablet)-Given       1439 (1 tablet)-Given        0026 (1 tablet)-Given       0510 (1 tablet)-Given       0952 (1 tablet)-Given       2114 (1 tablet)-Given        0107 (1 tablet)-Given       0937 (1 tablet)-Given       1759 (1 tablet)-Given       2244 (1 tablet)-Given        0417 (1 tablet)-Given       0849 (1 tablet)-Given       1554 (1 tablet)-Given       2023 (1 tablet)-Given        0032 (1 tablet)-Given       0447 (1 tablet)-Given       0827 (1 tablet)-Given       2131 (1 tablet)-Given        0306 (1 tablet)-Given       1359 (1 tablet)-Given           hydrOXYzine (ATARAX) tablet 25 mg  Dose: 25 mg  Freq: EVERY 6 HOURS PRN Route: PO  PRN Reason: other  PRN Comment: adjuvant pain  Start: 01/26/18 1634     1708 (25 mg)-Given        0025 (25 mg)-Given                               0046 (25 mg)-Given       1030 (25 mg)-Given           Or  hydrOXYzine (ATARAX)  tablet 50 mg  Dose: 50 mg  Freq: EVERY 6 HOURS PRN Route: PO  PRN Reason: other  PRN Comment: adjuvant pain  Start: 01/26/18 1634                    0510 (25 mg)-Given        0108 (50 mg)-Given        0417 (50 mg)-Given                           ipratropium - albuterol 0.5 mg/2.5 mg/3 mL (DUONEB) neb solution 3 mL  Dose: 3 mL  Freq: 4 TIMES DAILY Route: NEBULIZATION  Start: 01/23/18 0800    0712 (3 mL)-Given       1117 (3 mL)-Given       1519 (3 mL)-Given       1906 (3 mL)-Given        0709 (3 mL)-Given       1152 (3 mL)-Given       1520 (3 mL)-Given       1959 (3 mL)-Given        0749 (3 mL)-Given       1124 (3 mL)-Given       1611 (3 mL)-Given       1931 (3 mL)-Given        0737 (3 mL)-Given       1128 (3 mL)-Given       1521 (3 mL)-Given       1950 (3 mL)-Given        0720 (3 mL)-Given       1116 (3 mL)-Given       1605 (3 mL)-Given       1931 (3 mL)-Given        0717 (3 mL)-Given       1118 (3 mL)-Given       1518 (3 mL)-Given       2024 (3 mL)-Given        0712 (3 mL)-Given       1125 (3 mL)-Given       [ ] 1600       [ ] 2000           Lidocaine (LIDOCARE) 4 % Patch 1 patch  Dose: 1 patch  Freq: EVERY 24 HOURS 2000 Route: TD  Start: 01/26/18 1645   Admin Instructions: Apply patch(s) to back as needed. To prevent lidocaine toxicity, patient should be patch free for 12 hrs daily. Patches may be cut to smaller size prior to removing release liner.  NEVER APPLY HEAT OVER PATCH which increases absorption and risk of local anesthetic toxicity. Do not apply over area where liposomal bupivacaine was injected for 96 hours post injection.             1943 (1 patch)-Given [C]        2016 (1 patch)-Given        1933 (1 patch)-Given [C]        2005 (1 patch)-Given [C]        2110 (1 patch)-Given [C]        [ ] 2000          And  lidocaine patch REMOVAL  Freq: EVERY 24 HOURS 0800 Route: TD  Start: 01/27/18 0800   Admin Instructions: Remove lidocaine Patch.       0805 ( )-Patch Removed        0810 ( )-Patch Removed         "0848 ( )-Patch Removed        0947 ( )-Patch Removed        0805 ( )-Patch Removed          And  lidocaine patch in PLACE  Freq: EVERY 8 HOURS Route: TD  Start: 01/26/18 2000   Admin Instructions: Chart every shift, confirming that patch is still in place on patient (no barcode scan needed). See patch order for dose information.  NEVER APPLY HEAT OVER PATCH which will increase absorption and may lead to risk of local anesthetic toxicity. Do not apply over area where liposomal bupivacaine injected for 96 hours.      2015 ( )-Patch in Place        0301 ( )-Patch in Place              2020 ( )-Patch in Place        0310 ( )-Patch in Place       (1111)-Not Given [C]       1933 ( )-Patch in Place        0315 ( )-Patch in Place       1119 ( )-Negative [C]       2056 ( )-Patch in Place               1101 ( )-Negative [C]       2115 ( )-Patch in Place        0304 ( )-Patch in Place       1112 ( )-Patch Removed [C]       [ ] 2000           lidocaine (LMX4) kit  Freq: EVERY 1 HOUR PRN Route: Top  PRN Reason: pain  PRN Comment: with VAD insertion or accessing implanted port.  Start: 01/21/18 2016   Admin Instructions: Do NOT give if patient has a history of allergy to any local anesthetic or any \"radha\" product.   Apply 30 minutes prior to VAD insertion or port access.  MAX Dose:  2.5 g (  of 5 g tube)               lidocaine 1 % 1 mL  Dose: 1 mL  Freq: EVERY 1 HOUR PRN Route: OTHER  PRN Comment: mild pain with VAD insertion or accessing implanted port  Start: 01/21/18 2016   Admin Instructions: Do NOT give if patient has a history of allergy to any local anesthetic or any \"radha\" product. MAX dose 1 mL subcutaneous OR intradermal in divided doses.               LORazepam (ATIVAN) tablet 0.5 mg  Dose: 0.5 mg  Freq: AT BEDTIME Route: PO  Start: 01/21/18 2200 2039 (0.5 mg)-Given        2016 (0.5 mg)-Given        2017 (0.5 mg)-Given        2019 (0.5 mg)-Given        2005 (0.5 mg)-Given        2111 (0.5 mg)-Given        [ ] " 2100           losartan (COZAAR) tablet 50 mg  Dose: 50 mg  Freq: DAILY Route: PO  Start: 01/22/18 0900    0815 (50 mg)-Given        0849 (50 mg)-Given        0804 (50 mg)-Given        0804 (50 mg)-Given        0846 (50 mg)-Given        0817 (50 mg)-Given        0801 (50 mg)-Given           magnesium hydroxide (MILK OF MAGNESIA) suspension 30 mL  Dose: 30 mL  Freq: DAILY PRN Route: PO  PRN Reason: constipation  Start: 01/24/18 2038   Admin Instructions: Hold for loose stools.  This is the second step of a three step constipation treatment.     1243 (30 mL)-Given        0546 (30 mL)-Given                menthol (Topical Analgesic) 2.5% (BENGAY VANISHIN SCENT) 2.5 % topical gel  Freq: EVERY 6 HOURS PRN Route: Top  PRN Reason: moderate pain  Start: 01/21/18 2016   Admin Instructions: Apply to legs               metoprolol tartrate (LOPRESSOR) tablet 50 mg  Dose: 50 mg  Freq: 2 TIMES DAILY Route: PO  Start: 01/21/18 2100    0814 (50 mg)-Given       2041 (50 mg)-Given        0849 (50 mg)-Given       2016 (50 mg)-Given        0805 (50 mg)-Given       2017 (50 mg)-Given        0804 (50 mg)-Given       2019 (50 mg)-Given        0847 (50 mg)-Given       2005 (50 mg)-Given        0817 (50 mg)-Given       2111 (50 mg)-Given        0801 (50 mg)-Given       [ ] 2100           miconazole (MICATIN; MICRO GUARD) 2 % powder  Freq: EVERY 1 HOUR PRN Route: Top  PRN Reason: other  PRN Comment: topical candidiasis  Start: 01/30/18 1200   Admin Instructions: Apply to affected area.                 naloxone (NARCAN) injection 0.1-0.4 mg  Dose: 0.1-0.4 mg  Freq: EVERY 2 MIN PRN Route: IV  PRN Reason: opioid reversal  Start: 01/21/18 2016   Admin Instructions: For respiratory rate LESS than or EQUAL to 8.  Partial reversal dose:  0.1 mg titrated q 2 minutes for Analgesia Side Effects Monitoring Sedation Level of 3 (frequently drowsy, arousable, drifts to sleep during conversation).Full reversal dose:  0.4 mg bolus for Analgesia Side Effects  Monitoring Sedation Level of 4 (somnolent, minimal or no response to stimulation).  For ordered doses up to 2mg give IVP. Give each 0.4mg over 15 seconds in emergency situations. For non-emergent situations further dilute in 9mL of NS to facilitate titration of response.               NIFEdipine ER osmotic (PROCARDIA XL) 24 hr tablet 90 mg  Dose: 90 mg  Freq: DAILY Route: PO  Start: 01/22/18 0900   Admin Instructions: Hold for SBP < 120     0814 (90 mg)-Given        0849 (90 mg)-Given        0804 (90 mg)-Given        0804 (90 mg)-Given        0848 (90 mg)-Given        0817 (90 mg)-Given        0801 (90 mg)-Given           nitroGLYcerin (NITROSTAT) sublingual tablet 0.4 mg  Dose: 0.4 mg  Freq: EVERY 5 MIN PRN Route: SL  PRN Reason: chest pain  Start: 01/21/18 2016   Admin Instructions: Maximum 3 doses in 15 minutes.  Notify provider if no relief after 3 doses.    Do NOT give nitroglycerin SL IF the patient has taken avanafil (STENDRA), sildenafil (VIAGRA) or (REVATIO) within the last 8 hours, vardenafil (LEVITRA) or (STAXYN) within the last 18 hours, tadalafil (CIALIS) or (ADCIRCA) within the last 36 hours. Inform provider if patient has taken one of these medications.  If patient is still having acute angina requiring treatment, an alternative treatment option may be used such as: IV beta-blocker [2.5 mg - 5 mg metoprolol (LOPRESSOR)] if ordered by a provider.               ondansetron (ZOFRAN-ODT) ODT tab 4 mg  Dose: 4 mg  Freq: EVERY 6 HOURS PRN Route: PO  PRN Reasons: nausea,vomiting  Start: 01/21/18 2016   Admin Instructions: This is Step 1 of nausea and vomiting management.  If nausea not resolved in 15 minutes, go to Step 2 prochlorperazine (COMPAZINE). Do not push through foil backing. Peel back foil and gently remove. Place on tongue immediately. Administration with liquid unnecessary  With dry hands, peel back foil backing and gently remove tablet; do not push oral disintegrating tablet through foil  backing; administer immediately on tongue and oral disintegrating tablet dissolves in seconds; then swallow with saliva; liquid not required.              Or  ondansetron (ZOFRAN) injection 4 mg  Dose: 4 mg  Freq: EVERY 6 HOURS PRN Route: IV  PRN Reasons: nausea,vomiting  Start: 01/21/18 2016   Admin Instructions: This is Step 1 of nausea and vomiting management.  If nausea not resolved in 15 minutes, go to Step 2 prochlorperazine (COMPAZINE).  Irritant. For ordered doses up to 4 mg, give IV Push undiluted over 2-5 minutes.               ranitidine (ZANTAC) tablet 150 mg  Dose: 150 mg  Freq: 2 TIMES DAILY Route: PO  Start: 01/27/18 2100 2017 (150 mg)-Given        0804 (150 mg)-Given       2019 (150 mg)-Given        0847 (150 mg)-Given       2005 (150 mg)-Given        0817 (150 mg)-Given       2111 (150 mg)-Given        0801 (150 mg)-Given       [ ] 2100           senna-docusate (SENOKOT-S;PERICOLACE) 8.6-50 MG per tablet 1 tablet  Dose: 1 tablet  Freq: 2 TIMES DAILY PRN Route: PO  PRN Reason: constipation  Start: 01/24/18 2038   Admin Instructions: If no bowel movement in 24 hours, increase to 2 tablets PO.  Hold for loose stools.  This is the first step of a three step constipation treatment.              Or  senna-docusate (SENOKOT-S;PERICOLACE) 8.6-50 MG per tablet 2 tablet  Dose: 2 tablet  Freq: 2 TIMES DAILY PRN Route: PO  PRN Reason: constipation  Start: 01/24/18 2038   Admin Instructions: Hold for loose stools.  This is the first step of a three step constipation treatment.               sodium chloride (PF) 0.9% PF flush 3 mL  Dose: 3 mL  Freq: EVERY 8 HOURS Route: IK  Start: 01/29/18 0900        1045 (3 mL)-Given               0033 (3 mL)-Given       0819 (3 mL)-Given       1604 (3 mL)-Given        0043 (3 mL)-Given       0805 (3 mL)-Given       [ ] 1700           sodium chloride (PF) 0.9% PF flush 3 mL  Dose: 3 mL  Freq: EVERY 1 HOUR PRN Route: IK  PRN Reason: line flush  PRN Comment: for peripheral  IV flush post IV meds  Start: 01/21/18 2016 2054 (3 mL)-Given         0937 (3 mL)-Given               sodium chloride 3 % neb solution 3 mL  Dose: 3 mL  Freq: 2 TIMES DAILY Route: NEBULIZATION  Start: 01/26/18 0800   Admin Instructions: Give albuterol prior to NaCl 3%      0709 (3 mL)-Given       2000 (3 mL)-Given        0748 (3 mL)-Given       1931 (3 mL)-Given        0742 (3 mL)-Given       1950 (3 mL)-Given        0720 (3 mL)-Given       1931 (3 mL)-Given        0717 (3 mL)-Given       2024 (3 mL)-Given        0712 (3 mL)-Given       [ ] 2000           traMADol (ULTRAM) half-tab 25 mg  Dose: 25 mg  Freq: EVERY 6 HOURS PRN Route: PO  PRN Reason: moderate pain  Start: 01/30/18 1214         1245 (25 mg)-Given        0107 (25 mg)-Given           zolpidem (AMBIEN) half-tab 2.5 mg  Dose: 2.5 mg  Freq: AT BEDTIME PRN Route: PO  PRN Reason: sleep  Start: 01/28/18 0119       0131 (2.5 mg)-Given        0120 (2.5 mg)-Given        0046 (2.5 mg)-Given       2216 (2.5 mg)-Given           Discontinued Medications  Medications 01/25/18 01/26/18 01/27/18 01/28/18 01/29/18 01/30/18 01/31/18         Dose: 20 mg  Freq: 2 TIMES DAILY. Route: IV  Start: 01/27/18 0845   End: 01/29/18 0856   Admin Instructions: For ordered doses up to 40 mg, give IV Push undiluted over 1-2 minutes.       0937 (20 mg)-Given       1609 (20 mg)-Given        0804 (20 mg)-Given       1606 (20 mg)-Given        0849-Hold       0856-Med Discontinued           Dose: 40 mg  Freq: 2 TIMES DAILY. Route: IV  Start: 01/29/18 1600   End: 01/29/18 0857   Admin Instructions: For ordered doses up to 40 mg, give IV Push undiluted over 1-2 minutes.         0857-Med Discontinued

## 2018-01-21 NOTE — ED PROVIDER NOTES
History     Chief Complaint:  Shortness of Breath    HPI   Estefany An is an 87 year old female with a complex medical history including paroxysmal atrial fibrillation, coronary artery disease, anxiety, and hypertension who presents with shortness of breath. The patient was diagnosed with closed nondisplaced fractures of her pelvis and left clavicle two weeks ago after a fall. She was also diagnosed with influenza at that time and put on a course of Tamiflu, which she successfully completed. The patient was admitted to [facility] from 1/7/18-1/18/18 under the care of Dr. Stovall and has been in a transitional care unit since 1/15/18 when she was discharged. The patient also had an ECHO performed while she was in the hospital on 1/8/18 that showed an EF of 55-60%. The patient states she has been having significant pain that has been difficult to manage since leaving the hospital. She mostly complains of pain to her lower back and right hip. However, the patient also notes she had 7 episodes of diarrhea last night and has been short of breath for the last couple of days. Today, EMS was called for the patient to come to the ED for further evaluation due to hypoxia. EMS providers state the patient's oxygen saturations were in the low 80s on room air, so they placed her on 5L oxygen and increased this to 10L oxygen as her saturations did not change much. They also noted she was in atrial fibrillation en route to the hospital and gave her one Duoneb due to crackles and wheezing. EMS providers noted her respiratory rate was in the 40s. The patient states she has had a poor appetite since leaving the hospital, but has been trying to drink Ensure. She also reports her legs and feet feel more swollen than normal and she has had a persistent cough since November, which seems to be getting better. She denies any fever or chest pain.    Allergies:  No known drug allergies    Medications:   "  Norco  Ativan  Lopressor  Cozaar  Adalat    Past Medical History:    Anxiety  Arthritis  Hypertension  Closed nondisplaced fracture of left clavicle  Closed nondisplaced fracture of pelvic  Paroxysmal atrial fibrillation  Insomnia  Coronary artery disease    Past Surgical History:    Cholecystectomy    Family History:    History reviewed. No pertinent family history.     Social History:  Smoking status: No  Alcohol use: No  PCP: Ricki Blake  Marital Status:  [5]     Review of Systems   Constitutional: Positive for appetite change. Negative for chills and fever.   Respiratory: Positive for cough, shortness of breath and wheezing.    Cardiovascular: Positive for leg swelling. Negative for chest pain.   Gastrointestinal: Positive for diarrhea.   Musculoskeletal: Positive for arthralgias and back pain.   All other systems reviewed and are negative.    Physical Exam     Patient Vitals for the past 24 hrs:   BP Temp Temp src Pulse Heart Rate Resp SpO2 Height Weight   01/21/18 1600 120/50 - - - 69 17 97 % - -   01/21/18 1545 120/62 - - - 73 19 97 % - -   01/21/18 1530 117/57 - - - 74 17 97 % - -   01/21/18 1515 112/52 - - - 61 19 100 % - -   01/21/18 1500 105/53 - - - 62 18 100 % 1.727 m (5' 8\") 88.7 kg (195 lb 8.8 oz)   01/21/18 1445 108/51 - - - 61 18 100 % - -   01/21/18 1430 115/52 - - - 66 17 94 % - -   01/21/18 1429 - - - - - - 94 % - -   01/21/18 1400 119/58 - - - 67 22 91 % - -   01/21/18 1354 - - - 66 66 - 90 % - -   01/21/18 1348 - - - - - - 95 % - -   01/21/18 1345 - - - - 70 19 96 % - -   01/21/18 1336 - 97.7  F (36.5  C) Temporal - - - - - -   01/21/18 1332 108/68 - - - 67 27 90 % - -     Physical Exam  Nursing note and vitals reviewed.  Constitutional: Pleasant and well groomed.          HENT:    Mouth/Throat: Oropharynx is without swelling or erythema. Oral mucosa dry.    Eyes: Conjunctivae are normal. No scleral icterus.    Neck: Neck supple.   Cardiovascular: Normal rate, irregular rhythm and " intact distal pulses.    Pulmonary/Chest: Expiratory wheezing more on the left than the right. Diminished breath sounds on the right.  Abdominal: Soft.  No distension. There is no tenderness.   Musculoskeletal:  Lower extremity edema bilaterally, No calf tenderness  Neurological:Alert and answering questions appropriately. Coordination normal.   Skin: Skin is warm and dry.   Psychiatric: Normal mood and affect.     Emergency Department Course   ECG (13:40:17):  Rate 73 bpm. NJ interval *. QRS duration 78. QT/QTc 358/394. P-R-T axes * 17 14. Atrial fibrillation. Low voltage QRS. Abnormal ECG. Interpreted at 1343 by Tati Morley.    Imaging:  Radiographic findings were communicated with the patient who voiced understanding of the findings.    XR Chest Port 1 view:  There is a probable new small right pleural effusion. There is no definite airspace opacity in the right lung. The left lung and left pleural space are normal. Heart size is normal.  As read by Radiology.    CT Chest Pulmonary Embolism w Contrast:  1. No CT evidence for acute pulmonary embolus.  2. Consolidation at the posterior right lower lobe, concerning for  pneumonia.  3. Cardiomegaly.  4. Reflux of contrast into the inferior vena cava suggests right-sided  heart failure.  As read by Radiology.    Laboratory:  ISTAT BMP (1355): Na 122 (L), Chloride 89 (L), Glucose 142 9H), Creatinine 1.2 (H), GFR 42 (L), Calcium ionized 4.3 (L), HGB 8.2 (L), Hematocrit 24 (L), o/w WNL  ISTAT gases lactate venous: pH 7.49 (H), pCO2 33 (L), o/w WNL (Lactic acid 1.8)  CBC: WBC 11.3 (H), HGB 7.5 (L), o/w WNL ()   CMP: Na 122 (L), Chloride 91 (L), Glucose 132 (H), Creatinine 1.08 (H), GFR 48 (L), Calcium 7.4 (L), Albumin 2.7 (L), Protein total 6.5 9L), Alkphos 196 (H), o/w WNL  Troponin: <0.015  BNP: 4442 (H)    Interventions:  1348: Duoneb, 3 mL, nebulization   1600: 0.2 mg Dilaudid IV  1600: 2 g Rocephin IV infusion  1602: 500 mg Azithromycin IV  1648:  1750 mg Vancomycin in NaCl infusion IV  40 mg Lasix IV held per Dr. Bhagat until she goes upstairs    Emergency Department Course:  The patient arrived in the emergency department via EMS.  Past medical records, nursing notes, and vitals reviewed.  1325: I performed an exam of the patient and obtained history, as documented above.   ECG performed, results above.  IV inserted and blood drawn.  The patient was sent for a chest x-ray and a chest CT while in the emergency department, findings above.    1405: I rechecked the patient. Explained findings to the patient.    1430: The patient was placed on HFNC on 35L 100% O2 via high-flow nasal cannula and her oxygen saturations are at 95% up from 90% on 10L facemask previously.    1525: I rechecked the patient. Explained findings to the patient.    1543: I spoke to Dr. Bhagat of the hospitalist service who accepts the patient for admission.     1605: I rechecked the patient. Explained findings to the patient. She reports that her breathing is feeling better. The patient was titrated down to 30L 70% O2 via high-flow oxygen nasal cannula and her saturations are remaining in the mid-90s.    1623: Dr. Bhagat came to the ED to see the patient at this time.    Findings and plan explained to the patient who consents to admission. Discussed the patient with Dr. Bhagat, who will admit the patient to an [facility] bed for further monitoring, evaluation, and treatment.     Impression & Plan    Medical Decision Making:  Estefany An is an 87 year old female who presents to the ED for evaluation of acute onset of shortness of breath int the setting of having recently been admitted for influenza as well as fractures complicated by a fall including a pelvic fracture, which was managed non-operatively. She was noted to be significantly hypoxic at her rehab facility. The differential diagnosis includes, but is not limited to pneumonia, reactive airway disease, influenza complication, congestive  heart failure, pneumothorax, and pulmonary embolism. On arrival, the patient was awake and alert, but had oxygen saturations of 90-91% on 10L face-mask oxygen. She received a neb due to wheezing, which resolved with the nebs and improved her reported work of breathing. An IV was placed, labs were drawn and sent. A portable chest x-ray was obtained and she was expedited to CT for evaluation for a pulmonary embolism. This returned revealing findings concerning for pneumonia, but not significantly for pulmonary edema. The patient was placed on high-flow nasal cannula after she returned from CT. This was performed, which resulted in improvement in her oxygen saturations. We have been able to titrate this down as noted in the notes above. However, her BNP did return significantly elevated and she was noted to have increased lower extremity edema. For thisDr. Bhagat requested Lasix, however after examining the patient asked that we not administer it. She was treated with antibiotics for healthcare-associated pneumonia as well as coverage for post-influenza pneumonia, receiving Ceftriaxone, Azithromycin and Vancomycin. She was noted to have hyponatremia, which may be slightly dilutional and was noted during her recent admission as well. I have spoken with Dr. Bhagat who has accepted the patient to admission. She will be admitted to intermediate care under the care of Dr. Bhagat.    Diagnosis:    ICD-10-CM   1. Acute respiratory failure with hypoxia (H) J96.01   2. Healthcare-associated pneumonia J18.9   3. Hyponatremia E87.1     Disposition: Admitted to an [facility] bed    Alicja Felder  1/21/2018   [facility] EMERGENCY DEPARTMENT    IAlicja, am serving as a scribe at 1:25 PM on 1/21/2018 to document services personally performed by Tati Morley based on my observations and the provider's statements to me.        Tati Morley MD  01/22/18 1383

## 2018-01-21 NOTE — H&P
[facility] History and Physical    Estefany An MRN# [id number]   Age: 87 year old YOB: 1930     Date of Admission:  1/21/2018    Home clinic: [facility]  Primary care provider: Ricki Blake          Assessment and Plan:   Assessment:   Ms. An is an 87-year-old woman who returns to the hospital today about a week after having been discharged.  The previous hospitalization January 7 - January 15 at which time she was treated for influenza and pneumonia as well as urinary tract infection and hyponatremia.  She is generally weak and was found to have right lower lobe infiltrate versus atelectasis with pleural effusion versus parapneumonic effusion.    Notably the patient's BNP is about double what it was at the last hospitalization.  Echocardiogram during the hospital stay indicated no evidence for heart failure but the patient is chronically in atrial fibrillation.    Dx:  1. Acute hypoxic respiratory failure.  Differential diagnosis here needs to include bacterial superinfection following influenza a.  But I think more likely this patient has some volume overload with right-sided pleural effusion and right lower lobe atelectasis.  2. Probable pneumonia in the setting of recent Influenza A. Presumed superinfection.   3. Right parapneumonic effusion.  4. Hyponatremia. This is now more marked than during the last hospitalization. Consider SIADH.   5. Atrial fibrillation with controlled ventricular rate.  6. Peripheral edema, without documented CHF on Echo from 1/8/18. Suspect some degree of Right-sided failure, based on peripheral edema.   7. Acute on chronic anemia with evidence of iron deficiency and inadequate reticulocyte count.       Plan:   Admit to intermediate care. Telemetry on High-flow oxygen by NC.  Cont with empiric abx: Ceftriaxone 2 g IV BID and Azithro.  Pulmonary Toilet.  Empiric trail of Lasix 20 mg IV q 8. Place PureWick catheter.   Monitor Sodium  closely. Will check urine electrolytes as soon as she voids.   Venofer 200 mg daily x days.               Chief Complaint:   Weakness and shortness of breath.     Ms. Estefany An is an 87-year-old  female who was hospitalized here from January 7 - January 15 with influenza a infection and hypoxia.  She had fallen at home which prompted her evaluation in the emergency department.  She was found to have right clavicle as well as right pubic rami fractures.  Also during her hospitalization she had modest hyponatremia thought due to hydrochlorothiazide and a urinary tract infection.  She was discharged to transitional care.    At this time patient states that she initially felt fairly well but has been declining since arriving at the transitional care.  She denies significant fever, sweats, chills but notes that she has become extremely fatigued.  She is somewhat short of breath but I think the chief complaint on presentation was weakness rather than truly shortness of breath. She is exhausted and indicates that she sometimes thinks about dying.    Ms. An indicates that she has significant pain in the right clavicle as well as right low back.  She also has some discomfort in her left ankle which is improved with mentholated camphor rub.  She otherwise denies significantly productive cough but finds that she is somewhat short of breath when she is trying to sleep.  She has to sleep sitting up lying on her back.  She denies significant trouble with eating.  Notes anorexia.  No nausea vomiting diarrhea.  She has had some constipation.  Patient appreciates increased ankle swelling.    In the emergency department patient was found to have a new right lower lobe infiltrate with some evidence of pleural effusion.  Notably when the patient was picked up by EMS she was hypoxic.  Patient was treated presumptively in the emergency department for bacterial pneumonia.       Past Medical History:     Past Medical History:    Diagnosis Date     Anxiety      Arthritis      Chronic atrial fibrillation (H)      Hypertension              Past Surgical History:      Past Surgical History:   Procedure Laterality Date     CHOLECYSTECTOMY               Social History:     Social History   Substance Use Topics     Smoking status: Never Smoker     Smokeless tobacco: Not on file     Alcohol use No             Family History:   No family history on file.  Family history reviewed and considered non-contributory.            Allergies:   No Known Allergies          Medications:   See pharmacy reconciliation.         Review of Systems:   A comprehensive review of systems was performed and found to be negative except as described in this note           Physical Exam:   Vitals were reviewed  Temp: 97.7  F (36.5  C) Temp src: Temporal BP: 120/50 Pulse: 66 Heart Rate: 66 Resp: 17 SpO2: 97 % O2 Device: High Flow Nasal Cannula (HFNC) Oxygen Delivery: 15 LPM    Constitutional: Awake, alert, cooperative, no apparent distress, and appears stated age. Fatigued-appearing. Fully appropriate. Appears somewhat depressed/flat affect.  Eyes: Lids and lashes normal, pupils equal, round and reactive to light, extra ocular muscles intact, sclera clear, conjunctiva normal.  ENT: Normocephalic, without obvious abnormality, atramatic, sinuses nontender on palpation, external ears without lesions, oral pharynx with moist mucus membranes, tonsils without erythema or exudates, gums normal and good dentition.  Neck: Supple, symmetrical, trachea midline, no adenopathy, thyroid symmetric, not enlarged and no tenderness, skin normal.  Hematologic / Lymphatic: No cervical lymphadenopathy and no supraclavicular lymphadenopathy.  Back: Symmetric, no curvature, spinous processes are non-tender on palpation, paraspinous muscles are non-tender on palpation, no costal vertebral tenderness.  Lungs: coarse breath sounds bilat without increased work of breathing. No  wheeze.  Cardiovascular: IRRIR without murmur. Pitting edema bilat ankles.  Abdomen: No scars, normal bowel sounds, soft, non-distended, non-tender, no masses palpated, no hepatosplenomegaly.  Musculoskeletal: No redness, warmth, or swelling of the joints. Tone is normal.  Neurologic: Awake, alert, oriented to name, place and time.  Cranial nerves II-XII are grossly intact.   Neuropsychiatric: Normal affect, mood, orientation, memory and insight.  Skin: No rashes, erythema, pallor, petechia or purpura.            Data:     Results for orders placed or performed during the hospital encounter of 01/21/18 (from the past 24 hour(s))   CBC with platelets differential   Result Value Ref Range    WBC 11.3 (H) 4.0 - 11.0 10e9/L    RBC Count 2.38 (L) 3.8 - 5.2 10e12/L    Hemoglobin 7.5 (L) 11.7 - 15.7 g/dL    Hematocrit 22.1 (L) 35.0 - 47.0 %    MCV 93 78 - 100 fl    MCH 31.5 26.5 - 33.0 pg    MCHC 33.9 31.5 - 36.5 g/dL    RDW 14.3 10.0 - 15.0 %    Platelet Count 409 150 - 450 10e9/L    Diff Method Automated Method     % Neutrophils 75.8 %    % Lymphocytes 13.5 %    % Monocytes 9.7 %    % Eosinophils 0.3 %    % Basophils 0.2 %    % Immature Granulocytes 0.5 %    Nucleated RBCs 0 0 /100    Absolute Neutrophil 8.6 (H) 1.6 - 8.3 10e9/L    Absolute Lymphocytes 1.5 0.8 - 5.3 10e9/L    Absolute Monocytes 1.1 0.0 - 1.3 10e9/L    Absolute Eosinophils 0.0 0.0 - 0.7 10e9/L    Absolute Basophils 0.0 0.0 - 0.2 10e9/L    Abs Immature Granulocytes 0.1 0 - 0.4 10e9/L    Absolute Nucleated RBC 0.0    Comprehensive metabolic panel   Result Value Ref Range    Sodium 122 (L) 133 - 144 mmol/L    Potassium 4.4 3.4 - 5.3 mmol/L    Chloride 91 (L) 94 - 109 mmol/L    Carbon Dioxide 23 20 - 32 mmol/L    Anion Gap 8 3 - 14 mmol/L    Glucose 132 (H) 70 - 99 mg/dL    Urea Nitrogen 26 7 - 30 mg/dL    Creatinine 1.08 (H) 0.52 - 1.04 mg/dL    GFR Estimate 48 (L) >60 mL/min/1.7m2    GFR Estimate If Black 58 (L) >60 mL/min/1.7m2    Calcium 7.4 (L) 8.5 -  10.1 mg/dL    Bilirubin Total 0.3 0.2 - 1.3 mg/dL    Albumin 2.7 (L) 3.4 - 5.0 g/dL    Protein Total 6.5 (L) 6.8 - 8.8 g/dL    Alkaline Phosphatase 196 (H) 40 - 150 U/L    ALT 18 0 - 50 U/L    AST 18 0 - 45 U/L   Troponin I   Result Value Ref Range    Troponin I ES <0.015 0.000 - 0.045 ug/L   Nt probnp inpatient (BNP)   Result Value Ref Range    N-Terminal Pro BNP Inpatient 4442 (H) 0 - 1800 pg/mL   ISTAT gases lactate ezekiel POCT   Result Value Ref Range    Ph Venous 7.49 (H) 7.32 - 7.43 pH    PCO2 Venous 33 (L) 40 - 50 mm Hg    PO2 Venous 30 25 - 47 mm Hg    Bicarbonate Venous 25 21 - 28 mmol/L    O2 Sat Venous 65 %    Lactic Acid 1.8 0.7 - 2.1 mmol/L   ISTAT Basic Met ICa HCT POCT   Result Value Ref Range    Sodium 122 (L) 133 - 144 mmol/L    Potassium 4.5 3.4 - 5.3 mmol/L    Chloride 89 (L) 94 - 109 mmol/L    Total CO2 22 20 - 32 mmol/L    Anion Gap 11 6 - 17 mmol/L    Glucose 142 (H) 70 - 99 mg/dL    Urea Nitrogen 24 7 - 30 mg/dL    Creatinine 1.2 (H) 0.52 - 1.04 mg/dL    GFR Estimate 42 (L) >60 mL/min/1.7m2    GFR Estimate If Black 51 (L) >60 mL/min/1.7m2    Calcium Ionized 4.3 (L) 4.4 - 5.2 mg/dL    Hemoglobin 8.2 (L) 11.7 - 15.7 g/dL    Hematocrit - POCT 24 (L) 35.0 - 47.0 %PCV   XR Chest Port 1 View    Narrative    CHEST PORTABLE ONE VIEW 1/21/2018 2:15 PM     COMPARISON: Frontal chest x-ray 1/12/2018    HISTORY: Hypoxia, shortness of breath.       Impression    IMPRESSION: There is a probable new small right pleural effusion.  There is no definite airspace opacity in the right lung. The left lung  and left pleural space are normal. Heart size is normal.    BENJI ROSAS MD   CT Chest Pulmonary Embolism w Contrast    Narrative    CT CHEST PULMONARY EMBOLISM WITH CONTRAST 1/21/2018 2:27 PM    HISTORY:  Chest pain.      TECHNIQUE:  70 mL Isovue 370. Axial images with coronal  reconstructions. Radiation dose for this scan was reduced using  automated exposure control, adjustment of the mA and/or kV according  to  patient size, or iterative reconstruction technique.    COMPARISON:  1/7/2018    FINDINGS:  Motion artifact on multiple images. Dense consolidation in  the right lower lung, worrisome for pneumonia. Atelectasis or scarring  at the left lung base. Lungs otherwise clear.     The pulmonary arteries are well-opacified. No CT evidence for acute  pulmonary embolus. No significant adenopathy. Cardiomegaly. Reflux of  contrast into the inferior vena cava and hepatic veins suggest  right-sided heart failure.    Marked stable vertebral body height loss of T12.      Impression    IMPRESSION:    1. No CT evidence for acute pulmonary embolus.  2. Consolidation at the posterior right lower lobe, concerning for  pneumonia.  3. Cardiomegaly.  4. Reflux of contrast into the inferior vena cava suggests right-sided  heart failure.      Attestation:  I have reviewed today's vital signs, notes, medications, labs and imaging.  Total time: 45 minutes Carrillo Bhagat MD

## 2018-01-21 NOTE — ED NOTES
Two week ago closed pelvis fracture. Still with significant pain that has been difficult to manage. Shortness of breath, wheezing. Respiratory rate in the 40's. Sats on room air in the low 80's. Influenza positive 2 weeks ago with completed course of Tamiflu. History of A Fib; EMS reports patient was in A Fib on EKG in route.   Patient is alert and oriented times four. Her primary complaints at this time are lower back pain and hip pain on the right side.

## 2018-01-21 NOTE — IP AVS SNAPSHOT
` `     [place] [address] MEDICAL SURGICAL: [phone]                 INTERAGENCY TRANSFER FORM - NOTES (H&P, Discharge Summary, Consults, Procedures, Therapies)   2018                    Hospital Admission Date: 2018  ESTEFANY GRANT   : 1930  Sex: Female        Patient PCP Information     Provider PCP Type    Ricki Blake MD General         History & Physicals      H&P by Carrillo Bhagat MD at 2018  6:14 PM     Author:  Carrillo Bhagat MD Service:  Hospitalist Author Type:  Physician    Filed:  2018  6:03 PM Date of Service:  2018  6:14 PM Creation Time:  2018  5:27 PM    Status:  Addendum :  Carrillo Bhagat MD (Physician)         [facility] History and Physical    Estefany Grant MRN# [id number]   Age: 87 year old YOB: 1930     Date of Admission:  2018    Home clinic: [facility]  Primary care provider: Ricki Blake          Assessment and Plan:   Assessment:   Ms.[KP1.1] Nataliya[KP1.2] is an 87-year-old woman who returns to the hospital today about a week after having been discharged.  The previous hospitalization  - January 15 at which time she was treated for influenza and pneumonia as well as urinary tract infection and hyponatremia.  She is generally weak and was found to have right lower lobe infiltrate versus atelectasis with pleural effusion versus parapneumonic effusion.    Notably the patient's BNP is about double what it was at the last hospitalization.  Echocardiogram during the hospital stay indicated no evidence for heart failure but the patient is chronically in atrial fibrillation.    Dx:  1. Acute hypoxic respiratory failure.  Differential diagnosis here needs to include bacterial superinfection following influenza a.  But I think more likely this patient has some volume overload with right-sided pleural effusion and right lower lobe atelectasis.  2. Probable pneumonia in the setting of recent  Influenza A. Presumed superinfection.   3. Right parapneumonic effusion.  4. Hyponatremia. This is now more marked than during the last hospitalization. Consider SIADH.   5. Atrial fibrillation with controlled ventricular rate.  6. Peripheral edema, without documented CHF on Echo from 1/8/18. Suspect some degree of Right-sided failure, based on peripheral edema.   7. Acute on chronic anemia with evidence of iron deficiency and inadequate reticulocyte count.       Plan:   Admit to intermediate care. Telemetry on High-flow oxygen by NC.  Cont with empiric abx: Ceftriaxone 2 g IV BID and Azithro.  Pulmonary Toilet.  Empiric trail of Lasix 20 mg IV q 8. Place PureWick catheter.   Monitor Sodium closely. Will check urine electrolytes as soon as she voids.   Venofer 200 mg daily x days.               Chief Complaint:   Weakness and shortness of breath.     Ms. Estefany An is an 87-year-old  female who was hospitalized here from January 7 - January 15 with influenza a infection and hypoxia.  She had fallen at home which prompted her evaluation in the emergency department.  She was found to have right clavicle as well as right pubic rami fractures.  Also during her hospitalization she had modest hyponatremia thought due to hydrochlorothiazide and a urinary tract infection.  She was discharged to transitional care.    At this time patient states that she initially felt fairly well but has been declining since arriving at the transitional care.  She denies significant fever, sweats, chills but notes that she has become extremely fatigued.  She is somewhat short of breath but I think the chief complaint on presentation was weakness rather than truly shortness of breath. She is exhausted and indicates that she sometimes thinks about dying.    Ms. An indicates that she has significant pain in the right clavicle as well as right low back.  She also has some discomfort in her left ankle which is improved with mentholated  camphor rub.  She otherwise denies significantly productive cough but finds that she is somewhat short of breath when she is trying to sleep.  She has to sleep sitting up lying on her back.  She denies significant trouble with eating.  Notes anorexia.  No nausea vomiting diarrhea.  She has had some constipation.  Patient appreciates increased ankle swelling.    In the emergency department patient was found to have a new right lower lobe infiltrate with some evidence of pleural effusion.  Notably when the patient was picked up by EMS she was hypoxic.  Patient was treated presumptively in the emergency department for bacterial pneumonia.       Past Medical History:[KP1.1]     Past Medical History:   Diagnosis Date     Anxiety      Arthritis      Chronic atrial fibrillation (H)      Hypertension[KP1.3]              Past Surgical History:[KP1.1]      Past Surgical History:   Procedure Laterality Date     CHOLECYSTECTOMY[KP1.3]               Social History:[KP1.1]     Social History   Substance Use Topics     Smoking status: Never Smoker     Smokeless tobacco: Not on file     Alcohol use No[KP1.3]             Family History:[KP1.1]   No family history on file.[KP1.3]  Family history reviewed and considered non-contributory.            Allergies:[KP1.1]   No Known Allergies[KP1.4]          Medications:   See pharmacy reconciliation.         Review of Systems:   A comprehensive review of systems was performed and found to be negative except as described in this note           Physical Exam:   Vitals were reviewed[KP1.1]  Temp: 97.7  F (36.5  C) Temp src: Temporal BP: 120/50 Pulse: 66 Heart Rate: 66 Resp: 17 SpO2: 97 % O2 Device: High Flow Nasal Cannula (HFNC) Oxygen Delivery: 15 LPM[KP1.4]    Constitutional: Awake, alert, cooperative, no apparent distress, and appears stated age. Fatigued-appearing. Fully appropriate. Appears somewhat depressed/flat affect.  Eyes: Lids and lashes normal, pupils equal, round and reactive to  light, extra ocular muscles intact, sclera clear, conjunctiva normal.  ENT: Normocephalic, without obvious abnormality, atramatic, sinuses nontender on palpation, external ears without lesions, oral pharynx with moist mucus membranes, tonsils without erythema or exudates, gums normal and good dentition.  Neck: Supple, symmetrical, trachea midline, no adenopathy, thyroid symmetric, not enlarged and no tenderness, skin normal.  Hematologic / Lymphatic: No cervical lymphadenopathy and no supraclavicular lymphadenopathy.  Back: Symmetric, no curvature, spinous processes are non-tender on palpation, paraspinous muscles are non-tender on palpation, no costal vertebral tenderness.  Lungs: coarse breath sounds bilat without increased work of breathing. No wheeze.  Cardiovascular: IRRIR without murmur. Pitting edema bilat ankles.  Abdomen: No scars, normal bowel sounds, soft, non-distended, non-tender, no masses palpated, no hepatosplenomegaly.  Musculoskeletal: No redness, warmth, or swelling of the joints. Tone is normal.  Neurologic: Awake, alert, oriented to name, place and time.  Cranial nerves II-XII are grossly intact.   Neuropsychiatric: Normal affect, mood, orientation, memory and insight.  Skin: No rashes, erythema, pallor, petechia or purpura.            Data:[KP1.1]     Results for orders placed or performed during the hospital encounter of 01/21/18 (from the past 24 hour(s))   CBC with platelets differential   Result Value Ref Range    WBC 11.3 (H) 4.0 - 11.0 10e9/L    RBC Count 2.38 (L) 3.8 - 5.2 10e12/L    Hemoglobin 7.5 (L) 11.7 - 15.7 g/dL    Hematocrit 22.1 (L) 35.0 - 47.0 %    MCV 93 78 - 100 fl    MCH 31.5 26.5 - 33.0 pg    MCHC 33.9 31.5 - 36.5 g/dL    RDW 14.3 10.0 - 15.0 %    Platelet Count 409 150 - 450 10e9/L    Diff Method Automated Method     % Neutrophils 75.8 %    % Lymphocytes 13.5 %    % Monocytes 9.7 %    % Eosinophils 0.3 %    % Basophils 0.2 %    % Immature Granulocytes 0.5 %    Nucleated  RBCs 0 0 /100    Absolute Neutrophil 8.6 (H) 1.6 - 8.3 10e9/L    Absolute Lymphocytes 1.5 0.8 - 5.3 10e9/L    Absolute Monocytes 1.1 0.0 - 1.3 10e9/L    Absolute Eosinophils 0.0 0.0 - 0.7 10e9/L    Absolute Basophils 0.0 0.0 - 0.2 10e9/L    Abs Immature Granulocytes 0.1 0 - 0.4 10e9/L    Absolute Nucleated RBC 0.0    Comprehensive metabolic panel   Result Value Ref Range    Sodium 122 (L) 133 - 144 mmol/L    Potassium 4.4 3.4 - 5.3 mmol/L    Chloride 91 (L) 94 - 109 mmol/L    Carbon Dioxide 23 20 - 32 mmol/L    Anion Gap 8 3 - 14 mmol/L    Glucose 132 (H) 70 - 99 mg/dL    Urea Nitrogen 26 7 - 30 mg/dL    Creatinine 1.08 (H) 0.52 - 1.04 mg/dL    GFR Estimate 48 (L) >60 mL/min/1.7m2    GFR Estimate If Black 58 (L) >60 mL/min/1.7m2    Calcium 7.4 (L) 8.5 - 10.1 mg/dL    Bilirubin Total 0.3 0.2 - 1.3 mg/dL    Albumin 2.7 (L) 3.4 - 5.0 g/dL    Protein Total 6.5 (L) 6.8 - 8.8 g/dL    Alkaline Phosphatase 196 (H) 40 - 150 U/L    ALT 18 0 - 50 U/L    AST 18 0 - 45 U/L   Troponin I   Result Value Ref Range    Troponin I ES <0.015 0.000 - 0.045 ug/L   Nt probnp inpatient (BNP)   Result Value Ref Range    N-Terminal Pro BNP Inpatient 4442 (H) 0 - 1800 pg/mL   ISTAT gases lactate ezekiel POCT   Result Value Ref Range    Ph Venous 7.49 (H) 7.32 - 7.43 pH    PCO2 Venous 33 (L) 40 - 50 mm Hg    PO2 Venous 30 25 - 47 mm Hg    Bicarbonate Venous 25 21 - 28 mmol/L    O2 Sat Venous 65 %    Lactic Acid 1.8 0.7 - 2.1 mmol/L   ISTAT Basic Met ICa HCT POCT   Result Value Ref Range    Sodium 122 (L) 133 - 144 mmol/L    Potassium 4.5 3.4 - 5.3 mmol/L    Chloride 89 (L) 94 - 109 mmol/L    Total CO2 22 20 - 32 mmol/L    Anion Gap 11 6 - 17 mmol/L    Glucose 142 (H) 70 - 99 mg/dL    Urea Nitrogen 24 7 - 30 mg/dL    Creatinine 1.2 (H) 0.52 - 1.04 mg/dL    GFR Estimate 42 (L) >60 mL/min/1.7m2    GFR Estimate If Black 51 (L) >60 mL/min/1.7m2    Calcium Ionized 4.3 (L) 4.4 - 5.2 mg/dL    Hemoglobin 8.2 (L) 11.7 - 15.7 g/dL    Hematocrit - POCT 24 (L)  35.0 - 47.0 %PCV   XR Chest Port 1 View    Narrative    CHEST PORTABLE ONE VIEW 1/21/2018 2:15 PM     COMPARISON: Frontal chest x-ray 1/12/2018    HISTORY: Hypoxia, shortness of breath.       Impression    IMPRESSION: There is a probable new small right pleural effusion.  There is no definite airspace opacity in the right lung. The left lung  and left pleural space are normal. Heart size is normal.    BENJI ROSAS MD   CT Chest Pulmonary Embolism w Contrast    Narrative    CT CHEST PULMONARY EMBOLISM WITH CONTRAST 1/21/2018 2:27 PM    HISTORY:  Chest pain.      TECHNIQUE:  70 mL Isovue 370. Axial images with coronal  reconstructions. Radiation dose for this scan was reduced using  automated exposure control, adjustment of the mA and/or kV according  to patient size, or iterative reconstruction technique.    COMPARISON:  1/7/2018    FINDINGS:  Motion artifact on multiple images. Dense consolidation in  the right lower lung, worrisome for pneumonia. Atelectasis or scarring  at the left lung base. Lungs otherwise clear.     The pulmonary arteries are well-opacified. No CT evidence for acute  pulmonary embolus. No significant adenopathy. Cardiomegaly. Reflux of  contrast into the inferior vena cava and hepatic veins suggest  right-sided heart failure.    Marked stable vertebral body height loss of T12.      Impression    IMPRESSION:    1. No CT evidence for acute pulmonary embolus.  2. Consolidation at the posterior right lower lobe, concerning for  pneumonia.  3. Cardiomegaly.  4. Reflux of contrast into the inferior vena cava suggests right-sided  heart failure.[KP1.4]      Attestation:  I have reviewed today's vital signs, notes, medications, labs and imaging.  Total time: 45 minutes[KP1.1] Carrillo Bhagat MD[KP1.3]     Revision History        User Key Date/Time User Provider Type Action    > [N/A] 1/22/2018  6:03 PM Carrillo Bhagat MD Physician Addend     KP1.2 1/22/2018  9:14 AM Carrillo Bhagat MD Physician  "Incomplete Revision     KP1.4 1/21/2018  6:14 PM Carrillo Bhagat MD Physician Sign     KP1.3 1/21/2018  5:41 PM Carrillo Bhagat MD Physician      KP1.1 1/21/2018  5:27 PM Carrillo Bhagat MD Physician                   Discharge Summaries     No notes of this type exist for this encounter.         Consult Notes      Consults by Abi Link RD, LD at 1/26/2018  9:59 AM     Author:  Abi Link RD, LD Service:  Nutrition Author Type:  Registered Dietitian    Filed:  1/26/2018 10:42 AM Date of Service:  1/26/2018  9:59 AM Creation Time:  1/26/2018  9:56 AM    Status:  Signed :  Abi Link RD, LD (Registered Dietitian)         BRIEF NUTRITION ASSESSMENT      REASON FOR ASSESSMENT:  LOS    NUTRITION HISTORY:[MS1.1]  - Information obtained from patient and chart.[MS1.2]  - Seen by this writer during previous admission where patient verbalized decrease in appetite and overall feeling of unwell, though much improved with regards to oral intake.[MS1.1]  - Regular diet PTA.  - No decrease changes to meal frequency or portion sizes PTA.  - NKFA.[MS1.2]    CURRENT DIET AND INTAKE:  Diet:  2 gm Na  Room service with assist        100% intake since admission.    ANTHROPOMETRICS:  Height:[MS1.1] 5' 8\"[MS1.2]  Weight:[MS1.1] 195#[MS1.2]  BMI:[MS1.1] 30[MS1.2] kg/m2  IBW:[MS1.1] 140#[MS1.2]  Weight Status:[MS1.1] Obesity Grade I BMI 30-34.9[MS1.2]  %IBW:[MS1.1] 139%[MS1.2]  Weight History:[MS1.1]  Wt Readings from Last 10 Encounters:   01/26/18 88.8 kg (195 lb 12.8 oz)   01/13/18 88.7 kg (195 lb 8 oz)   11/02/14 81.6 kg (180 lb)   12/03/13 81.6 kg (180 lb)[MS1.3]   -[MS1.1] Current wt consistent with that from past admission.[MS1.2]     LABS:  Labs noted    MALNUTRITION:  Patient does not meet two of the following criteria necessary for diagnosing malnutrition: significant weight loss, reduced intake, subcutaneous fat loss, muscle loss or fluid retention    NUTRITION INTERVENTION:  Nutrition " Diagnosis:  No nutrition diagnosis at this time.    Implementation:  Nutrition Education:  Per Provider order if indicated[MS1.1].[MS1.2]    FOLLOW UP/MONITORING:   Will re-evaluate in 7 - 10 days, or sooner, if re-consulted.        Abi Link RD, NATALI  Clinical Dietitian  3rd floor/ICU: [phone]  All other floors: [phone]  Weekend/holiday: [phone][MS1.1]     Revision History        User Key Date/Time User Provider Type Action    > MS1.2 1/26/2018 10:42 AM Abi Link RD, LD Registered Dietitian Sign     MS1.3 1/26/2018  9:58 AM Abi Link RD, LD Registered Dietitian      MS1.1 1/26/2018  9:56 AM Abi Link RD, LD Registered Dietitian                      Progress Notes - Physician (Notes from 01/28/18 through 01/31/18)      Progress Notes by Criss Alfonso LSW at 1/31/2018 11:04 AM     Author:  Criss Alfonso LSW Service:  (none) Author Type:      Filed:  1/31/2018 11:10 AM Date of Service:  1/31/2018 11:04 AM Creation Time:  1/31/2018 11:04 AM    Status:  Signed :  Criss Alfonso LSW ()         SWS:  SW notified that pt is able to return to [facility] TCU today.  SW spoke with pt and daughter and they are agreeable. Daughter requests wc transport at [place] and is aware and agreeable to cost of transport. SW contacted [facility] and transport will be here at 3:00pm. [facility] will be sending a stretcher transport but pt will only be billed for a wc. Pt updated and agreeable. Daughter had left and pt did not feel that SW will need to update her.  RN and facility updated.[LH1.1]      Revision History        User Key Date/Time User Provider Type Action    > LH1.1 1/31/2018 11:10 AM Criss Alfonso LSW  Sign            Progress Notes by Saida River, PT at 1/30/2018  1:28 PM     Author:  Saida River, PT Service:  (none) Author Type:  Physical Therapist    Filed:  1/30/2018  9:34 PM Date of Service:  1/30/2018  1:28  PM Creation Time:  1/30/2018  9:34 PM    Status:  Signed :  Saida River PT (Physical Therapist)            01/30/18 1854   General Information   Onset of Edema 01/16/18  (about 2 weeks)   Affected Body Part(s) Left LE;Right LE   Edema Etiology (CHF)   Etiology Comments Patient with acute respiratory failure, noted increased edema from CHF when she began to feel ill   Pertinent history of current problem (PT: include personal factors and/or comorbidities that impact the POC; OT: include additional occupational profile info) Pt returned to the ED on 1/21/2018 with complaint of worsening weakness and dyspnea and was found in the ED to have evidence of consolidation in the right lower lung.  Pt previously hospitalized from January 7 - January 15 at which time she was treated for influenza and pneumonia as well as urinary tract infection and hyponatremia.  See chart for additional PMH.  Pt was admitted from TCU where she had been since being discharged from the hospital on 1/15.  Prior to first January hospitalization, pt was living alone in an independent living facility.  She used a 4WW outside her apartment and was Yamile/IND with ADL's.   Edema Precautions Cardiac Edema/CHF   Pain   Patient currently in pain No   Edema Examination / Assessment   Skin Condition Pitting;Dryness;Intact   Skin Condition Comments skin shiny, taught   ROM   Range of Motion (WFL) no deficits were identified   Strength   Strength (WFL) (significant strength deficits noted)   Sensation   Sensation (WFL) no deficits were identified   Assessment/Plan   Patient presents with Stage 2 Lymphedema   Assessment Patient would benefit from lymphedema management in order to decrease bilateral LEs edema to increase independence with mobility, mobility tolerance.   Clinical Decision Making (Complexity) Low complexity   Planned Edema Interventions Gradient compression bandaging;Exercises;Education;Manual therapy   Treatment Frequency daily  "  Treatment Duration 5 days   Patient, Family and/or Staff in agreement with plan of care. Yes   Risks and benefits of treatment have been explained. Yes   Total Evaluation Time   Total Evaluation Time (Minutes) 5[MM1.1]        Revision History        User Key Date/Time User Provider Type Action    > MM1.1 1/30/2018  9:34 PM Saida River, PT Physical Therapist Sign            Progress Notes by Rocío Higuera at 1/30/2018  2:50 PM     Author:  Rocío Higuera Service:  Spiritual Health Author Type:      Filed:  1/30/2018  3:01 PM Date of Service:  1/30/2018  2:50 PM Creation Time:  1/30/2018  2:50 PM    Status:  Signed :  Rocío Higuera ()         SPIRITUAL HEALTH SERVICES  SPIRITUAL ASSESSMENT Progress Note  [facility] 3rd floor Med Surg    PRIMARY FOCUS:     Goals of care    Symptom/pain management    Emotional/spiritual/Temple distress    Support for coping    ILLNESS CIRCUMSTANCES:   Reviewed documentation. Reflective conversation shared with Estefany which integrated elements of illness and family narratives.     Context of Serious Illness/Symptom(s) - Estefany was admitted with respiratory concerns    Persons/Resources Involved - Daughter    DISTRESS:     Emotional/Existential/Relational Distress - Estefany said she feels like she is \"taking one step forward and two steps backward\" in her recovery. She also talked about how difficult it was when her  passed away. She said he had been taking Warfarin and when he started bleeding \"they could not stop it.\"    Spiritual/Scientologist Distress - None expressed.    Social/Cultural/Economic Distress - None expressed.    SPIRIT (Coping):     Oriental orthodox/Hiwot - Estefany said she was born and raised Nondenominational, however her daughter just retired from being a Denominational .    Spiritual Practice(s) - Prayer    Emotional/Existential/Relational Connections - Daughter, who lives in [place] and a son who lives out of state.    SENSE-MAKING:    Goals of Care - Estefany's goals are " restorative. She is hoping to get back to [facility] in [place] where she lives soon.    Meaning/Sense-Making - Estefany talked about how good her daughter has been to her. Her and her  moved here to [place] from [place] to be close to their daughter. She said her daughter was a [employer]  in [place], until just retiring. She talked of how she has loved to knit and dru and how much she likes to play cards with her friends at the [facility]. She is hoping to get back there soon.    PLAN: Estefany was informed about Spiritual Health services and how she can access support if she so desires.      Rocío Higuera  Chaplain Resident  Pager [phone][BE1.1]       Revision History        User Key Date/Time User Provider Type Action    > BE1.1 1/30/2018  3:01 PM Rocío Higuera Sign            Progress Notes by Jarocho Antoine DO at 1/30/2018 11:11 AM     Author:  Jarocho Antoine DO Service:  Hospitalist Author Type:  Physician    Filed:  1/30/2018 11:26 AM Date of Service:  1/30/2018 11:11 AM Creation Time:  1/30/2018 11:11 AM    Status:  Signed :  Jarocho Antoine DO (Physician)         [facility]  Hospitalist Progress Note  Name: Estefany An    MRN: [id number]  Provider:  Jarocho Antoine DO On license of [facility] (Text Page)    Assessment & Plan   Summary of Stay:  Estefany An is a 87 year old female who returned to the ED on 1/21/2018 with complaint of worsening weakness and dyspnea. She was found in the ED to have evidence of consolidation in the right lower lung. She was started on antibiotics, but without fever or remarkable cough.  Of note, her BNP was significantly elevated above the value of two weeks ago, when she was here for influenza and hyponatremia. She was started on IV lasix and appears to be improving gradually and she also has been working some on pulmonary toilet, to open up the right lower lobe consolidation which may be more due to atelectasis.     1.  Acute hypoxic resp failure:  -  This  appears multifactorial certainly with right lower lobe atelectasis and fluid overload contributing to hypoxia.  She has responded well to gentle diuresis and given response volume overload appears a significant part of her complaints.  Anemia may have been playing a role as well so we transfused 2 U RBCs with good effect earlier in her stay.    --She has now been off of supplemental O2 for the past couple days but remains very weak and deconditioned and in the setting of what seems to be increasing LE edema and weights.  My colleague ordered lymphedema wraps and increased her IV lasix from 20 mg IV BID to 40 mg IV BID.  On exam/review today she appears still somewhat overloaded though improving.  I will give her another day of the 40 mg IV BID dosing and consider moving to oral tomorrow.  --  She was treated with a total of three days of azithromycin and ceftriaxone prior to these being stopped on the 23rd due to lower clinical suspicion for pneumonia.  It is not felt she has an active infection at this point.    2.  Right pleural effusion vs consolidation:  -  Repeat on my review appeared improved.  She does not appear to have an overt infection at this point.  Continue diuresis as above.    3.  Afib with controlled ventricular rate, presented on chronic anticoagulation:  -  Discussion about anticoagulation for stroke prophylaxis by my colleague prior. Pt and daughter initially wished to pursue this despite falls risk etc but given down-trending hgb it was felt we should not continue this for now (hold lovenox/eliquis).  Pending further workup might be able to revisit this down the line.  Dr. Moody revisited this with her daughter recently as well and she remained in agreement.  -  Continue metoprolol for rate control    4.  Iron deficiency anemia:  -  Received IV iron replacement x 5 doses completed 1/25.  Transfused 1/26 and 1/27 to hgb 8 range with improved symptoms.  No overt blood loss at this point.   Needs outpatient hgb monitoring.  Hold on any ASA or other anticoag for now given concern of bleeding.  She is not a good physical candidate for endoscopy right now given other issues unless more overt/active bleeding.    5.  Diastolic CHF:  -  Diuresis as discussed above.  Continue IV lasix today, consider moving to oral tomorrow.    DVT Prophylaxis: Pneumatic Compression Devices  Code Status: DNR/DNI    Disposition Plan   Expected discharge in 1-2 days to transitional care unit once respiratory status/volume status further improves.     Entered: Jarocho Antoine 01/30/2018, 11:11 AM     Interval History   Assumed care, history reviewed.  Ms An was just finishing her breakfast and feeling better.      -Data reviewed today: I reviewed all new labs and imaging reports over the last 24 hours. I personally reviewed no images or EKG's today.    Physical Exam   Temp: 97.2  F (36.2  C) Temp src: Oral BP: 134/61   Heart Rate: 83 Resp: 16 SpO2: 93 % O2 Device: None (Room air)    Vitals:    01/26/18 0540 01/29/18 0113 01/30/18 0423   Weight: 88.8 kg (195 lb 12.8 oz) 89.7 kg (197 lb 12.8 oz) 89.1 kg (196 lb 8 oz)     Vital Signs with Ranges  Temp:  [95.6  F (35.3  C)-98.4  F (36.9  C)] 97.2  F (36.2  C)  Heart Rate:  [78-83] 83  Resp:  [16-20] 16  BP: (123-134)/(50-61) 134/61  SpO2:  [93 %-97 %] 93 %  I/O last 3 completed shifts:  In: 480 [P.O.:480]  Out: -     GEN:  Alert, oriented x 3, appears ill/frail but comfortable, no overt distress.    HEENT:  Normocephalic/atraumatic, no scleral icterus, no nasal discharge, mouth moist.  CV:   Irreg, controlled rate.  Distant.  No rub.  LUNGS:  Clear to auscultation upper, decreased breath sounds bases.  No wheezes/retractions.  Symmetric chest rise on inhalation noted.  ABD:  Active bowel sounds, soft, non-tender/non-distended.  No rebound/guarding/rigidity.  EXT:  Trace to +1 LE edema.  No acute cyanosis.  No acute joint synovitis noted.  SKIN:  Dry to touch, no exanthems noted in  the visualized areas.    Medications       sodium chloride (PF)  3 mL Intracatheter Q8H     furosemide  40 mg Intravenous BID     ranitidine  150 mg Oral BID     lidocaine  1 patch Transdermal Q24h    And     lidocaine   Transdermal Q24H    And     lidocaine   Transdermal Q8H     sodium chloride  3 mL Nebulization BID     docusate sodium  100 mg Oral BID     ipratropium - albuterol 0.5 mg/2.5 mg/3 mL  3 mL Nebulization 4x Daily     LORazepam  0.5 mg Oral At Bedtime     losartan  50 mg Oral Daily     metoprolol tartrate  50 mg Oral BID     NIFEdipine ER  90 mg Oral Daily     Data[SS1.1]       Recent Labs  Lab 01/30/18  0759 01/29/18  0748 01/28/18  0732 01/27/18  0738  01/24/18  0715   WBC  --   --   --  11.5*  --  10.4   HGB 8.4* 8.3* 8.2* 7.3*  < > 7.1*   HCT  --   --   --  22.7*  --  21.2*   MCV  --   --   --  98  --  94     --   --  337  --  377   < > = values in this interval not displayed.    Recent Labs  Lab 01/30/18  0759 01/29/18  0748 01/28/18  0732  01/24/18  0715   * 128* 129*  < > 127*   POTASSIUM 4.1 4.2 4.3  < > 4.0   CHLORIDE 96 95 95  < > 94   CO2 27 28 28  < > 28   ANIONGAP 7 5 6  < > 5   GLC 92 93 92  < > 99   BUN 16 18 21  < > 17   CR 0.83 0.84 0.84  < > 0.82   GFRESTIMATED 65 65 64  < > 66   GFRESTBLACK 79 78 78  < > 80   KACI 8.3* 8.1* 8.1*  < > 8.1*   MAG  --   --   --   --  2.3   < > = values in this interval not displayed.[SS1.2]    No results found for this or any previous visit (from the past 24 hour(s)).[SS1.1]         Revision History        User Key Date/Time User Provider Type Action    > SS1.2 1/30/2018 11:26 AM Jarocho Antoine, DO Physician Sign     SS1.1 1/30/2018 11:11 AM Jarocho Antoine, DO Physician             Progress Notes by Aguila Moody MD at 1/29/2018  9:19 AM     Author:  Aguila Moody MD Service:  Hospitalist Author Type:  Physician    Filed:  1/29/2018  9:29 AM Date of Service:  1/29/2018  9:19 AM Creation Time:  1/29/2018   9:19 AM    Status:  Signed :  Aguila Moody MD (Physician)         [facility]  Hospitalist Progress Note  Aguila Modoy MD 01/29/2018    Reason for Stay (Diagnosis): worsening dyspnea         Assessment and Plan:      Summary of Stay: Estefany An is a 87 year old female who returned to the ED on 1/21/2018 with complaint of worsening weakness and dyspnea. She was found in the ED to have evidence of consolidation in the right lower lung. She was started on antibiotics, but without fever or remarkable cough.  Of note, her BNP was significantly elevated above the value of two weeks ago, when she was here for influenza and hyponatremia. She was started on IV lasix and appears to be improving gradually and she also has been working some on pulmonary toilet, to open up the right lower lobe consolidation which may be more due to atelectasis.     Complicating her course, her hgb had drifted down to 6.3 for which we transfused RBCs 1/26.  There is no clear blood loss to explain this drop though we are holding AC moving forward.  She received a second unit for RBCs on 1/27 as her hgb was still just 7.3 with some ongoing symptoms of anemia and since then has had good response w/ stable hgb.  I've discussed appropriate follow up in clinic w/ her daughter, Maylin, who agrees she is not really in favor of endoscopies/invasive procedures at this point in spite of potential slow GI blood loss.    We have resumed IV lasix with this as well as she still seems hypervolemic and LE edema is not better and weights have actually increased a bit.    She resides at [facility] and will DC to the TCU portion of that facility soon pending further improvement.      Problem List:   1. Acute hypoxic resp failure.  This appears multifactorial certainly with right lower lobe atelectasis and fluid overload contributing to hypoxia.  She has responded to gentle diuresis. Anemia may have been playing a role  as well so we transfused 2 U RBCs with good effect.    --She has now been off of supplemental O2 for the past 2+ days but remains very weak and deconditioned and in the setting of what seems to be increasing LE edema and weights I am ordering lymphedema wraps and increasing her IV lasix from 20 mg IV BID to 40 mg IV BID.    --I am also repeating a CXR to evaluate for consolidation vs effusion as she still has some cough.   --she was treated with a total of three days of azithromycin and ceftriaxone prior to these being stopped on the 23rd due to lower clinical suspicion for pneumonia    2. Right pleural v parapneumonic effusion. Covered for poss bacterial superinfection for three days, now off abx.  No fevers, normal WBC.    --Repeating imaging w/ CXR as we are about a week out from her original imaging.    3. Hypervolemic Hyponatremia, improved with gentle diuresis but then trended down again 1/27 after getting blood.  Seems more fluid-up so restarted IV diuretics and now increasing.  --sodium 128, recheck in the AM.    4. Atrial Fibrillation w controlled vent rate. Discussion about anticoagulation for stroke prophylaxis by my colleague. Pt and daughter initially wished to pursue this despite falls risk etc but given down-trending hgb I feel we should not continue this for now (hold lovenox/eliquis).  Pending further workup might be able to revisit this down the line.  I did revisit this with her daughter as well given my concerns re: anemia and she was in agreement.    5. Peripheral edema.  Continue diuretics as above, also will order lymphedema wraps.    6. Iron deficiency anemia. Received iron replacement IV while here x 5 doses as ordered by my colleague, completed on 1/25.  hgb still has drifted to 6.3.  Transfused 1 U RBCs 1/26 w/ response to 7.3 then again 1/27 and now she is stable in the low~8 range.  Monitoring for blood loss and will recheck.  Hemodynamically stable.  --discussed appropriate follow up in  "clinic w/ her daughter, Maylin, who agrees she is not really in favor of endoscopies/invasive procedures at this point in spite of potential slow GI blood loss.    DVT Prophylaxis: avoid chemical dvt prophylaxis in favor of lymphedema wraps, ambulation given anemia.  Code Status: DNR/DNI  Discharge Dispo: TCU prior to returning to her independent apartment.  Estimated Disch Date / # of Days until Disch: hopefully in ~2 days pending diuresis.        Interval History (Subjective):      She feels weak, LE edema slightly worse, legs achy  Weights not regularly checked, but as of this AM trend is upward.  I am ordering lymphedema wraps and increasing her IV lasix from 20 mg IV BID to 40 mg IV BID.    I am also repeating a CXR to evaluate for consolidation vs effusion as she still has some cough.   Denies chest pain  Breathing overall better  Sodium slightly worse at 128           Physical Exam:      Last Vital Signs:  /58 (BP Location: Left arm)  Pulse 87  Temp 97.6  F (36.4  C) (Oral)  Resp 20  Ht 1.727 m (5' 7.99\")  Wt 89.7 kg (197 lb 12.8 oz)  SpO2 96%  BMI 30.08 kg/m2    I/O last 3 completed shifts:  In: 880 [P.O.:880]  Out: -     Constitutional: Awake, alert, cooperative, no apparent distress   Respiratory: Clear to auscultation bilaterally, no crackles or wheezing aside from some coarse sounds at the left and right lung bases.   Cardiovascular: Regular rate and rhythm, normal S1 and S2, and no murmur noted   Abdomen: Normal bowel sounds, soft, non-distended, non-tender   Skin: No rashes, no cyanosis, dry to touch   Neuro: Alert and oriented x3, no weakness, numbness, memory loss   Extremities: Trace edema   Other(s):        All other systems: Negative          Medications:      All current medications were reviewed with changes reflected in problem list.         Data:      All new lab and imaging data was reviewed.   Labs/Imaging:  Results for orders placed or performed during the hospital encounter of " 01/21/18 (from the past 24 hour(s))   Basic metabolic panel   Result Value Ref Range    Sodium 128 (L) 133 - 144 mmol/L    Potassium 4.2 3.4 - 5.3 mmol/L    Chloride 95 94 - 109 mmol/L    Carbon Dioxide 28 20 - 32 mmol/L    Anion Gap 5 3 - 14 mmol/L    Glucose 93 70 - 99 mg/dL    Urea Nitrogen 18 7 - 30 mg/dL    Creatinine 0.84 0.52 - 1.04 mg/dL    GFR Estimate 65 >60 mL/min/1.7m2    GFR Estimate If Black 78 >60 mL/min/1.7m2    Calcium 8.1 (L) 8.5 - 10.1 mg/dL   Hemoglobin   Result Value Ref Range    Hemoglobin 8.3 (L) 11.7 - 15.7 g/dL[MF1.1]            Revision History        User Key Date/Time User Provider Type Action    > MF1.1 1/29/2018  9:29 AM Aguila Moody MD Physician Sign            Progress Notes by Aguila Moody MD at 1/28/2018  9:07 AM     Author:  Aguila Moody MD Service:  Hospitalist Author Type:  Physician    Filed:  1/28/2018  9:18 AM Date of Service:  1/28/2018  9:07 AM Creation Time:  1/28/2018  9:07 AM    Status:  Addendum :  Aguila Moody MD (Physician)         [facility]  Hospitalist Progress Note  Aguila Moody MD 01/28/2018    Reason for Stay (Diagnosis): worsening dyspnea         Assessment and Plan:      Summary of Stay: Estefany An is a 87 year old female who returned to the ED on 1/21/2018 with complaint of worsening weakness and dyspnea. She was found in the ED to have evidence of consolidation in the right lower lung. She was started on antibiotics, but without fever or remarkable cough.  Of note, her BNP was significantly elevated above the value of two weeks ago, when she was here for influenza and hyponatremia. She was started on IV lasix and appears to be improving gradually. Pt also has been working some on pulmonary toilet, to open up the right lower lobe consolidation which may be more due to atelectasis.  She resides at [facility] and will return there vs TCU soon pending further  improvement.    Her hgb has drifted down to 6.3 for which we transfused RBCs 1/26.  There is no clear blood loss to explain this drop though we are holding AC moving forward.  She received a second unit for RBCs on 1/27 as her hgb is 7.3 with some ongoing symptoms of anemia.  We have resumed IV lasix with this as well as she still seems hypervolemic.    Problem List:   1. Acute hypoxic resp failure.  This appears multifactorial certainly with right lower lobe atelectasis and fluid overload contributing to hypoxia.  She has responded to gentle diuresis.  Continue IV lasix today.  Will need recheck bmp, monitor weights etc .  Anemia may be playing a role as well so we are transfusing as needed.    2. Right pleural v parapneumonic effusion. Covered for poss bacterial superinfection for three days, now off abx.  No fevers, normal WBC.  Consider repeating imaging w/ CXR pending her course.    3. Hyponatremia, improving with gentle diuresis but then trended down again 1/27 after getting blood.  Seems more fluid-up so restarted IV diuretics.    4. Atrial Fibrillation w controlled vent rate. Discussion about anticoagulation for stroke prophylaxis by my colleague. Pt and daughter initially wished to pursue this despite falls risk etc but given down-trending hgb I feel we should not continue this for now (hold lovenox/eliquis).  Pending further workup might be able to revisit this down the line.    5. Peripheral edema.  Continue diuretics as above.     6. Iron deficiency anemia. Received iron replacement IV while here x 5 doses as ordered by my colleague, completed on 1/25.  hgb still has drifted to 6.3.  Transfused 1 U RBCs w/ response to 7.3.  Monitoring for blood loss and will recheck.  Hemodynamically stable.  --transfuse one more U RBCs today.[MF1.1]  --discussed appropriate follow up in clinic w/ her daughter, Maylin, who agrees she is not really in favor of endoscopies/invasive procedures at this point in spite of  "potential slow GI blood loss.[MF1.2]    DVT Prophylaxis:[MF1.1] PCDs given anemia.[MF1.3]  Code Status: Full Code  Discharge Dispo: ?TCU[MF1.1] prior to returning to her independent apartment.[MF1.3]  Estimated Disch Date / # of Days until Disch: hopefully tomorrow[MF1.1] or the next day pending diuresis.[MF1.3]        Interval History (Subjective):[MF1.1]      Updated her daughter via phone  Remains on IV diuretics, sodium trending up.  Estefany feels better today, overall stronger  Still w/ some cough and exp wheezes - working w/ RT re: this.  Otherwise feels fairly well.  Awaiting daily weight - not recently checked  hgb improved as expected.[MF1.3]           Physical Exam:      Last Vital Signs:  /57 (BP Location: Left arm)  Pulse 87  Temp 97.7  F (36.5  C) (Oral)  Resp 16  Ht 1.727 m (5' 8\")  Wt 88.8 kg (195 lb 12.8 oz)  SpO2 94%  BMI 29.77 kg/m2    I/O last 3 completed shifts:  In: 994 [P.O.:640]  Out: 400 [Urine:400]    Constitutional: Awake, alert, cooperative, no apparent distress   Respiratory: Clear to auscultation bilaterally, no crackles or wheezing aside from some coarse sounds at the left lung base.   Cardiovascular: Regular rate and rhythm, normal S1 and S2, and no murmur noted   Abdomen: Normal bowel sounds, soft, non-distended, non-tender   Skin: No rashes, no cyanosis, dry to touch   Neuro: Alert and oriented x3, no weakness, numbness, memory loss   Extremities: Trace edema   Other(s):        All other systems: Negative          Medications:      All current medications were reviewed with changes reflected in problem list.         Data:      All new lab and imaging data was reviewed.   Labs/Imaging:  Results for orders placed or performed during the hospital encounter of 01/21/18 (from the past 24 hour(s))   Basic metabolic panel   Result Value Ref Range    Sodium 129 (L) 133 - 144 mmol/L    Potassium 4.3 3.4 - 5.3 mmol/L    Chloride 95 94 - 109 mmol/L    Carbon Dioxide 28 20 - 32 mmol/L "    Anion Gap 6 3 - 14 mmol/L    Glucose 92 70 - 99 mg/dL    Urea Nitrogen 21 7 - 30 mg/dL    Creatinine 0.84 0.52 - 1.04 mg/dL    GFR Estimate 64 >60 mL/min/1.7m2    GFR Estimate If Black 78 >60 mL/min/1.7m2    Calcium 8.1 (L) 8.5 - 10.1 mg/dL   Hemoglobin   Result Value Ref Range    Hemoglobin 8.2 (L) 11.7 - 15.7 g/dL[MF1.1]            Revision History        User Key Date/Time User Provider Type Action    > MF1.2 1/28/2018  9:18 AM Aguila Moody MD Physician Addend     MF1.3 1/28/2018  9:17 AM Aguila Moody MD Physician Sign     MF1.1 1/28/2018  9:07 AM Aguila Moody MD Physician                   Procedure Notes     No notes of this type exist for this encounter.         Progress Notes - Therapies (Notes from 01/28/18 through 01/31/18)      Progress Notes by Saida River PT at 1/30/2018  1:28 PM     Author:  Saida River PT Service:  (none) Author Type:  Physical Therapist    Filed:  1/30/2018  9:34 PM Date of Service:  1/30/2018  1:28 PM Creation Time:  1/30/2018  9:34 PM    Status:  Signed :  Saida River PT (Physical Therapist)            01/30/18 1328   General Information   Onset of Edema 01/16/18  (about 2 weeks)   Affected Body Part(s) Left LE;Right LE   Edema Etiology (CHF)   Etiology Comments Patient with acute respiratory failure, noted increased edema from CHF when she began to feel ill   Pertinent history of current problem (PT: include personal factors and/or comorbidities that impact the POC; OT: include additional occupational profile info) Pt returned to the ED on 1/21/2018 with complaint of worsening weakness and dyspnea and was found in the ED to have evidence of consolidation in the right lower lung.  Pt previously hospitalized from January 7 - January 15 at which time she was treated for influenza and pneumonia as well as urinary tract infection and hyponatremia.  See chart for additional PMH.  Pt was admitted from TCU  where she had been since being discharged from the hospital on 1/15.  Prior to first January hospitalization, pt was living alone in an independent living facility.  She used a 4WW outside her apartment and was Yamile/IND with ADL's.   Edema Precautions Cardiac Edema/CHF   Pain   Patient currently in pain No   Edema Examination / Assessment   Skin Condition Pitting;Dryness;Intact   Skin Condition Comments skin shiny, taught   ROM   Range of Motion (WFL) no deficits were identified   Strength   Strength (WFL) (significant strength deficits noted)   Sensation   Sensation (WFL) no deficits were identified   Assessment/Plan   Patient presents with Stage 2 Lymphedema   Assessment Patient would benefit from lymphedema management in order to decrease bilateral LEs edema to increase independence with mobility, mobility tolerance.   Clinical Decision Making (Complexity) Low complexity   Planned Edema Interventions Gradient compression bandaging;Exercises;Education;Manual therapy   Treatment Frequency daily   Treatment Duration 5 days   Patient, Family and/or Staff in agreement with plan of care. Yes   Risks and benefits of treatment have been explained. Yes   Total Evaluation Time   Total Evaluation Time (Minutes) 5[MM1.1]        Revision History        User Key Date/Time User Provider Type Action    > MM1.1 1/30/2018  9:34 PM Saida River, PT Physical Therapist Sign

## 2018-01-21 NOTE — IP AVS SNAPSHOT
MRN:[id number]                      After Visit Summary   1/21/2018    Estefany An    MRN: [id number]           Thank you!     Thank you for choosing [facility] for your care. Our goal is always to provide you with excellent care. Hearing back from our patients is one way we can continue to improve our services. Please take a few minutes to complete the written survey that you may receive in the mail after you visit. If you would like to speak to someone directly about your visit please contact Patient Relations at [phone]. Thank you!          Patient Information     Date Of Birth          9/20/1930        Designated Caregiver       Most Recent Value    Caregiver    Will someone help with your care after discharge? yes    Name of designated caregiver Maylin Brambila, narinder    Phone number of caregiver [phone]    Caregiver address [place]      About your hospital stay     You were admitted on:  January 21, 2018 You last received care in the:  [facility]    You were discharged on:  January 31, 2018        Reason for your hospital stay       You were admitted for respiratory failure which was treated with diuretics.                  Who to Call     For medical emergencies, please call 911.  For non-urgent questions about your medical care, please call your primary care provider or clinic, [phone]          Attending Provider     Provider Specialty    Tati Morley MD Emergency Medicine    [facility]       Primary Care Provider Office Phone # Fax #    Ricki Blake [phone]      After Care Instructions     Activity       Your activity upon discharge: activity as tolerated            Activity - Up with nursing assistance           Daily weights       Call Provider for weight gain of more than 2 pounds per day or 4 pounds per week.            Diet       Follow this diet upon discharge:        Combination Diet: 2 gm NA Diet            Discharge Instructions       Max of 3000 mg acetaminophen all sources in 24 hours.            Fall precautions           General info for SNF       Length of Stay Estimate: Short Term Care: Estimated # of Days 31-90  Condition at Discharge: Improving  Level of care:skilled   Rehabilitation Potential: Good  Admission H&P remains valid and up-to-date: Yes  Recent Chemotherapy: N/A  Use Nursing Home Standing Orders: Yes, but need facility review when the PCP see's patient.            Mantoux instructions       Give two-step Mantoux (PPD) Per Facility Policy Yes if needs again per policy                  Follow-up Appointments     Follow-up and recommended labs and tests        Follow up with primary care provider/care center provider within 7 days to evaluate medication change and for hospital follow up  The following labs/tests are recommended: recheck basic metabolic panel and hemoglobin.                  Additional Services     Home care nursing referral       RN skilled nursing visit. RN to assess vital signs and weight, respiratory and cardiac status, patients ability to take and record daily blood pressure, temp and weight, hydration, nutrition and bowel status and home safety.    Your provider has ordered home care nursing services. If you have not been contacted within 2 days of your discharge please call the inpatient department phone number at [phone] .            Occupational Therapy Adult Consult       Evaluate and treat as clinically indicated.    Reason:  Deconditioning            Physical Therapy Adult Consult       Evaluate and treat as clinically indicated.    Reason:  Deconditioning                  General Recommendations To Control Heart Failure When You Get Home       Heart Failure Instructions for Patients and Families: Please read and check off each of these important instructions as you do them when you get home.          Weight and Symptoms        ___ Put a scale in your bathroom.    ___ Post a weight chart or calendar next to your scale.    ___ Weigh yourself everyday as soon as you get up in the morning (before breakfast).  You should only be wearing your pajamas.  Write your weight on the chart/calendar.  ___ Bring your weight chart/calendar with you to all appointments.  ___ Call your doctor or nurse practitioner if you gain 2 pounds (in 1 day) or 5 pounds in (1 week) from your goal  good  weight.  Your good weight is also called your  dry  weight.  Your doctor or nurse will tell you what your good weight should be.    ___ Call your doctor or nurse practitioner if you have shortness of breath that gets worse over time, leg swelling or fatigue.       Medications and Diet       ___ Make sure to take your medication as prescribed.    ___ Bring a current list of your medication and all of your medicine bottles with you to all appointments.    ___ Limit fluids if you still have swelling or shortness of breath, or if your doctor tells you to do so.    ___ Eat less than 2000 mg of sodium (salt) every day. Read food labels, and do not add salt to meals. Remember, if you eat less salt you retain less fluid.  ___ Follow a heart healthy diet that is low in saturated fat.        Activity and Suggested Lifestyle Changes      ___ Stay active. Talk to your doctor about an exercise program that is safe for your heart.  ___ Stop smoking. Reduce alcohol use.      ___ Lose weight if you are overweight. Extra weight puts a lot of stress on the heart.                 Control for Leg Swelling     ___ Keep your legs elevated (raised) as needed for swelling. If swelling is uncomfortable or elevation doesn t help, ask your doctor about using ACE wraps or support stockings.        What is the [facility]?  Cardiomyopathy  Optimization  Rehabilitation  Education    The [facility] is a heart failure specialty clinic within [facility].  It is an outpatient  disease management program that is based on a phase-by-phase approach, which is tailored to each patient s individual needs.  The cardiologist, nurse practitioner, physician assistant and nurses provide an ongoing outpatient care and treatment plan that guides heart failure and cardiomyopathy patients from evaluation and education to stabilization. This team works with your current primary care doctor and cardiologist to help you:    - Avoid hospitalizations  - Slow the progression of the disease  - Improve length and quality of life  - Know who and when to call if heart failure symptoms appear  - Receive easy access to quality health care and advice  - Better understand your condition and treatment  - Decrease the tremendous cost burden of heart failure care  - Detect future heart problems before they become life threatening                                 Follow-up Appointments     ___ An appointment with the [facility] may already have been made for you (see section   Your next appointments already scheduled ).  If you have a question about your appointment, would like to speak with a [facility] nurse, or would like to become a [facility] patient, please call one of the following locations:     - [facility]):                                             [phone]  - [facility]):                                            [phone]  - [facility]):                 [phone]      ___ Your [facility] Clinic Team will continue to educate you on your heart failure and may adjust medications based on your vital signs, lab work, and how you are feeling.  Therefore, it is very important to bring the following to all [facility] appointments:    - An accurate list of your medications  - Your medicine bottles  - Your weight chart/calendar                   Pending Results     No orders found from 1/19/2018 to 1/22/2018.     "        Statement of Approval     Ordered          18 1255  I have reviewed and agree with all the recommendations and orders detailed in this document.  EFFECTIVE NOW     Approved and electronically signed by:  Jarocho Antoine DO             Admission Information     Date & Time Department Dept. Phone    [address] [address] Medical Surgical [phone]      Your Vitals Were     Blood Pressure Pulse Temperature Respirations Height Weight    125/50 (BP Location: Left arm) 87 97.6  F (36.4  C) (Oral) 20 1.727 m (5' 7.99\") 88.9 kg (196 lb)    Pulse Oximetry BMI (Body Mass Index)                91% 29.81 kg/m2          MyChart Information     [employer] lets you send messages to your doctor, view your test results, renew your prescriptions, schedule appointments and more. To sign up, go to [email or web address] . Click on \"Log in\" on the left side of the screen, which will take you to the Welcome page. Then click on \"Sign up Now\" on the right side of the page.     You will be asked to enter the access code listed below, as well as some personal information. Please follow the directions to create your username and password.     Your access code is: [email or web address]  Expires: 2018 11:37 AM     Your access code will  in 90 days. If you need help or a new code, please call your [place] clinic or [phone].        Care EveryWhere ID     This is your Care EveryWhere ID. This could be used by other organizations to access your [place] medical records  [id number]        Equal Access to Services     [facility]: Hadii marcos sands Sogail, waaxda luqadaha, qaybta kaalmadaniella alanis . So [facility] [phone].    ATENCIÓN: Si habla español, tiene a carter disposición servicios gratuitos de asistencia lingüística. Llame al [phone].    We comply with applicable federal civil rights laws and [place] laws. We do not discriminate on the basis of race, color, " national origin, age, disability, sex, sexual orientation, or gender identity.               Review of your medicines      START taking        Dose / Directions    furosemide 40 MG tablet   Commonly known as:  LASIX   Used for:  Chronic diastolic congestive heart failure (H)        Dose:  40 mg   Take 1 tablet (40 mg) by mouth daily   Refills:  0       ipratropium - albuterol 0.5 mg/2.5 mg/3 mL 0.5-2.5 (3) MG/3ML neb solution   Commonly known as:  DUONEB   Used for:  Acute respiratory failure with hypoxia (H)        Dose:  1 vial   Take 1 vial (3 mLs) by nebulization 3 times daily   Quantity:  360 mL   Refills:  0         CONTINUE these medicines which may have CHANGED, or have new prescriptions. If we are uncertain of the size of tablets/capsules you have at home, strength may be listed as something that might have changed.        Dose / Directions    HYDROcodone-acetaminophen 5-325 MG per tablet   Commonly known as:  NORCO   This may have changed:  additional instructions   Used for:  Pubic ramus fracture, right, closed, initial encounter (H)        Dose:  1 tablet   Take 1 tablet by mouth every 4 hours as needed for moderate to severe pain (Max 3 does per day)   Quantity:  30 tablet   Refills:  0         CONTINUE these medicines which have NOT CHANGED        Dose / Directions    LORazepam 0.5 MG tablet   Commonly known as:  ATIVAN   Used for:  Closed nondisplaced fracture of right clavicle, unspecified part of clavicle, initial encounter        Dose:  0.5 mg   Take 1 tablet (0.5 mg) by mouth At Bedtime   Quantity:  30 tablet   Refills:  0       losartan 100 MG tablet   Commonly known as:  COZAAR        Dose:  50 mg   Take 50 mg by mouth daily   Refills:  0       metoprolol tartrate 50 MG tablet   Commonly known as:  LOPRESSOR   Used for:  New onset atrial fibrillation (H)        Dose:  50 mg   Take 1 tablet (50 mg) by mouth 2 times daily   Quantity:  60 tablet   Refills:  0       NIFEdipine ER 90 MG Tb24    Commonly known as:  ADALAT CC        Dose:  90 mg   Take 90 mg by mouth daily   Refills:  0       TYLENOL PO        Dose:  650 mg   Take 650 mg by mouth 3 times daily   Refills:  0         STOP taking     TAMIFLU PO                Where to get your medicines      Some of these will need a paper prescription and others can be bought over the counter. Ask your nurse if you have questions.     Bring a paper prescription for each of these medications     HYDROcodone-acetaminophen 5-325 MG per tablet    LORazepam 0.5 MG tablet       You don't need a prescription for these medications     furosemide 40 MG tablet    ipratropium - albuterol 0.5 mg/2.5 mg/3 mL 0.5-2.5 (3) MG/3ML neb solution                Protect others around you: Learn how to safely use, store and throw away your medicines at www.disposemymeds.org.        Information about OPIOIDS     PRESCRIPTION OPIOIDS: WHAT YOU NEED TO KNOW    Prescription opioids can be used to help relieve moderate to severe pain and are often prescribed following a surgery or injury, or for certain health conditions. These medications can be an important part of treatment but also come with serious risks. It is important to work with your health care provider to make sure you are getting the safest, most effective care.    WHAT ARE THE RISKS AND SIDE EFFECTS OF OPIOID USE?  Prescription opioids carry serious risks of addiction and overdose, especially with prolonged use. An opioid overdose, often marked by slowed breathing can cause sudden death. The use of prescription opioids can have a number of side effects as well, even when taken as directed:      Tolerance - meaning you might need to take more of a medication for the same pain relief    Physical dependence - meaning you have symptoms of withdrawal when a medication is stopped    Increased sensitivity to pain    Constipation    Nausea, vomiting, and dry mouth    Sleepiness and dizziness    Confusion    Depression    Low  levels of testosterone that can result in lower sex drive, energy, and strength    Itching and sweating    RISKS ARE GREATER WITH:    History of drug misuse, substance use disorder, or overdose    Mental health conditions (such as depression or anxiety)    Sleep apnea    Older age (65 years or older)    Pregnancy    Avoid alcohol while taking prescription opioids.   Also, unless specifically advised by your health care provider, medications to avoid include:    Benzodiazepines (such as Xanax or Valium)    Muscle relaxants (such as Soma or Flexeril)    Hypnotics (such as Ambien or Lunesta)    Other prescription opioids    KNOW YOUR OPTIONS:  Talk to your health care provider about ways to manage your pain that do not involve prescription opioids. Some of these options may actually work better and have fewer risks and side effects:    Pain relievers such as acetaminophen, ibuprofen, and naproxen    Some medications that are also used for depression or seizures    Physical therapy and exercise    Cognitive behavioral therapy, a psychological, goal-directed approach, in which patients learn how to modify physical, behavioral, and emotional triggers of pain and stress    IF YOU ARE PRESCRIBED OPIOIDS FOR PAIN:    Never take opioids in greater amounts or more often than prescribed    Follow up with your primary health care provider and work together to create a plan on how to manage your pain.    Talk about ways to help manage your pain that do not involve prescription opioids    Talk about all concerns and side effects    Help prevent misuse and abuse    Never sell or share prescription opioids    Never use another person's prescription opioids    Store prescription opioids in a secure place and out of reach of others (this may include visitors, children, friends, and family)    Visit www.cdc.gov/drugoverdose to learn about risks of opioid abuse and overdose    If you believe you may be struggling with addiction, tell  your health care provider and ask for guidance or call [facility]'s National Helpline at 2-930-930-HELP    LEARN MORE / www.cdc.gov/drugoverdose/prescribing/guideline.html    Safely dispose of unused prescription opioids: Find your local drug take-back programs and more information about the importance of safe disposal at www.doseofreality.mn.gov             Medication List: This is a list of all your medications and when to take them. Check marks below indicate your daily home schedule. Keep this list as a reference.      Medications           Morning Afternoon Evening Bedtime As Needed    furosemide 40 MG tablet   Commonly known as:  LASIX   Take 1 tablet (40 mg) by mouth daily   Last time this was given:  40 mg on 1/27/2018  8:04 AM                                HYDROcodone-acetaminophen 5-325 MG per tablet   Commonly known as:  NORCO   Take 1 tablet by mouth every 4 hours as needed for moderate to severe pain (Max 3 does per day)   Last time this was given:  1 tablet on 1/31/2018  1:59 PM                                ipratropium - albuterol 0.5 mg/2.5 mg/3 mL 0.5-2.5 (3) MG/3ML neb solution   Commonly known as:  DUONEB   Take 1 vial (3 mLs) by nebulization 3 times daily   Last time this was given:  3 mLs on 1/31/2018 11:25 AM                                LORazepam 0.5 MG tablet   Commonly known as:  ATIVAN   Take 1 tablet (0.5 mg) by mouth At Bedtime   Last time this was given:  0.5 mg on 1/30/2018  9:11 PM                                losartan 100 MG tablet   Commonly known as:  COZAAR   Take 50 mg by mouth daily   Last time this was given:  50 mg on 1/31/2018  8:01 AM                                metoprolol tartrate 50 MG tablet   Commonly known as:  LOPRESSOR   Take 1 tablet (50 mg) by mouth 2 times daily   Last time this was given:  50 mg on 1/31/2018  8:01 AM                                NIFEdipine ER 90 MG Tb24   Commonly known as:  ADALAT CC   Take 90 mg by mouth daily   Last time this was given:   90 mg on 1/31/2018  8:01 AM                                TYLENOL PO   Take 650 mg by mouth 3 times daily   Last time this was given:  650 mg on 1/26/2018  5:07 PM

## 2018-01-21 NOTE — IP AVS SNAPSHOT
"          [place] [address] MEDICAL SURGICAL: [phone]                                              INTERAGENCY TRANSFER FORM - LAB / IMAGING / EKG / EMG RESULTS   2018                    Hospital Admission Date: 2018  DENISSE GRANT   : 1930  Sex: Female        Attending Provider: (none)    Allergies:  No Known Allergies    Infection:  None   Service:  HOSPITALIST    Ht:  1.727 m (5' 7.99\")   Wt:  88.9 kg (196 lb)   Admission Wt:  88.7 kg (195 lb 8.8 oz)    BMI:  29.81 kg/m 2   BSA:  2.07 m 2            Patient PCP Information     Provider PCP Type    Ricki Blake MD General         Lab Results - 3 Days      Basic metabolic panel [[id number]] (Abnormal)  Resulted: 18 0816, Result status: Final result    Ordering provider: Carrillo Bhagat MD  18 0000 Resulting lab: [facility]    Specimen Information    Type Source Collected On   Blood  18 0706          Components       Value Reference Range Flag Lab   Sodium 130 133 - 144 mmol/L L FrRdHs   Potassium 4.1 3.4 - 5.3 mmol/L  FrRdHs   Chloride 96 94 - 109 mmol/L  FrRdHs   Carbon Dioxide 29 20 - 32 mmol/L  FrRdHs   Anion Gap 5 3 - 14 mmol/L  FrRdHs   Glucose 84 70 - 99 mg/dL  FrRdHs   Urea Nitrogen 16 7 - 30 mg/dL  FrRdHs   Creatinine 0.89 0.52 - 1.04 mg/dL  FrRdHs   GFR Estimate 60 >60 mL/min/1.7m2 L FrRdHs   Comment:  Non  GFR Calc   GFR Estimate If Black 73 >60 mL/min/1.7m2  FrRdHs   Comment:  African American GFR Calc   Calcium 8.3 8.5 - 10.1 mg/dL L FrRdHs            Hemoglobin [[id number]] (Abnormal)  Resulted: 18 0737, Result status: Final result    Ordering provider: Carrillo Bhagat MD  18 0000 Resulting lab: [facility]    Specimen Information    Type Source Collected On   Blood  18 0706          Components       Value Reference Range Flag Lab   Hemoglobin 8.3 11.7 - 15.7 g/dL L FrRdHs            Basic metabolic panel [[id number]] (Abnormal)  Resulted: 18 0848, " Result status: Final result    Ordering provider: Carrillo Bhagat MD  01/30/18 0000 Resulting lab: [facility]    Specimen Information    Type Source Collected On   Blood  01/30/18 0759          Components       Value Reference Range Flag Lab   Sodium 130 133 - 144 mmol/L L FrRdHs   Potassium 4.1 3.4 - 5.3 mmol/L  FrRdHs   Chloride 96 94 - 109 mmol/L  FrRdHs   Carbon Dioxide 27 20 - 32 mmol/L  FrRdHs   Anion Gap 7 3 - 14 mmol/L  FrRdHs   Glucose 92 70 - 99 mg/dL  FrRdHs   Urea Nitrogen 16 7 - 30 mg/dL  FrRdHs   Creatinine 0.83 0.52 - 1.04 mg/dL  FrRdHs   GFR Estimate 65 >60 mL/min/1.7m2  FrRdHs   Comment:  Non  GFR Calc   GFR Estimate If Black 79 >60 mL/min/1.7m2  FrRd   Comment:  African American GFR Calc   Calcium 8.3 8.5 - 10.1 mg/dL L FrRdHs            Platelet count [[id number]]  Resulted: 01/30/18 0833, Result status: Final result    Ordering provider: Carrillo Bhagat MD  01/30/18 0000 Resulting lab: [facility]    Specimen Information    Type Source Collected On   Blood  01/30/18 0759          Components       Value Reference Range Flag Lab   Platelet Count 292 150 - 450 10e9/L  FrRdHs            Hemoglobin [[id number]] (Abnormal)  Resulted: 01/30/18 0833, Result status: Final result    Ordering provider: Carrillo Bhagat MD  01/30/18 0000 Resulting lab: [facility]    Specimen Information    Type Source Collected On   Blood  01/30/18 0759          Components       Value Reference Range Flag Lab   Hemoglobin 8.4 11.7 - 15.7 g/dL L FrRdHs            Basic metabolic panel [[id number]] (Abnormal)  Resulted: 01/29/18 0828, Result status: Final result    Ordering provider: Carrillo Bhagat MD  01/29/18 0000 Resulting lab: [facility]    Specimen Information    Type Source Collected On   Blood  01/29/18 0748          Components       Value Reference Range Flag Lab   Sodium 128 133 - 144 mmol/L L FrRdHs   Potassium 4.2 3.4 - 5.3 mmol/L  FrRdHs    Chloride 95 94 - 109 mmol/L  FrRdHs   Carbon Dioxide 28 20 - 32 mmol/L  FrRdHs   Anion Gap 5 3 - 14 mmol/L  FrRdHs   Glucose 93 70 - 99 mg/dL  FrRdHs   Urea Nitrogen 18 7 - 30 mg/dL  FrRdHs   Creatinine 0.84 0.52 - 1.04 mg/dL  FrRdHs   GFR Estimate 65 >60 mL/min/1.7m2  FrRdHs   Comment:  Non  GFR Calc   GFR Estimate If Black 78 >60 mL/min/1.7m2  FrRdHs   Comment:  African American GFR Calc   Calcium 8.1 8.5 - 10.1 mg/dL L FrRdHs            Hemoglobin [[id number]] (Abnormal)  Resulted: 01/29/18 0806, Result status: Final result    Ordering provider: Carrillo Bhagat MD  01/29/18 0000 Resulting lab: [facility]    Specimen Information    Type Source Collected On   Blood  01/29/18 0748          Components       Value Reference Range Flag Lab   Hemoglobin 8.3 11.7 - 15.7 g/dL L FrRdHs            Basic metabolic panel [[id number]] (Abnormal)  Resulted: 01/28/18 0810, Result status: Final result    Ordering provider: Carrillo Bhagat MD  01/28/18 0000 Resulting lab: [facility]    Specimen Information    Type Source Collected On   Blood  01/28/18 0732          Components       Value Reference Range Flag Lab   Sodium 129 133 - 144 mmol/L L FrRdHs   Potassium 4.3 3.4 - 5.3 mmol/L  FrRdHs   Chloride 95 94 - 109 mmol/L  FrRdHs   Carbon Dioxide 28 20 - 32 mmol/L  FrRdHs   Anion Gap 6 3 - 14 mmol/L  FrRdHs   Glucose 92 70 - 99 mg/dL  FrRdHs   Urea Nitrogen 21 7 - 30 mg/dL  FrRdHs   Creatinine 0.84 0.52 - 1.04 mg/dL  FrRdHs   GFR Estimate 64 >60 mL/min/1.7m2  FrRdHs   Comment:  Non  GFR Calc   GFR Estimate If Black 78 >60 mL/min/1.7m2  FrRd   Comment:  African American GFR Calc   Calcium 8.1 8.5 - 10.1 mg/dL L FrRdHs            Hemoglobin [[id number]] (Abnormal)  Resulted: 01/28/18 0752, Result status: Final result    Ordering provider: Carrillo Bhagat MD  01/28/18 0000 Resulting lab: [facility]    Specimen Information    Type Source Collected On   Blood   01/28/18 0732          Components       Value Reference Range Flag Lab   Hemoglobin 8.2 11.7 - 15.7 g/dL L [facility]            Testing Performed By     Lab - Abbreviation Name Director Address Valid Date Range    12 - [facility] Unknown [address] [place] [address] 05/08/15 1057 - Present            Unresulted Labs (24h ago through future)    Start       Ordered    01/25/18 0600  Hemoglobin  DAILY,   Routine     Comments:  Last Lab Result: Hemoglobin (g/dL)       Date                     Value                 01/24/2018               7.1 (L)          ----------    01/24/18 2120 01/24/18 0600  Platelet count  (Pharmacological Prophylaxis - enoxaparin (LOVENOX) *Use only if creatinine clearance is greater than 30 mL/min)  EVERY THREE DAYS,   Routine     Comments:  Repeat every 3 days while on VTE prophylaxis.  Notify provider and hold enoxaparin if platelet count falls by 50% of baseline. If no result is listed, this lab has not been done the past 365 days. LATEST LAB RESULT: Platelet Count (10e9/L)       Date                     Value                 01/21/2018               409              ----------    01/21/18 2016 01/24/18 0600  Basic metabolic panel  DAILY,   Routine      01/23/18 1711    Unscheduled  Glucose  CONDITIONAL X 3,   Routine     Comments:  Release order if patient experiencing hyperglycemia or hypoglycemia symptoms and Notify Provider.    01/21/18 2016         Imaging Results - 3 Days      XR Chest Port 1 View [[id number]]  Resulted: 01/29/18 1808, Result status: Final result    Ordering provider: Aguila Moody MD  01/29/18 0919 Resulted by: Michel Dillon MD    Performed: 01/29/18 0927 - 01/29/18 1026 Resulting lab: RADIOLOGY RESULTS    Narrative:       XR CHEST PORT 1 VW 1/29/2018 10:26 AM    COMPARISON: 1/21/2018    HISTORY: Lung consolidation follow-up.      Impression:       IMPRESSION: Mild bibasilar atelectasis/consolidation,  not  significantly changed. No pneumothorax seen on either side.    ALEX MOSQUERA MD      Testing Performed By     Lab - Abbreviation Name Director Address Valid Date Range    104 - [facility] RADIOLOGY RESULTS Unknown Unknown 02/16/05 1553 - Present            Encounter-Level Documents:     There are no encounter-level documents.      Order-Level Documents:     There are no order-level documents.

## 2018-01-21 NOTE — LETTER
Warm Hand-Off to Next Level of Care    Name: Estefany An  MRN #: [id number]  :  1930  Reason for Hospitalization:  Hyponatremia [E87.1]  Healthcare-associated pneumonia [J18.9]  Acute respiratory failure with hypoxia (H) [J96.01]  Acute and chr resp failure, unsp w hypoxia or hypercapnia (H) [J96.20]  Admit Date/Time: 2018  1:26 PM  Discharge Date:  18  PCP: Ricki Blake    Patient will receive the following: TCU  [facility]  [facility] Recommendations:          Pt DC'd to TCU for continued therapies. She was admitted on  for treatment of vol overload and resp failure.    Anju Green RN,BSN, CTS  [facility]  Care Coordination  [phone]

## 2018-01-21 NOTE — IP AVS SNAPSHOT
` ` Patient Information     Patient Name Sex     Estefany An ([id number]) Female 1930       Room Bed    0303 0303-      Patient Demographics     Address Phone    [address]   [facility] [address] [phone] (Home)  NONE (Work)  [phone] (Mobile)      Patient Ethnicity & Race     Ethnic Group Patient Race    American White      Emergency Contact(s)     Name Relation Home Work Mobile    Maylin Bravo Daughter [phone] none [phone]    Samantha Bravo Grandchild [phone] NONE [phone]      Documents on File        Status Date Received Description       Documents for the Patient    Consent for Services - Hospital/Clinic Received () 13     Consent for EHR Access Received 13     Privacy Notice - [place] Received 13     Insurance Card Received 14     External Medication Information Consent       Patient ID Received 14     [facility] Specified Other       Consent for Services/Privacy Notice - Hospital/Clinic Received 18     Care Everywhere Prospective Auth Received 18        Documents for the Encounter    CMS IM for Patient Signature Received 18     CMS IM for Patient Signature Received 18 [address] [place] [address]      Admission Information     Attending Provider Admitting Provider Admission Type Admission Date/Time     Carrillo Bhagat MD Emergency 18  1326    Discharge Date Hospital Service [facility]Cert [facility]    Unit Room/Bed Admission Status        3 MEDICAL SURGICAL  Admission (Confirmed)       Admission     Complaint    Acute on chronic respiratory failure with hypoxia (H)      Hospital Account     Name Acct ID Class Status Primary Coverage    Estefany An [id number] Inpatient Open MEDICARE - MEDICARE            Guarantor Account (for Hospital Account #[id number])     Name Relation to Pt Service Area Active? Acct Type    Estefany An Self FCS  Yes Personal/Family    Address Phone          [address] [place] [address] [phone](H)  NONE(O)              Coverage Information (for Hospital Account #[id number])     1. MEDICARE/MEDICARE     F/O Payor/Plan Precert #    MEDICARE/MEDICARE     Subscriber Subscriber #    Estefany An [id number]    Address Phone    ATTN CLAIMS  [address]  [facility] [place] [address] [phone]          2. [employer] [employer] [employer]     F/O Payor/Plan Precert #    [employer]     Subscriber Subscriber #    Estefany An [id number]    Address Phone    [address] [address]  [place] [address] [phone]

## 2018-01-22 NOTE — PROGRESS NOTES
[facility]  Hospitalist Progress Note  Carrillo Bhagat MD 01/22/2018    Reason for Stay (Diagnosis): worsening dyspnea         Assessment and Plan:      Summary of Stay: Estefany An is a 87 year old female who returned to the ED on 1/21/2018 with complaint of worsening weakness and dyspnea. She was found in the ED to have evidence of consolidation in the right lower lung. She was started on antibiotics, but without fever or remarkable cough, I feel that true bacterial infection is unlikely.    Pt's BNP was significantly elevated above the value of two weeks ago, when she was here for influenza and hyponatremia. She has been started on IV lasix and appears to be improving gradually.    Problem List:   1. Acute hypoxic resp failure.   2. Right pleural v parapneumonic effusion. Will cont to cover for poss bacterial superinfection deespite the possibility that this is not bacterial. Consider sampling or draining if fevers or new sx arise.   3. Hyponatremia, improving with gentle diuresis. Nothing to confirm SIADH (serum Na > 40 and urine Osm 269).  4. Atrial Fibrillation w controlled vent rate. Discussion about anticoagulation for stroke prophylaxis. Pt and daughter wish to pursue this despite falls risk etc.   5. Peripheral edema.   6. Iron deficiency anemia.       PLAN:  1. Cont with gentle diuresis.   2. Cont abx, though ok to stop if no fever in 48 hours.   3. Cont to monitor Na.   4. Cont Venofer.  5. PT and encourage to ambulate.   6. Will give trial of Lovenox. If no bleeding, would recommend trying Eliquis or Xarelto.  7. Check SPEP.    DVT Prophylaxis: Enoxaparin (Lovenox) SQ  Code Status: Full Code  Discharge Dispo: TCu expected.  Estimated Disch Date / # of Days until Disch: likely 2 more days.        Interval History (Subjective):      Improved today. Reiterated goals of care: DNIR/DNI  Pt coughing some, but non-productive.                  Physical Exam:      Last Vital Signs:  /61   "Pulse 66  Temp 98.3  F (36.8  C) (Axillary)  Resp 16  Ht 1.727 m (5' 8\")  Wt 85 kg (187 lb 6.3 oz)  SpO2 (!) 88%  BMI 28.49 kg/m2    I/O last 3 completed shifts:  In: 840 [P.O.:740; I.V.:100]  Out: 2050 [Urine:2050]    Constitutional: Awake, alert, cooperative, no apparent distress   Respiratory: Clear to auscultation bilaterally, no crackles or wheezing   Cardiovascular: Regular rate and rhythm, normal S1 and S2, and no murmur noted   Abdomen: Normal bowel sounds, soft, non-distended, non-tender   Skin: No rashes, no cyanosis, dry to touch   Neuro: Alert and oriented x3, no weakness, numbness, memory loss   Extremities: Trace edema   Other(s):        All other systems: Negative          Medications:      All current medications were reviewed with changes reflected in problem list.         Data:      All new lab and imaging data was reviewed.   Labs/Imaging:  Results for orders placed or performed during the hospital encounter of 01/21/18 (from the past 24 hour(s))   Methicillin Resistant Staph Aureus PCR   Result Value Ref Range    Specimen Description Nares     S Aur Meth Resis PCR Negative NEG^Negative   Glucose by meter   Result Value Ref Range    Glucose 105 (H) 70 - 99 mg/dL   Platelet count   Result Value Ref Range    Platelet Count 368 150 - 450 10e9/L   Creatinine   Result Value Ref Range    Creatinine 0.99 0.52 - 1.04 mg/dL    GFR Estimate 53 (L) >60 mL/min/1.7m2    GFR Estimate If Black 64 >60 mL/min/1.7m2   Glucose by meter   Result Value Ref Range    Glucose 95 70 - 99 mg/dL   Glucose by meter   Result Value Ref Range    Glucose 93 70 - 99 mg/dL   Basic metabolic panel   Result Value Ref Range    Sodium 126 (L) 133 - 144 mmol/L    Potassium 3.9 3.4 - 5.3 mmol/L    Chloride 95 94 - 109 mmol/L    Carbon Dioxide 22 20 - 32 mmol/L    Anion Gap 9 3 - 14 mmol/L    Glucose 89 70 - 99 mg/dL    Urea Nitrogen 20 7 - 30 mg/dL    Creatinine 0.93 0.52 - 1.04 mg/dL    GFR Estimate 57 (L) >60 mL/min/1.7m2    GFR " Estimate If Black 69 >60 mL/min/1.7m2    Calcium 7.7 (L) 8.5 - 10.1 mg/dL   CBC with platelets   Result Value Ref Range    WBC 9.2 4.0 - 11.0 10e9/L    RBC Count 2.50 (L) 3.8 - 5.2 10e12/L    Hemoglobin 7.6 (L) 11.7 - 15.7 g/dL    Hematocrit 23.6 (L) 35.0 - 47.0 %    MCV 94 78 - 100 fl    MCH 30.4 26.5 - 33.0 pg    MCHC 32.2 31.5 - 36.5 g/dL    RDW 14.5 10.0 - 15.0 %    Platelet Count 382 150 - 450 10e9/L   Protein electrophoresis   Result Value Ref Range    Albumin Fraction PENDING 3.7 - 5.1 g/dL    Alpha 1 Fraction PENDING 0.2 - 0.4 g/dL    Alpha 2 Fraction PENDING 0.5 - 0.9 g/dL    Beta Fraction PENDING 0.6 - 1.0 g/dL    Gamma Fraction PENDING 0.7 - 1.6 g/dL    Monoclonal Peak PENDING 0.0 g/dL    ELP Interpretation: PENDING    Vitamin D Deficiency   Result Value Ref Range    Vitamin D Deficiency screening 34 20 - 75 ug/L   Glucose by meter   Result Value Ref Range    Glucose 95 70 - 99 mg/dL   UA with Microscopic   Result Value Ref Range    Color Urine Straw     Appearance Urine Clear     Glucose Urine Negative NEG^Negative mg/dL    Bilirubin Urine Negative NEG^Negative    Ketones Urine Negative NEG^Negative mg/dL    Specific Gravity Urine 1.009 1.003 - 1.035    Blood Urine Negative NEG^Negative    pH Urine 6.0 5.0 - 7.0 pH    Protein Albumin Urine Negative NEG^Negative mg/dL    Urobilinogen mg/dL 0.0 0.0 - 2.0 mg/dL    Nitrite Urine Negative NEG^Negative    Leukocyte Esterase Urine Negative NEG^Negative    Source Unspecified Urine     WBC Urine 1 0 - 2 /HPF    RBC Urine <1 0 - 2 /HPF    Squamous Epithelial /HPF Urine <1 0 - 1 /HPF   Osmolality urine   Result Value Ref Range    Urine Osmolality 269 100 - 1200 mmol/kg   Sodium random urine   Result Value Ref Range    Sodium Urine mmol/L 75 mmol/L   Glucose by meter   Result Value Ref Range    Glucose 123 (H) 70 - 99 mg/dL

## 2018-01-22 NOTE — PLAN OF CARE
Problem: Patient Care Overview  Goal: Plan of Care/Patient Progress Review  Outcome: No Change  Neuro: alert and oriented x4, afebrile  Card: afib, pulses strong and equal x4  Resp: dim, 10 L oxymask  GI: bs +  : diuresis with lasix, see flowsheet for volume, pure wick   Endo: wnl  Skin: right buttock wound, preexisting   Pain: LBP, bilateral ankles, pain med x2, Beng[place] x2

## 2018-01-22 NOTE — PROGRESS NOTES
Pt transferred to [facility] via WC escorted by nursing staff and pt stable at time of transfer.  Report given to RYNE Tan, all belongings with pt at time of transfer. Pt oriented to room/call light. See flowsheet for additional assessment information.

## 2018-01-23 NOTE — PROGRESS NOTES
"[facility] RCAT     Date: 1/22/18  Admission Dx: respiratory failure/PNA  Pulmonary History: none  Home Nebulizer/MDI Use: none  Home Oxygen: none  Acuity Level (RCAT flow sheet): 3  Aerosol Therapy initiated: Duoneb QID, alb Q2 prn      Pulmonary Hygiene initiated:NA      Volume Expansion initiated: IS      Current Oxygen Requirements: 5lpm oxymask  Current SpO2: 94%    Re-evaluation date: 1/25/18    Patient Education: Patient aware of medication benefits and interactions      See \"RT Assessments\" flow sheet for patient assessment scoring and Acuity Level Details.             "

## 2018-01-23 NOTE — PROGRESS NOTES
01/23/18 1000   Quick Adds   Type of Visit Initial PT Evaluation   Living Environment   Lives With alone   Living Arrangements independent living facility   Home Accessibility no concerns   Number of Stairs to Enter Home 0   Number of Stairs Within Home 0   Transportation Available family or friend will provide  (pt doesn't drive at baseline, daughter assists )   Living Environment Comment pt was admitted from TCU where she had been since being discharged from the hospital on 1/15.  Prior to first January hospitalization, pt was living alone in an independent living facility.    Self-Care   Dominant Hand right   Usual Activity Tolerance moderate   Current Activity Tolerance fair   Regular Exercise yes   Activity/Exercise Type walking   Exercise Amount/Frequency daily   Equipment Currently Used at Home grab bar;shower chair;walker, rolling  (4WW outside her apartment )   Functional Level Prior   Ambulation 1-->assistive equipment   Transferring 0-->independent   Toileting 0-->independent   Bathing 1-->assistive equipment   Dressing 0-->independent   Eating 0-->independent   Communication 0-->understands/communicates without difficulty   Swallowing 0-->swallows foods/liquids without difficulty   Cognition 0 - no cognition issues reported   Fall history within last six months yes   Number of times patient has fallen within last six months 1   Which of the above functional risks had a recent onset or change? ambulation;transferring   General Information   Onset of Illness/Injury or Date of Surgery - Date 01/21/18   Referring Physician Carrillo Bhagat MD   Patient/Family Goals Statement Plans to return to TCU.    Pertinent History of Current Problem (include personal factors and/or comorbidities that impact the POC) Ms. An is an 87-year-old woman who returns to the hospital today about a week after having been discharged.  The previous hospitalization January 7 - January 15 at which time she was treated for influenza  and pneumonia as well as urinary tract infection and hyponatremia.  She is generally weak and was found to have right lower lobe infiltrate versus atelectasis with pleural effusion versus parapneumonic effusion.  Pt had a fall at home prior to first hospitalization which prompted her evaluation in the emergency department.  She was found to have right clavicle as well as right pubic rami fractures.   Precautions/Limitations fall precautions;oxygen therapy device and L/min  (3L O2 )   General Observations supine in bed upon arrival and agreeable to PT session.    General Info Comments Activity: Up with assist    Cognitive Status Examination   Orientation orientation to person, place and time   Level of Consciousness alert   Follows Commands and Answers Questions 100% of the time   Personal Safety and Judgment intact   Pain Assessment   Patient Currently in Pain No   Strength   Strength Comments functional weakness demonstrated with mobility.    Bed Mobility   Bed Mobility Comments supine to sit with SBA.  Sit to supine with Kat with LE's.   Transfer Skills   Transfer Comments CGA and FWW   Gait   Gait Comments CGA and FWW with bedside gait   Balance   Balance no deficits were identified   General Therapy Interventions   Planned Therapy Interventions bed mobility training;gait training;strengthening;transfer training;progressive activity/exercise   Clinical Impression   Criteria for Skilled Therapeutic Intervention yes, treatment indicated   PT Diagnosis difficulty with transfers and gait.   Influenced by the following impairments generalized weakness, deconditioning, fatigue.   Functional limitations due to impairments impaired functional mobility and impaired functional activity tolerance.   Clinical Presentation Stable/Uncomplicated   Clinical Presentation Rationale 1 system assessed - musculoskeletal   Clinical Decision Making (Complexity) Low complexity   Therapy Frequency` 5 times/week   Predicted Duration of  "Therapy Intervention (days/wks) 5 days   Anticipated Equipment Needs at Discharge other (see comments)  (TBD at TCU )   Anticipated Discharge Disposition Transitional Care Facility   Risk & Benefits of therapy have been explained Yes   Patient, Family & other staff in agreement with plan of care Yes   [facility] TM \"6 Clicks\"   2016, Trustees of [facility], under license to [employer].  All rights reserved.   6 Clicks Short Forms Basic Mobility Inpatient Short Form   [facility]  \"6 Clicks\" V.2 Basic Mobility Inpatient Short Form   1. Turning from your back to your side while in a flat bed without using bedrails? 3 - A Little   2. Moving from lying on your back to sitting on the side of a flat bed without using bedrails? 3 - A Little   3. Moving to and from a bed to a chair (including a wheelchair)? 3 - A Little   4. Standing up from a chair using your arms (e.g., wheelchair, or bedside chair)? 3 - A Little   5. To walk in hospital room? 3 - A Little   6. Climbing 3-5 steps with a railing? 2 - A Lot   Basic Mobility Raw Score (Score out of 24.Lower scores equate to lower levels of function) 17   Total Evaluation Time   Total Evaluation Time (Minutes) 10     "

## 2018-01-23 NOTE — PLAN OF CARE
Problem: ARDS (Acute Resp Distress Syndrome) (Adult)  Goal: Signs and Symptoms of Listed Potential Problems Will be Absent, Minimized or Managed (ARDS)  Signs and symptoms of listed potential problems will be absent, minimized or managed by discharge/transition of care (reference ARDS (Acute Resp Distress Syndrome) (Adult) CPG).  Outcome: Improving  icu transfer to imc. Vss. sats 89-93 on 6Loxymask. ls dim. Tele afib cvr. Foam to right buttocks changed per icu. Lactic 2.1. Admitting MD was notified. BLE +1. norco for back pain. Productive cough. No change in icu RN assessment.

## 2018-01-23 NOTE — PROGRESS NOTES
[facility]  Hospitalist Progress Note  Carrillo Bhagat MD 01/23/2018    Reason for Stay (Diagnosis): worsening dyspnea         Assessment and Plan:      Summary of Stay: Estefany An is a 87 year old female who returned to the ED on 1/21/2018 with complaint of worsening weakness and dyspnea. She was found in the ED to have evidence of consolidation in the right lower lung. She was started on antibiotics, but without fever or remarkable cough, I feel that true bacterial infection is unlikely.    Pt's BNP was significantly elevated above the value of two weeks ago, when she was here for influenza and hyponatremia. She has been started on IV lasix and appears to be improving gradually.     Problem List:   1. Acute hypoxic resp failure.  This appears multifactorial certainly with right lower lobe atelectasis contributing to hypoxia.  In addition patient has responded to gentle diuresis.  2. Right pleural v parapneumonic effusion. Will cont to cover for poss bacterial superinfection despite the possibility that this is not bacterial.   3. Hyponatremia, improving with gentle diuresis. Nothing to confirm SIADH (serum Na > 40 and urine Osm 269).  4. Atrial Fibrillation w controlled vent rate. Discussion about anticoagulation for stroke prophylaxis. Pt and daughter wish to pursue this despite falls risk etc.   5. Peripheral edema.   6. Iron deficiency anemia.       PLAN:  1. Cont with gentle diuresis.  Encourage incentive spirometry.  2. Discontinue antibiotics.  3. Cont to monitor Na.   4. Cont Venofer daily doses.   5. PT and encourage to ambulate.   6. Will give trial of Lovenox. If no bleeding, would recommend trying Eliquis or Xarelto.    DVT Prophylaxis: Enoxaparin (Lovenox) SQ  Code Status: Full Code  Discharge Dispo: TCu expected.  Estimated Disch Date / # of Days until Disch: likely 2 more days.        Interval History (Subjective):      Patient continues to cough.  She remains stable but still  "requiring oxygen by nasal cannula.  She denies fevers.  Working with incentive spirometer.                  Physical Exam:      Last Vital Signs:  /54 (BP Location: Left arm)  Pulse 88  Temp 95.2  F (35.1  C) (Oral)  Resp 20  Ht 1.727 m (5' 8\")  Wt 85 kg (187 lb 6.3 oz)  SpO2 98%  BMI 28.49 kg/m2    I/O last 3 completed shifts:  In: 900 [P.O.:840; I.V.:60]  Out: 675 [Urine:675]    Constitutional: Awake, alert, cooperative, no apparent distress   Respiratory: Clear to auscultation bilaterally, no crackles or wheezing   Cardiovascular: Regular rate and rhythm, normal S1 and S2, and no murmur noted   Abdomen: Normal bowel sounds, soft, non-distended, non-tender   Skin: No rashes, no cyanosis, dry to touch   Neuro: Alert and oriented x3, no weakness, numbness, memory loss   Extremities: Trace edema   Other(s):        All other systems: Negative          Medications:      All current medications were reviewed with changes reflected in problem list.         Data:      All new lab and imaging data was reviewed.   Labs/Imaging:  Results for orders placed or performed during the hospital encounter of 01/21/18 (from the past 24 hour(s))   Glucose by meter   Result Value Ref Range    Glucose 123 (H) 70 - 99 mg/dL   Lactic acid level STAT   Result Value Ref Range    Lactic Acid 2.1 (H) 0.7 - 2.0 mmol/L   CBC with platelets   Result Value Ref Range    WBC 9.9 4.0 - 11.0 10e9/L    RBC Count 2.52 (L) 3.8 - 5.2 10e12/L    Hemoglobin 7.7 (L) 11.7 - 15.7 g/dL    Hematocrit 23.7 (L) 35.0 - 47.0 %    MCV 94 78 - 100 fl    MCH 30.6 26.5 - 33.0 pg    MCHC 32.5 31.5 - 36.5 g/dL    RDW 14.6 10.0 - 15.0 %    Platelet Count 407 150 - 450 10e9/L         "

## 2018-01-23 NOTE — PLAN OF CARE
Problem: ARDS (Acute Resp Distress Syndrome) (Adult)  Goal: Signs and Symptoms of Listed Potential Problems Will be Absent, Minimized or Managed (ARDS)  Signs and symptoms of listed potential problems will be absent, minimized or managed by discharge/transition of care (reference ARDS (Acute Resp Distress Syndrome) (Adult) CPG).   Outcome: No Change  MS. An is pleasant, alert and oriented.  Her VS are stable. 02 sats 89-93 on 6Loxymask. Lungs are dim, coarse to bases. Wound to buttocks foam intact. Trace edema ble. VS q 2hours. Will addend as needed. And continue to monitor.

## 2018-01-23 NOTE — PLAN OF CARE
Problem: Patient Care Overview  Goal: Plan of Care/Patient Progress Review  Initial evaluation completed and treatment initiated.  Pt returned to the ED on 1/21/2018 with complaint of worsening weakness and dyspnea and was found in the ED to have evidence of consolidation in the right lower lung.  Pt previously hospitalized from January 7 - January 15 at which time she was treated for influenza and pneumonia as well as urinary tract infection and hyponatremia.  See chart for additional PMH.  Pt was admitted from TCU where she had been since being discharged from the hospital on 1/15.  Prior to first January hospitalization, pt was living alone in an independent living facility.  She used a 4WW outside her apartment and was Yam[facility]/IND with ADL's.      Discharge Planner PT   Patient plan for discharge:  Return to TCU.   Current status:  Supine to sit with SBA, CGA and FWW with sit to stands, CGA and FWW with bedside gait, sit to supine with Kat with LE's.    Barriers to return to prior living situation:  Weakness, deconditioning, fatigue, lives alone, needs assist with mobility.    Recommendations for discharge:  TCU  Rationale for recommendations:  To progress strength, activity tolerance, and independence/safety with mobility.         Entered by: Eda Daley 01/23/2018 10:53 AM

## 2018-01-23 NOTE — PROGRESS NOTES
PT recommends patient return to TCU at discharge. She was admitted from [facility]. Met with patient at bedside to inquire if she is interested in returning to [facility]. She stated she needs to have further therapy and would like to return to [facility]. Called [facility] ([phone]) spoke with Ginette. Pt was in a shared room. They can hold her bed. There is no charge as they only charge if they are below 96% capacity for over 30 days. Ginette will put a hold on patient's bed.     CM will continue to follow patient until discharge for any additional needs.     Lesli Allen RN, BSN, CTS  [facility]  [phone]

## 2018-01-24 NOTE — PROGRESS NOTES
[facility]  Hospitalist Progress Note  Carrillo Bhagat MD 01/24/2018    Reason for Stay (Diagnosis): worsening dyspnea         Assessment and Plan:      Summary of Stay: Estefany An is a 87 year old female who returned to the ED on 1/21/2018 with complaint of worsening weakness and dyspnea. She was found in the ED to have evidence of consolidation in the right lower lung. She was started on antibiotics, but without fever or remarkable cough, I feel that true bacterial infection is unlikely.    Pt's BNP was significantly elevated above the value of two weeks ago, when she was here for influenza and hyponatremia. She was started on IV lasix and appears to be improving gradually. Pt also has been working some on pulmonary toilet, to open up the right lower lobe consolidation.     Problem List:   1. Acute hypoxic resp failure.  This appears multifactorial certainly with right lower lobe atelectasis contributing to hypoxia.  In addition patient has responded to gentle diuresis.  2. Right pleural v parapneumonic effusion. Will cont to cover for poss bacterial superinfection despite the possibility that this is not bacterial.   3. Hyponatremia, improving with gentle diuresis. Nothing to confirm SIADH (serum Na > 40 and urine Osm 269).  4. Atrial Fibrillation w controlled vent rate. Discussion about anticoagulation for stroke prophylaxis. Pt and daughter wish to pursue this despite falls risk etc.   5. Peripheral edema.   6. Iron deficiency anemia. Currently receiving iron replacement IV while here.       PLAN:  1. Cont with gentle diuresis (switched to oral lasix today).    2. Encourage incentive spirometry, scheduled nebs, acapella and pulmonary toilet.  3. Cont to monitor Na.   4. Cont Venofer daily doses.   5. PT and encourage to ambulate.   6. Will give trial of Lovenox. If no bleeding, would recommend trying Eliquis or Xarelto. I asked Pharmacy to figure out which NOAC is preferred by her insurance for  "A fib.    DVT Prophylaxis: Enoxaparin (Lovenox) SQ  Code Status: Full Code  Discharge Dispo: TCu expected.  Estimated Disch Date / # of Days until Disch: possibly tomorrow v Friday.        Interval History (Subjective):      Patient continues to cough.  She remains stable but still requiring oxygen by nasal cannula.  She denies fevers.  Working with incentive spirometer.  Pt is interested in discharge, but is still requiringsupplemental oxygen. States she is starting to feel better.                  Physical Exam:      Last Vital Signs:  /56 (BP Location: Right arm)  Pulse 101  Temp 97.6  F (36.4  C) (Axillary)  Resp 18  Ht 1.727 m (5' 8\")  Wt 85 kg (187 lb 6.3 oz)  SpO2 98%  BMI 28.49 kg/m2    I/O last 3 completed shifts:  In: 1643 [P.O.:1640; I.V.:3]  Out: 300 [Urine:300]    Constitutional: Awake, alert, cooperative, no apparent distress   Respiratory: Clear to auscultation bilaterally, no crackles or wheezing   Cardiovascular: Regular rate and rhythm, normal S1 and S2, and no murmur noted   Abdomen: Normal bowel sounds, soft, non-distended, non-tender   Skin: No rashes, no cyanosis, dry to touch   Neuro: Alert and oriented x3, no weakness, numbness, memory loss   Extremities: Trace edema   Other(s):        All other systems: Negative          Medications:      All current medications were reviewed with changes reflected in problem list.         Data:      All new lab and imaging data was reviewed.   Labs/Imaging:  Results for orders placed or performed during the hospital encounter of 01/21/18 (from the past 24 hour(s))   Basic metabolic panel   Result Value Ref Range    Sodium 127 (L) 133 - 144 mmol/L    Potassium 4.0 3.4 - 5.3 mmol/L    Chloride 94 94 - 109 mmol/L    Carbon Dioxide 28 20 - 32 mmol/L    Anion Gap 5 3 - 14 mmol/L    Glucose 99 70 - 99 mg/dL    Urea Nitrogen 17 7 - 30 mg/dL    Creatinine 0.82 0.52 - 1.04 mg/dL    GFR Estimate 66 >60 mL/min/1.7m2    GFR Estimate If Black 80 >60 " mL/min/1.7m2    Calcium 8.1 (L) 8.5 - 10.1 mg/dL   CBC with platelets   Result Value Ref Range    WBC 10.4 4.0 - 11.0 10e9/L    RBC Count 2.25 (L) 3.8 - 5.2 10e12/L    Hemoglobin 7.1 (L) 11.7 - 15.7 g/dL    Hematocrit 21.2 (L) 35.0 - 47.0 %    MCV 94 78 - 100 fl    MCH 31.6 26.5 - 33.0 pg    MCHC 33.5 31.5 - 36.5 g/dL    RDW 14.9 10.0 - 15.0 %    Platelet Count 377 150 - 450 10e9/L

## 2018-01-24 NOTE — PLAN OF CARE
Pt A&Ox4. Telemetry monitoring in place. VSS on 3L O2 per NC. Pt has diminished lung sounds throughout with coarseness to lower lobes. PIV to R and L AC, SL with intermittent abx and iron. External catheter in place, hooked up to low continuous suction. Wound to R ischial tuberance, dressed in mepilex, CDI. Pt Ax1 with transfers and ambulation, resolves at rest. Pt has dyspnea and increase in HR with exertion. 2g sodium diet, good appetite. Pt has bed on hold at [facility]. Will continue to monitor and report off to next nurse.

## 2018-01-24 NOTE — PROGRESS NOTES
MS. An is pleasant, alert and oriented.  Her VS are stable. Remains on supportive o2. 98% on 3LNC. Lungs are dim. Wound to buttocks foam clean, dry and intact.  Patient able to sleep more soundly tonight. States she is feeling better as well. 0100 IV lasix held due to bp and HR being out of range. Purewick remains in place. D/c plan to AVC in 2 days. Will continue to monitor.

## 2018-01-25 NOTE — PROGRESS NOTES
[facility]  Hospitalist Progress Note  Aguila Moody MD 01/25/2018    Reason for Stay (Diagnosis): worsening dyspnea         Assessment and Plan:      Summary of Stay: Estefany An is a 87 year old female who returned to the ED on 1/21/2018 with complaint of worsening weakness and dyspnea. She was found in the ED to have evidence of consolidation in the right lower lung. She was started on antibiotics, but without fever or remarkable cough.  Of note, her BNP was significantly elevated above the value of two weeks ago, when she was here for influenza and hyponatremia. She was started on IV lasix and appears to be improving gradually. Pt also has been working some on pulmonary toilet, to open up the right lower lobe consolidation which may be more due to atelectasis.  She resides at [facility] and will return there soon pending further improvement.    Problem List:   1. Acute hypoxic resp failure.  This appears multifactorial certainly with right lower lobe atelectasis and fluid overload contributing to hypoxia.  She has responded to gentle diuresis.  Continue PO lasix today.  Will need recheck bmp, monitor weights etc .    2. Right pleural v parapneumonic effusion. Will continue to cover for poss bacterial superinfection despite the possibility that this is not bacterial.     3. Hyponatremia, improving with gentle diuresis.     4. Atrial Fibrillation w controlled vent rate. Discussion about anticoagulation for stroke prophylaxis. Pt and daughter wish to pursue this despite falls risk etc.  Was given a dose of lovenox, now will plan to consider Eliquis pending Pharmacy eval re: cost.    5. Peripheral edema.  Continue diuretics.    6. Iron deficiency anemia. Currently receiving iron replacement IV while here x 5 doses as ordered by my colleague.        DVT Prophylaxis: no chemical dvt prophylaxis given decline in hgb  Code Status: Full Code  Discharge Dispo: ?TCU vs back to [facility] w/ increased  "services.  Estimated Disch Date / # of Days until Disch: hopefully tomorrow if hgb stable.        Interval History (Subjective):      Still on supplemental O2 this AM  Overall feeling better - less dyspnea  I assumed care today, history reviewed  Denies pain.                  Physical Exam:      Last Vital Signs:  /48 (BP Location: Right arm)  Pulse 101  Temp 98.1  F (36.7  C) (Axillary)  Resp 18  Ht 1.727 m (5' 8\")  Wt 85 kg (187 lb 6.3 oz)  SpO2 96%  BMI 28.49 kg/m2    I/O last 3 completed shifts:  In: 1040 [P.O.:1040]  Out: 690 [Urine:690]    Constitutional: Awake, alert, cooperative, no apparent distress   Respiratory: Clear to auscultation bilaterally, no crackles or wheezing   Cardiovascular: Regular rate and rhythm, normal S1 and S2, and no murmur noted   Abdomen: Normal bowel sounds, soft, non-distended, non-tender   Skin: No rashes, no cyanosis, dry to touch   Neuro: Alert and oriented x3, no weakness, numbness, memory loss   Extremities: Trace edema   Other(s):        All other systems: Negative          Medications:      All current medications were reviewed with changes reflected in problem list.         Data:      All new lab and imaging data was reviewed.   Labs/Imaging:  Results for orders placed or performed during the hospital encounter of 01/21/18 (from the past 24 hour(s))   Basic metabolic panel   Result Value Ref Range    Sodium 127 (L) 133 - 144 mmol/L    Potassium 5.3 3.4 - 5.3 mmol/L    Chloride 95 94 - 109 mmol/L    Carbon Dioxide 25 20 - 32 mmol/L    Anion Gap 7 3 - 14 mmol/L    Glucose 98 70 - 99 mg/dL    Urea Nitrogen 18 7 - 30 mg/dL    Creatinine 0.82 0.52 - 1.04 mg/dL    GFR Estimate 66 >60 mL/min/1.7m2    GFR Estimate If Black 80 >60 mL/min/1.7m2    Calcium 8.1 (L) 8.5 - 10.1 mg/dL   Hemoglobin   Result Value Ref Range    Hemoglobin 7.5 (L) 11.7 - 15.7 g/dL         "

## 2018-01-25 NOTE — PROGRESS NOTES
Patient ordered for BD's.  Patient sitting up in chair at this time and doesn't want to go thru process to get back into bed for BD's.   Given neb.  BS [place] encouraged.

## 2018-01-25 NOTE — PLAN OF CARE
Problem: Patient Care Overview  Goal: Plan of Care/Patient Progress Review  Outcome: Improving  A&O x4. VSS, on 3L O2 via nc. Ax1 w/walker when oob. LS diminshed, MUNOZ, denies SOB. BS audible/active x4, denies N/V. Pt c/o low back and shoulder pain, Tylenol and Norco given this shift. Purewick in place. PT today. Tele Afib CVR, pt had a 10 beat run of Vtach at 0218 while sleeping, MD notified, Mg ordered that resulted at 2.3, no further orders or runs. Resting between cares. Continue with plan of care.

## 2018-01-25 NOTE — PLAN OF CARE
Problem: Patient Care Overview  Goal: Plan of Care/Patient Progress Review  Tele A Fib CVR this shift. Up with one and a walker. C/O back pain, medicated with norco. O2 on at 3L decreased to 2 1/2 LPM via nasal cannula. Incontinent, using pure wic. Hgb 7.1 today.

## 2018-01-26 NOTE — PLAN OF CARE
"Problem: Patient Care Overview  Goal: Plan of Care/Patient Progress Review  Outcome: No Change  Neuro: A & O   VS:  VSS  Tele: Afib   Respiratory: Lung sounds course, diminished.   GI: WDL  : Incontinent of urine. Pure wick removed this am.   Pain: denies.   IV: PIV x 2 WDL SL   Transfer: A1 walker.   Diet: Tolerating PO foods and fluids well.   Plan: TBD. Infused 1 unit PRBC's today. Hbg recheck at 1400. Pt tolerated transfusion well. Hospitalist following.       0812 MD paged: \"critical hgb - 6.3\"    0815 - New orders received.  MD explained rational for blood product administration.  Pt signed consent form for blood product administration - consent placed in chart.   "

## 2018-01-26 NOTE — PLAN OF CARE
Problem: Patient Care Overview  Goal: Plan of Care/Patient Progress Review    Discharge Planner PT   Patient plan for discharge:  Return to TCU.   Current status:  Pt completes seated LE strengthening including repeated sit<>stands. Pt ambulates short distance in room with FWW and CGA. Pt fatigues very easily and was reporting high fatigue levels at end of session. Vitals assessed, all WNL except SaO2 was 89% on RA, but this quickly ashu to 93% in ~60sec.   Barriers to return to prior living situation:  Weakness, deconditioning, fatigue, lives alone, needs assist with mobility.    Recommendations for discharge:  TCU  Rationale for recommendations:  Pt is not currently at baseline for mobility, and is unsafe to discharge home. With continued PT, both IP and after discharge, pt is likely to obtain mobility goals.        Entered by: Crystal Stevens 01/26/2018 5:21 PM

## 2018-01-26 NOTE — PLAN OF CARE
Problem: Patient Care Overview  Goal: Plan of Care/Patient Progress Review  Weaned off oxygen this am. O2 sats 92-93%. Ambulated in room and to the bathroom with walker and assist of one. Had large BM after suppository and MOM and prune juice x 3. States she feels better after BM. BLE 2+ persists.

## 2018-01-26 NOTE — CONSULTS
"BRIEF NUTRITION ASSESSMENT      REASON FOR ASSESSMENT:  LOS    NUTRITION HISTORY:  - Information obtained from patient and chart.  - Seen by this writer during previous admission where patient verbalized decrease in appetite and overall feeling of unwell, though much improved with regards to oral intake.  - Regular diet PTA.  - No decrease changes to meal frequency or portion sizes PTA.  - NKFA.    CURRENT DIET AND INTAKE:  Diet:  2 gm Na  Room service with assist        100% intake since admission.    ANTHROPOMETRICS:  Height: 5' 8\"  Weight: 195#  BMI: 30 kg/m2  IBW: 140#  Weight Status: Obesity Grade I BMI 30-34.9  %IBW: 139%  Weight History:  Wt Readings from Last 10 Encounters:   01/26/18 88.8 kg (195 lb 12.8 oz)   01/13/18 88.7 kg (195 lb 8 oz)   11/02/14 81.6 kg (180 lb)   12/03/13 81.6 kg (180 lb)   - Current wt consistent with that from past admission.     LABS:  Labs noted    MALNUTRITION:  Patient does not meet two of the following criteria necessary for diagnosing malnutrition: significant weight loss, reduced intake, subcutaneous fat loss, muscle loss or fluid retention    NUTRITION INTERVENTION:  Nutrition Diagnosis:  No nutrition diagnosis at this time.    Implementation:  Nutrition Education:  Per Provider order if indicated.    FOLLOW UP/MONITORING:   Will re-evaluate in 7 - 10 days, or sooner, if re-consulted.        Abi Link RD, LD  Clinical Dietitian  3rd floor/ICU: [phone]  All other floors: [phone]  Weekend/holiday: [phone]  "

## 2018-01-26 NOTE — PLAN OF CARE
Problem: Patient Care Overview  Goal: Plan of Care/Patient Progress Review  Outcome: No Change  Pt. AxOx4, up with assist of 1 and walker, Tele:A.fib, Lung sounds diminished, room air sat s at 92-93%. SOB with exertion.  C/O low back pain and shoulder pain and given Norco with decrease in pain level.  Pt. incontinent of urine and continues on purewick .Plan  Pt. continues on PO Lasix, Lovenox, pain management. Mepilex drsg changed x1 this shift due to drsg found rolled up on buttocks. Pt. denies any pain. Will continue with POC.

## 2018-01-26 NOTE — PROGRESS NOTES
[facility]  Hospitalist Progress Note  Aguila Moody MD 01/26/2018    Reason for Stay (Diagnosis): worsening dyspnea         Assessment and Plan:      Summary of Stay: Estefany An is a 87 year old female who returned to the ED on 1/21/2018 with complaint of worsening weakness and dyspnea. She was found in the ED to have evidence of consolidation in the right lower lung. She was started on antibiotics, but without fever or remarkable cough.  Of note, her BNP was significantly elevated above the value of two weeks ago, when she was here for influenza and hyponatremia. She was started on IV lasix and appears to be improving gradually. Pt also has been working some on pulmonary toilet, to open up the right lower lobe consolidation which may be more due to atelectasis.  She resides at [facility] and will return there soon pending further improvement.    Her hgb has drifted down to 6.3 for which we are transfusing RBCs.  There is no clear blood loss to explain this drop though we are holding AC moving forward.    Problem List:   1. Acute hypoxic resp failure.  This appears multifactorial certainly with right lower lobe atelectasis and fluid overload contributing to hypoxia.  She has responded to gentle diuresis.  Continue PO lasix today.  Will need recheck bmp, monitor weights etc .    2. Right pleural v parapneumonic effusion. Covered for poss bacterial superinfection for three days, now off abx.  No fevers, normal WBC.  Consider repeating imaging w/ CXR.    3. Hyponatremia, improving with gentle diuresis.     4. Atrial Fibrillation w controlled vent rate. Discussion about anticoagulation for stroke prophylaxis by my colleague. Pt and daughter wished to pursue this despite falls risk etc but given down-trending hgb I feel we should not continue this for now (hold lovenox/eliquis).  Pending further workup might be able to try this down the line.    5. Peripheral edema.  Continue  "diuretics.    6. Iron deficiency anemia. Received iron replacement IV while here x 5 doses as ordered by my colleague, completed on 1/25.  hgb still has drifted to 6.5.  Transfusing 1 U RBCs.  Monitoring for blood loss and will recheck.  Hemodynamically stable.      DVT Prophylaxis: Enoxaparin (Lovenox) SQ  Code Status: Full Code  Discharge Dispo: ?TCU vs back to [facility] w/ increased services.  Estimated Disch Date / # of Days until Disch: hopefully tomorrow.          Interval History (Subjective):      hgb drifted down to 6.3, transfusing today  Discussed transfusion, consented personally  No apparent blood loss  Feels fatigued today, otherwise off O2 and doing OK  Updated her daughter via phone         Physical Exam:      Last Vital Signs:  /51 (BP Location: Left arm)  Pulse 101  Temp 97.7  F (36.5  C) (Oral)  Resp 20  Ht 1.727 m (5' 8\")  Wt 88.8 kg (195 lb 12.8 oz)  SpO2 91%  BMI 29.77 kg/m2    I/O last 3 completed shifts:  In: 990 [P.O.:990]  Out: 750 [Urine:750]    Constitutional: Awake, alert, cooperative, no apparent distress   Respiratory: Clear to auscultation bilaterally, no crackles or wheezing   Cardiovascular: Regular rate and rhythm, normal S1 and S2, and no murmur noted   Abdomen: Normal bowel sounds, soft, non-distended, non-tender   Skin: No rashes, no cyanosis, dry to touch   Neuro: Alert and oriented x3, no weakness, numbness, memory loss   Extremities: Trace edema   Other(s):        All other systems: Negative          Medications:      All current medications were reviewed with changes reflected in problem list.         Data:      All new lab and imaging data was reviewed.   Labs/Imaging:  Results for orders placed or performed during the hospital encounter of 01/21/18 (from the past 24 hour(s))   Basic metabolic panel   Result Value Ref Range    Sodium 128 (L) 133 - 144 mmol/L    Potassium 4.7 3.4 - 5.3 mmol/L    Chloride 93 (L) 94 - 109 mmol/L    Carbon Dioxide 28 20 - 32 mmol/L    " Anion Gap 7 3 - 14 mmol/L    Glucose 101 (H) 70 - 99 mg/dL    Urea Nitrogen 26 7 - 30 mg/dL    Creatinine 0.81 0.52 - 1.04 mg/dL    GFR Estimate 66 >60 mL/min/1.7m2    GFR Estimate If Black 80 >60 mL/min/1.7m2    Calcium 7.9 (L) 8.5 - 10.1 mg/dL   Hemoglobin   Result Value Ref Range    Hemoglobin 6.3 (LL) 11.7 - 15.7 g/dL

## 2018-01-27 NOTE — PROGRESS NOTES
[facility]  Hospitalist Progress Note  Aguila Moody MD 01/27/2018    Reason for Stay (Diagnosis): worsening dyspnea         Assessment and Plan:      Summary of Stay: Estefany An is a 87 year old female who returned to the ED on 1/21/2018 with complaint of worsening weakness and dyspnea. She was found in the ED to have evidence of consolidation in the right lower lung. She was started on antibiotics, but without fever or remarkable cough.  Of note, her BNP was significantly elevated above the value of two weeks ago, when she was here for influenza and hyponatremia. She was started on IV lasix and appears to be improving gradually. Pt also has been working some on pulmonary toilet, to open up the right lower lobe consolidation which may be more due to atelectasis.  She resides at [facility] and will return there vs TCU soon pending further improvement.    Her hgb has drifted down to 6.3 for which we transfused RBCs 1/26.  There is no clear blood loss to explain this drop though we are holding AC moving forward.  She is getting a second unit for RBCs on 1/27 as her hgb is 7.3 with some ongoing symptoms of anemia.  We have resumed IV lasix with this as well.    Problem List:   1. Acute hypoxic resp failure.  This appears multifactorial certainly with right lower lobe atelectasis and fluid overload contributing to hypoxia.  She has responded to gentle diuresis.  Continue IV lasix today.  Will need recheck bmp, monitor weights etc .  Anemia may be playing a role as well so we are transfusing as needed.    2. Right pleural v parapneumonic effusion. Covered for poss bacterial superinfection for three days, now off abx.  No fevers, normal WBC.  Consider repeating imaging w/ CXR pending her course.    3. Hyponatremia, improving with gentle diuresis but then trended down again 1/27 after getting blood.  Seems more fluid-up so restarting IV diuretics.    4. Atrial Fibrillation w controlled vent rate.  "Discussion about anticoagulation for stroke prophylaxis by my colleague. Pt and daughter initially wished to pursue this despite falls risk etc but given down-trending hgb I feel we should not continue this for now (hold lovenox/eliquis).  Pending further workup might be able to revisit this down the line.    5. Peripheral edema.  Continue diuretics as above.     6. Iron deficiency anemia. Received iron replacement IV while here x 5 doses as ordered by my colleague, completed on 1/25.  hgb still has drifted to 6.3.  Transfused 1 U RBCs w/ response to 7.3.  Monitoring for blood loss and will recheck.  Hemodynamically stable.  --transfuse one more U RBCs today.      DVT Prophylaxis: Enoxaparin (Lovenox) SQ  Code Status: Full Code  Discharge Dispo: ?TCU vs back to [facility] w/ increased services.  Estimated Disch Date / # of Days until Disch: hopefully tomorrow.          Interval History (Subjective):      hgb drifted down to 7.3, transfusing today again w/ IV lasix pre- and post- transfusion  Sodium down to 126, seems she still has significant peripheral edema/volume overload so starting IV diuretics again  No apparent blood loss or black stools per nursing or patient  Feels fatigued today, otherwise off O2 and doing OK  No chest pain or significant cough           Physical Exam:      Last Vital Signs:  /59  Pulse 87  Temp 97.6  F (36.4  C) (Axillary)  Resp 18  Ht 1.727 m (5' 8\")  Wt 88.8 kg (195 lb 12.8 oz)  SpO2 93%  BMI 29.77 kg/m2    I/O last 3 completed shifts:  In: 1624.17 [P.O.:1320]  Out: -     Constitutional: Awake, alert, cooperative, no apparent distress   Respiratory: Clear to auscultation bilaterally, no crackles or wheezing aside from some coarse sounds at the left lung base.   Cardiovascular: Regular rate and rhythm, normal S1 and S2, and no murmur noted   Abdomen: Normal bowel sounds, soft, non-distended, non-tender   Skin: No rashes, no cyanosis, dry to touch   Neuro: Alert and oriented x3, no " weakness, numbness, memory loss   Extremities: Trace edema   Other(s):        All other systems: Negative          Medications:      All current medications were reviewed with changes reflected in problem list.         Data:      All new lab and imaging data was reviewed.   Labs/Imaging:  Results for orders placed or performed during the hospital encounter of 01/21/18 (from the past 24 hour(s))   Hemoglobin   Result Value Ref Range    Hemoglobin 7.6 (L) 11.7 - 15.7 g/dL   Basic metabolic panel   Result Value Ref Range    Sodium 126 (L) 133 - 144 mmol/L    Potassium 4.8 3.4 - 5.3 mmol/L    Chloride 94 94 - 109 mmol/L    Carbon Dioxide 27 20 - 32 mmol/L    Anion Gap 5 3 - 14 mmol/L    Glucose 95 70 - 99 mg/dL    Urea Nitrogen 25 7 - 30 mg/dL    Creatinine 0.82 0.52 - 1.04 mg/dL    GFR Estimate 66 >60 mL/min/1.7m2    GFR Estimate If Black 80 >60 mL/min/1.7m2    Calcium 8.2 (L) 8.5 - 10.1 mg/dL   CBC with platelets differential   Result Value Ref Range    WBC 11.5 (H) 4.0 - 11.0 10e9/L    RBC Count 2.31 (L) 3.8 - 5.2 10e12/L    Hemoglobin 7.3 (L) 11.7 - 15.7 g/dL    Hematocrit 22.7 (L) 35.0 - 47.0 %    MCV 98 78 - 100 fl    MCH 31.6 26.5 - 33.0 pg    MCHC 32.2 31.5 - 36.5 g/dL    RDW 16.9 (H) 10.0 - 15.0 %    Platelet Count 337 150 - 450 10e9/L    Diff Method Automated Method     % Neutrophils 71.5 %    % Lymphocytes 15.6 %    % Monocytes 10.2 %    % Eosinophils 2.0 %    % Basophils 0.2 %    % Immature Granulocytes 0.5 %    Nucleated RBCs 0 0 /100    Absolute Neutrophil 8.2 1.6 - 8.3 10e9/L    Absolute Lymphocytes 1.8 0.8 - 5.3 10e9/L    Absolute Monocytes 1.2 0.0 - 1.3 10e9/L    Absolute Eosinophils 0.2 0.0 - 0.7 10e9/L    Absolute Basophils 0.0 0.0 - 0.2 10e9/L    Abs Immature Granulocytes 0.1 0 - 0.4 10e9/L    Absolute Nucleated RBC 0.0

## 2018-01-27 NOTE — PLAN OF CARE
Problem: Patient Care Overview  Goal: Plan of Care/Patient Progress Review  Outcome: Improving  Neuro: A & O x 4.  VS:  VSS  Tele: Afib   Respiratory: Lung sounds diminished.   GI: WDL  : Incontinent of urine.   Pain: denies.   IV: PIV WDL SL   Transfer: A1 walker.   Diet: Tolerating PO foods and fluids well.   Plan: TBD. Infused 1 unit PRBC's today. Pt tolerated transfusion well. Recheck hgb in am. Hospitalist following.

## 2018-01-27 NOTE — PLAN OF CARE
Problem: Patient Care Overview  Goal: Plan of Care/Patient Progress Review  Transfers A1 & walker.  A/Ox4.   VSS.  C/o pain to low back.  Hydrocodone given with minimal relief.  Tele afib CVR.  LS dim.  Denies SOB.  Trace edema to mukesh LE.  Pt is incontinent.  Incontinent cares provided.  Dressing to coccyx CDI.  Pt will DC to TCU today if Hgb stable.  Pt s meds are in lock box.

## 2018-01-27 NOTE — PLAN OF CARE
Problem: ARDS (Acute Resp Distress Syndrome) (Adult)  Goal: Signs and Symptoms of Listed Potential Problems Will be Absent, Minimized or Managed (ARDS)  Signs and symptoms of listed potential problems will be absent, minimized or managed by discharge/transition of care (reference ARDS (Acute Resp Distress Syndrome) (Adult) CPG).   Outcome: Improving  VSS, afeb., LS are coarse, exp wheezes. 93% on RA. Appetite is good, ate 100% for dinner.Ambulated in room w assist x1 and walker. Hgb recheck was 7.6. LE edema 2-3 + . Tele is Afib.

## 2018-01-27 NOTE — PLAN OF CARE
Problem: Patient Care Overview  Goal: Plan of Care/Patient Progress Review    Discharge Planner PT   Patient plan for discharge:  Return to TCU.   Current status:  Pt completes seated LE strengthening. Pt completes pivot transfer to bedside chair with CGA and FWW with multiple cues for technique/safety throughout. Pt on RA throughout, vitals stable  Barriers to return to prior living situation:  Weakness, deconditioning, fatigue, lives alone, needs assist with mobility.   Recommendations for discharge:  TCU  Rationale for recommendations:  Pt is not currently at baseline for mobility, and is unsafe to discharge home. With continued PT, both IP and after discharge, pt is likely to obtain mobility goals.        Entered by: Crystal Stevens 01/27/2018 3:27 PM

## 2018-01-28 NOTE — PLAN OF CARE
Problem: ARDS (Acute Resp Distress Syndrome) (Adult)  Goal: Signs and Symptoms of Listed Potential Problems Will be Absent, Minimized or Managed (ARDS)  Signs and symptoms of listed potential problems will be absent, minimized or managed by discharge/transition of care (reference ARDS (Acute Resp Distress Syndrome) (Adult) CPG).   Outcome: Improving  VSS, afeb., LS are dim, coarse bilat. 95% on RA Pt is on IV Lasix. .Hgb was 7.3 today, 1 PRBC unit given, recheck in am. Pt has good appetite, ate 100% for dinner. Tele is Afib.

## 2018-01-28 NOTE — PLAN OF CARE
Problem: Patient Care Overview  Goal: Plan of Care/Patient Progress Review    Discharge Planner PT   Patient plan for discharge:  Return to TCU.   Current status:  Patient seated in bedside chair upon arrival.  Patient participated in LE strengthening exercises in preparation for transfers.  Patient performed sit to stand at bedside chair with min A and 2WW.  Patient amb 3 feet at bedside with 2WW, CGA and verbal cueing for safe technique.  Required mod A at LEs in order to return to supine in bed.  Patient reported fatigue following activity.  Patient on RA throughout, vitals stable  Barriers to return to prior living situation:  Weakness, deconditioning, fatigue, lives alone, needs assist with mobility.   Recommendations for discharge:  TCU  Rationale for recommendations:  Pt is not currently at baseline for mobility, and is unsafe to discharge home. With continued PT, both IP and after discharge, pt is likely to obtain mobility goals.        Entered by: Saida River 01/28/2018 4:15 PM

## 2018-01-28 NOTE — PROGRESS NOTES
[facility]  Hospitalist Progress Note  Aguila Moody MD 01/28/2018    Reason for Stay (Diagnosis): worsening dyspnea         Assessment and Plan:      Summary of Stay: Estefany An is a 87 year old female who returned to the ED on 1/21/2018 with complaint of worsening weakness and dyspnea. She was found in the ED to have evidence of consolidation in the right lower lung. She was started on antibiotics, but without fever or remarkable cough.  Of note, her BNP was significantly elevated above the value of two weeks ago, when she was here for influenza and hyponatremia. She was started on IV lasix and appears to be improving gradually. Pt also has been working some on pulmonary toilet, to open up the right lower lobe consolidation which may be more due to atelectasis.  She resides at [facility] and will return there vs TCU soon pending further improvement.    Her hgb has drifted down to 6.3 for which we transfused RBCs 1/26.  There is no clear blood loss to explain this drop though we are holding AC moving forward.  She received a second unit for RBCs on 1/27 as her hgb is 7.3 with some ongoing symptoms of anemia.  We have resumed IV lasix with this as well as she still seems hypervolemic.    Problem List:   1. Acute hypoxic resp failure.  This appears multifactorial certainly with right lower lobe atelectasis and fluid overload contributing to hypoxia.  She has responded to gentle diuresis.  Continue IV lasix today.  Will need recheck bmp, monitor weights etc .  Anemia may be playing a role as well so we are transfusing as needed.    2. Right pleural v parapneumonic effusion. Covered for poss bacterial superinfection for three days, now off abx.  No fevers, normal WBC.  Consider repeating imaging w/ CXR pending her course.    3. Hyponatremia, improving with gentle diuresis but then trended down again 1/27 after getting blood.  Seems more fluid-up so restarted IV diuretics.    4. Atrial  "Fibrillation w controlled vent rate. Discussion about anticoagulation for stroke prophylaxis by my colleague. Pt and daughter initially wished to pursue this despite falls risk etc but given down-trending hgb I feel we should not continue this for now (hold lovenox/eliquis).  Pending further workup might be able to revisit this down the line.    5. Peripheral edema.  Continue diuretics as above.     6. Iron deficiency anemia. Received iron replacement IV while here x 5 doses as ordered by my colleague, completed on 1/25.  hgb still has drifted to 6.3.  Transfused 1 U RBCs w/ response to 7.3.  Monitoring for blood loss and will recheck.  Hemodynamically stable.  --transfuse one more U RBCs today.  --discussed appropriate follow up in clinic w/ her daughter, Maylin, who agrees she is not really in favor of endoscopies/invasive procedures at this point in spite of potential slow GI blood loss.    DVT Prophylaxis: PCDs given anemia.  Code Status: Full Code  Discharge Dispo: ?TCU prior to returning to her independent apartment.  Estimated Disch Date / # of Days until Disch: hopefully tomorrow or the next day pending diuresis.        Interval History (Subjective):      Updated her daughter via phone  Remains on IV diuretics, sodium trending up.  Estefany feels better today, overall stronger  Still w/ some cough and exp wheezes - working w/ RT re: this.  Otherwise feels fairly well.  Awaiting daily weight - not recently checked  hgb improved as expected.           Physical Exam:      Last Vital Signs:  /57 (BP Location: Left arm)  Pulse 87  Temp 97.7  F (36.5  C) (Oral)  Resp 16  Ht 1.727 m (5' 8\")  Wt 88.8 kg (195 lb 12.8 oz)  SpO2 94%  BMI 29.77 kg/m2    I/O last 3 completed shifts:  In: 994 [P.O.:640]  Out: 400 [Urine:400]    Constitutional: Awake, alert, cooperative, no apparent distress   Respiratory: Clear to auscultation bilaterally, no crackles or wheezing aside from some coarse sounds at the left lung " base.   Cardiovascular: Regular rate and rhythm, normal S1 and S2, and no murmur noted   Abdomen: Normal bowel sounds, soft, non-distended, non-tender   Skin: No rashes, no cyanosis, dry to touch   Neuro: Alert and oriented x3, no weakness, numbness, memory loss   Extremities: Trace edema   Other(s):        All other systems: Negative          Medications:      All current medications were reviewed with changes reflected in problem list.         Data:      All new lab and imaging data was reviewed.   Labs/Imaging:  Results for orders placed or performed during the hospital encounter of 01/21/18 (from the past 24 hour(s))   Basic metabolic panel   Result Value Ref Range    Sodium 129 (L) 133 - 144 mmol/L    Potassium 4.3 3.4 - 5.3 mmol/L    Chloride 95 94 - 109 mmol/L    Carbon Dioxide 28 20 - 32 mmol/L    Anion Gap 6 3 - 14 mmol/L    Glucose 92 70 - 99 mg/dL    Urea Nitrogen 21 7 - 30 mg/dL    Creatinine 0.84 0.52 - 1.04 mg/dL    GFR Estimate 64 >60 mL/min/1.7m2    GFR Estimate If Black 78 >60 mL/min/1.7m2    Calcium 8.1 (L) 8.5 - 10.1 mg/dL   Hemoglobin   Result Value Ref Range    Hemoglobin 8.2 (L) 11.7 - 15.7 g/dL

## 2018-01-28 NOTE — PLAN OF CARE
Problem: Patient Care Overview  Goal: Plan of Care/Patient Progress Review  Transfers A1 & walker.  A/Ox4.   VSS.  C/o pain to low back.  Hydrocodone given with some relief.  Tele afib CVR.  LS dim.  Denies SOB.  Trace edema to mukesh LE.  Pt is incontinent. Incontinent cares provided.  Dressing to coccyx CDI.

## 2018-01-28 NOTE — PLAN OF CARE
Problem: Patient Care Overview  Goal: Plan of Care/Patient Progress Review  Outcome: Improving  Neuro: A & O x 4.  VS:  VSS  Tele: Afib   Respiratory: Lung sounds diminished, course.   GI: WDL  : Incontinent of urine.   Pain: Given Norco x 1 for back pain.   IV: PIV WDL SL   Transfer: A1 walker.   Diet: Tolerating PO foods and fluids well.   Plan: TBD. Hospitalist following.

## 2018-01-29 NOTE — PLAN OF CARE
Problem: ARDS (Acute Resp Distress Syndrome) (Adult)  Goal: Signs and Symptoms of Listed Potential Problems Will be Absent, Minimized or Managed (ARDS)  Signs and symptoms of listed potential problems will be absent, minimized or managed by discharge/transition of care (reference ARDS (Acute Resp Distress Syndrome) (Adult) CPG).   Outcome: Improving  VSS, afeb., LS are coarse, dim. 95% on RA. Pt denies pain, up to ambulate in room and to BR x3. Cont on IV Lasix Q12H. Good appetite, ate 100% for dinner. Hgb is 8.2. Tele is Afib CVR.

## 2018-01-29 NOTE — PLAN OF CARE
Problem: Patient Care Overview  Goal: Plan of Care/Patient Progress Review  Transfers A1 & walker.  A/Ox4. VSS.  C/o pain to low back.  Hydrocodone given with some relief.  Tele afib CVR.  LS coarse.  Denies SOB.  2+ edema to mukesh LE.  Pt is on IV Lasix.  Pt is incontinent. Incontinent cares provided.  Dressing to coccyx CDI.

## 2018-01-29 NOTE — PLAN OF CARE
Problem: Patient Care Overview  Goal: Plan of Care/Patient Progress Review  Outcome: No Change  HR as high at 110, otherwise a-fib with CVR. Other vital signs stable. Right hip pain relieved with PRN norco. LS coarse/rhonchi, congested nonproductive cough, slight dyspnea upon exertion. BLE 2+ edema. Lasix IV increased to 40mg. Urinary incontinence. CXR pending. Continues to have generalized weakness. Up with Ax1 and walker. SW consult in to help with D/C planning.

## 2018-01-30 NOTE — PROGRESS NOTES
"SPIRITUAL HEALTH SERVICES  SPIRITUAL ASSESSMENT Progress Note  [facility] 3rd floor Med Surg    PRIMARY FOCUS:     Goals of care    Symptom/pain management    Emotional/spiritual/Oriental orthodox distress    Support for coping    ILLNESS CIRCUMSTANCES:   Reviewed documentation. Reflective conversation shared with Estefany which integrated elements of illness and family narratives.     Context of Serious Illness/Symptom(s) - Estefany was admitted with respiratory concerns    Persons/Resources Involved - Daughter    DISTRESS:     Emotional/Existential/Relational Distress - Estefany said she feels like she is \"taking one step forward and two steps backward\" in her recovery. She also talked about how difficult it was when her  passed away. She said he had been taking Warfarin and when he started bleeding \"they could not stop it.\"    Spiritual/Gnosticist Distress - None expressed.    Social/Cultural/Economic Distress - None expressed.    SPIRIT (Coping):     Worship/Hiwot - Estefany said she was born and raised Zoroastrian, however her daughter just retired from being a Jain .    Spiritual Practice(s) - Prayer    Emotional/Existential/Relational Connections - Daughter, who lives in [place] and a son who lives out of state.    SENSE-MAKING:    Goals of Care - Estefany's goals are restorative. She is hoping to get back to the [facility] in [place] where she lives soon.    Meaning/Sense-Making - Estefany talked about how good her daughter has been to her. Her and her  moved here to [place] from [place] to be close to their daughter. She said her daughter was a Jain  in [place], until just retiring. She talked of how she has loved to knit and dru and how much she likes to play cards with her friends at the [facility]. She is hoping to get back there soon.    PLAN: Estefany was informed about Spiritual Health services and how she can access support if she so desires.      Rocío Higuera  Chaplain Resident  Pager [phone]    "

## 2018-01-30 NOTE — PLAN OF CARE
Problem: Patient Care Overview  Goal: Plan of Care/Patient Progress Review  Outcome: Improving  Vital signs stable. Tele monitoring- a-fib CVR.   Pain in right pelvis and right shoulder not well relieved with PRN norco. MD notified and PRN tramadol order.   LS coarse, frequent productive cough, denies shortness of breath. IV lasix BID, nebs scheduled. Using IS and acapella appropriately.  Voiding frequently, incontinent at times.  Up with Ax1 and walker.

## 2018-01-30 NOTE — PLAN OF CARE
All VSS, LS coarse on RA, infrequent, loose cough.  Tele: A fib CVR.  Moves with A1 and a walker, incontinent at times.  Received PRN Norco x2, ambien x1 and atarax x1.  Plan is to d/c in 2 days.  Will continue with POC.

## 2018-01-30 NOTE — PROGRESS NOTES
[facility]  Hospitalist Progress Note  Name: Estefany An    MRN: [id number]  Provider:  Jarocho Antoine DO, [facility] (Text Page)    Assessment & Plan   Summary of Stay:  Estefany An is a 87 year old female who returned to the ED on 1/21/2018 with complaint of worsening weakness and dyspnea. She was found in the ED to have evidence of consolidation in the right lower lung. She was started on antibiotics, but without fever or remarkable cough.  Of note, her BNP was significantly elevated above the value of two weeks ago, when she was here for influenza and hyponatremia. She was started on IV lasix and appears to be improving gradually and she also has been working some on pulmonary toilet, to open up the right lower lobe consolidation which may be more due to atelectasis.     1.  Acute hypoxic resp failure:  -  This appears multifactorial certainly with right lower lobe atelectasis and fluid overload contributing to hypoxia.  She has responded well to gentle diuresis and given response volume overload appears a significant part of her complaints.  Anemia may have been playing a role as well so we transfused 2 U RBCs with good effect earlier in her stay.    --She has now been off of supplemental O2 for the past couple days but remains very weak and deconditioned and in the setting of what seems to be increasing LE edema and weights.  My colleague ordered lymphedema wraps and increased her IV lasix from 20 mg IV BID to 40 mg IV BID.  On exam/review today she appears still somewhat overloaded though improving.  I will give her another day of the 40 mg IV BID dosing and consider moving to oral tomorrow.  --  She was treated with a total of three days of azithromycin and ceftriaxone prior to these being stopped on the 23rd due to lower clinical suspicion for pneumonia.  It is not felt she has an active infection at this point.    2.  Right pleural effusion vs consolidation:  -  Repeat on my review appeared  improved.  She does not appear to have an overt infection at this point.  Continue diuresis as above.    3.  Afib with controlled ventricular rate, presented on chronic anticoagulation:  -  Discussion about anticoagulation for stroke prophylaxis by my colleague prior. Pt and daughter initially wished to pursue this despite falls risk etc but given down-trending hgb it was felt we should not continue this for now (hold lovenox/eliquis).  Pending further workup might be able to revisit this down the line.  Dr. Moody revisited this with her daughter recently as well and she remained in agreement.  -  Continue metoprolol for rate control    4.  Iron deficiency anemia:  -  Received IV iron replacement x 5 doses completed 1/25.  Transfused 1/26 and 1/27 to hgb 8 range with improved symptoms.  No overt blood loss at this point.  Needs outpatient hgb monitoring.  Hold on any ASA or other anticoag for now given concern of bleeding.  She is not a good physical candidate for endoscopy right now given other issues unless more overt/active bleeding.    5.  Diastolic CHF:  -  Diuresis as discussed above.  Continue IV lasix today, consider moving to oral tomorrow.    DVT Prophylaxis: Pneumatic Compression Devices  Code Status: DNR/DNI    Disposition Plan   Expected discharge in 1-2 days to transitional care unit once respiratory status/volume status further improves.     Entered: Jarocho Antoine 01/30/2018, 11:11 AM     Interval History   Assumed care, history reviewed.  Ms An was just finishing her breakfast and feeling better.      -Data reviewed today: I reviewed all new labs and imaging reports over the last 24 hours. I personally reviewed no images or EKG's today.    Physical Exam   Temp: 97.2  F (36.2  C) Temp src: Oral BP: 134/61   Heart Rate: 83 Resp: 16 SpO2: 93 % O2 Device: None (Room air)    Vitals:    01/26/18 0540 01/29/18 0113 01/30/18 0423   Weight: 88.8 kg (195 lb 12.8 oz) 89.7 kg (197 lb 12.8 oz) 89.1 kg  (196 lb 8 oz)     Vital Signs with Ranges  Temp:  [95.6  F (35.3  C)-98.4  F (36.9  C)] 97.2  F (36.2  C)  Heart Rate:  [78-83] 83  Resp:  [16-20] 16  BP: (123-134)/(50-61) 134/61  SpO2:  [93 %-97 %] 93 %  I/O last 3 completed shifts:  In: 480 [P.O.:480]  Out: -     GEN:  Alert, oriented x 3, appears ill/frail but comfortable, no overt distress.    HEENT:  Normocephalic/atraumatic, no scleral icterus, no nasal discharge, mouth moist.  CV:   Irreg, controlled rate.  Distant.  No rub.  LUNGS:  Clear to auscultation upper, decreased breath sounds bases.  No wheezes/retractions.  Symmetric chest rise on inhalation noted.  ABD:  Active bowel sounds, soft, non-tender/non-distended.  No rebound/guarding/rigidity.  EXT:  Trace to +1 LE edema.  No acute cyanosis.  No acute joint synovitis noted.  SKIN:  Dry to touch, no exanthems noted in the visualized areas.    Medications       sodium chloride (PF)  3 mL Intracatheter Q8H     furosemide  40 mg Intravenous BID     ranitidine  150 mg Oral BID     lidocaine  1 patch Transdermal Q24h    And     lidocaine   Transdermal Q24H    And     lidocaine   Transdermal Q8H     sodium chloride  3 mL Nebulization BID     docusate sodium  100 mg Oral BID     ipratropium - albuterol 0.5 mg/2.5 mg/3 mL  3 mL Nebulization 4x Daily     LORazepam  0.5 mg Oral At Bedtime     losartan  50 mg Oral Daily     metoprolol tartrate  50 mg Oral BID     NIFEdipine ER  90 mg Oral Daily     Data       Recent Labs  Lab 01/30/18  0759 01/29/18  0748 01/28/18  0732 01/27/18  0738  01/24/18  0715   WBC  --   --   --  11.5*  --  10.4   HGB 8.4* 8.3* 8.2* 7.3*  < > 7.1*   HCT  --   --   --  22.7*  --  21.2*   MCV  --   --   --  98  --  94     --   --  337  --  377   < > = values in this interval not displayed.    Recent Labs  Lab 01/30/18  0759 01/29/18  0748 01/28/18  0732  01/24/18  0715   * 128* 129*  < > 127*   POTASSIUM 4.1 4.2 4.3  < > 4.0   CHLORIDE 96 95 95  < > 94   CO2 27 28 28  < > 28    ANIONGAP 7 5 6  < > 5   GLC 92 93 92  < > 99   BUN 16 18 21  < > 17   CR 0.83 0.84 0.84  < > 0.82   GFRESTIMATED 65 65 64  < > 66   GFRESTBLACK 79 78 78  < > 80   KACI 8.3* 8.1* 8.1*  < > 8.1*   MAG  --   --   --   --  2.3   < > = values in this interval not displayed.    No results found for this or any previous visit (from the past 24 hour(s)).

## 2018-01-30 NOTE — PLAN OF CARE
Problem: Patient Care Overview  Goal: Plan of Care/Patient Progress Review  Discharge Planner OT   Patient plan for discharge:TCU  Current status: Will hold OT eval at this time. Chart reviewed, pt requiring CGA-Min A with transfers based on PT notes. Pt recommended for d/c to TCU, which is pending bed availability. Will defer OT evaluation to TCU.   Barriers to return to prior living situation: pt is deconditioned and experiencing weakness. She resides alone  Recommendations for discharge: TCU  Rationale for recommendations: Pt not able to safely d/c to home with current level of A needed. Pt would benefit from continued skilled OT intervention to improve strength and endurance and independence in ADLs       Entered by: Nicky Mariscal 01/30/2018 5:09 PM

## 2018-01-30 NOTE — PLAN OF CARE
Problem: Patient Care Overview  Goal: Plan of Care/Patient Progress Review  Outcome: Improving  VSS, Afib with CVR 80-90's. A/O, Assist of 1 with walker. LS coarse. Pt complains of right hip pain, [place] given see MAR. SW consult for discharge planning.

## 2018-01-31 ENCOUNTER — RECORDS - HEALTHEAST (OUTPATIENT)
Dept: LAB | Facility: CLINIC | Age: 83
End: 2018-01-31

## 2018-01-31 NOTE — PLAN OF CARE
Problem: Patient Care Overview  Goal: Plan of Care/Patient Progress Review  Outcome: No Change  A&O. VSS. LS coarse, frequent productive cough present. C/o R hip pain rated at 8 on 0-10 scale, Tramadol and Norco PRN effective. Ax1 with walker. Tele Afib CVR. 2 gm NA diet. PT/OT following. Lymph wraps to BLE. Continue with POC.

## 2018-01-31 NOTE — PLAN OF CARE
Problem: Patient Care Overview  Goal: Plan of Care/Patient Progress Review  PT: Received orders for lymphedema assessment, patient currently followed by mobility as well.  Patient with increased bilateral LE edema during this hospitalization with acute respiratory failure and PMH including CHF.  Patient's daughter present upon assessment, both patient and daughter reported that edema has improved over the last 2 days.  Pitting edema noted at bilateral feet and ankles, improving at mid calf.  Patient agreeable to lymphedema wrapping.    Discharge Planner PT   Patient plan for discharge: TCU  Current status: Pt is appropriate for bilateral LE quick wrap, using short stretch (SS) bandages, not ACE wraps. Pt educated in rationale for compression with wound healing and importance of using SS bandages which have low resting pressure and high working pressure, vs ACE wraps which have high resting pressure and low working pressure. LEs washed, lotion applied, and quick wraps completed to bilateral LEs, with appropriate stretch and spacing to allow gradient compression.   Coordinated with pt regarding wearing schedule. If pt does not tolerate wraps well, please remove wraps but keep LEs elevated. Pt educated on decongestive exercises: Toe curls/clenches, APs, ankle circles, heel slides. Pt also educated on elevation during the day while resting and ambulation for mm pump action.   Barriers to return to prior living situation:  Weakness, deconditioning, fatigue, lives alone, needs assist with mobility.   Recommendations for discharge: TCU with lymphedema therapy  Rationale for recommendations: Patient would benefit from ongoing physical therapy to address strength and activity tolerance deficits in order to increase independence with mobility, and for ongoing lymphedema management.       Entered by: Saida River 01/30/2018 9:17 PM

## 2018-01-31 NOTE — PLAN OF CARE
Problem: Patient Care Overview  Goal: Plan of Care/Patient Progress Review  Outcome: Improving  Neuro: A & O x 4. Calm, cooperative.   VS:  VSS  Tele: Afib CVR  Respiratory: LS dim, course.  Infrequent cough.   GI: WDL  : Incontinent x 3 this shift.  Pain: Discomfort noted to hip, back. Controled with PO meds. Pt more comfortable when up in chair.   IV: PIV WDL SL  Transfer: A1 with walker.   Diet: Tolerating PO foods and fluids well.   Plan: Plan to discharge today at 3 pm - HE providing transportation.       Pt dc'd to TCU at 1500. Patient's After Visit Summary was reviewed with patient and family.   Patient verbalized understanding of After Visit Summary, recommended follow up and was given an opportunity to ask questions. Verbalizes understanding of all discharge information. PIV removed.

## 2018-01-31 NOTE — PLAN OF CARE
Problem: Patient Care Overview  Goal: Plan of Care/Patient Progress Review    Discharge Planner PT   Patient plan for discharge: TCU  Current status: Pt is appropriate for bilateral LE quick wrap. LEs washed, lotion applied, and quick wraps completed to bilateral LEs, with appropriate stretch and spacing to allow gradient compression.  If pt does not tolerate wraps well, please remove wraps but keep LEs elevated. Pt educated on decongestive exercises: Toe curls/clenches, APs, ankle circles, heel slides. Pt also educated on elevation during the day while resting and ambulation for mm pump action.   Barriers to return to prior living situation:  Weakness, deconditioning, fatigue, lives alone, needs assist with mobility.   Recommendations for discharge: TCU with lymphedema therapy  Rationale for recommendations: Patient would benefit from ongoing physical therapy to address strength and activity tolerance deficits in order to increase independence with mobility, and for ongoing lymphedema management.       Entered by: Saida River 01/31/2018 4:42 PM         Physical Therapy Discharge Summary    Reason for therapy discharge:    Discharged to transitional care facility.    Progress towards therapy goal(s). See goals on Care Plan in [facility] electronic health record for goal details.  Goals partially met.  Barriers to achieving goals:   discharge from facility.    Therapy recommendation(s):    Continued therapy is recommended.  Rationale/Recommendations:  Patient would benefit from ongoing physical therapy in order to increase independence with mobility and management of LE edema.

## 2018-01-31 NOTE — PROGRESS NOTES
SWS:  SW notified that pt is able to return to [facility] TCU today.  SW spoke with pt and daughter and they are agreeable. Daughter requests wc transport at NH and is aware and agreeable to cost of transport. SW contacted [facility] and transport will be here at 3:00pm. [facility] will be sending a stretcher transport but pt will only be billed for a wc. Pt updated and agreeable. Daughter had left and pt did not feel that SW will need to update her.  RN and facility updated.

## 2018-01-31 NOTE — DISCHARGE SUMMARY
[facility] [place]    Discharge Summary  Hospitalist    Date of Admission:  1/21/2018  Date of Discharge:  1/31/2018  3:17 PM  Provider:  Jarocho Antoine DO, [facility]    Discharge Diagnoses   1.  Acute hypoxic respiratory failure  2.  Diastolic CHF exacerbation  3..  Hypervolemic hyponatremia  4.  Marked Iron Deficiency Anemia, suspected chronic blood loss      Other medical issues:  Past Medical History:   Diagnosis Date     Anxiety      Arthritis      Chronic atrial fibrillation (H)      Hypertension        History of Present Illness   Estefany An is an 87 year old female who presented with SOB.  Please see the admission history and physical for full details.    Hospital Course   Estefany An was admitted on 1/21/2018.  The following problems were addressed during her hospitalization:    Please see the other recent hospital records for details of the patients earlier January hospital stay.  Ms. An presented back with more shortness of breath/hypoxia.  She appeared volume overloaded though initially there was concern of a possible new pulmonary infection.  She was initially covered with antibiotics, but they were later stopped.  It is felt the patient had volume overload that gradually worsened leading to her presentation.  She was diuresed with IV diuretics and improved.  Further titration of the lasix may be needed as she is followed at the rehab/TCU.  She also was noted to have a more severe anemia than prior.  There was no overt blood loss though it was likely from a GI source.  She was given IV iron replacement.  Discussions were carried out with the patient and her family.   They elected for no aggressive workup including endoscopies.  She did have a transfusion that helped her shortness of breath.  Given the concern of bleeding her baseline anticoagulation was stopped after discussing risks/benefits of ongoing anticoagulation with the anemia.  Sodium improved during her stay.  I recommend a periodic  "outpatient check for stability as it has been low at times in two hospital stays.   By discharge she was on room air and up moving around more.  She has deconditioning and needs ongoing help/therapy.  Please see the medical record for further details of her stay.       Significant Results and Procedures   See Below    Pending Results     Unresulted Labs Ordered in the Past 30 Days of this Admission     No orders found from 11/22/2017 to 1/22/2018.          Code Status   DNR / DNI       Primary Care Physician   Ricki Blake    Blood pressure 125/50, pulse 87, temperature 97.6  F (36.4  C), temperature source Oral, resp. rate 20, height 1.727 m (5' 7.99\"), weight 88.9 kg (196 lb), SpO2 91 %.    Alert, oriented, appeared comfortable.  Heart regular, lungs clear on day of discharge.    Discharge Disposition   Rehab    Consultations This Hospital Stay   PHARMACY TO DOSE VANCO  PHYSICAL THERAPY ADULT IP CONSULT  PHARMACY IP CONSULT  PHARMACY LIAISON FOR MEDICATION COVERAGE CONSULT  LYMPHEDEMA THERAPY IP CONSULT  PHYSICAL THERAPY ADULT IP CONSULT  OCCUPATIONAL THERAPY ADULT IP CONSULT  PHYSICAL THERAPY ADULT IP CONSULT  OCCUPATIONAL THERAPY ADULT IP CONSULT    Time Spent on this Encounter   I, Jarocho Antoine, personally saw the patient today and spent greater than 30 minutes discharging this patient.    Discharge Orders     Home care nursing referral     Activity   Your activity upon discharge: activity as tolerated     Follow-up and recommended labs and tests    Follow up with primary care provider/care center provider within 7 days to evaluate medication change and for hospital follow up  The following labs/tests are recommended: recheck basic metabolic panel and hemoglobin.     Reason for your hospital stay   You were admitted for respiratory failure which was treated with diuretics.     General info for SNF   Length of Stay Estimate: Short Term Care: Estimated # of Days 31-90  Condition at Discharge: Improving  Level " of care:skilled   Rehabilitation Potential: Good  Admission H&P remains valid and up-to-date: Yes  Recent Chemotherapy: N/A  Use Nursing Home Standing Orders: Yes, but need facility review when the PCP see's patient.     Mantoux instructions   Give two-step Mantoux (PPD) Per Facility Policy Yes if needs again per policy     Daily weights   Call Provider for weight gain of more than 2 pounds per day or 4 pounds per week.     Activity - Up with nursing assistance     Discharge Instructions   Max of 3000 mg acetaminophen all sources in 24 hours.     DNR/DNI     Physical Therapy Adult Consult   Evaluate and treat as clinically indicated.    Reason:  Deconditioning     Occupational Therapy Adult Consult   Evaluate and treat as clinically indicated.    Reason:  Deconditioning     Fall precautions     Diet   Follow this diet upon discharge:      Combination Diet: 2 gm NA Diet       Discharge Medications   Current Discharge Medication List      START taking these medications    Details   ipratropium - albuterol 0.5 mg/2.5 mg/3 mL (DUONEB) 0.5-2.5 (3) MG/3ML neb solution Take 1 vial (3 mLs) by nebulization 3 times daily  Qty: 360 mL    Associated Diagnoses: Acute respiratory failure with hypoxia (H)      furosemide (LASIX) 40 MG tablet Take 1 tablet (40 mg) by mouth daily    Associated Diagnoses: Chronic diastolic congestive heart failure (H)         CONTINUE these medications which have CHANGED    Details   HYDROcodone-acetaminophen (NORCO) 5-325 MG per tablet Take 1 tablet by mouth every 4 hours as needed for moderate to severe pain (Max 3 does per day)  Qty: 30 tablet, Refills: 0    Associated Diagnoses: Pubic ramus fracture, right, closed, initial encounter (H)      LORazepam (ATIVAN) 0.5 MG tablet Take 1 tablet (0.5 mg) by mouth At Bedtime  Qty: 30 tablet, Refills: 0    Associated Diagnoses: Closed nondisplaced fracture of right clavicle, unspecified part of clavicle, initial encounter         CONTINUE these medications  which have NOT CHANGED    Details   Acetaminophen (TYLENOL PO) Take 650 mg by mouth 3 times daily      metoprolol tartrate (LOPRESSOR) 50 MG tablet Take 1 tablet (50 mg) by mouth 2 times daily  Qty: 60 tablet, Refills: 0    Associated Diagnoses: New onset atrial fibrillation (H)      losartan (COZAAR) 100 MG tablet Take 50 mg by mouth daily       NIFEdipine ER (ADALAT CC) 90 MG TB24 Take 90 mg by mouth daily         STOP taking these medications       Oseltamivir Phosphate (TAMIFLU PO) Comments:   Reason for Stopping:             Allergies   No Known Allergies  Data      Recent Labs  Lab 01/31/18 0706 01/30/18 0759 01/29/18  0748  01/27/18  0738   WBC  --   --   --   --  11.5*   HGB 8.3* 8.4* 8.3*  < > 7.3*   HCT  --   --   --   --  22.7*   MCV  --   --   --   --  98   PLT  --  292  --   --  337   < > = values in this interval not displayed.    Recent Labs  Lab 01/31/18 0706 01/30/18 0759 01/29/18  0748  01/27/18  0738   WBC  --   --   --   --  11.5*   HGB 8.3* 8.4* 8.3*  < > 7.3*   HCT  --   --   --   --  22.7*   MCV  --   --   --   --  98   PLT  --  292  --   --  337   < > = values in this interval not displayed.      Recent Labs  Lab 01/31/18 0706 01/30/18 0759 01/29/18  0748   * 130* 128*   POTASSIUM 4.1 4.1 4.2   CHLORIDE 96 96 95   CO2 29 27 28   ANIONGAP 5 7 5   GLC 84 92 93   BUN 16 16 18   CR 0.89 0.83 0.84   GFRESTIMATED 60* 65 65   GFRESTBLACK 73 79 78   KACI 8.3* 8.3* 8.1*         Results for orders placed or performed during the hospital encounter of 01/21/18   XR Chest Port 1 View    Narrative    CHEST PORTABLE ONE VIEW 1/21/2018 2:15 PM     COMPARISON: Frontal chest x-ray 1/12/2018    HISTORY: Hypoxia, shortness of breath.       Impression    IMPRESSION: There is a probable new small right pleural effusion.  There is no definite airspace opacity in the right lung. The left lung  and left pleural space are normal. Heart size is normal.    BENJI ROSAS MD   CT Chest Pulmonary Embolism w  Contrast    Narrative    CT CHEST PULMONARY EMBOLISM WITH CONTRAST 1/21/2018 2:27 PM    HISTORY:  Chest pain.      TECHNIQUE:  70 mL Isovue 370. Axial images with coronal  reconstructions. Radiation dose for this scan was reduced using  automated exposure control, adjustment of the mA and/or kV according  to patient size, or iterative reconstruction technique.    COMPARISON:  1/7/2018    FINDINGS:  Motion artifact on multiple images. Dense consolidation in  the right lower lung, worrisome for pneumonia. Atelectasis or scarring  at the left lung base. Lungs otherwise clear.     The pulmonary arteries are well-opacified. No CT evidence for acute  pulmonary embolus. No significant adenopathy. Cardiomegaly. Reflux of  contrast into the inferior vena cava and hepatic veins suggest  right-sided heart failure.    Marked stable vertebral body height loss of T12.      Impression    IMPRESSION:    1. No CT evidence for acute pulmonary embolus.  2. Consolidation at the posterior right lower lobe, concerning for  pneumonia.  3. Cardiomegaly.  4. Reflux of contrast into the inferior vena cava suggests right-sided  heart failure.    MAX HEDRICK MD   XR Chest Port 1 View    Narrative    XR CHEST PORT 1 VW 1/29/2018 10:26 AM    COMPARISON: 1/21/2018    HISTORY: Lung consolidation follow-up.      Impression    IMPRESSION: Mild bibasilar atelectasis/consolidation, not  significantly changed. No pneumothorax seen on either side.    ALEX MOSQUERA MD     Echocardiogram:  Interpretation Summary     The visual ejection fraction is estimated at 55-60%.  The right ventricle is normal in size and function.  Normal left atrial size.  The study was technically difficult. There is no comparison study available.  _____________________________________________________________________________  __        Left Ventricle  The left ventricle is normal in size. Proximal septal thickening is noted. The  visual ejection fraction is estimated at 55-60%.  E by E prime ratio is between  8 and 15, which is indeterminate for assessment of left ventricular filling  pressures. No regional wall motion abnormalities noted.

## 2018-01-31 NOTE — PLAN OF CARE
Problem: Patient Care Overview  Goal: Plan of Care/Patient Progress Review  Outcome: Improving  VSS, A/O. 2g NA diet. Assist of 1 with walker. LS coarse and diminished in bases, productive cough, Robitussin given see MAR. SOB on exertion. Voids frequently and incontinent, IV Lasix BID. Using IS and acapella appropriately. Plan to discharge in 1-2 days to TCU.

## 2018-01-31 NOTE — PROGRESS NOTES
01/30/18 1328   General Information   Onset of Edema 01/16/18  (about 2 weeks)   Affected Body Part(s) Left LE;Right LE   Edema Etiology (CHF)   Etiology Comments Patient with acute respiratory failure, noted increased edema from CHF when she began to feel ill   Pertinent history of current problem (PT: include personal factors and/or comorbidities that impact the POC; OT: include additional occupational profile info) Pt returned to the ED on 1/21/2018 with complaint of worsening weakness and dyspnea and was found in the ED to have evidence of consolidation in the right lower lung.  Pt previously hospitalized from January 7 - January 15 at which time she was treated for influenza and pneumonia as well as urinary tract infection and hyponatremia.  See chart for additional PMH.  Pt was admitted from TCU where she had been since being discharged from the hospital on 1/15.  Prior to first January hospitalization, pt was living alone in an independent living facility.  She used a 4WW outside her apartment and was Yamile/IND with ADL's.   Edema Precautions Cardiac Edema/CHF   Pain   Patient currently in pain No   Edema Examination / Assessment   Skin Condition Pitting;Dryness;Intact   Skin Condition Comments skin shiny, taught   ROM   Range of Motion (WFL) no deficits were identified   Strength   Strength (WFL) (significant strength deficits noted)   Sensation   Sensation (WFL) no deficits were identified   Assessment/Plan   Patient presents with Stage 2 Lymphedema   Assessment Patient would benefit from lymphedema management in order to decrease bilateral LEs edema to increase independence with mobility, mobility tolerance.   Clinical Decision Making (Complexity) Low complexity   Planned Edema Interventions Gradient compression bandaging;Exercises;Education;Manual therapy   Treatment Frequency daily   Treatment Duration 5 days   Patient, Family and/or Staff in agreement with plan of care. Yes   Risks and benefits of  treatment have been explained. Yes   Total Evaluation Time   Total Evaluation Time (Minutes) 5

## 2018-02-01 LAB
ANION GAP SERPL CALCULATED.3IONS-SCNC: 8 MMOL/L (ref 5–18)
BUN SERPL-MCNC: 13 MG/DL (ref 8–28)
CALCIUM SERPL-MCNC: 8.7 MG/DL (ref 8.5–10.5)
CHLORIDE BLD-SCNC: 95 MMOL/L (ref 98–107)
CO2 SERPL-SCNC: 26 MMOL/L (ref 22–31)
CREAT SERPL-MCNC: 0.76 MG/DL (ref 0.6–1.1)
GFR SERPL CREATININE-BSD FRML MDRD: >60 ML/MIN/1.73M2
GLUCOSE BLD-MCNC: 94 MG/DL (ref 70–125)
HGB BLD-MCNC: 9.3 G/DL (ref 12–16)
POTASSIUM BLD-SCNC: 4.2 MMOL/L (ref 3.5–5)
SODIUM SERPL-SCNC: 129 MMOL/L (ref 136–145)

## 2018-02-03 NOTE — PROGRESS NOTES
Warm Hand-Off to Next Level of Care    Name: Estefany An  MRN #: [id number]  :  1930  Reason for Hospitalization:  Hyponatremia [E87.1]  Healthcare-associated pneumonia [J18.9]  Acute respiratory failure with hypoxia (H) [J96.01]  Acute and chr resp failure, unsp w hypoxia or hypercapnia (H) [J96.20]  Admit Date/Time: 2018  1:26 PM  Discharge Date:  18  PCP: Ricki Blake    Patient will receive the following: TCU  [facility]  [facility] Recommendations:          Pt DC'd to TCU for continued therapies. She was admitted on  for treatment of vol overload and resp failure.    Anju Green RN,BSN, CTS  [facility]  Care Coordination  [phone]

## 2018-02-05 ENCOUNTER — RECORDS - HEALTHEAST (OUTPATIENT)
Dept: LAB | Facility: CLINIC | Age: 83
End: 2018-02-05

## 2018-02-06 LAB — FERRITIN SERPL-MCNC: 331 NG/ML (ref 10–130)

## 2018-02-12 ENCOUNTER — RECORDS - HEALTHEAST (OUTPATIENT)
Dept: LAB | Facility: CLINIC | Age: 83
End: 2018-02-12

## 2018-02-13 LAB
ANION GAP SERPL CALCULATED.3IONS-SCNC: 6 MMOL/L (ref 5–18)
BUN SERPL-MCNC: 16 MG/DL (ref 8–28)
CALCIUM SERPL-MCNC: 9 MG/DL (ref 8.5–10.5)
CHLORIDE BLD-SCNC: 93 MMOL/L (ref 98–107)
CO2 SERPL-SCNC: 28 MMOL/L (ref 22–31)
CREAT SERPL-MCNC: 0.76 MG/DL (ref 0.6–1.1)
GFR SERPL CREATININE-BSD FRML MDRD: >60 ML/MIN/1.73M2
GLUCOSE BLD-MCNC: 85 MG/DL (ref 70–125)
MAGNESIUM SERPL-MCNC: 2.3 MG/DL (ref 1.8–2.6)
POTASSIUM BLD-SCNC: 4.6 MMOL/L (ref 3.5–5)
SODIUM SERPL-SCNC: 127 MMOL/L (ref 136–145)

## 2018-02-15 ENCOUNTER — RECORDS - HEALTHEAST (OUTPATIENT)
Dept: LAB | Facility: CLINIC | Age: 83
End: 2018-02-15

## 2018-02-16 LAB
ANION GAP SERPL CALCULATED.3IONS-SCNC: 8 MMOL/L (ref 5–18)
BUN SERPL-MCNC: 16 MG/DL (ref 8–28)
CALCIUM SERPL-MCNC: 8.9 MG/DL (ref 8.5–10.5)
CHLORIDE BLD-SCNC: 93 MMOL/L (ref 98–107)
CO2 SERPL-SCNC: 27 MMOL/L (ref 22–31)
CREAT SERPL-MCNC: 0.76 MG/DL (ref 0.6–1.1)
GFR SERPL CREATININE-BSD FRML MDRD: >60 ML/MIN/1.73M2
GLUCOSE BLD-MCNC: 84 MG/DL (ref 70–125)
POTASSIUM BLD-SCNC: 4 MMOL/L (ref 3.5–5)
SODIUM SERPL-SCNC: 128 MMOL/L (ref 136–145)

## 2018-02-17 ENCOUNTER — RECORDS - HEALTHEAST (OUTPATIENT)
Dept: LAB | Facility: CLINIC | Age: 83
End: 2018-02-17

## 2018-02-18 NOTE — ED PROVIDER NOTES
History     Chief Complaint:  upper abdominal pain    The history is provided by the patient.      Estefany An is a 87 year old female who presents to the emergency department today for evaluation of upper abdominal pain. From January 21, 2018 to January 31, 2018, the patient was admitted to the intermediate care unit here at [facility] for Acute respiratory failure with hypoxia, Healthcare-associated pneumonia, and Hyponatremia. She was put on blood thinners, but then stopped because her hemoglobin dropped too much. 02/16/18, the patient was diagnosed with a lower extremity DVT at [facility]. The patient reports that all of yesterday she wasn't feeling very good. In the evening she requested a stool softener, was given 2, and then was experiencing an upset stomach afterwards. This morning, pain started in her upper abdominal region. This pain is constant, non spreading, and she has no other symptoms. Of note, she had a small bowel movement yesterday and today.    CT CHEST PULMONARY EMBOLISM WITH CONTRAST 1/21/2018 2:27 PM     FINDINGS:  Motion artifact on multiple images. Dense consolidation in  the right lower lung, worrisome for pneumonia. Atelectasis or scarring  at the left lung base. Lungs otherwise clear.      The pulmonary arteries are well-opacified. No CT evidence for acute  pulmonary embolus. No significant adenopathy. Cardiomegaly. Reflux of  contrast into the inferior vena cava and hepatic veins suggest  right-sided heart failure.     Marked stable vertebral body height loss of T12.    Allergies:  No Known Drug Allergies     Medications:    Norco  Ativan  Duoneb  Lasix  Tylenol  Lopressor  Cozaar  Adalat CC    Past Medical History:    Anxiety   Arthritis   Chronic atrial fibrillation (H)   Gastroesophageal reflux disease   Hypertension     Past Surgical History:    Cholecystectomy    Family History:    History reviewed. No pertinent family history.    Social History:  The patient was accompanied to the ED  "by family.  Smoking Status: Never Smoker  Smokeless Tobacco: Never Used  Alcohol Use: Negative   Marital Status:       Review of Systems   Constitutional: Negative for fever.   HENT: Negative.    Respiratory: Negative.    Cardiovascular: Positive for chest pain.   Gastrointestinal: Positive for abdominal pain and constipation. Negative for blood in stool, nausea, rectal pain and vomiting.   All other systems reviewed and are negative.    Physical Exam     Patient Vitals for the past 24 hrs:   BP Temp Temp src Heart Rate Resp SpO2 Height   02/18/18 1600 123/65 - - 85 18 97 % -   02/18/18 1545 124/63 - - 89 20 99 % -   02/18/18 1530 107/53 - - 80 18 100 % -   02/18/18 1515 119/61 - - 87 21 99 % -   02/18/18 1500 112/67 - - 79 22 96 % -   02/18/18 1445 109/63 - - - - 92 % -   02/18/18 1415 118/59 - - - - 97 % -   02/18/18 1411 125/65 - - - - 98 % -   02/18/18 1409 - - - - - 98 % -   02/18/18 1403 - - - - 28 - -   02/18/18 1401 122/58 - - - - - -   02/18/18 1358 - 97.5  F (36.4  C) Oral 110 - 95 % 1.727 m (5' 8\")      Physical Exam  General: Patient is alert and cooperative.  HENT:  Normal nose, oropharynx. Moist oral mucosa.  Eyes: EOMI. Normal conjunctiva.  Neck:  Normal range of motion and appearance.   Cardiovascular:  Normal rate, regular rhythm and normal heart sounds.   Pulmonary/Chest:  Effort normal. No wheezing or crackles.  Abdominal: Soft. No distension or tenderness.     Musculoskeletal: Normal range of motion. No edema or tenderness.   Neurological: oriented, normal strength, sensation, and coordination.   Skin: Warm and dry. No rash or bruising.   Psychiatric: Normal mood and affect. Normal behavior and judgement.      Emergency Department Course     Imaging:  Radiology findings were communicated with the patient who voiced understanding of the findings.    CT Chest/Abdomen/Pelvis w Contrast  1. No evidence of pulmonary embolism.  2. Small right pleural effusion. Underlying atelectasis or " infiltrate  in the right lower lobe.  3. Mild to moderate colonic distention with gas and stool. No definite  evidence of bowel obstruction.  Reading per radiology    Laboratory:  Laboratory findings were communicated with the patient who voiced understanding of the findings.    ISTAT gases lactate ezekiel POCT: pH: 7.39, pCO2: 40, PO2: 28, Bicarb: 25, O2 Sat: 52, Lactic Acid: 1.6  CBC: WBC 11.6, HGB 9.7,   CMP: NA: 125, Chloride: 91, Glucose: 126, GFR: 51, Albumin: 3.0, Alk phos: 321, ALT: 60 o/w WNL (Creatinine 1.03)  Lipase (1410): 134    Interventions:  1541 NS, 250 mL, IV     Emergency Department Course:    1358 Nursing notes and vitals reviewed.    1409 IV was inserted and blood was drawn for laboratory testing, results above.    1410 I performed an exam of the patient as documented above.     1415 The patient was sent for a CT while in the emergency department, results above.      1415 IV was inserted and blood was drawn for laboratory testing, results above.    1622 I personally reviewed the lab and imaging results with the patient and answered all related questions prior to discharge.    Impression & Plan      Medical Decision Making:  Estefany An is a 87 year old female who presents to the emergency department today for evaluation of an episode of acute upper abdominal to lower central chest discomfort. She resides at an assisted living facility and was recently hospitalized for management of atrial fibrillation and congestive heart failure. During that hospitalization she did have a CT chest showing no PE but is reporting that she had recent outpatient lower extremity ultrasound showing a DVT, and is presently on Lovenox. She has had no recent fevers but admits to some constipation. On arrival here she is reporting resolution of her discomfort. Given her age and co-morbidities in this clinical presentation I felt a CT of the chest abdomen and pelvis was clinically warranted. Fortunately, this study  demonstrates no interval development of pulmonary embolism or serious intra-abdominal abnormality to account for her symptoms. There is no mass obstruction aneurysm or intra-abdominal inflammatory process. There is some evidence of large amount of stool suggesting the possibility of symptomatic constipation which the patient actually thinks is a viable explanation. Consequently I recommended beginning daily Miralax but believe she is stable for discharge back to her assisted living facility. Her laboratory tests show a mild nonspecific elevation of her alkaline phosphatase. She has had a prior cholecystectomy presently has no abdominal discomfort and I do not believe this is clinically relevant. She does have long-standing history of hyponatremia and has a sodium of 126. She received a small bolus of normal saline and I recommended recheck as an outpatient later this week.    Diagnosis:    ICD-10-CM    1. Pain of upper abdomen R10.10      Disposition:   Discharge    Discharge Medications:  New Prescriptions    POLYETHYLENE GLYCOL (MIRALAX) POWDER    Take 17 g (1 capful) by mouth daily     Scribe Disclosure:  I, Bo Becerra, am serving as a scribe at 2:03 PM on 2/18/2018 to document services personally performed by Luis Felipe Lyle MD based on my observations and the provider's statements to me.     [facility] EMERGENCY DEPARTMENT       Luis Felipe Lyle MD  02/19/18 9988

## 2018-02-18 NOTE — ED NOTES
[facility], showing epigastric area.  Started at 0900, rating 6/10 when present.  Hx acid reflux, recent DVT's.  EKG shows afib, hx of afib.  Diff breathing with activity normal for patient.  Satting 93% on RA, placed on 2L via nc by EMS.  VSS, BP's=130's.  ABCD's intact; A/O x 4.

## 2018-02-18 NOTE — ED NOTES
Bed: ED23  Expected date: 2/18/18  Expected time: 1:42 PM  Means of arrival: Ambulance  Comments:  Hai Bucio

## 2018-02-18 NOTE — ED AVS SNAPSHOT
[facility] Emergency Department    [address]    [facility] [address]    Phone:  [phone]    Fax:  [phone]                                       Estefany An   MRN: [id number]    Department:  [facility] Emergency Department   Date of Visit:  2/18/2018           After Visit Summary Signature Page     I have received my discharge instructions, and my questions have been answered. I have discussed any challenges I see with this plan with the nurse or doctor.    ..........................................................................................................................................  Patient/Patient Representative Signature      ..........................................................................................................................................  Patient Representative Print Name and Relationship to Patient    ..................................................               ................................................  Date                                            Time    ..........................................................................................................................................  Reviewed by Signature/Title    ...................................................              ..............................................  Date                                                            Time

## 2018-02-18 NOTE — DISCHARGE INSTRUCTIONS
Discharge Instructions  Abdominal Pain    Abdominal pain (belly pain) can be caused by many things. Your evaluation today does not show the exact cause for your pain. Your provider today has decided that it is unlikely your pain is due to a life threatening problem, or a problem requiring surgery or hospital admission. Sometimes those problems cannot be found right away, so it is very important that you follow up as directed.  Sometimes only the changes which occur over time allow the cause of your pain to be found.    Generally, every Emergency Department visit should have a follow-up clinic visit with either a primary or a specialty clinic/provider. Please follow-up as instructed by your emergency provider today. With abdominal pain, we often recommend very close follow-up, such as the following day.    ADULTS:  Return to the Emergency Department right away if:      You get an oral temperature above 102oF or as directed by your provider.    You have blood in your stools. This may be bright red or appear as black, tarry stools.      You keep vomiting (throwing up) or cannot drink liquids.    You see blood when you vomit.     You cannot have a bowel movement or you cannot pass gas.    Your stomach gets bloated or bigger.    Your skin or the whites of your eyes look yellow.    You faint.    You have bloody, frequent or painful urination (peeing).    You have new symptoms or anything that worries you.    CHILDREN:  Return to the Emergency Department right away if your child has any of the above-listed symptoms or the following:      Pushes your hand away or screams/cries when his/her belly is touched.    You notice your child is very fussy or weak.    Your child is very tired and is too tired to eat or drink.    Your child is dehydrated.  Signs of dehydration can be:  o Significant change in the amount of wet diapers/urine.  o Your infant or child starts to have dry mouth and lips, or no saliva (spit) or  tears.    PREGNANT WOMEN:  Return to the Emergency Department right away if you have any of the above-listed symptoms or the following:      You have bleeding, leaking fluid or passing tissue from the vagina.    You have worse pain or cramping, or pain in your shoulder or back.    You have vomiting that will not stop.    You have a temperature of 100oF or more.    Your baby is not moving as much as usual.    You faint.    You get a bad headache with or without eye problems and abdominal pain.    You have a seizure.    You have unusual discharge from your vagina and abdominal pain.    Abdominal pain is pretty common during pregnancy.  Your pain may or may not be related to your pregnancy. You should follow-up closely with your OB provider so they can evaluate you and your baby.  Until you follow-up with your regular provider, do the following:       Avoid sex and do not put anything in your vagina.    Drink clear fluids.    Only take medications approved by your provider.    MORE INFORMATION:    Appendicitis:  A possible cause of abdominal pain in any person who still has their appendix is acute appendicitis. Appendicitis is often hard to diagnose.  Testing does not always rule out early appendicitis or other causes of abdominal pain. Close follow-up with your provider and re-evaluations may be needed to figure out the reason for your abdominal pain.    Follow-up:  It is very important that you make an appointment with your clinic and go to the appointment.  If you do not follow-up with your primary provider, it may result in missing an important development which could result in permanent injury or disability and/or lasting pain.  If there is any problem keeping your appointment, call your provider or return to the Emergency Department.    Medications:  Take your medications as directed by your provider today.  Before using over-the-counter medications, ask your provider and make sure to take the medications as  "directed.  If you have any questions about medications, ask your provider.    Diet:  Resume your normal diet as much as possible, but do not eat fried, fatty or spicy foods while you have pain.  Do not drink alcohol or have caffeine.  Do not smoke tobacco.    Probiotics: If you have been given an antibiotic, you may want to also take a probiotic pill or eat yogurt with live cultures. Probiotics have \"good bacteria\" to help your intestines stay healthy. Studies have shown that probiotics help prevent diarrhea (loose stools) and other intestine problems (including C. diff infection) when you take antibiotics. You can buy these without a prescription in the pharmacy section of the store.     If you were given a prescription for medicine here today, be sure to read all of the information (including the package insert) that comes with your prescription.  This will include important information about the medicine, its side effects, and any warnings that you need to know about.  The pharmacist who fills the prescription can provide more information and answer questions you may have about the medicine.  If you have questions or concerns that the pharmacist cannot address, please call or return to the Emergency Department.       Remember that you can always come back to the Emergency Department if you are not able to see your regular provider in the amount of time listed above, if you get any new symptoms, or if there is anything that worries you.  Discharge Instructions  Constipation  Constipation can cause severe cramping pain and your provider thinks this might be the cause of your abdominal pain (belly pain) today.  People usually recognize that they are constipated because they have difficulty having bowel movements, are not having bowel movements frequently enough, or are not having large enough bowel movements. Sometimes, especially in children or older people, you do not recognize that you are constipated until it " becomes severe. The most common causes of constipation are a lack of exercise and not eating enough fruits, vegetables, and whole grains. Constipation can also be a side effect of medications, such as narcotics, or may be caused by a disease of the digestive system.    Generally, every Emergency Department visit should have a follow-up clinic visit with either a primary or a specialty clinic/provider. Please follow-up as instructed by your emergency provider today. Sometimes, chronic constipation requires further testing to determine the cause. If you are over 50 years old, you may need a colonoscopy if you have not had one before.     Return to the Emergency Department if:    Your abdominal pain worsens or does not improve after a bowel movement.    You become very weak.    You get a temperature above 102oF or as directed by your provider.    You have blood in your stools (bright red or black, tarry stools).    You keep vomiting (throwing up) or cannot drink liquids.    Your see blood when you vomit.    Your stomach gets bloated or bigger.    You have new symptoms or anything that worries you.    What can I do to help myself?    If your provider gave you a cathartic medication, like magnesium citrate or GoLytely  (polyethylene glycol), you can expect to have cramps and gas pains after taking it. You can expect to have a number of bowel movements and even diarrhea (loose or watery stools) in the course of clearing your bowels.  You will know your bowels have been cleaned out after you pass clear liquid. The cramps and gas should let up after you have emptied your bowels. You may want to wait until morning to take this type of medication so you aren t up in the night.     Sometimes instead of cathartics, we recommend laxatives like milk of magnesia to move your bowels more slowly, or an enema to help the bowels to move. Read and follow the package directions, or follow your provider s instructions.    Once you have  become very constipated, it takes time for your bowels to return to normal and you need to be very careful to prevent becoming constipated again. Take a laxative if you do not move your bowels at least every two days.       Eat foods that have a lot of fiber. Good choices are fruits, vegetables, prune juice, apple juice, and high fiber cereal. Limit dairy products such as milk and cheese, since these can make constipation worse.     Drink plenty of water.     When you feel the need to go to the bathroom, go to the bathroom. Do not hold it.    Miralax , Metamucil , Colace , Senna or fiber supplements can be used daily.  Miralax  daily is often the best choice for children.  If you were given a prescription for medicine here today, be sure to read all of the information (including the package insert) that comes with your prescription.  This will include important information about the medicine, its side effects, and any warnings that you need to know about.  The pharmacist who fills the prescription can provide more information and answer questions you may have about the medicine.  If you have questions or concerns that the pharmacist cannot address, please call or return to the Emergency Department.   Remember that you can always come back to the Emergency Department if you are not able to see your regular provider in the amount of time listed above, if you get any new symptoms, or if there is anything that worries you.

## 2018-02-18 NOTE — ED AVS SNAPSHOT
[facility] Emergency Department    [address]    [facility] [address]    Phone:  [phone]    Fax:  [phone]                                       Estefany An   MRN: [id number]    Department:  [facility] Emergency Department   Date of Visit:  2/18/2018           Patient Information     Date Of Birth          9/20/1930        Your diagnoses for this visit were:     Pain of upper abdomen        You were seen by Luis Felipe Lyle MD.      Follow-up Information     Follow up with Ricki Blake MD.    Specialty:  Family Practice    Why:  for re-evaluation of your symptoms this week if not improving    Contact information:    [facility]  [address]  [facility] [address] [phone]          Discharge Instructions       Discharge Instructions  Abdominal Pain    Abdominal pain (belly pain) can be caused by many things. Your evaluation today does not show the exact cause for your pain. Your provider today has decided that it is unlikely your pain is due to a life threatening problem, or a problem requiring surgery or hospital admission. Sometimes those problems cannot be found right away, so it is very important that you follow up as directed.  Sometimes only the changes which occur over time allow the cause of your pain to be found.    Generally, every Emergency Department visit should have a follow-up clinic visit with either a primary or a specialty clinic/provider. Please follow-up as instructed by your emergency provider today. With abdominal pain, we often recommend very close follow-up, such as the following day.    ADULTS:  Return to the Emergency Department right away if:      You get an oral temperature above 102oF or as directed by your provider.    You have blood in your stools. This may be bright red or appear as black, tarry stools.      You keep vomiting (throwing up) or cannot drink liquids.    You see blood when you vomit.      You cannot have a bowel movement or you cannot pass gas.    Your stomach gets bloated or bigger.    Your skin or the whites of your eyes look yellow.    You faint.    You have bloody, frequent or painful urination (peeing).    You have new symptoms or anything that worries you.    CHILDREN:  Return to the Emergency Department right away if your child has any of the above-listed symptoms or the following:      Pushes your hand away or screams/cries when his/her belly is touched.    You notice your child is very fussy or weak.    Your child is very tired and is too tired to eat or drink.    Your child is dehydrated.  Signs of dehydration can be:  o Significant change in the amount of wet diapers/urine.  o Your infant or child starts to have dry mouth and lips, or no saliva (spit) or tears.    PREGNANT WOMEN:  Return to the Emergency Department right away if you have any of the above-listed symptoms or the following:      You have bleeding, leaking fluid or passing tissue from the vagina.    You have worse pain or cramping, or pain in your shoulder or back.    You have vomiting that will not stop.    You have a temperature of 100oF or more.    Your baby is not moving as much as usual.    You faint.    You get a bad headache with or without eye problems and abdominal pain.    You have a seizure.    You have unusual discharge from your vagina and abdominal pain.    Abdominal pain is pretty common during pregnancy.  Your pain may or may not be related to your pregnancy. You should follow-up closely with your OB provider so they can evaluate you and your baby.  Until you follow-up with your regular provider, do the following:       Avoid sex and do not put anything in your vagina.    Drink clear fluids.    Only take medications approved by your provider.    MORE INFORMATION:    Appendicitis:  A possible cause of abdominal pain in any person who still has their appendix is acute appendicitis. Appendicitis is often hard  "to diagnose.  Testing does not always rule out early appendicitis or other causes of abdominal pain. Close follow-up with your provider and re-evaluations may be needed to figure out the reason for your abdominal pain.    Follow-up:  It is very important that you make an appointment with your clinic and go to the appointment.  If you do not follow-up with your primary provider, it may result in missing an important development which could result in permanent injury or disability and/or lasting pain.  If there is any problem keeping your appointment, call your provider or return to the Emergency Department.    Medications:  Take your medications as directed by your provider today.  Before using over-the-counter medications, ask your provider and make sure to take the medications as directed.  If you have any questions about medications, ask your provider.    Diet:  Resume your normal diet as much as possible, but do not eat fried, fatty or spicy foods while you have pain.  Do not drink alcohol or have caffeine.  Do not smoke tobacco.    Probiotics: If you have been given an antibiotic, you may want to also take a probiotic pill or eat yogurt with live cultures. Probiotics have \"good bacteria\" to help your intestines stay healthy. Studies have shown that probiotics help prevent diarrhea (loose stools) and other intestine problems (including C. diff infection) when you take antibiotics. You can buy these without a prescription in the pharmacy section of the store.     If you were given a prescription for medicine here today, be sure to read all of the information (including the package insert) that comes with your prescription.  This will include important information about the medicine, its side effects, and any warnings that you need to know about.  The pharmacist who fills the prescription can provide more information and answer questions you may have about the medicine.  If you have questions or concerns that the " pharmacist cannot address, please call or return to the Emergency Department.       Remember that you can always come back to the Emergency Department if you are not able to see your regular provider in the amount of time listed above, if you get any new symptoms, or if there is anything that worries you.  Discharge Instructions  Constipation  Constipation can cause severe cramping pain and your provider thinks this might be the cause of your abdominal pain (belly pain) today.  People usually recognize that they are constipated because they have difficulty having bowel movements, are not having bowel movements frequently enough, or are not having large enough bowel movements. Sometimes, especially in children or older people, you do not recognize that you are constipated until it becomes severe. The most common causes of constipation are a lack of exercise and not eating enough fruits, vegetables, and whole grains. Constipation can also be a side effect of medications, such as narcotics, or may be caused by a disease of the digestive system.    Generally, every Emergency Department visit should have a follow-up clinic visit with either a primary or a specialty clinic/provider. Please follow-up as instructed by your emergency provider today. Sometimes, chronic constipation requires further testing to determine the cause. If you are over 50 years old, you may need a colonoscopy if you have not had one before.     Return to the Emergency Department if:    Your abdominal pain worsens or does not improve after a bowel movement.    You become very weak.    You get a temperature above 102oF or as directed by your provider.    You have blood in your stools (bright red or black, tarry stools).    You keep vomiting (throwing up) or cannot drink liquids.    Your see blood when you vomit.    Your stomach gets bloated or bigger.    You have new symptoms or anything that worries you.    What can I do to help myself?    If your  provider gave you a cathartic medication, like magnesium citrate or GoLytely  (polyethylene glycol), you can expect to have cramps and gas pains after taking it. You can expect to have a number of bowel movements and even diarrhea (loose or watery stools) in the course of clearing your bowels.  You will know your bowels have been cleaned out after you pass clear liquid. The cramps and gas should let up after you have emptied your bowels. You may want to wait until morning to take this type of medication so you aren t up in the night.     Sometimes instead of cathartics, we recommend laxatives like milk of magnesia to move your bowels more slowly, or an enema to help the bowels to move. Read and follow the package directions, or follow your provider s instructions.    Once you have become very constipated, it takes time for your bowels to return to normal and you need to be very careful to prevent becoming constipated again. Take a laxative if you do not move your bowels at least every two days.       Eat foods that have a lot of fiber. Good choices are fruits, vegetables, prune juice, apple juice, and high fiber cereal. Limit dairy products such as milk and cheese, since these can make constipation worse.     Drink plenty of water.     When you feel the need to go to the bathroom, go to the bathroom. Do not hold it.    Miralax , Metamucil , Colace , Senna or fiber supplements can be used daily.  Miralax  daily is often the best choice for children.  If you were given a prescription for medicine here today, be sure to read all of the information (including the package insert) that comes with your prescription.  This will include important information about the medicine, its side effects, and any warnings that you need to know about.  The pharmacist who fills the prescription can provide more information and answer questions you may have about the medicine.  If you have questions or concerns that the pharmacist cannot  address, please call or return to the Emergency Department.   Remember that you can always come back to the Emergency Department if you are not able to see your regular provider in the amount of time listed above, if you get any new symptoms, or if there is anything that worries you.      24 Hour Appointment Hotline       To make an appointment at any [facility], call [phone] ([phone]). If you don't have a family doctor or clinic, we will help you find one. [facility] clinics are conveniently located to serve the needs of you and your family.             Review of your medicines      START taking        Dose / Directions Last dose taken    polyethylene glycol powder   Commonly known as:  MIRALAX   Dose:  1 capful   Quantity:  527 g        Take 17 g (1 capful) by mouth daily   Refills:  0          Our records show that you are taking the medicines listed below. If these are incorrect, please call your family doctor or clinic.        Dose / Directions Last dose taken    furosemide 40 MG tablet   Commonly known as:  LASIX   Dose:  40 mg        Take 1 tablet (40 mg) by mouth daily   Refills:  0        HYDROcodone-acetaminophen 5-325 MG per tablet   Commonly known as:  NORCO   Dose:  1 tablet   Quantity:  30 tablet        Take 1 tablet by mouth every 4 hours as needed for moderate to severe pain (Max 3 does per day)   Refills:  0        ipratropium - albuterol 0.5 mg/2.5 mg/3 mL 0.5-2.5 (3) MG/3ML neb solution   Commonly known as:  DUONEB   Dose:  1 vial   Quantity:  360 mL        Take 1 vial (3 mLs) by nebulization 3 times daily   Refills:  0        LORazepam 0.5 MG tablet   Commonly known as:  ATIVAN   Dose:  0.5 mg   Quantity:  30 tablet        Take 1 tablet (0.5 mg) by mouth At Bedtime   Refills:  0        losartan 100 MG tablet   Commonly known as:  COZAAR   Dose:  50 mg        Take 50 mg by mouth daily   Refills:  0        metoprolol tartrate 50 MG tablet   Commonly known as:  LOPRESSOR   Dose:  50  mg   Quantity:  60 tablet        Take 1 tablet (50 mg) by mouth 2 times daily   Refills:  0        NIFEdipine ER 90 MG Tb24   Commonly known as:  ADALAT CC   Dose:  90 mg        Take 90 mg by mouth daily   Refills:  0        TYLENOL PO   Dose:  650 mg        Take 650 mg by mouth 3 times daily   Refills:  0                Prescriptions were sent or printed at these locations (1 Prescription)                   Other Prescriptions                Printed at Department/Unit printer (1 of 1)         polyethylene glycol (MIRALAX) powder                Procedures and tests performed during your visit     Procedure/Test Number of Times Performed    CBC with platelets differential 1    CT Chest/Abdomen/Pelvis w Contrast 1    Cardiac Continuous Monitoring 1    Comprehensive metabolic panel 1    Draw and hold 3    ISTAT CG4 gases lactate ezekiel nursing POCT 1    ISTAT gases lactate ezekiel POCT 1    Lipase 1    Peripheral IV: Standard 1    Pulse oximetry nursing 1      Orders Needing Specimen Collection     None      Pending Results     No orders found from 2/16/2018 to 2/19/2018.            Pending Culture Results     No orders found from 2/16/2018 to 2/19/2018.            Pending Results Instructions     If you had any lab results that were not finalized at the time of your Discharge, you can call the ED Lab Result RN at [phone]. You will be contacted by this team for any positive Lab results or changes in treatment. The nurses are available 7 days a week from 10A to 6:30P.  You can leave a message 24 hours per day and they will return your call.        Test Results From Your Hospital Stay        2/18/2018  2:56 PM      Component Results     Component Value Ref Range & Units Status    Sodium 125 (L) 133 - 144 mmol/L Final    Potassium 4.2 3.4 - 5.3 mmol/L Final    Chloride 91 (L) 94 - 109 mmol/L Final    Carbon Dioxide 26 20 - 32 mmol/L Final    Anion Gap 8 3 - 14 mmol/L Final    Glucose 126 (H) 70 - 99 mg/dL Final    Urea  Nitrogen 25 7 - 30 mg/dL Final    Creatinine 1.03 0.52 - 1.04 mg/dL Final    GFR Estimate 51 (L) >60 mL/min/1.7m2 Final    Non  GFR Calc    GFR Estimate If Black 61 >60 mL/min/1.7m2 Final    African American GFR Calc    Calcium 8.6 8.5 - 10.1 mg/dL Final    Bilirubin Total 0.5 0.2 - 1.3 mg/dL Final    Albumin 3.0 (L) 3.4 - 5.0 g/dL Final    Protein Total 7.3 6.8 - 8.8 g/dL Final    Alkaline Phosphatase 321 (H) 40 - 150 U/L Final    ALT 60 (H) 0 - 50 U/L Final    AST 44 0 - 45 U/L Final         2/18/2018  2:39 PM      Component Results     Component Value Ref Range & Units Status    WBC 11.6 (H) 4.0 - 11.0 10e9/L Final    RBC Count 3.04 (L) 3.8 - 5.2 10e12/L Final    Hemoglobin 9.7 (L) 11.7 - 15.7 g/dL Final    Hematocrit 29.4 (L) 35.0 - 47.0 % Final    MCV 97 78 - 100 fl Final    MCH 31.9 26.5 - 33.0 pg Final    MCHC 33.0 31.5 - 36.5 g/dL Final    RDW 16.0 (H) 10.0 - 15.0 % Final    Platelet Count 277 150 - 450 10e9/L Final    Diff Method Automated Method  Final    % Neutrophils 78.3 % Final    % Lymphocytes 9.8 % Final    % Monocytes 10.9 % Final    % Eosinophils 0.2 % Final    % Basophils 0.2 % Final    % Immature Granulocytes 0.6 % Final    Nucleated RBCs 0 0 /100 Final    Absolute Neutrophil 9.1 (H) 1.6 - 8.3 10e9/L Final    Absolute Lymphocytes 1.1 0.8 - 5.3 10e9/L Final    Absolute Monocytes 1.3 0.0 - 1.3 10e9/L Final    Absolute Eosinophils 0.0 0.0 - 0.7 10e9/L Final    Absolute Basophils 0.0 0.0 - 0.2 10e9/L Final    Abs Immature Granulocytes 0.1 0 - 0.4 10e9/L Final    Absolute Nucleated RBC 0.0  Final         2/18/2018  3:40 PM      Narrative     CT CHEST, ABDOMEN, AND PELVIS WITH CONTRAST 2/18/2018 2:41 PM     HISTORY: Acute upper abdominal/lower chest pain; recent DVT diagnosis.    COMPARISON: Chest CT from 1/21/2018. No prior abdomen and pelvis CTs.    TECHNIQUE: Volumetric helical acquisition of CT images from the lung  apices through the symphysis pubis after the administration of  100mL  Isovue-370 intravenous contrast. Radiation dose for this scan was  reduced using automated exposure control, adjustment of the mA and/or  kV according to patient size, or iterative reconstruction technique.    FINDINGS:   Chest: Examination is compromised by motion artifact. There are no  filling defects in the pulmonary arteries. There is insufficient  contrast opacification of the aorta to evaluate for dissection. There  is a small right pleural effusion with underlying atelectasis or  infiltrate in the right lower lobe. The degree of opacity has improved  compared with the previous examination. There is mild atelectasis at  the left lung base. Small calcified granuloma in the right midlung.  There is an old compression fracture of T12.    Abdomen and pelvis: There is moderate motion artifact. Postoperative  changes of cholecystectomy. The liver, spleen, pancreas, and adrenal  glands show no acute abnormality allowing for the motion artifact.  There are areas of low attenuation in the kidneys. Some of these can  be characterized as cysts but others are too small to characterize.  There is no free intraperitoneal air. The colon is mildly distended  with gas and stool. Appendix is not identified. There are a few fluid  distended loops of small bowel in the pelvis. There is trace free  fluid in the pelvis. Extensive atherosclerotic changes in the aorta  but no evidence of aneurysm.        Impression     IMPRESSION:   1. No evidence of pulmonary embolism.  2. Small right pleural effusion. Underlying atelectasis or infiltrate  in the right lower lobe.  3. Mild to moderate colonic distention with gas and stool. No definite  evidence of bowel obstruction.     MANJEET REESE MD               2/18/2018  2:15 PM      Component Results     Component Value Ref Range & Units Status    Ph Venous 7.39 7.32 - 7.43 pH Final    PCO2 Venous 40 40 - 50 mm Hg Final    PO2 Venous 28 25 - 47 mm Hg Final    Bicarbonate Venous 25  21 - 28 mmol/L Final    O2 Sat Venous 52 % Final    Lactic Acid 1.6 0.7 - 2.1 mmol/L Final         2/18/2018  2:56 PM      Component Results     Component Value Ref Range & Units Status    Lipase 134 73 - 393 U/L Final                Clinical Quality Measure: Blood Pressure Screening     Your blood pressure was checked while you were in the emergency department today. The last reading we obtained was  BP: 123/65 . Please read the guidelines below about what these numbers mean and what you should do about them.  If your systolic blood pressure (the top number) is less than 120 and your diastolic blood pressure (the bottom number) is less than 80, then your blood pressure is normal. There is nothing more that you need to do about it.  If your systolic blood pressure (the top number) is 120-139 or your diastolic blood pressure (the bottom number) is 80-89, your blood pressure may be higher than it should be. You should have your blood pressure rechecked within a year by a primary care provider.  If your systolic blood pressure (the top number) is 140 or greater or your diastolic blood pressure (the bottom number) is 90 or greater, you may have high blood pressure. High blood pressure is treatable, but if left untreated over time it can put you at risk for heart attack, stroke, or kidney failure. You should have your blood pressure rechecked by a primary care provider within the next 4 weeks.  If your provider in the emergency department today gave you specific instructions to follow-up with your doctor or provider even sooner than that, you should follow that instruction and not wait for up to 4 weeks for your follow-up visit.        Thank you for choosing [facility]       Thank you for choosing [facility] for your care. Our goal is always to provide you with excellent care. Hearing back from our patients is one way we can continue to improve our services. Please take a few minutes to complete the written survey that you  "may receive in the mail after you visit with us. Thank you!        [employer] Information     [employer] lets you send messages to your doctor, view your test results, renew your prescriptions, schedule appointments and more. To sign up, go to [email or web address] . Click on \"Log in\" on the left side of the screen, which will take you to the Welcome page. Then click on \"Sign up Now\" on the right side of the page.     You will be asked to enter the access code listed below, as well as some personal information. Please follow the directions to create your username and password.     Your access code is: [email or web address]  Expires: 2018 11:37 AM     Your access code will  in 90 days. If you need help or a new code, please call your [facility] clinic or [phone].        Care EveryWhere ID     This is your Care EveryWhere ID. This could be used by other organizations to access your [facility] medical records  [id number]        Equal Access to Services     MAHSA VARGAS : Hadii marcos toledoo Sogail, waaxda luqadaha, qaybta kaalmada adepapa, daniella swanson . [facility] [phone].    ATENCIÓN: Si habla español, tiene a carter disposición servicios gratuitos de asistencia lingüística. Llame al [phone].    We comply with applicable federal civil rights laws and [place] laws. We do not discriminate on the basis of race, color, national origin, age, disability, sex, sexual orientation, or gender identity.            After Visit Summary       This is your record. Keep this with you and show to your community pharmacist(s) and doctor(s) at your next visit.                  "

## 2018-02-19 ENCOUNTER — RECORDS - HEALTHEAST (OUTPATIENT)
Dept: LAB | Facility: CLINIC | Age: 83
End: 2018-02-19

## 2018-02-19 LAB
BASOPHILS # BLD AUTO: 0 THOU/UL (ref 0–0.2)
BASOPHILS NFR BLD AUTO: 0 % (ref 0–2)
EOSINOPHIL # BLD AUTO: 0.1 THOU/UL (ref 0–0.4)
EOSINOPHIL NFR BLD AUTO: 1 % (ref 0–6)
ERYTHROCYTE [DISTWIDTH] IN BLOOD BY AUTOMATED COUNT: 16.3 % (ref 11–14.5)
HCT VFR BLD AUTO: 29.8 % (ref 35–47)
HGB BLD-MCNC: 9.7 G/DL (ref 12–16)
LYMPHOCYTES # BLD AUTO: 1.4 THOU/UL (ref 0.8–4.4)
LYMPHOCYTES NFR BLD AUTO: 14 % (ref 20–40)
MAGNESIUM SERPL-MCNC: 2.1 MG/DL (ref 1.8–2.6)
MCH RBC QN AUTO: 31.3 PG (ref 27–34)
MCHC RBC AUTO-ENTMCNC: 32.6 G/DL (ref 32–36)
MCV RBC AUTO: 96 FL (ref 80–100)
MONOCYTES # BLD AUTO: 1.3 THOU/UL (ref 0–0.9)
MONOCYTES NFR BLD AUTO: 13 % (ref 2–10)
NEUTROPHILS # BLD AUTO: 7.2 THOU/UL (ref 2–7.7)
NEUTROPHILS NFR BLD AUTO: 73 % (ref 50–70)
PLATELET # BLD AUTO: 280 THOU/UL (ref 140–440)
PMV BLD AUTO: 9.9 FL (ref 8.5–12.5)
RBC # BLD AUTO: 3.1 MILL/UL (ref 3.8–5.4)
WBC: 9.9 THOU/UL (ref 4–11)

## 2018-02-20 ENCOUNTER — RECORDS - HEALTHEAST (OUTPATIENT)
Dept: LAB | Facility: CLINIC | Age: 83
End: 2018-02-20

## 2018-02-20 LAB — INR PPP: 1.2 (ref 0.9–1.1)

## 2018-02-21 ENCOUNTER — RECORDS - HEALTHEAST (OUTPATIENT)
Dept: LAB | Facility: CLINIC | Age: 83
End: 2018-02-21

## 2018-02-21 LAB
ALBUMIN UR-MCNC: ABNORMAL MG/DL
ANION GAP SERPL CALCULATED.3IONS-SCNC: 9 MMOL/L (ref 5–18)
APPEARANCE UR: CLEAR
BACTERIA #/AREA URNS HPF: ABNORMAL HPF
BILIRUB UR QL STRIP: NEGATIVE
BUN SERPL-MCNC: 25 MG/DL (ref 8–28)
CALCIUM SERPL-MCNC: 9 MG/DL (ref 8.5–10.5)
CHLORIDE BLD-SCNC: 91 MMOL/L (ref 98–107)
CO2 SERPL-SCNC: 25 MMOL/L (ref 22–31)
COLOR UR AUTO: YELLOW
CREAT SERPL-MCNC: 0.87 MG/DL (ref 0.6–1.1)
FOLATE SERPL-MCNC: 15.5 NG/ML
GFR SERPL CREATININE-BSD FRML MDRD: >60 ML/MIN/1.73M2
GLUCOSE BLD-MCNC: 89 MG/DL (ref 70–125)
GLUCOSE UR STRIP-MCNC: NEGATIVE MG/DL
HGB BLD-MCNC: 9.7 G/DL (ref 12–16)
HGB UR QL STRIP: NEGATIVE
KETONES UR STRIP-MCNC: NEGATIVE MG/DL
LEUKOCYTE ESTERASE UR QL STRIP: ABNORMAL
NITRATE UR QL: NEGATIVE
PH UR STRIP: 5.5 [PH] (ref 4.5–8)
POTASSIUM BLD-SCNC: 4.5 MMOL/L (ref 3.5–5)
RBC #/AREA URNS AUTO: ABNORMAL HPF
SODIUM SERPL-SCNC: 125 MMOL/L (ref 136–145)
SP GR UR STRIP: 1.02 (ref 1–1.03)
SQUAMOUS #/AREA URNS AUTO: ABNORMAL LPF
TRANS CELLS #/AREA URNS HPF: ABNORMAL LPF
UROBILINOGEN UR STRIP-ACNC: ABNORMAL
WBC #/AREA URNS AUTO: ABNORMAL HPF

## 2018-02-24 LAB
BACTERIA SPEC CULT: ABNORMAL
BACTERIA SPEC CULT: ABNORMAL

## 2018-03-01 ENCOUNTER — RECORDS - HEALTHEAST (OUTPATIENT)
Dept: LAB | Facility: CLINIC | Age: 83
End: 2018-03-01

## 2018-03-02 LAB
ANION GAP SERPL CALCULATED.3IONS-SCNC: 7 MMOL/L (ref 5–18)
BUN SERPL-MCNC: 23 MG/DL (ref 8–28)
CALCIUM SERPL-MCNC: 9.2 MG/DL (ref 8.5–10.5)
CHLORIDE BLD-SCNC: 96 MMOL/L (ref 98–107)
CO2 SERPL-SCNC: 28 MMOL/L (ref 22–31)
CREAT SERPL-MCNC: 0.78 MG/DL (ref 0.6–1.1)
ERYTHROCYTE [DISTWIDTH] IN BLOOD BY AUTOMATED COUNT: 16.3 % (ref 11–14.5)
GFR SERPL CREATININE-BSD FRML MDRD: >60 ML/MIN/1.73M2
GLUCOSE BLD-MCNC: 88 MG/DL (ref 70–125)
HCT VFR BLD AUTO: 28.3 % (ref 35–47)
HGB BLD-MCNC: 9.2 G/DL (ref 12–16)
MCH RBC QN AUTO: 31.1 PG (ref 27–34)
MCHC RBC AUTO-ENTMCNC: 32.5 G/DL (ref 32–36)
MCV RBC AUTO: 96 FL (ref 80–100)
PLATELET # BLD AUTO: 286 THOU/UL (ref 140–440)
PMV BLD AUTO: 9.9 FL (ref 8.5–12.5)
POTASSIUM BLD-SCNC: 4.1 MMOL/L (ref 3.5–5)
RBC # BLD AUTO: 2.96 MILL/UL (ref 3.8–5.4)
SODIUM SERPL-SCNC: 131 MMOL/L (ref 136–145)
WBC: 7 THOU/UL (ref 4–11)

## 2018-03-11 ENCOUNTER — RECORDS - HEALTHEAST (OUTPATIENT)
Dept: LAB | Facility: CLINIC | Age: 83
End: 2018-03-11

## 2018-03-12 LAB
ANION GAP SERPL CALCULATED.3IONS-SCNC: 9 MMOL/L (ref 5–18)
BUN SERPL-MCNC: 25 MG/DL (ref 8–28)
CALCIUM SERPL-MCNC: 9.3 MG/DL (ref 8.5–10.5)
CHLORIDE BLD-SCNC: 101 MMOL/L (ref 98–107)
CO2 SERPL-SCNC: 25 MMOL/L (ref 22–31)
CREAT SERPL-MCNC: 0.9 MG/DL (ref 0.6–1.1)
GFR SERPL CREATININE-BSD FRML MDRD: 59 ML/MIN/1.73M2
GLUCOSE BLD-MCNC: 87 MG/DL (ref 70–125)
HBA1C MFR BLD: 5.2 % (ref 4.2–6.1)
HGB BLD-MCNC: 9.3 G/DL (ref 12–16)
POTASSIUM BLD-SCNC: 4.2 MMOL/L (ref 3.5–5)
SODIUM SERPL-SCNC: 135 MMOL/L (ref 136–145)

## 2018-03-14 PROBLEM — I50.31 ACUTE DIASTOLIC CONGESTIVE HEART FAILURE (H): Status: ACTIVE | Noted: 2018-01-01

## 2018-03-14 PROBLEM — I50.9 CHF (CONGESTIVE HEART FAILURE) (H): Status: ACTIVE | Noted: 2018-01-01

## 2018-03-14 NOTE — IP AVS SNAPSHOT
[place] [address] [facility]    [address]    [facility] [address]    Phone:  [phone]    Fax:  [phone]                                       After Visit Summary   3/14/2018    Estefany An    MRN: [id number]           After Visit Summary Signature Page     I have received my discharge instructions, and my questions have been answered. I have discussed any challenges I see with this plan with the nurse or doctor.    ..........................................................................................................................................  Patient/Patient Representative Signature      ..........................................................................................................................................  Patient Representative Print Name and Relationship to Patient    ..................................................               ................................................  Date                                            Time    ..........................................................................................................................................  Reviewed by Signature/Title    ...................................................              ..............................................  Date                                                            Time

## 2018-03-14 NOTE — LETTER
Transition Communication Hand-off for Care Transitions to Next Level of Care Provider    Name: Estefany An  : 1930  MRN #: [id number]  Primary Care Provider: Ricki Blake  Primary Care MD Name: Dr. Ricki Blake  Primary Clinic: [facility] [address]  [facility] [address]  Primary Care Clinic Name: [facility]  Reason for Hospitalization:  Chronic atrial fibrillation (H) [I48.2]  Acute diastolic congestive heart failure (H) [I50.31]  Admit Date/Time: 3/14/2018 12:33 PM  Discharge Date: ***  Payor Source: Payor: MEDICARE / Plan: MEDICARE / Product Type: Medicare /       Key Recommendations:  Patient discharged last time with neb machine. Daughter stated patient has difficult time manipulating the medication container and spills the medication. She is noncompliant with a low sodium diet and has not been weighing herself daily. CM provided patient with a new scale with large readout.       Michelle Allen    AVS/Discharge Summary is the source of truth; this is a helpful guide for improved communication of patient story

## 2018-03-14 NOTE — ED PROVIDER NOTES
History     Chief Complaint:  Generalized Weakness    HPI   Estefany An is a 87 year old female with arthritis, diastolic heart failure, and history of atrial fibrillation who presents to the emergency department via EMS for evaluation of generalized weakness. The patient had a fall on 01/09/18 in which she fractured her pelvis and her clavicle. She was then at [facility] transitional care unit until discharged yesterday.  The patient reports she initially felt improved in the TCU, but realized this morning that she could not care for herself. She reports not feeling well since January with increased weakness, fatigue, episodes of productive coughing, and increased swelling to her lower legs bilaterally. Her daughter endorses these symptoms. The patient also notes that her weight increased from 180 lbs to 208 while in the care unit, and is currently still around 208.  She feels short of breath with exertion but feels more weak than she does feel short of breath.  She denies any associated chest pain.  She was not discharged home with warfarin or Lasix that was provided to her during her TCU stay.  The patient further complains of pain to her pelvic bone, collar bone, right hip, and back, but attributed this to her recent fractures and arthritis. She denies any recent fevers or urinary symptoms other than frequency.     Allergies:  Allergies reviewed. No pertinent allergies.     Medications:    Miralax  Norco  Ativan  DuoNeb  Lasix  Lopressor  Cozaar  Adalat     Past Medical History:    Anxiety   Arthritis   Chronic atrial fibrillation    Gastroesophageal reflux disease   Hypertension     Past Surgical History:    Cholecystectomy     Family History:    Family history reviewed. No pertinent family history.    Social History:  The patient was accompanied to the emergency department by her daughter.  Smoking Status: Never Smoker  Smokeless Tobacco: Never Used  Alcohol Use: No  Marital Status:      Review of  Systems   Constitutional: Positive for fatigue and unexpected weight change. Negative for fever.   Respiratory: Positive for cough.    Cardiovascular: Positive for leg swelling.   Genitourinary: Positive for frequency. Negative for dysuria.   Musculoskeletal: Positive for back pain.        Pelvic pain, collarbone pain, hip pain   Neurological: Positive for weakness.   All other systems reviewed and are negative.    Physical Exam     Patient Vitals for the past 24 hrs:   BP Temp Temp src Pulse Heart Rate Resp SpO2 Weight   03/14/18 1430 130/69 - - - 101 12 93 % -   03/14/18 1415 134/67 - - - 121 19 94 % -   03/14/18 1330 125/75 - - - 110 8 94 % -   03/14/18 1315 128/82 - - - - 15 91 % -   03/14/18 1300 125/70 - - - 108 19 95 % -   03/14/18 1245 130/75 - - - 113 14 96 % -   03/14/18 1243 132/88 98.4  F (36.9  C) Oral 112 - 20 97 % 94.3 kg (208 lb)     Physical Exam    General:   Pleasant, age appropriate, mildly ill appearing female.  HEENT:    Oropharynx is moist, without lesions or trismus.  Eyes:    Conjunctiva normal  Neck:    Supple, no meningismus.     CV:     Irregular rhythm, tachycardic.      No murmurs, rubs or gallops.       No unilateral leg swelling.       2+ radial pulses bilateral.       2+ bilateral lower extremity edema.  PULM:    Inspiratory rales and expiratory wheezing throughout     No respiratory distress.      Good air exchange.     No stridor.  ABD:    Soft, non-tender, non-distended.       No pulsatile masses.       No rebound, guarding or rigidity.  MSK:     No gross deformity to all four extremities.   LYMPH:   No cervical lymphadenopathy.  NEURO:   Alert. Good muscle tone, no atrophy.  Skin:    Warm, dry and intact.    Psych:    Mood is good and affect is appropriate.        Emergency Department Course     ECG:  ECG taken at 1334, ECG read at 1341  Atrial fibrillation with rapid ventricular response  Low voltage QRS  Nonspecific ST and T wave abnormality  Abnormal ECG   No significant  change compared to ECG dated 01/07/18  Rate 106 bpm. WA interval * ms. QRS duration 68 ms. QT/QTc 304/403 ms. P-R-T axes * 26 175.    Imaging:  Radiology findings were communicated with the patient and her daughter who voiced understanding of the findings.    Chest XR,  PA & LAT  Small bilateral effusions.  Reading per radiology.     Laboratory:  Laboratory findings were communicated with the patient and her daughter who voiced understanding of the findings.    UA: All Negative   CBC: WBC 5.1, HGB 9.9 (L),   BMP: Glucose 119 (H), GFR Estimate 58 (L) o/w WNL (Creatinine 0.91)  BNP: 5522 (H)  Troponin (Collected 1247): <0.015   INR: 2.16 (H)    Interventions:  1432 Lasix 40 mg IV   1456 Ultram 50 mg PO     Emergency Department Course:     Nursing notes and vitals reviewed.     I performed an exam of the patient as documented above.     IV was inserted and blood was drawn for laboratory testing, results above.     The patient provided a urine sample here in the emergency department. This was sent for laboratory testing, findings above.    The patient was sent for a chest x-ray while in the emergency department, results above.    I discussed the treatment plan with the patient and her daughter. They expressed understanding of this plan and consented to admission. I discussed the patient with Vicki Sena PA-C for Dr. Mukherjee, who will admit the patient to a monitored bed for further evaluation and treatment.    [id number] Vicki Sena came down to evaluate the patient at bedside.    I personally reviewed the laboratory, imaging, and EKG results with the patient and her daughter and answered all related questions prior to admission.    Impression & Plan      Medical Decision Making:  Estefany An is a 87 year old female who presents to the emergency department today with generalized weakness, cough and difficulty breathing.  She was noted to be volume overloaded.  She has a history of diastolic heart failure.  She  has rales and expiratory wheezing on exam.  BNP is elevated at 5500 and chest x-ray shows bilateral small pleural effusions with cephalization.  Findings are consistent with acute diastolic heart failure.  Patient given a dose of IV Lasix.  Patient will be transferred to a telemetry bed for further evaluation and management.    Diagnosis:    ICD-10-CM    1. Acute diastolic congestive heart failure (H) I50.31    2. Chronic atrial fibrillation (H) I48.2      Disposition:   The patient was admitted into the care of Dr. Mukherjee for further evaluation and management.     Scribe Disclosure:  I, Minerva Mcdaniel, am serving as a scribe at 12:40 PM on 3/14/2018 to document services personally performed by Guanako Donaldson MD based on my observations and the provider's statements to me.    [facility] EMERGENCY DEPARTMENT       Guanako Donaldson MD  03/14/18 2520

## 2018-03-14 NOTE — H&P
History and Physical     Estefany An MRN# [id number]   YOB: 1930 Age: 87 year old      Date of Admission:  3/14/2018    Primary care provider: Ricki Blake          Assessment and Plan:   Estefany An is a 87 year old female with a PMH significant for chronic A. fib, hypertension, GERD and anxiety, who presents with generalized weakness and shortness of breath.    1. Acute diastolic congestive heart failure -increase shortness of breath since discharging from TCU yesterday, did eat half of a [employer] meal last evening for dinner.  Was not sent home from TCU on Lasix.  BNP is 5500, chest x-ray shows small bilateral pleural effusions with mild vascular congestion.  She is not hypoxic but does appear to be mildly tachypneic and with increased cough.  Patient endorses increased lower extremity edema, orthopnea, and weight gain since last hospital discharge.  Received 40 mg of IV Lasix in the ED.  Continue Lasix 20 mg IV twice daily, strict I's and O's, 2 g sodium diet as needed duo nebs.  Last echo was done 1/2018, EF 55-60%.  Repeat echo is not indicated at this time.  2. Chronic A. Fib -rate controlled with metoprolol and nifedipine.  Anticoagulated on Coumadin.  Per chart review, at discharge from last hospitalization in January, patient was discontinued from Coumadin due to severe anemia requiring blood transfusion at that time.  Patient states she has been on Coumadin throughout her TCU stay but was not discharged on it yesterday.  Is unclear when exactly her Coumadin was resumed.  Her hemoglobin is currently stable, will ask pharmacy to dose Coumadin.  Heart rates are mildly elevated in ED, possibly due to fluid overload per #1 versus elevated heart rates causing #1.  Will monitor heart rates on telemetry and consider titrating rate controlling medications.  3. HTN -resume home meds with parameters  4. GERD -resume omeprazole    Prophylaxis -on Coumadin  Code status - DNR / DNI  Dispo -admit  inpatient.  Anticipate at least 2 nights for further management of symptoms.                Chief Complaint:   Weakness and shortness of breath         History of Present Illness:   Estefany An is a 87 year old female with a PMH significant for chronic A. fib, hypertension, GERD and anxiety, who presents with generalized weakness and shortness of breath.  Patient states she discharged from a TCU yesterday .  She states she was not feeling 100% but wanted to go home.  She states that upon returning home she had a half of [employer] meal for dinner.  Since returning home she has become increasingly weak and short of breath.  Of note she was hospitalized in January after a fall in the nonsurgical pelvic fracture as well as influenza A.  She was discharged home but returned shortly after that with acute diastolic heart failure.  She was discharged from that hospital stay to TCU.  During the hospitalization she had severe anemia that required blood transfusion.  She was on Coumadin he for chronic A. fib and this was stopped even though the patient reports the Coumadin was continued throughout her TCU stay.  She states she was on Lasix during TCU stay as well but states she was not discharged home on either Lasix or Coumadin.  She notes increasing lower extremity edema as well as weight gain since her last discharge from the hospital, 180 up to 208 now.  She does note orthopnea and increased cough.  She denies chest pain, fever, chills, nausea, vomiting, diarrhea, or dysuria.  In the ED, mild tachycardia in the 110s, vital signs otherwise stable.  BMP is fairly unremarkable.  CBC -otherwise unremarkable.Hgb 9.9, BNP 5522, troponin <0.015, INR 2.16.  UA unremarkable. CXR shows small bilateral pleural effusions and mild vascular congestion.  EKG A. fib with RVR.  She received 40 mg of IV Lasix and 50 mg PO Tramadol.  She will be admitted to inpatient for further management of acute diastolic heart failure.    Hx obtained  by speaking with ED physician, chart review and pt interview.               Past Medical History:     Past Medical History:   Diagnosis Date     Anxiety      Arthritis      Chronic atrial fibrillation (H)      Gastroesophageal reflux disease      Hypertension                Past Surgical History:     Past Surgical History:   Procedure Laterality Date     CHOLECYSTECTOMY                 Social History:     Social History     Social History     Marital status:      Spouse name: N/A     Number of children: N/A     Years of education: N/A     Occupational History     Not on file.     Social History Main Topics     Smoking status: Never Smoker     Smokeless tobacco: Never Used     Alcohol use No     Drug use: Not on file     Sexual activity: Not on file     Other Topics Concern     Not on file     Social History Narrative               Family History:   Reviewed and noncontributory         Allergies:    No Known Allergies            Medications:     Prior to Admission medications    Medication Sig Last Dose Taking? Auth Provider   polyethylene glycol (MIRALAX) powder Take 17 g (1 capful) by mouth daily   Luis Felipe Lyle MD   HYDROcodone-acetaminophen (NORCO) 5-325 MG per tablet Take 1 tablet by mouth every 4 hours as needed for moderate to severe pain (Max 3 does per day)   Jarocho Antoine, DO   LORazepam (ATIVAN) 0.5 MG tablet Take 1 tablet (0.5 mg) by mouth At Bedtime   Jarocho Antoine, DO   ipratropium - albuterol 0.5 mg/2.5 mg/3 mL (DUONEB) 0.5-2.5 (3) MG/3ML neb solution Take 1 vial (3 mLs) by nebulization 3 times daily   Jarocho Antoine, DO   furosemide (LASIX) 40 MG tablet Take 1 tablet (40 mg) by mouth daily   Jarocho Antoine DO   Acetaminophen (TYLENOL PO) Take 650 mg by mouth 3 times daily   Reported, Patient   metoprolol tartrate (LOPRESSOR) 50 MG tablet Take 1 tablet (50 mg) by mouth 2 times daily   Hernandez Stovall MD   losartan (COZAAR) 100 MG tablet Take 50 mg by mouth daily    Unknown,  Entered By History   NIFEdipine ER (ADALAT CC) 90 MG TB24 Take 90 mg by mouth daily   Unknown, Entered By History              Review of Systems:   A Comprehensive greater than 10 system review of systems was carried out.  Pertinent positives and negatives are noted above.  Otherwise negative for contributory information.     Review Of Systems  Skin: negative  Eyes: negative  Ears/Nose/Throat: negative  Respiratory: No shortness of breath, dyspnea on exertion, cough, or hemoptysis  Cardiovascular: negative  Gastrointestinal: negative  Genitourinary: negative  Musculoskeletal: negative  Neurologic: negative  Psychiatric: negative  Hematologic/Lymphatic/Immunologic: negative  Endocrine: negative             Physical Exam:   Blood pressure 130/69, pulse 112, temperature 98.4  F (36.9  C), temperature source Oral, resp. rate 12, weight 94.3 kg (208 lb), SpO2 93 %.  Exam:  GENERAL:  Comfortable.  PSYCH: pleasant, oriented, No acute distress.  HEENT:  Atraumatic, normocephalic. Normal conjunctiva, normal hearing, and oropharynx is normal.  NECK:  Supple, no neck vein distention  HEART:  Normal S1, S2 with no murmur, no pericardial rub, gallops or S3 or S4.  LUNGS:  Rhonchi and occasional wheezing, improved some with subsequent breaths, mildly tachypneic  GI:  Soft, normal bowel sounds. Non-tender, non distended.   EXTREMITIES: 2+ bilateral lower extremity edema, +2 pulses bilateral and equal.  SKIN:  Dry to touch, No rash, wound or ulcerations.  NEUROLOGIC:  CN 2-12 intact, BL 5/5 symmetric upper and lower extremity strength, sensation is intact with no focal deficits.               Data:       Recent Labs  Lab 03/14/18  1247   NTBNPI 5522*       Recent Labs  Lab 03/14/18  1247   WBC 5.1   HGB 9.9*   HCT 30.9*   MCV 94          Recent Labs  Lab 03/14/18  1247      POTASSIUM 4.0   CHLORIDE 101   CO2 27   ANIONGAP 6   *   BUN 22   CR 0.91   GFRESTIMATED 58*   GFRESTBLACK 71   KACI 9.3       Recent  Labs  Lab 03/14/18  1247   INR 2.16*       Recent Labs  Lab 03/14/18  1247   TROPI <0.015       Recent Labs  Lab 03/14/18  1321   COLOR Yellow   APPEARANCE Clear   URINEGLC Negative   URINEBILI Negative   URINEKETONE Negative   SG 1.013   UBLD Negative   URINEPH 7.0   PROTEIN Negative   NITRITE Negative   LEUKEST Negative   RBCU 1   WBCU 1       Recent Results (from the past 48 hour(s))   Chest XR,  PA & LAT    Narrative    XR CHEST 2 VW 3/14/2018 1:55 PM    HISTORY: Dyspnea.    COMPARISON: 1/29/2018    FINDINGS: Small bilateral pleural effusions. No airspace consolidation  or pneumothorax. Stable heart size. Severe degenerative change at the  shoulders and a subacute right clavicular head fracture.      Impression    IMPRESSION: Small bilateral effusions.    MD Vicki RODRIGUEZ PA-C    This patient was seen and discussed with Dr. Javier who agrees with the current plans as outlined above.

## 2018-03-14 NOTE — ED NOTES
Bed: ED15  Expected date:   Expected time:   Means of arrival:   Comments:  [id number]  87yoF  Weak/pneumonia

## 2018-03-14 NOTE — IP AVS SNAPSHOT
MRN:[id number]                      After Visit Summary   3/14/2018    Estefany An    MRN: [id number]           Thank you!     Thank you for choosing [facility] for your care. Our goal is always to provide you with excellent care. Hearing back from our patients is one way we can continue to improve our services. Please take a few minutes to complete the written survey that you may receive in the mail after you visit. If you would like to speak to someone directly about your visit please contact Patient Relations at [phone]. Thank you!          Patient Information     Date Of Birth          9/20/1930        Designated Caregiver       Most Recent Value    Caregiver    Will someone help with your care after discharge? no [At independent living apartment in [facility] ]      About your hospital stay     You were admitted on:  March 14, 2018 You last received care in the:  [place] [address] [facility]    You were discharged on:  March 23, 2018       Who to Call     For medical emergencies, please call 911.  For non-urgent questions about your medical care, please call your primary care provider or clinic, [phone]          Attending Provider     Provider Specialty    Guanako Donaldson MD Emergency Medicine    [facility]       Primary Care Provider Office Phone # Fax #    Ricki Blake [phone]      After Care Instructions     Activity - Up ad ladonna           Advance Diet as Tolerated       Follow this diet upon discharge: 2g salt            Daily weights       Call Provider for weight gain of more than 2 pounds per day or 5 pounds per week.            General info for SNF       Length of Stay Estimate: Short Term Care: Estimated # of Days <30  Condition at Discharge: Improving  Level of care:skilled   Rehabilitation Potential: Good  Admission H&P remains valid and up-to-date: Yes  Recent Chemotherapy: N/A  Use Nursing  Home Standing Orders: Yes            Mantoux instructions       Give two-step Mantoux (PPD) Per Facility Policy Yes            Oxygen - Nasal cannula       2 Lpm by nasal cannula to keep O2 sats 92% or greater.                  Follow-up Appointments     Follow Up and recommended labs and tests       Follow up with primary care provider in 7 days.  The following labs/tests are recommended: INR check in 3 days.  BMP in 7 days.                  Your next 10 appointments already scheduled     Apr 10, 2018 11:45 AM CDT   LAB with RU LAB   [facility] ([facility])    [address] [place] [id number]   [phone]           Please do not eat 10-12 hours before your appointment if you are coming in fasting for labs on lipids, cholesterol, or glucose (sugar). This does not apply to pregnant women. Water, hot tea and black coffee (with nothing added) are okay. Do not drink other fluids, diet soda or chew gum.            Apr 10, 2018 12:45 PM CDT   Core MD Reid with Tano Chang MD   [facility] ([facility])    [address] [place] [id number]   [phone]              Additional Services     Occupational Therapy Adult Consult       Evaluate and treat as clinically indicated.    Reason:  Weakness            Physical Therapy Adult Consult       Evaluate and treat as clinically indicated.    Reason:  Weakness                  Warfarin Instruction     You have started taking a medicine called warfarin. This is a blood-thinning medicine (anticoagulant). It helps prevent and treat blood clots.      Before leaving the hospital, make sure you know how much to take and how long to take it.      You will need regular blood tests to make sure your blood is clotting safely. It is very important to see your doctor for regular blood tests.    Talk to your doctor before taking any new medicine  "(this includes over-the-counter drugs and herbal products). Many medicines can interact with warfarin. This may cause more bleeding or too much clotting.     Eating a lot of vitamin K--found in green, leafy vegetables--can change the way warfarin works in your body. Do NOT avoid these foods. Instead, try to eat the same amount each day.     Bleeding is the most common side-effect of warfarin. You may notice bleeding gums, a bloody nose, bruises and bleeding longer when you cut yourself. See a doctor at once if:   o You cough up blood  o You find blood in your stool (poop)  o You have a deep cut, or a cut that bleeds longer than 10 minutes   o You have a bad cut, hard fall, accident or hit your head (go to urgent care or the emergency room).    For women who can get pregnant: This medicine can harm an unborn baby. Be very careful not to get pregnant while taking this medicine. If you think you might be pregnant, call your doctor right away.    For more information, read \"Guide to Warfarin Therapy,  the booklet you received in the hospital.        Pending Results     Date and Time Order Name Status Description    3/20/2018 1737 Sputum Culture Aerobic Bacterial In process     3/17/2018 1058 Blood culture Preliminary     3/17/2018 1058 Blood culture Preliminary             Statement of Approval     Ordered          03/23/18 1113  I have reviewed and agree with all the recommendations and orders detailed in this document.  EFFECTIVE NOW     Approved and electronically signed by:  Luis Miguel Childress DO             Admission Information     Date & Time Provider Department Dept. Phone    3/14/2018 Philip Javier MD [place] [address] [facility] [phone]      Your Vitals Were     Blood Pressure Pulse Temperature Respirations Height Weight    130/69 (BP Location: Right arm) 71 95.8  F (35.4  C) (Oral) 20 1.727 m (5' 8\") 94.4 kg (208 lb 1.6 oz)    Pulse Oximetry BMI (Body Mass Index)                96% " "31.64 kg/m2          [employer] Information     [employer] lets you send messages to your doctor, view your test results, renew your prescriptions, schedule appointments and more. To sign up, go to [email or web address] . Click on \"Log in\" on the left side of the screen, which will take you to the Welcome page. Then click on \"Sign up Now\" on the right side of the page.     You will be asked to enter the access code listed below, as well as some personal information. Please follow the directions to create your username and password.     Your access code is: [email or web address]  Expires: 2018 12:37 PM     Your access code will  in 90 days. If you need help or a new code, please call your [facility] clinic or [phone].        Care EveryWhere ID     This is your Care EveryWhere ID. This could be used by other organizations to access your [facility] medical records  [id number]        Equal Access to Services     MAHSA VARGAS : Hadii marcos toledoo Sogail, waaxda luqadaha, qaybta kaalmada adeegyada, daniella swanson . So [facility] [phone].    ATENCIÓN: Si habla español, tiene a carter disposición servicios gratuitos de asistencia lingüística. Llame al [phone].    We comply with applicable federal civil rights laws and [place] laws. We do not discriminate on the basis of race, color, national origin, age, disability, sex, sexual orientation, or gender identity.               Review of your medicines      START taking        Dose / Directions    predniSONE 10 MG tablet   Commonly known as:  DELTASONE        4 tabs daily for 2 days, then 3 tabs daily for 2 days, then 2 tabs daily for 2 days, then 1 tab daily for 2 days, then stop   Quantity:  20 tablet   Refills:  0       rOPINIRole 0.25 MG tablet   Commonly known as:  REQUIP        Dose:  0.25 mg   Take 1 tablet (0.25 mg) by mouth At Bedtime   Quantity:  30 tablet   Refills:  0       spironolactone 25 MG tablet   Commonly known as:  ALDACTONE "        Dose:  25 mg   Start taking on:  3/24/2018   Take 1 tablet (25 mg) by mouth daily   Quantity:  30 tablet   Refills:  0         CONTINUE these medicines which may have CHANGED, or have new prescriptions. If we are uncertain of the size of tablets/capsules you have at home, strength may be listed as something that might have changed.        Dose / Directions    losartan 25 MG tablet   Commonly known as:  COZAAR   This may have changed:    - medication strength  - how much to take        Dose:  12.5 mg   Start taking on:  3/24/2018   Take 0.5 tablets (12.5 mg) by mouth daily   Quantity:  30 tablet   Refills:  0       metoprolol tartrate 100 MG tablet   Commonly known as:  LOPRESSOR   This may have changed:    - medication strength  - how much to take        Dose:  100 mg   Take 1 tablet (100 mg) by mouth 2 times daily   Quantity:  60 tablet   Refills:  0       NIFEdipine ER 60 MG 24 hr tablet   Commonly known as:  ADALAT CC   This may have changed:    - medication strength  - how much to take        Dose:  60 mg   Start taking on:  3/24/2018   Take 1 tablet (60 mg) by mouth daily   Quantity:  30 tablet   Refills:  0       polyethylene glycol Packet   Commonly known as:  MIRALAX/GLYCOLAX   This may have changed:    - when to take this  - reasons to take this        Dose:  17 g   Take 17 g by mouth daily as needed for constipation   Quantity:  30 packet   Refills:  0         CONTINUE these medicines which have NOT CHANGED        Dose / Directions    COUMADIN 2.5 MG tablet   Generic drug:  warfarin        Dose:  2.5 mg   Take 2.5 mg by mouth daily   Refills:  0       fluticasone-salmeterol 250-50 MCG/DOSE diskus inhaler   Commonly known as:  ADVAIR        Dose:  1 puff   Inhale 1 puff into the lungs every 12 hours   Refills:  0       furosemide 40 MG tablet   Commonly known as:  LASIX   Used for:  Chronic diastolic congestive heart failure (H)        Dose:  40 mg   Take 1 tablet (40 mg) by mouth daily   Refills:  0        ipratropium - albuterol 0.5 mg/2.5 mg/3 mL 0.5-2.5 (3) MG/3ML neb solution   Commonly known as:  DUONEB   Used for:  Acute respiratory failure with hypoxia (H)        Dose:  1 vial   Take 1 vial (3 mLs) by nebulization 3 times daily   Quantity:  360 mL   Refills:  0       Menthol (Topical Analgesic) 4 % Gel        Externally apply topically 2 times daily   Refills:  0       omeprazole 20 MG CR capsule   Commonly known as:  priLOSEC        Dose:  20 mg   Take 20 mg by mouth daily   Refills:  0       TYLENOL PO        Dose:  1000 mg   Take 1,000 mg by mouth 3 times daily   Refills:  0         STOP taking     hydrochlorothiazide 25 MG tablet   Commonly known as:  HYDRODIURIL           LORazepam 0.5 MG tablet   Commonly known as:  ATIVAN                Where to get your medicines      Some of these will need a paper prescription and others can be bought over the counter. Ask your nurse if you have questions.     You don't need a prescription for these medications     losartan 25 MG tablet    metoprolol tartrate 100 MG tablet    NIFEdipine ER 60 MG 24 hr tablet    polyethylene glycol Packet    predniSONE 10 MG tablet    rOPINIRole 0.25 MG tablet    spironolactone 25 MG tablet                Protect others around you: Learn how to safely use, store and throw away your medicines at www.disposemymeds.org.             Medication List: This is a list of all your medications and when to take them. Check marks below indicate your daily home schedule. Keep this list as a reference.      Medications           Morning Afternoon Evening Bedtime As Needed    COUMADIN 2.5 MG tablet   Take 2.5 mg by mouth daily   Last time this was given:  1 mg on 3/20/2018  6:29 PM   Generic drug:  warfarin                                fluticasone-salmeterol 250-50 MCG/DOSE diskus inhaler   Commonly known as:  ADVAIR   Inhale 1 puff into the lungs every 12 hours                                furosemide 40 MG tablet   Commonly known as:   LASIX   Take 1 tablet (40 mg) by mouth daily   Last time this was given:  40 mg on 3/23/2018  9:44 AM                                ipratropium - albuterol 0.5 mg/2.5 mg/3 mL 0.5-2.5 (3) MG/3ML neb solution   Commonly known as:  DUONEB   Take 1 vial (3 mLs) by nebulization 3 times daily   Last time this was given:  3 mLs on 3/23/2018  7:45 AM                                losartan 25 MG tablet   Commonly known as:  COZAAR   Take 0.5 tablets (12.5 mg) by mouth daily   Start taking on:  3/24/2018   Last time this was given:  12.5 mg on 3/23/2018  9:43 AM                                Menthol (Topical Analgesic) 4 % Gel   Externally apply topically 2 times daily   Last time this was given:  5 g on 3/20/2018  1:39 AM                                metoprolol tartrate 100 MG tablet   Commonly known as:  LOPRESSOR   Take 1 tablet (100 mg) by mouth 2 times daily   Last time this was given:  100 mg on 3/23/2018  9:43 AM                                NIFEdipine ER 60 MG 24 hr tablet   Commonly known as:  ADALAT CC   Take 1 tablet (60 mg) by mouth daily   Start taking on:  3/24/2018   Last time this was given:  60 mg on 3/23/2018  9:41 AM                                omeprazole 20 MG CR capsule   Commonly known as:  priLOSEC   Take 20 mg by mouth daily   Last time this was given:  20 mg on 3/23/2018  9:44 AM                                polyethylene glycol Packet   Commonly known as:  MIRALAX/GLYCOLAX   Take 17 g by mouth daily as needed for constipation   Last time this was given:  17 g on 3/19/2018  9:10 AM                                predniSONE 10 MG tablet   Commonly known as:  DELTASONE   4 tabs daily for 2 days, then 3 tabs daily for 2 days, then 2 tabs daily for 2 days, then 1 tab daily for 2 days, then stop   Last time this was given:  60 mg on 3/23/2018  9:43 AM                                rOPINIRole 0.25 MG tablet   Commonly known as:  REQUIP   Take 1 tablet (0.25 mg) by mouth At Bedtime   Last time  this was given:  0.25 mg on 3/22/2018  8:36 PM                                spironolactone 25 MG tablet   Commonly known as:  ALDACTONE   Take 1 tablet (25 mg) by mouth daily   Start taking on:  3/24/2018   Last time this was given:  25 mg on 3/23/2018  9:43 AM                                TYLENOL PO   Take 1,000 mg by mouth 3 times daily   Last time this was given:  1,000 mg on 3/23/2018  9:43 AM                                          More Information        Left-Sided Congestive Heart Failure (CHF)    The heart is a large muscle that pumps blood throughout the body. Blood carries oxygen to all the organs (including the brain), muscles, and skin of your body. After the body takes the oxygen out of the blood, the blood returns to the heart. The right side of the heart collects that blood and pumps it to the lungs to receive fresh oxygen. This oxygen-rich blood from the lungs then returns to the left side of the heart, where it is pumped back out to the brain and rest of the body, starting the process all over.   Heart failure occurs when the heart muscle is weakened. This can occur after a heart attack or with significant coronary artery disease. This affects the pumping action of the heart.   When the left side of the heart is weakened, it can t handle the blood it is getting from the lungs. Pressure then builds up in the veins of the lungs, causing fluid to leak into the lung tissues. This may be referred to as congestive heart failure. This causes you to feel short of breath, weak, or dizzy. These symptoms are often worse with exertion, such as climbing stairs or walking up hills. Lying flat is uncomfortable and can make your breathing worse. This may make sleeping difficult and force you to use extra pillows to elevate your upper body to help you sleep well.  Causes of heart failure include:    Coronary artery disease    Heart attack in the past (also known as acute myocardial infarction, or AMI)    High  blood pressure    Damaged heart valve    Diabetes    Obesity    Cigarette smoking    Alcohol abuse  Treatment  Heart failure is a chronic condition. There is no cure. The purpose of medical treatment is to improve the pumping action of the heart, and remove excess water and fluids from the body. A number of medicines can help reach this goal, improve symptoms and keep the heart from becoming weaker. In some cases of severe heart failure, a mechanical device will be placed in the heart to help the heart pump. Another major goal is to better treat the causes of heart failure, such as diabetes, high blood pressure, and your lifestyle.  Home care    Check your weight every day. A sudden increase in weight gain could mean worsening heart failure.    Use the same scale every day.    Weigh yourself at the same time every day.    Make sure the scale is on the floor, not on a rug.    Keep a record of your weight every day, so your healthcare provider can see it. If you are not given a log sheet for this, keep a separate journal for this purpose.     Cut back on how much salt (sodium) you eat:    Your provider will tell you how much salt to have daily, usually 2,000 mg or less.    Avoid high-salt foods. These include olives, pickles, smoked meats, processed foods, and salted potato chips.    Don't add salt to your food at the table. Use only small amounts of salt when cooking.    Avoid binging on salt-heavy meals.    Follow your healthcare provider's recommendations about how much fluid you should have.    Stop smoking.    Cut back on the amount of alcohol you drink.    Lose weight if you are overweight. The excess weight adds a lot of stress on the workload of the heart.    Stay active. Talk to your provider about an exercise program that is safe for your heart.    Keep your feet elevated to reduce swelling. Ask your provider about support hose as a preventive treatment for daytime leg swelling.    Follow your healthcare  provider's instructions closely.  Besides taking your medicine as instructed, an important part of treatment includes lifestyle changes. These include diet, physical activity, stopping smoking, and weight control.  Improve your diet. Often in the hospital, people are given a heart healthy diet. This includes more fresh foods, lower saturated fat, less processed foods, and lower salt.  Follow-up care  Follow up with your healthcare provider, or as advised. Make sure to keep any appointments that were made for you. This can help better control heart failure.  If an X-ray was done, you will be told of any new findings that may affect your care.  Call 911  Call 911 if you:    Become severely short of breath    Feel lightheaded, or feel like you might pass out or faint    Have chest pain or discomfort that is different than usual, the medicines your provider told you to use for this don't help, or the pain lasts longer than 10 to 15 minutes    Develop a rapid heart rate suddenly  When to seek medical advice  Call your healthcare provider right away if you have any of these signs of worsening heart failure:    Sudden weight gain --  more than 2 pounds in 1 day, or 5 pounds in 1 week, or whatever weight gain you were told to report by your provider    Trouble breathing not related to being active    New or increased swelling of your legs or ankles    Swelling or pain in your abdomen    Breathing trouble at night, waking up short of breath or needing more pillows to elevate your upper body to help you breathe    Frequent coughing that doesn t go away    Feeling much more tired than usual  Date Last Reviewed: 1/4/2016 2000-2017 [employer]. [address] [place] [address], [place] [address]. All rights reserved. This information is not intended as a substitute for professional medical care. Always follow your healthcare professional's instructions.                Pneumonia (Adult)  Pneumonia is an infection deep  within the lungs. It is in the small air sacs (alveoli). Pneumonia may be caused by a virus or bacteria. Pneumonia caused by bacteria is usually treated with an antibiotic. Severe cases may need to be treated in the hospital. Milder cases can be treated at home. Symptoms usually start to get better during the first 2 days of treatment.    Home care  Follow these guidelines when caring for yourself at home:    Rest at home for the first 2 to 3 days, or until you feel stronger. Don t let yourself get overly tired when you go back to your activities.    Stay away from cigarette smoke - yours or other people s.    You may use acetaminophen or ibuprofen to control fever or pain, unless another medicine was prescribed. If you have chronic liver or kidney disease, talk with your healthcare provider before using these medicines. Also talk with your provider if you ve had a stomach ulcer or gastrointestinal bleeding. Don t give aspirin to anyone younger than 18 years of age who is ill with a fever. It may cause severe liver damage.    Your appetite may be poor, so a light diet is fine.    Drink 6 to 8 glasses of fluids every day to make sure you are getting enough fluids. Beverages can include water, sport drinks, sodas without caffeine, juices, tea, or soup. Fluids will help loosen secretions in the lung. This will make it easier for you to cough up the phlegm (sputum). If you also have heart or kidney disease, check with your healthcare provider before you drink extra fluids.    Take antibiotic medicine prescribed until it is all gone, even if you are feeling better after a few days.  Follow-up care  Follow up with your healthcare provider in the next 2 to 3 days, or as advised. This is to be sure the medicine is helping you get better.  If you are 65 or older, you should get a pneumococcal vaccine and a yearly flu (influenza) shot. You should also get these vaccines if you have chronic lung disease like asthma, emphysema,  or COPD. Recently, a second type of pneumonia vaccine has become available for everyone over 65 years old. This is in addition to the previous vaccine. Ask your provider about this.  When to seek medical advice  Call your healthcare provider right away if any of these occur:    You don t get better within the first 48 hours of treatment    Shortness of breath gets worse    Rapid breathing (more than 25 breaths per minute)    Coughing up blood    Chest pain gets worse with breathing    Fever of 100.4 F (38 C) or higher that doesn t get better with fever medicine    Weakness, dizziness, or fainting that gets worse    Thirst or dry mouth that gets worse    Sinus pain, headache, or a stiff neck    Chest pain not caused by coughing  Date Last Reviewed: 1/1/2017 2000-2017 [employer]. [address] [place] [address], [place] [address]. All rights reserved. This information is not intended as a substitute for professional medical care. Always follow your healthcare professional's instructions.

## 2018-03-14 NOTE — ED NOTES
Fall 1/9/18 with fx hip. D/C from TCU yesterday to home at [facility]. Main complaint weakness and unable to perform ADLs. Hx Afib but now Afib RVR. Productive yellow sputum cough. Tachypnea. . EMS initiated 4 LPM for comfort. ABC in tact. A/OX3 Increased BLE edema

## 2018-03-14 NOTE — PHARMACY-ADMISSION MEDICATION HISTORY
Admission medication history interview status for this patient is complete. See [facility] admission navigator for allergy information, prior to admission medications and immunization status.     Medication history interview source(s):Patient and Caregiver  Medication history resources (including written lists, pill bottles, clinic record):[facility] Medication list and Care Everywhere  Primary pharmacy:[facility]    Changes made to PTA medication list:  Added: Adavair, omeprazole,hctz, warfarin, biofreeze  Deleted: None  Changed: Miralax from daily to every 3 days.  - Patient took one dose of Hctz 25mg this am since that was what she was on prior to being admitted to [facility]    Actions taken by pharmacist (provider contacted, etc):Called [facility] for mediation history and [facility] for Hctz dose.     Additional medication history information:None    Medication reconciliation/reorder completed by provider prior to medication history? No    Do you take OTC medications (eg tylenol, ibuprofen, fish oil, eye/ear drops, etc)? Y      Prior to Admission medications    Medication Sig Last Dose Taking? Auth Provider   polyethylene glycol (MIRALAX/GLYCOLAX) Packet Take 1 packet by mouth every 3 days 3/13/2018 at Unknown time Yes Unknown, Entered By History   Menthol, Topical Analgesic, 4 % GEL Externally apply topically 2 times daily 3/12/2018 at Unknown time Yes Unknown, Entered By History   omeprazole (PRILOSEC) 20 MG CR capsule Take 20 mg by mouth daily 3/13/2018 at am Yes Unknown, Entered By History   fluticasone-salmeterol (ADVAIR) 250-50 MCG/DOSE diskus inhaler Inhale 1 puff into the lungs every 12 hours 3/13/2018 at am Yes Unknown, Entered By History   warfarin (COUMADIN) 2.5 MG tablet Take 2.5 mg by mouth daily 3/12/2018 at pm Yes Unknown, Entered By History   hydrochlorothiazide (HYDRODIURIL) 25 MG tablet Take 25 mg by mouth once 3/14/2018 at am Yes Unknown, Entered By History   LORazepam (ATIVAN) 0.5 MG tablet Take 1 tablet (0.5  mg) by mouth At Bedtime 3/13/2018 at hs Yes Jarocho Antoine, DO   ipratropium - albuterol 0.5 mg/2.5 mg/3 mL (DUONEB) 0.5-2.5 (3) MG/3ML neb solution Take 1 vial (3 mLs) by nebulization 3 times daily 3/14/2018 at am Yes Jarocho Antoine,    furosemide (LASIX) 40 MG tablet Take 1 tablet (40 mg) by mouth daily 3/13/2018 at am Yes Jarocho Antoine,    Acetaminophen (TYLENOL PO) Take 1,000 mg by mouth 3 times daily  3/13/2018 at pm Yes Reported, Patient   metoprolol tartrate (LOPRESSOR) 50 MG tablet Take 1 tablet (50 mg) by mouth 2 times daily 3/14/2018 at am Yes Hernandez Stovall MD   losartan (COZAAR) 100 MG tablet Take 50 mg by mouth daily  3/14/2018 at am Yes Unknown, Entered By History   NIFEdipine ER (ADALAT CC) 90 MG TB24 Take 90 mg by mouth daily 3/14/2018 at am Yes Unknown, Entered By History

## 2018-03-14 NOTE — IP AVS SNAPSHOT
"    [place] [address] MEDICAL SURGICAL: [phone]                                              INTERAGENCY TRANSFER FORM - LAB / IMAGING / EKG / EMG RESULTS   3/14/2018                    Hospital Admission Date: 3/14/2018  DENISSE GRANT   : 1930  Sex: Female        Attending Provider: Philip Javier MD     Allergies:  No Known Allergies    Infection:  None   Service:  GENERAL MEDI    Ht:  1.727 m (5' 8\")   Wt:  92.2 kg (203 lb 4.2 oz)   Admission Wt:  94.3 kg (208 lb)    BMI:  30.91 kg/m 2   BSA:  2.1 m 2            Patient PCP Information     Provider PCP Type    Ricki Blake MD General         Lab Results - 3 Days      Lactic acid level STAT [[id number]]  Resulted: 18 0928, Result status: Final result    Ordering provider: Luis Miguel Childress DO  18 0859 Resulting lab: [facility]    Specimen Information    Type Source Collected On   Blood  18 0920          Components       Value Reference Range Flag Lab   Lactic Acid 1.5 0.7 - 2.0 mmol/L  [facility]            Blood culture [[id number]]  Resulted: 18 0743, Result status: Preliminary result    Ordering provider: Hernandez Stovall MD  18 1054 Resulting lab: [facility]    Specimen Information    Type Source Collected On   Blood  18 1301   Comment:  Left Hand          Components       Value Reference Range Flag Lab   Specimen Description Blood Left Hand      Special Requests Aerobic and anaerobic bottles received   75   Culture Micro No growth after 4 days   225            Blood culture [[id number]]  Resulted: 18 0743, Result status: Preliminary result    Ordering provider: Hernandez Stovall MD  18 6277 Resulting lab: [facility]    Specimen Information    Type Source Collected On   Blood  18 1256   Comment:  Left Arm          Components       Value Reference Range Flag Lab   Specimen Description Blood Left Arm      Special " Requests Aerobic and anaerobic bottles received   75   Culture Micro No growth after 4 days   225            INR [[id number]] (Abnormal)  Resulted: 03/21/18 0732, Result status: Final result    Ordering provider: Vicki Sena PA-C  03/21/18 0000 Resulting lab: [facility]    Specimen Information    Type Source Collected On   Blood  03/21/18 0701          Components       Value Reference Range Flag Lab   INR 3.83 0.86 - 1.14 H FrRdHs   Comment:  Reviewed: OK with previous            Basic metabolic panel [[id number]] (Abnormal)  Resulted: 03/21/18 0725, Result status: Final result    Ordering provider: Hernandez Stovall MD  03/21/18 0000 Resulting lab: [facility]    Specimen Information    Type Source Collected On   Blood  03/21/18 0701          Components       Value Reference Range Flag Lab   Sodium 130 133 - 144 mmol/L L FrRdHs   Potassium 3.4 3.4 - 5.3 mmol/L  FrRdHs   Chloride 94 94 - 109 mmol/L  FrRdHs   Carbon Dioxide 30 20 - 32 mmol/L  FrRdHs   Anion Gap 6 3 - 14 mmol/L  FrRdHs   Glucose 90 70 - 99 mg/dL  FrRdHs   Urea Nitrogen 19 7 - 30 mg/dL  FrRdHs   Creatinine 1.08 0.52 - 1.04 mg/dL H FrRdHs   GFR Estimate 48 >60 mL/min/1.7m2 L FrRdHs   Comment:  Non  GFR Calc   GFR Estimate If Black 58 >60 mL/min/1.7m2 L FrRdHs   Comment:  African American GFR Calc   Calcium 8.6 8.5 - 10.1 mg/dL  FrRdHs            CBC with platelets differential [[id number]] (Abnormal)  Resulted: 03/21/18 0712, Result status: Final result    Ordering provider: Hernandez Stovall MD  03/21/18 0000 Resulting lab: [facility]    Specimen Information    Type Source Collected On   Blood  03/21/18 0701          Components       Value Reference Range Flag Lab   WBC 5.8 4.0 - 11.0 10e9/L  FrRdHs   RBC Count 3.23 3.8 - 5.2 10e12/L L FrRdHs   Hemoglobin 9.7 11.7 - 15.7 g/dL L FrRdHs   Hematocrit 29.7 35.0 - 47.0 % L FrRdHs   MCV 92 78 - 100 fl  FrRdHs   MCH 30.0 26.5 - 33.0 pg  FrRdHs    MCHC 32.7 31.5 - 36.5 g/dL  FrRdHs   RDW 15.7 10.0 - 15.0 % H FrRdHs   Platelet Count 117 150 - 450 10e9/L L FrRdHs   Diff Method Automated Method   FrRdHs   % Neutrophils 69.9 %  FrRdHs   % Lymphocytes 17.1 %  FrRdHs   % Monocytes 10.9 %  FrRdHs   % Eosinophils 1.6 %  FrRdHs   % Basophils 0.2 %  FrRdHs   % Immature Granulocytes 0.3 %  FrRdHs   Nucleated RBCs 0 0 /100  FrRdHs   Absolute Neutrophil 4.1 1.6 - 8.3 10e9/L  FrRdHs   Absolute Lymphocytes 1.0 0.8 - 5.3 10e9/L  FrRdHs   Absolute Monocytes 0.6 0.0 - 1.3 10e9/L  FrRdHs   Absolute Eosinophils 0.1 0.0 - 0.7 10e9/L  FrRdHs   Absolute Basophils 0.0 0.0 - 0.2 10e9/L  FrRdHs   Abs Immature Granulocytes 0.0 0 - 0.4 10e9/L  FrRdHs   Absolute Nucleated RBC 0.0   FrRdHs            Sputum Culture Aerobic Bacterial [[id number]] (Abnormal)  Resulted: 03/20/18 1647, Result status: Final result    Ordering provider: Hernandez Stovall MD  03/17/18 1051 Resulting lab: [facility]    Specimen Information    Type Source Collected On   Sputum  03/20/18 1200          Components       Value Reference Range Flag Lab   Specimen Description Sputum      Culture Micro Canceled, Test credited   226   Culture Micro --  A 226   Result:         >10 Squamous epithelial cells/low power field indicates oral contamination. Please   recollect.     Culture Micro --   226   Result:         Notification of test cancellation was given to  Hermila Rosado RN @ [phone] 3/20/18 CS              Gram stain [[id number]] (Abnormal)  Resulted: 03/20/18 1644, Result status: Final result    Ordering provider: Hernandez Stovall MD  03/17/18 1056 Resulting lab: [facility]    Specimen Information    Type Source Collected On   Sputum  03/20/18 1200          Components       Value Reference Range Flag Lab   Specimen Description Sputum      Special Requests Screen   75   Gram Stain --  A 226   Result:         >10 Squamous epithelial cells/low power field indicates oral contamination. Please    recollect.     Gram Stain <25 PMNs/low power field   226   Result:     Gram Stain --   226   Result:         Few  Mixed gram positive jennifer              Folate [[id number]]  Resulted: 03/20/18 1433, Result status: Final result    Ordering provider: Vicki Sena PA-C  03/20/18 0826 Resulting lab: [facility]    Specimen Information    Type Source Collected On     03/20/18 0826          Components       Value Reference Range Flag Lab   Folate 24.8 >5.4 ng/mL  51            Vitamin B12 [[id number]] (Abnormal)  Resulted: 03/20/18 1407, Result status: Final result    Ordering provider: Vicki Sena PA-C  03/20/18 0826 Resulting lab: [facility]    Specimen Information    Type Source Collected On     03/20/18 0826          Components       Value Reference Range Flag Lab   Vitamin B12 1650 193 - 986 pg/mL H 51            TSH with free T4 reflex [[id number]]  Resulted: 03/20/18 1119, Result status: Final result    Ordering provider: Vicki Sena PA-C  03/20/18 0826 Resulting lab: [facility]    Specimen Information    Type Source Collected On     03/20/18 0826          Components       Value Reference Range Flag Lab   TSH 2.06 0.40 - 4.00 mU/L  FrRdHs            Iron and iron binding capacity [[id number]] (Abnormal)  Resulted: 03/20/18 1115, Result status: Final result    Ordering provider: Vicki Sena PA-C  03/20/18 0826 Resulting lab: [facility]    Specimen Information    Type Source Collected On     03/20/18 0826          Components       Value Reference Range Flag Lab   Iron 26 35 - 180 ug/dL L FrRdHs   Iron Binding Cap 279 240 - 430 ug/dL  FrRdHs   Iron Saturation Index 9 15 - 46 % L FrRdHs            Ferritin [[id number]]  Resulted: 03/20/18 1115, Result status: Final result    Ordering provider: Vicki Sena PA-C  03/20/18 0826 Resulting lab: [facility]    Specimen Information     Type Source Collected On     03/20/18 0826          Components       Value Reference Range Flag Lab   Ferritin 139 8 - 252 ng/mL  FrRdHs            CBC with platelets differential [[id number]] (Abnormal)  Resulted: 03/20/18 0901, Result status: Final result    Ordering provider: Hernandez Stovall MD  03/20/18 0000 Resulting lab: [facility]    Specimen Information    Type Source Collected On   Blood  03/20/18 0826          Components       Value Reference Range Flag Lab   WBC 5.6 4.0 - 11.0 10e9/L  FrRdHs   RBC Count 3.23 3.8 - 5.2 10e12/L L FrRdHs   Hemoglobin 9.9 11.7 - 15.7 g/dL L FrRdHs   Hematocrit 29.9 35.0 - 47.0 % L FrRdHs   MCV 93 78 - 100 fl  FrRdHs   MCH 30.7 26.5 - 33.0 pg  FrRdHs   MCHC 33.1 31.5 - 36.5 g/dL  FrRdHs   RDW 15.6 10.0 - 15.0 % H FrRdHs   Platelet Count 118 150 - 450 10e9/L L FrRdHs   Diff Method Automated Method   FrRdHs   % Neutrophils 65.8 %  FrRdHs   % Lymphocytes 20.8 %  FrRdHs   % Monocytes 11.4 %  FrRdHs   % Eosinophils 1.4 %  FrRdHs   % Basophils 0.4 %  FrRdHs   % Immature Granulocytes 0.2 %  FrRdHs   Nucleated RBCs 0 0 /100  FrRdHs   Absolute Neutrophil 3.7 1.6 - 8.3 10e9/L  FrRdHs   Absolute Lymphocytes 1.2 0.8 - 5.3 10e9/L  FrRdHs   Absolute Monocytes 0.6 0.0 - 1.3 10e9/L  FrRdHs   Absolute Eosinophils 0.1 0.0 - 0.7 10e9/L  FrRdHs   Absolute Basophils 0.0 0.0 - 0.2 10e9/L  FrRdHs   Abs Immature Granulocytes 0.0 0 - 0.4 10e9/L  FrRdHs   Absolute Nucleated RBC 0.0   FrRdHs   Anisocytosis Slight   FrRdHs   Target Cells Slight   FrRdHs   Reactive Lymphs Present   FrRdHs   Platelet Estimate Automated count confirmed.  Platelet morphology is normal.   FrRdHs            Basic metabolic panel [[id number]] (Abnormal)  Resulted: 03/20/18 0852, Result status: Final result    Ordering provider: Hernandez Stovall MD  03/20/18 0000 Resulting lab: [facility]    Specimen Information    Type Source Collected On   Blood  03/20/18 0826          Components       Value  Reference Range Flag Lab   Sodium 131 133 - 144 mmol/L L FrRdHs   Potassium 3.6 3.4 - 5.3 mmol/L  FrRdHs   Chloride 93 94 - 109 mmol/L L FrRdHs   Carbon Dioxide 30 20 - 32 mmol/L  FrRdHs   Anion Gap 8 3 - 14 mmol/L  FrRdHs   Glucose 97 70 - 99 mg/dL  FrRdHs   Urea Nitrogen 20 7 - 30 mg/dL  FrRdHs   Creatinine 1.19 0.52 - 1.04 mg/dL H FrRdHs   GFR Estimate 43 >60 mL/min/1.7m2 L FrRdHs   Comment:  Non  GFR Calc   GFR Estimate If Black 52 >60 mL/min/1.7m2 L FrRdHs   Comment:  African American GFR Calc   Calcium 8.6 8.5 - 10.1 mg/dL  FrRdHs            INR [[id number]] (Abnormal)  Resulted: 03/20/18 0845, Result status: Final result    Ordering provider: Vicki Sena PA-C  03/20/18 0000 Resulting lab: [facility]    Specimen Information    Type Source Collected On   Blood  03/20/18 0826          Components       Value Reference Range Flag Lab   INR 3.39 0.86 - 1.14 H FrRdHs            Potassium [[id number]]  Resulted: 03/19/18 1613, Result status: Final result    Ordering provider: Hernandez Stovall MD  03/19/18 1153 Resulting lab: [facility]    Specimen Information    Type Source Collected On   Blood  03/19/18 1557          Components       Value Reference Range Flag Lab   Potassium 3.9 3.4 - 5.3 mmol/L  FrRdHs            Magnesium [[id number]]  Resulted: 03/19/18 0740, Result status: Final result    Ordering provider: Vicki Sena PA-C  03/19/18 0635 Resulting lab: [facility]    Specimen Information    Type Source Collected On     03/19/18 0635          Components       Value Reference Range Flag Lab   Magnesium 2.1 1.6 - 2.3 mg/dL  FrRdHs            Basic metabolic panel [[id number]] (Abnormal)  Resulted: 03/19/18 0709, Result status: Final result    Ordering provider: Hernandez Stovall MD  03/19/18 0000 Resulting lab: [facility]    Specimen Information    Type Source Collected On   Blood  03/19/18 0635          Components        Value Reference Range Flag Lab   Sodium 133 133 - 144 mmol/L  FrStHsLb   Potassium 3.1 3.4 - 5.3 mmol/L L FrStHsLb   Chloride 94 94 - 109 mmol/L  FrStHsLb   Carbon Dioxide 33 20 - 32 mmol/L H FrStHsLb   Anion Gap 6 3 - 14 mmol/L  FrStHsLb   Glucose 97 70 - 99 mg/dL  FrStHsLb   Urea Nitrogen 21 7 - 30 mg/dL  FrStHsLb   Creatinine 1.13 0.52 - 1.04 mg/dL H FrStHsLb   GFR Estimate 46 >60 mL/min/1.7m2 L FrStHsLb   Comment:  Non  GFR Calc   GFR Estimate If Black 55 >60 mL/min/1.7m2 L FrStHsLb   Comment:  African American GFR Calc   Calcium 8.4 8.5 - 10.1 mg/dL L FrStHsLb            INR [[id number]] (Abnormal)  Resulted: 03/19/18 0658, Result status: Final result    Ordering provider: Vicki Sena PA-C  03/19/18 0000 Resulting lab: [facility]    Specimen Information    Type Source Collected On   Blood  03/19/18 0635          Components       Value Reference Range Flag Lab   INR 3.84 0.86 - 1.14 H FrRdHs   Comment:  Reviewed, acceptable            CBC with platelets differential [[id number]] (Abnormal)  Resulted: 03/19/18 0648, Result status: Final result    Ordering provider: Hernandez Stovall MD  03/19/18 0000 Resulting lab: [facility]    Specimen Information    Type Source Collected On   Blood  03/19/18 0635          Components       Value Reference Range Flag Lab   WBC 4.9 4.0 - 11.0 10e9/L  FrRdHs   RBC Count 3.14 3.8 - 5.2 10e12/L L FrRdHs   Hemoglobin 9.3 11.7 - 15.7 g/dL L FrRdHs   Hematocrit 29.5 35.0 - 47.0 % L FrRdHs   MCV 94 78 - 100 fl  FrRdHs   MCH 29.6 26.5 - 33.0 pg  FrRdHs   MCHC 31.5 31.5 - 36.5 g/dL  FrRdHs   RDW 15.6 10.0 - 15.0 % H FrRdHs   Platelet Count 117 150 - 450 10e9/L L FrRdHs   Diff Method Automated Method   FrRdHs   % Neutrophils 61.0 %  FrRdHs   % Lymphocytes 19.9 %  FrRdHs   % Monocytes 16.1 %  FrRdHs   % Eosinophils 2.6 %  FrRdHs   % Basophils 0.2 %  FrRdHs   % Immature Granulocytes 0.2 %  FrRdHs   Nucleated RBCs 0 0 /100  FrRdHs   Absolute  Neutrophil 3.0 1.6 - 8.3 10e9/L  FrRdHs   Absolute Lymphocytes 1.0 0.8 - 5.3 10e9/L  FrRdHs   Absolute Monocytes 0.8 0.0 - 1.3 10e9/L  FrRdHs   Absolute Eosinophils 0.1 0.0 - 0.7 10e9/L  FrRdHs   Absolute Basophils 0.0 0.0 - 0.2 10e9/L  FrRdHs   Abs Immature Granulocytes 0.0 0 - 0.4 10e9/L  FrRdHs   Absolute Nucleated RBC 0.0   FrRdHs            Comprehensive metabolic panel [[id number]] (Abnormal)  Resulted: 03/18/18 0657, Result status: Final result    Ordering provider: Hernandez Stovall MD  03/18/18 0000 Resulting lab: [facility]    Specimen Information    Type Source Collected On   Blood  03/18/18 0616          Components       Value Reference Range Flag Lab   Sodium 132 133 - 144 mmol/L L FrRdHs   Potassium 3.9 3.4 - 5.3 mmol/L  FrRdHs   Comment:  Specimen slightly hemolyzed, potassium may be falsely elevated   Chloride 94 94 - 109 mmol/L  FrRdHs   Carbon Dioxide 34 20 - 32 mmol/L H FrRdHs   Anion Gap 4 3 - 14 mmol/L  FrRdHs   Glucose 97 70 - 99 mg/dL  FrRdHs   Urea Nitrogen 17 7 - 30 mg/dL  FrRdHs   Creatinine 1.01 0.52 - 1.04 mg/dL  FrRdHs   GFR Estimate 52 >60 mL/min/1.7m2 L FrRdHs   Comment:  Non  GFR Calc   GFR Estimate If Black 63 >60 mL/min/1.7m2  FrRdHs   Comment:  African American GFR Calc   Calcium 8.4 8.5 - 10.1 mg/dL L FrRdHs   Bilirubin Total 0.7 0.2 - 1.3 mg/dL  FrRdHs   Albumin 2.8 3.4 - 5.0 g/dL L FrRdHs   Protein Total 7.1 6.8 - 8.8 g/dL  FrRdHs   Alkaline Phosphatase 275 40 - 150 U/L H FrRdHs   ALT 26 0 - 50 U/L  FrRdHs   AST 48 0 - 45 U/L H FrRdHs            INR [[id number]] (Abnormal)  Resulted: 03/18/18 0642, Result status: Final result    Ordering provider: Vicki Sena PA-C  03/18/18 0000 Resulting lab: [facility]    Specimen Information    Type Source Collected On   Blood  03/18/18 0616          Components       Value Reference Range Flag Lab   INR 3.57 0.86 - 1.14 H [facility]   Comment:  Reviewed: OK with previous            CBC with  platelets differential [[id number]] (Abnormal)  Resulted: 03/18/18 0634, Result status: Final result    Ordering provider: Hernandez Stovall MD  03/18/18 0000 Resulting lab: [facility]    Specimen Information    Type Source Collected On   Blood  03/18/18 0616          Components       Value Reference Range Flag Lab   WBC 3.9 4.0 - 11.0 10e9/L L FrRdHs   RBC Count 3.06 3.8 - 5.2 10e12/L L FrRdHs   Hemoglobin 9.5 11.7 - 15.7 g/dL L FrRdHs   Hematocrit 29.8 35.0 - 47.0 % L FrRdHs   MCV 97 78 - 100 fl  FrRdHs   MCH 31.0 26.5 - 33.0 pg  FrRdHs   MCHC 31.9 31.5 - 36.5 g/dL  FrRdHs   RDW 16.0 10.0 - 15.0 % H FrRdHs   Platelet Count 135 150 - 450 10e9/L L FrRdHs   Diff Method Automated Method   FrRdHs   % Neutrophils 68.6 %  FrRdHs   % Lymphocytes 15.1 %  FrRdHs   % Monocytes 14.0 %  FrRdHs   % Eosinophils 2.0 %  FrRdHs   % Basophils 0.0 %  FrRdHs   % Immature Granulocytes 0.3 %  FrRdHs   Nucleated RBCs 1 0 /100 H FrRdHs   Absolute Neutrophil 2.7 1.6 - 8.3 10e9/L  FrRdHs   Absolute Lymphocytes 0.6 0.8 - 5.3 10e9/L L FrRdHs   Absolute Monocytes 0.6 0.0 - 1.3 10e9/L  FrRdHs   Absolute Eosinophils 0.1 0.0 - 0.7 10e9/L  FrRdHs   Absolute Basophils 0.0 0.0 - 0.2 10e9/L  FrRdHs   Abs Immature Granulocytes 0.0 0 - 0.4 10e9/L  FrRdHs   Absolute Nucleated RBC 0.0   FrRdHs            Testing Performed By     Lab - Abbreviation Name Director Address Valid Date Range    12 - FrRdHs [facility] Unknown [address] [facility] [address] 05/08/15 1057 - Present    14 - FrStHsLb [facility] Unknown [address] [place] [address] 05/08/15 1057 - Present    51 - Unknown [facility] [place] Unknown [address] [facility] [address] 12/31/14 1010 - Present    75 - Unknown [facility] Unknown [address] [facility] [address] 01/15/15 1019 - Present    225 - Unknown [facility] Unknown [address] [facility]  "[facility] [address] 12/19/14 0954 - Present    226 - Unknown [facility] Unknown [address] [facility] [address] 12/19/14 0955 - Present            Unresulted Labs (24h ago through future)    Start       Ordered    03/20/18 1745  Sputum Culture Aerobic Bacterial  (Sputum Culture)  TIMED - ONCE,   Timed     Comments:  For EXPECTORATED SPUTUM, a Gram Stain ([id number]) must be ordered.    03/20/18 1737    03/20/18 1745  Gram stain  (Sputum Culture)  ROUTINE,   Routine      03/20/18 1737    03/15/18 0600  INR  (warfarin (COUMADIN) Pharmacy Consult-INITIAL ORDER)  DAILY,   Routine      03/14/18 1637    Unscheduled  Potassium  CONDITIONAL X 1 STAT,   STAT     Comments:  RN to release order IF the pronounced diuretic diuresics response is greater than 1000 mL or if there is new onset of arrhythmia.    03/14/18 1637    Unscheduled  Potassium  (Potassium Replacement - \"High\" - Replacement for all levels less than 4.1 mmol/L - UU,UR,UA,RH,SH,PH,WY )  CONDITIONAL (SPECIFY),   Routine     Comments:  Obtain Potassium Level for these conditions:  *IF no potassium result within 24 hrs before initiation of order set, draw potassium level with next lab collect.    *2 HOURS AFTER last IV potassium replacement dose and 4 hours after an oral replacement dose when potassium replacement given for level less than 3.4.  *Next morning after potassium dose.     Repeat Potassium Replacement if necessary.    03/16/18 0818    Unscheduled  Magnesium  (Magnesium Replacement - Adult - \"High\" - Replacement for all levels less than or equal to 2 mg/dL)  CONDITIONAL (SPECIFY),   Routine     Comments:  Obtain Magnesium Level for these conditions:  *IF no magnesium result within 24 hrs before initiation of order set, draw magnesium level with next lab collect.    *2 HOURS AFTER last magnesium replacement dose when magnesium replacement given for level less than 1.6  *Next morning after magnesium dose.     Repeat Magnesium " Replacement if necessary.    03/16/18 0818      Encounter-Level Documents:     There are no encounter-level documents.      Order-Level Documents:     There are no order-level documents.

## 2018-03-14 NOTE — IP AVS SNAPSHOT
Estefany An #[id number] (CSN: [id number])  (87 year old F)  (Adm: 18)     [id number]               [place] [address] MEDICAL SURGICAL: [phone]            Patient Demographics     Patient Name Sex          Age SSN Address Phone    Estefany An Female 1930 (87 year old) [id number]   [facility] [address] [phone] (Home)  NONE (Work)  [phone] (Mobile)      Emergency Contact(s)     Name Relation Home Work Mobile    Maylin Bravo Daughter [phone] none [phone]    Heavenly Brambila [phone] none [phone]      Admission Information     Attending Provider Admitting Provider Admission Type Admission Date/Time    Philip Javier MD Oljira, Amanuel Giragn, MD Emergency 18  1233    Discharge Date Hospital Service Auth/Cert Status Service Area     General Medicine Incomplete [facility]    Unit Room/Bed Admission Status       [facility] MEDICAL SURGICAL  Admission (Confirmed)       Admission     Complaint    CHF (congestive heart failure) (H)      Hospital Account     Name Acct ID Class Status Primary Coverage    Estefany An [id number] Inpatient Open MEDICARE - MEDICARE            Guarantor Account (for Hospital Account #[id number])     Name Relation to Pt Service Area Active? Acct Type    Estefany An Self FCS Yes Personal/Family    Address Phone          [address] [place] [address] [phone](H)  NONE(O)              Coverage Information (for Hospital Account #[id number])     1. MEDICARE/MEDICARE     F/O Payor/Plan Precert #    MEDICARE/MEDICARE     Subscriber Subscriber #    Estefany An [id number]    Address Phone    [employer] CLAIM[employer]  [address]  [place] [address] [phone]          2. [employer]     F/O Payor/Plan Precert #    [employer]     Subscriber Subscriber #    Estefany An [id number]     "Address Phone    [address] [address]  [place] [address] [phone]                                                      INTERAGENCY TRANSFER FORM - PHYSICIAN ORDERS   3/14/2018                       [place] [address] MEDICAL SURGICAL: [phone]            Attending Provider: Philip Javier MD     Allergies:  No Known Allergies    Infection:  None   Service:  GENERAL MEDI    Ht:  1.727 m (5' 8\")   Wt:  94.4 kg (208 lb 1.6 oz)   Admission Wt:  94.3 kg (208 lb)    BMI:  31.64 kg/m 2   BSA:  2.13 m 2            ED Clinical Impression     Diagnosis Description Comment Added By Time Added    Acute diastolic congestive heart failure (H) [I50.31] Acute diastolic congestive heart failure (H) [I50.31]  Guanako Donaldson MD 3/14/2018  2:40 PM    Chronic atrial fibrillation (H) [I48.2] Chronic atrial fibrillation (H) [I48.2]  Guanako Donaldson MD 3/14/2018  2:40 PM      Hospital Problems as of 3/23/2018              Priority Class Noted POA    Acute diastolic congestive heart failure (H) Medium  3/14/2018 Unknown    CHF (congestive heart failure) (H) Medium  3/14/2018 Yes      Non-Hospital Problems as of 3/23/2018              Priority Class Noted    Hypoxia Medium  1/7/2018    Acute on chronic respiratory failure with hypoxia (H) Medium  1/21/2018      Code Status History     Date Active Date Inactive Code Status Order ID Comments User Context    3/23/2018 11:12 AM  DNR/DNI [id number]  Luis Miguel Childress,  Outpatient    3/14/2018  4:38 PM 3/23/2018 11:12 AM DNR/DNI [id number]  Vicki Sena PA-C Inpatient    1/31/2018  2:59 PM 1/31/2018  5:27 PM DNR/DNI [id number]  Jarocho Antoine DO Inpatient    1/30/2018  7:50 AM 1/31/2018  2:59 PM DNR/DNI [id number]  Jarocho Antoine DO Inpatient    1/26/2018  8:05 AM 1/30/2018  7:50 AM DNR/DNI [id number]  Aguila Moody MD Outpatient    1/21/2018  8:16 PM 1/26/2018  8:05 AM DNR/DNI [id number]  Carrillo Bhagat MD Inpatient    1/15/2018 12:11 PM " 1/21/2018  8:16 PM DNR/DNI [id number]  Hernandez Stovall MD Outpatient    1/8/2018 10:49 AM 1/15/2018 12:11 PM DNR/DNI [id number]  Fletcher Houston MD Inpatient    1/7/2018 12:53 PM 1/8/2018 10:49 AM Full Code [id number]  Hu Loyd MD Inpatient      Current Code Status     Date Active Code Status Order ID Comments User Context       Prior         Medication Review      START taking        Dose / Directions Comments    predniSONE 10 MG tablet   Commonly known as:  DELTASONE        4 tabs daily for 2 days, then 3 tabs daily for 2 days, then 2 tabs daily for 2 days, then 1 tab daily for 2 days, then stop   Quantity:  20 tablet   Refills:  0        rOPINIRole 0.25 MG tablet   Commonly known as:  REQUIP        Dose:  0.25 mg   Take 1 tablet (0.25 mg) by mouth At Bedtime   Quantity:  30 tablet   Refills:  0        spironolactone 25 MG tablet   Commonly known as:  ALDACTONE        Dose:  25 mg   Start taking on:  3/24/2018   Take 1 tablet (25 mg) by mouth daily   Quantity:  30 tablet   Refills:  0          CONTINUE these medications which may have CHANGED, or have new prescriptions. If we are uncertain of the size of tablets/capsules you have at home, strength may be listed as something that might have changed.        Dose / Directions Comments    losartan 25 MG tablet   Commonly known as:  COZAAR   This may have changed:    - medication strength  - how much to take        Dose:  12.5 mg   Start taking on:  3/24/2018   Take 0.5 tablets (12.5 mg) by mouth daily   Quantity:  30 tablet   Refills:  0        metoprolol tartrate 100 MG tablet   Commonly known as:  LOPRESSOR   This may have changed:    - medication strength  - how much to take        Dose:  100 mg   Take 1 tablet (100 mg) by mouth 2 times daily   Quantity:  60 tablet   Refills:  0        NIFEdipine ER 60 MG 24 hr tablet   Commonly known as:  ADALAT CC   This may have changed:    - medication strength  - how much to take        Dose:  60 mg   Start  taking on:  3/24/2018   Take 1 tablet (60 mg) by mouth daily   Quantity:  30 tablet   Refills:  0        polyethylene glycol Packet   Commonly known as:  MIRALAX/GLYCOLAX   This may have changed:    - when to take this  - reasons to take this        Dose:  17 g   Take 17 g by mouth daily as needed for constipation   Quantity:  30 packet   Refills:  0          CONTINUE these medications which have NOT CHANGED        Dose / Directions Comments    COUMADIN 2.5 MG tablet   Generic drug:  warfarin        Dose:  2.5 mg   Take 2.5 mg by mouth daily   Refills:  0        fluticasone-salmeterol 250-50 MCG/DOSE diskus inhaler   Commonly known as:  ADVAIR        Dose:  1 puff   Inhale 1 puff into the lungs every 12 hours   Refills:  0        furosemide 40 MG tablet   Commonly known as:  LASIX   Used for:  Chronic diastolic congestive heart failure (H)        Dose:  40 mg   Take 1 tablet (40 mg) by mouth daily   Refills:  0        ipratropium - albuterol 0.5 mg/2.5 mg/3 mL 0.5-2.5 (3) MG/3ML neb solution   Commonly known as:  DUONEB   Used for:  Acute respiratory failure with hypoxia (H)        Dose:  1 vial   Take 1 vial (3 mLs) by nebulization 3 times daily   Quantity:  360 mL   Refills:  0        Menthol (Topical Analgesic) 4 % Gel        Externally apply topically 2 times daily   Refills:  0        omeprazole 20 MG CR capsule   Commonly known as:  priLOSEC        Dose:  20 mg   Take 20 mg by mouth daily   Refills:  0        TYLENOL PO        Dose:  1000 mg   Take 1,000 mg by mouth 3 times daily   Refills:  0          STOP taking     hydrochlorothiazide 25 MG tablet   Commonly known as:  HYDRODIURIL           LORazepam 0.5 MG tablet   Commonly known as:  ATIVAN                   After Care     Activity - Up ad ladonna           Advance Diet as Tolerated       Follow this diet upon discharge: 2g salt       Daily weights       Call Provider for weight gain of more than 2 pounds per day or 5 pounds per week.       General info for  SNF       Length of Stay Estimate: Short Term Care: Estimated # of Days <30  Condition at Discharge: Improving  Level of care:skilled   Rehabilitation Potential: Good  Admission H&P remains valid and up-to-date: Yes  Recent Chemotherapy: N/A  Use Nursing Home Standing Orders: Yes       Mantoux instructions       Give two-step Mantoux (PPD) Per Facility Policy Yes             Procedures     Oxygen - Nasal cannula       2 Lpm by nasal cannula to keep O2 sats 92% or greater.             Referrals     Occupational Therapy Adult Consult       Evaluate and treat as clinically indicated.    Reason:  Weakness       Physical Therapy Adult Consult       Evaluate and treat as clinically indicated.    Reason:  Weakness             Follow-Up Appointment Instructions     Follow Up and recommended labs and tests       Follow up with primary care provider in 7 days.  The following labs/tests are recommended: INR check in 3 days.  BMP in 7 days.             Your next 10 appointments already scheduled     Apr 10, 2018 11:45 AM CDT   LAB with RU LAB   [facility] HEART [facility] ([facility])    [address]  [facility] [address]   [phone]           Please do not eat 10-12 hours before your appointment if you are coming in fasting for labs on lipids, cholesterol, or glucose (sugar). This does not apply to pregnant women. Water, hot tea and black coffee (with nothing added) are okay. Do not drink other fluids, diet soda or chew gum.            Apr 10, 2018 12:45 PM CDT   Core MD Reid with Tano Chang MD   [facility] ([facility])    [address]  [facility] [address]   [phone]              Statement of Approval     Ordered          03/23/18 1113  I have reviewed and agree with all the recommendations and orders detailed in this document.  EFFECTIVE NOW     Approved and electronically signed by:  Fior  "Luis Miguel ROOT,                                                  INTERAGENCY TRANSFER FORM - NURSING   3/14/2018                       [place] [address] MEDICAL SURGICAL: [phone]            Attending Provider: Philip Javier MD     Allergies:  No Known Allergies    Infection:  None   Service:  GENERAL MEDI    Ht:  1.727 m (5' 8\")   Wt:  94.4 kg (208 lb 1.6 oz)   Admission Wt:  94.3 kg (208 lb)    BMI:  31.64 kg/m 2   BSA:  2.13 m 2            Advance Directives        Scanned docmt in ACP Activity?           Yes, scanned ACP docmt        Immunizations     None      ASSESSMENT     Discharge Profile Flowsheet     EXPECTED DISCHARGE     Resources List  Home Care 03/16/18 1359    Expected Discharge Date  03/16/18 (2.0 > single/AV [place] ) 03/15/18 0811   Assisted Living  -- ([facility] ) 03/16/18 1359    DISCHARGE NEEDS ASSESSMENT     Home Care  [facility] [phone], Fax: [phone] 03/16/18 1359    Patient/family verbalizes understanding of discharge plan recommendations?  Yes 03/15/18 1608   Transitional Care  [facility] [phone] 03/16/18 1359    Medical Team notified of plan?  yes 03/15/18 1608   PAS Number  [id number] 03/16/18 1359    Readmission Within The Last 30 Days  previous discharge plan unsuccessful 03/15/18 1608   SKIN      Equipment Currently Used at Home  grab bar;shower chair;walker, rolling 03/15/18 1209   Inspection of bony prominences  Full 03/22/18 2006    Transportation Available  car;family or friend will provide 03/15/18 1608   Skin WDL  ex 03/22/18 2006    # of Referrals Placed by CTS  External Care Coordination;Durable Medical Equipment (DME) 03/15/18 1608   Skin Color/Characteristics  bruised (ecchymotic);pale;redness blanchable;humble;shiny 03/22/18 2006    Does Patient Need a Referral for Clinic CC  Yes 03/15/18 1608   Skin Temperature  warm 03/22/18 2006    Coordination Referral Criteria  Admission DX CHF 03/15/18 1608  " " Skin Moisture  -- (lotion applied) 03/23/18 1023    GASTROINTESTINAL (ADULT,PEDIATRIC,OB)     Skin Elasticity  slow return to original state 03/22/18 2006    GI WDL  ex 03/22/18 2006   Skin Integrity  bruise(s);rash(es);scab(s);scar(s) 03/22/18 2006    Abdominal Appearance  obese 03/23/18 1023   Inspection under devices  Full 03/22/18 2006    Last Bowel Movement  03/21/18 03/22/18 2006   SAFETY      Passing flatus  yes 03/22/18 2006   Safety WDL  WDL 03/23/18 1023    COMMUNICATION ASSESSMENT     All Alarms  alarm(s) activated and audible 03/22/18 2006    Patient's communication style  spoken language (English or Bilingual) 03/14/18 1238   Safety Factors  bed in low position;wheels locked;call light in reach;upper side rails raised x 2;ID band on 03/22/18 2020    FINAL RESOURCES                        Assessment WDL (Within Defined Limits) Definitions           Safety WDL     Effective: 09/28/15    Row Information: <b>WDL Definition:</b> Bed in low position, wheels locked; call light in reach; upper side rails up x 2; ID band on<br> <font color=\"gray\"><i>Item=AS safety wdl>>List=AS safety wdl>>Version=F14</i></font>      Skin WDL     Effective: 09/28/15    Row Information: <b>WDL Definition:</b> Warm; dry; intact; elastic; without discoloration; pressure points without redness<br> <font color=\"gray\"><i>Item=AS skin wdl>>List=AS skin wdl>>Version=F14</i></font>      Vitals     Vital Signs Flowsheet     VITAL SIGNS     ANALGESIA SIDE EFFECTS MONITORING      Temp  95.8  F (35.4  C) 03/23/18 0744   Side Effects Monitoring: Respiratory Quality  R 03/23/18 1004    Temp src  Oral 03/23/18 0744   Side Effects Monitoring: Respiratory Depth  N 03/23/18 1004    Resp  20 03/23/18 0744   Side Effects Monitoring: Sedation Level  1 03/23/18 1004    Pulse  71 03/22/18 0052   HEIGHT AND WEIGHT      Heart Rate  93 03/23/18 0744   Height  1.727 m (5' 8\") 03/14/18 1640    Pulse/Heart Rate Source  Monitor 03/23/18 0744   Height Method  " Stated 03/14/18 1640    BP  130/69 03/23/18 0744   Weight  94.4 kg (208 lb 1.6 oz) 03/23/18 0610    BP Location  Right arm 03/23/18 0744   Weight Method  Bed scale 03/23/18 0610    OXYGEN THERAPY     Bed Scale  Standard (fitted sheet, draw sheet/ pad, cover/flat sheet, blanket, two pillows) 03/23/18 0610    SpO2  96 % 03/23/18 0747   BSA (Calculated - sq m)  2.14 03/14/18 1640    O2 Device  Nasal cannula 03/23/18 0747   BMI (Calculated)  31.92 03/14/18 1640    Oxygen Delivery  1.5 LPM 03/23/18 0747   EKG MONITORING      PAIN/COMFORT     Cardiac Regularity  Irregular 03/16/18 0046    Patient Currently in Pain  yes 03/23/18 0744   Cardiac Rhythm  Atrial flutter 03/16/18 0046    Preferred Pain Scale  number (Numeric Rating Pain Scale) 03/23/18 0744   POSITIONING      Patient's Stated Pain Goal  No pain 03/23/18 1004   Body Position  independently positioning;side-lying, left 03/23/18 0120    0-10 Pain Scale  0 03/23/18 1004   Head of Bed (HOB)  HOB at 20-30 degrees 03/23/18 0120    Word Pain Scale  2 03/14/18 1940   Chair  Upright in chair 03/21/18 1605    Pain Location  Back 03/23/18 0744   Positioning/Transfer Devices  pillows;in use 03/22/18 2006    Pain Orientation  Mid 03/23/18 0744   DAILY CARE      Pain Descriptors  Aching;Discomfort 03/23/18 0744   Activity Management  activity adjusted per tolerance;activity encouraged 03/23/18 1023    Pain Management Interventions  analgesia administered 03/22/18 2357   Activity Assistance Provided  assistance, stand-by 03/23/18 1023    Pain Intervention(s)  Repositioned 03/23/18 0744   Assistive Device Utilized  walker 03/23/18 1023    Response to Interventions  Absence of nonverbal indicators of pain 03/23/18 0441                 Patient Lines/Drains/Airways Status    Active LINES/DRAINS/AIRWAYS     Name: Placement date: Placement time: Site: Days: Last dressing change:    External Catheter 03/19/18 0815 other (see comments) 03/19/18   0815    4     Wound 01/21/18  Right Buttocks Suspected pressure ulcer dime shaped, red, skin sloughing  01/21/18   2033   Buttocks   60             Patient Lines/Drains/Airways Status    Active PICC/CVC     None            Intake/Output Detail Report     Date Intake     Output Net    Shift P.O. I.V. IV Piggyback Total Urine Total       Noc 03/21/18 2300 - 03/22/18 0659 -- -- -- -- 100 100 -100    Day 03/22/18 0700 - 03/22/18 1459 540 -- -- 540 400 400 140    Wendy 03/22/18 1500 - 03/22/18 2259 240 -- -- 240 -- -- 240    Noc 03/22/18 2300 - 03/23/18 0659 -- -- -- -- 350 350 -350    Day 03/23/18 0700 - 03/23/18 1459 -- -- -- -- -- -- 0      Last Void/BM       Most Recent Value    Urine Occurrence 1 at 03/22/2018 1820    Stool Occurrence 1 at 03/22/2018 1820      Case Management/Discharge Planning     Case Management/Discharge Planning Flowsheet     REFERRAL INFORMATION     COPING/STRESS      Did the Initial Social Work Assessment result in a Social Work Case?  Yes 03/15/18 1608   Major Change/Loss/Stressor  none 03/14/18 1649    Admission Type  inpatient 03/15/18 1608   EXPECTED DISCHARGE      Arrived From  home or self-care 03/15/18 1608   Expected Discharge Date  03/16/18 (2.0 > single/AV [place] ) 03/15/18 0811    Referral Source  high risk screening 03/15/18 1608   DISCHARGE PLANNING      New Steerage to  Clinics?  No 03/15/18 1608   Patient/family verbalizes understanding of discharge plan recommendations?  Yes 03/15/18 1608    # of Referrals Placed by CTS  External Care Coordination;Durable Medical Equipment (DME) 03/15/18 1608   Medical Team notified of plan?  yes 03/15/18 1608    Reason For Consult  care coordination/care conference;discharge planning 03/15/18 1608   Readmission Within The Last 30 Days  previous discharge plan unsuccessful 03/15/18 1608    Record Reviewed  clinical discipline documentation;history and physical;medical record;patient profile;plan of care 03/15/18 1608   Transportation Available  car;family or friend will  provide 03/15/18 1608    CTS Assigned to Case Corinne 03/16/18 1202   Does Patient Need a Referral for Clinic CC  Yes 03/15/18 1608    Primary Care Clinic Name  [facility] 03/15/18 1608   Coordination Referral Criteria  Admission DX CHF 03/15/18 1608    Primary Care MD Name  Dr. Ricki Blake 03/15/18 1608   FINAL RESOURCES      LIVING ENVIRONMENT     Equipment Currently Used at Home  grab bar;shower chair;walker, rolling 03/15/18 1209    Lives With  facility resident 03/15/18 1608   Resources List  Home Care 03/16/18 1359    Living Arrangements  independent living facility 03/15/18 1608   Assisted Living  -- ([facility] ) 03/16/18 1359    Primary Care Provided By  child(brittney) (specify);other (see comments) (facility staff PRN) 03/15/18 1608   Home Care  [facility] [phone], Fax: [phone] 03/16/18 1359    Able to Return to Prior Living Arrangements  yes 03/15/18 1608   Transitional Care  [facility] [phone] 03/16/18 1359    HOME SAFETY     PAS Number  [id number] 03/16/18 1359    Patient Feels Safe Living in Home?  yes 03/15/18 1608   ABUSE RISK SCREEN      ASSESSMENT OF FAMILY/SOCIAL SUPPORT     QUESTION TO PATIENT:  Has a member of your family or a partner(now or in the past) intimidated, hurt, manipulated, or controlled you in any way?  no 03/14/18 1649    Marital Status   03/15/18 1608   QUESTION TO PATIENT: Do you feel safe going back to the place where you are living?  yes 03/14/18 1649    Who is your support system?  Children;Facility resident(s)/Staff 03/15/18 1608   OBSERVATION: Is there reason to believe there has been maltreatment of a vulnerable adult (ie. Physical/Sexual/Emotional abuse, self neglect, lack of adequate food, shelter, medical care, or financial exploitation)?  no 03/14/18 1649    Description of Support System  Supportive;Involved 03/15/18 1608   OTHER      EMPLOYMENT     Are you depressed or being treated for depression?   No 03/14/18 1649    Do you work full or part-time?  no 03/15/18 1608                       [place] [address] MEDICAL SURGICAL: [phone]            Medication Administration Report for Estefany An as of 03/23/18 1118   Legend:    Given Hold Not Given Due Canceled Entry Other Actions    Time Time (Time) Time  Time-Action       Inactive    Active    Linked        Medications 03/17/18 03/18/18 03/19/18 03/20/18 03/21/18 03/22/18 03/23/18    acetaminophen (TYLENOL) tablet 1,000 mg  Dose: 1,000 mg  Freq: 3 TIMES DAILY Route: PO  Start: 03/14/18 1645   Admin Instructions: Maximum acetaminophen dose from all sources = 75 mg/kg/day not to exceed 4 gram     0912 (1,000 mg)-Given       1606 (1,000 mg)-Given       2152 (1,000 mg)-Given        0813 (1,000 mg)-Given       1604 (1,000 mg)-Given       2106 (1,000 mg)-Given        0856 (1,000 mg)-Given       1616 (1,000 mg)-Given       2151 (1,000 mg)-Given        0836 (1,000 mg)-Given       1611 (1,000 mg)-Given       2249 (1,000 mg)-Given        0747 (1,000 mg)-Given              1546 (1,000 mg)-Given       2150 (1,000 mg)-Given        0814 (1,000 mg)-Given       1636 (1,000 mg)-Given       2205 (1,000 mg)-Given        0943 (1,000 mg)-Given       [ ] 1600       [ ] 2200           albuterol neb solution 2.5 mg  Dose: 2.5 mg  Freq: EVERY 2 HOURS PRN Route: NEBULIZATION  PRN Reason: other  PRN Comment: dyspnea  Start: 03/21/18 1046              Continuing ACE inhibitor/ARB/ARNI from home medication list OR ACE inhibitor/ARB/ARNI order already placed during this visit  Freq: DOES NOT GO TO MAR Route: XX  PRN Reason: other  Start: 03/14/18 1637              Continuing beta blocker from home medication list OR beta blocker order already placed during this visit  Freq: DOES NOT GO TO MAR Route: XX  PRN Reason: other  Start: 03/14/18 2674   Admin Instructions: Continuing beta blocker from home medication list OR beta blocker order already placed during this visit                "fluticasone-vilanterol (BREO ELLIPTA) 200-25 MCG/INH oral inhaler 1 puff  Dose: 1 puff  Freq: DAILY Route: IN  Start: 03/14/18 2000   Admin Instructions: *Do not use more frequently than twice daily.*  Rinse mouth after use.<br>May use own home if available<br><br>Formulary alternate for ADVAIR<br>     0919 (1 puff)-Given        0742 (1 puff)-Given        0721 (1 puff)-Given        0737 (1 puff)-Given        0729 (1 puff)-Given        0725 (1 puff)-Given        0745 (1 puff)-Given           furosemide (LASIX) tablet 40 mg  Dose: 40 mg  Freq: DAILY Route: PO  Start: 03/22/18 0900         0814 (40 mg)-Given        0944 (40 mg)-Given           ipratropium - albuterol 0.5 mg/2.5 mg/3 mL (DUONEB) neb solution 3 mL  Dose: 1 vial  Freq: 3 TIMES DAILY Route: NEBULIZATION  Start: 03/17/18 0945    (1027)-Not Given [C]       1513 (3 mL)-Given       1935 (3 mL)-Given        0742 (3 mL)-Given       1527 (3 mL)-Given       2005 (3 mL)-Given        0715 (3 mL)-Given       1400 (3 mL)-Given       1930 (3 mL)-Given        0736 (3 mL)-Given       1350 (3 mL)-Given       2016 (3 mL)-Given        0729 (3 mL)-Given       1352 (3 mL)-Given       1918 (3 mL)-Given        0721 (3 mL)-Given       1347 (3 mL)-Given       1915 (3 mL)-Given        0745 (3 mL)-Given       [ ] 1400       [ ] 2000           lidocaine (LMX4) kit  Freq: EVERY 1 HOUR PRN Route: Top  PRN Reason: pain  PRN Comment: with VAD insertion or accessing implanted port.  Start: 03/14/18 1637   Admin Instructions: Do NOT give if patient has a history of allergy to any local anesthetic or any \"radha\" product.   Apply 30 minutes prior to VAD insertion or port access.  MAX Dose:  2.5 g (  of 5 g tube)               lidocaine 1 % 1 mL  Dose: 1 mL  Freq: EVERY 1 HOUR PRN Route: OTHER  PRN Comment: mild pain with VAD insertion or accessing implanted port  Start: 03/14/18 9435   Admin Instructions: Do NOT give if patient has a history of allergy to any local anesthetic or any \"radha\" " product. MAX dose 1 mL subcutaneous OR intradermal in divided doses.               LORazepam (ATIVAN) tablet 0.5 mg  Dose: 0.5 mg  Freq: AT BEDTIME Route: PO  Start: 03/14/18 2200    2153 (0.5 mg)-Given        2106 (0.5 mg)-Given        2151 (0.5 mg)-Given        2249 (0.5 mg)-Given        2150 (0.5 mg)-Given        2206 (0.5 mg)-Given        [ ] 2200           losartan (COZAAR) half-tab 12.5 mg  Dose: 12.5 mg  Freq: DAILY Route: PO  Start: 03/22/18 0900   Admin Instructions: Hold for SBP less than 120          0814 (12.5 mg)-Given        0943 (12.5 mg)-Given           magnesium sulfate 2 g in NS intermittent infusion ([facility] or [facility])  Dose: 2 g  Freq: DAILY PRN Route: IV  PRN Reason: magnesium supplementation  Start: 03/16/18 0818   Admin Instructions: For Serum Mg++ 1.6 - 2 mg/dL  Give 2 g and recheck magnesium level next AM.               magnesium sulfate 4 g in 100 mL sterile water (premade)  Dose: 4 g  Freq: EVERY 4 HOURS PRN Route: IV  PRN Reason: magnesium supplementation  Start: 03/16/18 0818   Admin Instructions: For serum Mg++ less than 1.6 mg/dL  Give 4 g and recheck magnesium level 2 hours after dose, and next AM.               melatonin tablet 1 mg  Dose: 1 mg  Freq: AT BEDTIME PRN Route: PO  PRN Reason: sleep  Start: 03/14/18 1637   Admin Instructions: Do not give unless at least 6 hours of uninterrupted sleep is expected.               Menthol (Topical Analgesic) 4 % GEL 5 g  Dose: 5 g  Freq: EVERY 4 HOURS PRN Route: EX  PRN Comment: pain  Start: 03/17/18 1956   Admin Instructions: Pharmacy does not stock.  Patient to use own medication from home after verification by pharmacist.     2014 (5 g)-Given        1122 (5 g)-Given         0139 (5 g)-Given              metoprolol tartrate (LOPRESSOR) tablet 100 mg  Dose: 100 mg  Freq: 2 TIMES DAILY Route: PO  Start: 03/22/18 2100   Admin Instructions: Hold for SBP less than 100          2036 (100 mg)-Given        0943 (100 mg)-Given       [ ] 2100            miconazole (MICATIN; MICRO GUARD) 2 % powder  Freq: EVERY 1 HOUR PRN Route: Top  PRN Reason: other  PRN Comment: topical candidiasis  Start: 03/15/18 1138   Admin Instructions: Apply to affected area.       1815 ( )-Given        2119 ( )-Given                naloxone (NARCAN) injection 0.1-0.4 mg  Dose: 0.1-0.4 mg  Freq: EVERY 2 MIN PRN Route: IV  PRN Reason: opioid reversal  Start: 03/14/18 1637   Admin Instructions: For respiratory rate LESS than or EQUAL to 8.  Partial reversal dose:  0.1 mg titrated q 2 minutes for Analgesia Side Effects Monitoring Sedation Level of 3 (frequently drowsy, arousable, drifts to sleep during conversation).Full reversal dose:  0.4 mg bolus for Analgesia Side Effects Monitoring Sedation Level of 4 (somnolent, minimal or no response to stimulation).  For ordered doses up to 2mg give IVP. Give each 0.4mg over 15 seconds in emergency situations. For non-emergent situations further dilute in 9mL of NS to facilitate titration of response.               NIFEdipine ER osmotic (PROCARDIA XL) 24 hr tablet 60 mg  Dose: 60 mg  Freq: DAILY Route: PO  Start: 03/23/18 0900   Admin Instructions: DO NOT CRUSH.           0941 (60 mg)-Given           omeprazole (priLOSEC) CR capsule 20 mg  Dose: 20 mg  Freq: DAILY Route: PO  Start: 03/14/18 2000    0912 (20 mg)-Given        0813 (20 mg)-Given        0857 (20 mg)-Given        0836 (20 mg)-Given        0748 (20 mg)-Given               0814 (20 mg)-Given        0944 (20 mg)-Given           ondansetron (ZOFRAN-ODT) ODT tab 4 mg  Dose: 4 mg  Freq: EVERY 6 HOURS PRN Route: PO  PRN Reasons: nausea,vomiting  Start: 03/14/18 1637   Admin Instructions: This is Step 1 of nausea and vomiting management.  If nausea not resolved in 15 minutes, go to Step 2 prochlorperazine (COMPAZINE). Do not push through foil backing. Peel back foil and gently remove. Place on tongue immediately. Administration with liquid unnecessary  With dry hands, peel back foil backing  and gently remove tablet; do not push oral disintegrating tablet through foil backing; administer immediately on tongue and oral disintegrating tablet dissolves in seconds; then swallow with saliva; liquid not required.              Or  ondansetron (ZOFRAN) injection 4 mg  Dose: 4 mg  Freq: EVERY 6 HOURS PRN Route: IV  PRN Reasons: nausea,vomiting  Start: 03/14/18 1637   Admin Instructions: This is Step 1 of nausea and vomiting management.  If nausea not resolved in 15 minutes, go to Step 2 prochlorperazine (COMPAZINE).  Irritant. For ordered doses up to 4 mg, give IV Push undiluted over 2-5 minutes.               Patient is already receiving anticoagulation with heparin, enoxaparin (LOVENOX), warfarin (COUMADIN)  or other anticoagulant medication  Freq: CONTINUOUS PRN Route: XX  Start: 03/14/18 1637              polyethylene glycol (MIRALAX/GLYCOLAX) Packet 17 g  Dose: 17 g  Freq: DAILY PRN Route: PO  PRN Reason: constipation  Start: 03/14/18 1637   Admin Instructions: Give in 8oz of  water, juice, or soda. Hold for loose stools.  This is the second step of a three step constipation treatment.  1 Packet = 17 grams. Mixed prescribed dose in 8 ounces of water. Follow with 8 oz. of water.       0910 (17 g)-Given               potassium chloride (KLOR-CON) Packet 20-40 mEq  Dose: 20-40 mEq  Freq: EVERY 2 HOURS PRN Route: ORAL OR FEED  PRN Reason: potassium supplementation  Start: 03/16/18 0818   Admin Instructions: Use if unable to tolerate tablets.    If Serum K+ 3.4-4.0, dose = 20 mEq x1. Recheck K+ level the next AM.  If Serum K+ 3.0-3.3, dose = 60 mEq po total dose (40 mEq x 1 followed in 2 hours by 20 mEq X1). Recheck K+ level 4 hours after dose and the next AM.  If Serum K+ 2.5-2.9, dose = 80 mEq po total dose (40 mEq Q2H x2). Recheck K+ level 4 hours after dose and the next AM.  If Serum K+ less than 2.5, See IV order.  Dissolve packet contents in 4-8 ounces of cold water or juice.               potassium  chloride 10 mEq in 100 mL intermittent infusion with 10 mg lidocaine  Dose: 10 mEq  Freq: EVERY 1 HOUR PRN Route: IV  PRN Reason: potassium supplementation  Start: 03/16/18 0818   Admin Instructions: Infuse via PERIPHERAL LINE. Use potassium with lidocaine for pain with peripheral administration.  If Serum K+ 3.4-4.0, dose = 10 mEq/hr x2 doses. Recheck K+ level the next AM.  If Serum K+ 3.0-3.3, dose = 10 mEq/hr x4 doses (40 mEq IV total dose). Recheck K+ level 2 hours after dose and the next AM.  If Serum K+ less than 3.0, dose = 10 mEq/hr x6 doses (60 mEq IV total dose). Recheck K+ level 2 hours after dose and the next AM.               potassium chloride 10 mEq in 100 mL sterile water intermittent infusion (premix)  Dose: 10 mEq  Freq: EVERY 1 HOUR PRN Route: IV  PRN Reason: potassium supplementation  Start: 03/16/18 0818   Admin Instructions: Infuse via PERIPHERAL LINE or CENTRAL LINE. Use for central line replacement if patient weight less than 65 kg, if patient is on TPN with high potassium content or if unit does not stock 20 mEq bags.  If Serum K+ 3.4-4.0, dose = 10 mEq/hr x2 doses. Recheck K+ level the next AM.  If Serum K+ 3.0-3.3, dose = 10 mEq/hr x4 doses (40 mEq IV total dose). Recheck K+ level 2 hours after dose and the next AM.  If Serum K+ less than 3.0, dose = 10 mEq/hr x6 doses (60 mEq IV total dose). Recheck K+ level 2 hours after dose and the next AM.               potassium chloride 20 mEq in 50 mL intermittent infusion  Dose: 20 mEq  Freq: EVERY 1 HOUR PRN Route: IV  PRN Reason: potassium supplementation  Start: 03/16/18 0818   Admin Instructions: Infuse via CENTRAL LINE Only.  May need EKG if less than 65 kg or on TPN - Max rate is 0.3 mEq/kg/hr for patients not on EKG monitoring.    If Serum K+ 3.4-4.0, dose = 20 mEq/hr x1 doses. Recheck K+ level the next AM.  If Serum K+ 3.0-3.3, dose = 20 mEq/hr x2 doses (40 mEq IV total dose).  Recheck K+ level 2 hours after dose and the next AM.  If Serum  K+ less than 3.0, dose = 20 mEq/hr x3 doses (60 mEq IV total dose). Recheck K+ level 2 hours after dose and the next AM.               potassium chloride SA (K-DUR/KLOR-CON M) CR tablet 20-40 mEq  Dose: 20-40 mEq  Freq: EVERY 2 HOURS PRN Route: PO  PRN Reason: potassium supplementation  Start: 03/16/18 0818   Admin Instructions: Use if able to take PO.   If Serum K+ 3.4-4.0, dose = 20 mEq x1. Recheck K+ level the next AM.  If Serum K+ 3.0-3.3, dose = 60 mEq po total dose (40 mEq x1 followed in 2 hours by 20 mEq x1). Recheck K+ level 4 hours after dose and the next AM.  If Serum K+ 2.5-2.9, dose = 80 mEq po total dose (40 mEq Q2H x2). Recheck K+ level 4 hours after dose and the next AM.  If Serum K+ less than 2.5, See IV order.  DO NOT CRUSH      1420 (20 mEq)-Given        0856 (40 mEq)-Given       1153 (20 mEq)-Given [C]        0947 (20 mEq)-Given         0854 (20 mEq)-Given       1404 (20 mEq)-Given            predniSONE (DELTASONE) tablet 60 mg  Dose: 60 mg  Freq: DAILY Route: PO  Start: 03/21/18 1015        1327 (60 mg)-Given        0814 (60 mg)-Given        0943 (60 mg)-Given           rOPINIRole (REQUIP) tablet 0.25 mg  Dose: 0.25 mg  Freq: AT BEDTIME Route: PO  Start: 03/20/18 2000   Admin Instructions: 2 hours before sleep        1930 (0.25 mg)-Given        2002 (0.25 mg)-Given        2036 (0.25 mg)-Given        [ ] 2000           senna-docusate (SENOKOT-S;PERICOLACE) 8.6-50 MG per tablet 1 tablet  Dose: 1 tablet  Freq: 2 TIMES DAILY PRN Route: PO  PRN Reason: constipation  Start: 03/14/18 1637   Admin Instructions: If no bowel movement in 24 hours, increase to 2 tablets PO.  Hold for loose stools.  This is the first step of a three step constipation treatment.              Or  senna-docusate (SENOKOT-S;PERICOLACE) 8.6-50 MG per tablet 2 tablet  Dose: 2 tablet  Freq: 2 TIMES DAILY PRN Route: PO  PRN Reason: constipation  Start: 03/14/18 6738   Admin Instructions: Hold for loose stools.  This is the first step  of a three step constipation treatment.               sodium chloride (PF) 0.9% PF flush 3 mL  Dose: 3 mL  Freq: EVERY 8 HOURS Route: IK  Start: 03/14/18 1645   Admin Instructions: And Q1H PRN, to lock peripheral IV dormant line.     0132 (3 mL)-Given       0846 (3 mL)-Given       1607 (3 mL)-Given        0058 (3 mL)-Given       0814 (3 mL)-Given       1604 (3 mL)-Given        0027 (3 mL)-Given       0856 (3 mL)-Given               0044 (3 mL)-Given       0836 (3 mL)-Given       1610 (3 mL)-Given        0003 (3 mL)-Given       0750 (3 mL)-Given       (1707)-Not Given [C]        (0240)-Not Given       (0816)-Not Given       (1642)-Not Given       (2359)-Not Given               [ ] 1600           sodium chloride (PF) 0.9% PF flush 3 mL  Dose: 3 mL  Freq: EVERY 1 HOUR PRN Route: IK  PRN Reason: line flush  PRN Comment: for peripheral IV flush post IV meds  Start: 03/14/18 1637              spironolactone (ALDACTONE) tablet 25 mg  Dose: 25 mg  Freq: DAILY Route: PO  Start: 03/22/18 1600         1636 (25 mg)-Given        0943 (25 mg)-Given           traMADol (ULTRAM) tablet 50 mg  Dose: 50 mg  Freq: 3 TIMES DAILY PRN Route: PO  PRN Reason: moderate pain  Start: 03/16/18 1600    1823 (50 mg)-Given        1604 (50 mg)-Given        0002 (50 mg)-Given       1623 (50 mg)-Given        0004 (50 mg)-Given       0947 (50 mg)-Given       2120 (50 mg)-Given        0747 (50 mg)-Given       1809 (50 mg)-Given        0055 (50 mg)-Given       2356 (50 mg)-Given            Warfarin Therapy Reminder (Check START DATE - warfarin may be starting in the FUTURE)  Dose: 1 each  Freq: CONTINUOUS PRN Route: XX  Start: 03/14/18 1637   Admin Instructions: *Note to reorder warfarin daily*  Pharmacy Warfarin Dosing Service  Patient is on Warfarin Therapy - check for daily order                 Dose: 1 each  Freq: NO DOSE TODAY (WARFARIN) Route: XX  Start: 03/22/18 1207   End: 03/22/18 9447   Admin Instructions: No dose of Warfarin due today per  order          2306-Med Discontinued       Completed Medications  Medications 03/17/18 03/18/18 03/19/18 03/20/18 03/21/18 03/22/18 03/23/18         Dose: 1 tablet  Freq: EVERY 12 HOURS SCHEDULED Route: PO  Indications of Use: COMMUNITY ACQUIRED PNEUMONIA  Start: 03/21/18 1045   End: 03/21/18 2002        1111 (1 tablet)-Given       2002 (1 tablet)-Given               Dose: 250 mg  Freq: ONCE Route: PO  Indications of Use: COMMUNITY ACQUIRED PNEUMONIA  Start: 03/21/18 1045   End: 03/21/18 1111        1111 (250 mg)-Given               Dose: 20 mg  Freq: EVERY 8 HOURS Route: IV  Start: 03/20/18 1600   End: 03/21/18 0750   Admin Instructions: For ordered doses up to 40 mg, give IV Push undiluted over 1-2 minutes.        1611 (20 mg)-Given        0003 (20 mg)-Given       0749 (20 mg)-Given               Dose: 1 mg  Freq: ONCE AT 6PM Route: PO  Start: 03/20/18 1800   End: 03/20/18 1829       1829 (1 mg)-Given             Discontinued Medications  Medications 03/17/18 03/18/18 03/19/18 03/20/18 03/21/18 03/22/18 03/23/18         Dose: 1 tablet  Freq: EVERY 12 HOURS SCHEDULED Route: PO  Indications of Use: COMMUNITY ACQUIRED PNEUMONIA  Start: 03/21/18 1045   End: 03/21/18 1044        1044-Med Discontinued           Dose: 250 mg  Freq: EVERY 24 HOURS Route: IV  Indications of Use: COMMUNITY ACQUIRED PNEUMONIA  Indications Comment: possible pseudomonas  Start: 03/18/18 1200   End: 03/21/18 1041   Admin Instructions: Schedule subsequent doses 24 hours from first dose.  May switch to oral when taking PO and not in ICU.      1309 (250 mg)-New Bag        1235 (250 mg)-New Bag        1159 (250 mg)-New Bag        1041-Med Discontinued           Dose: 3 mL  Freq: EVERY 4 HOURS PRN Route: NEBULIZATION  PRN Reason: wheezing  Start: 03/14/18 1637   End: 03/21/18 1046    0919 (3 mL)-Given           1046-Med Discontinued           Dose: 50 mg  Freq: DAILY Route: PO  Start: 03/15/18 0900   End: 03/21/18 1051   Admin Instructions: Hold for  SBP less than 120     0912 (50 mg)-Given        0813 (50 mg)-Given        0857 (50 mg)-Given        0836 (50 mg)-Given        0748 (50 mg)-Given              1051-Med Discontinued           Dose: 50 mg  Freq: 2 TIMES DAILY Route: PO  Start: 03/14/18 2100   End: 03/21/18 1051   Admin Instructions: Hold for SBP less than 100     0912 (50 mg)-Given       2152 (50 mg)-Given        0813 (50 mg)-Given       2106 (50 mg)-Given        0857 (50 mg)-Given       2001 (50 mg)-Given        0835 (50 mg)-Given       2120 (50 mg)-Given        0748 (50 mg)-Given              1051-Med Discontinued           Dose: 75 mg  Freq: 2 TIMES DAILY Route: PO  Start: 03/21/18 2100   End: 03/22/18 1552   Admin Instructions: Hold for SBP less than 100         2002 (75 mg)-Given        0814 (75 mg)-Given       1552-Med Discontinued          Dose: 90 mg  Freq: DAILY Route: PO  Start: 03/15/18 0900   End: 03/22/18 1553   Admin Instructions: DO NOT CRUSH.     0912 (90 mg)-Given        0813 (90 mg)-Given        0856 (90 mg)-Given        0835 (90 mg)-Given        0748 (90 mg)-Given               0814 (90 mg)-Given       1553-Med Discontinued          Dose: 3.375 g  Freq: EVERY 6 HOURS Route: IV  Indications of Use: COMMUNITY ACQUIRED PNEUMONIA  Indications Comment: Possible pseudomonas  Last Dose: 3.375 g (03/21/18 0411)  Start: 03/20/18 1600   End: 03/21/18 1042   Admin Instructions: Pharmacy adjusted dose per renal dosing protocol for est crcl 40 ml/min.        1620 (3.375 g)-New Bag       2249 (3.375 g)-New Bag        0411 (3.375 g)-New Bag       1042-Med Discontinued  (1321)-Not Given               Dose: 1 each  Freq: NO DOSE TODAY (WARFARIN) Route: XX  Start: 03/21/18 1148   End: 03/21/18 2347   Admin Instructions: No dose of Warfarin due today per order         2347-Med Discontinued      Medications 03/17/18 03/18/18 03/19/18 03/20/18 03/21/18 03/22/18 03/23/18               INTERAGENCY TRANSFER FORM - NOTES (H&P, Discharge Summary, Consults,  Procedures, Therapies)   3/14/2018                       [place] [address] MEDICAL SURGICAL: [phone]               History & Physicals      H&P by Vicki Sena PA-C at 3/14/2018  3:06 PM     Author:  Vicki Sena PA-C Service:  Internal Medicine Author Type:  Physician Assistant - FLORA    Filed:  3/14/2018  5:00 PM Date of Service:  3/14/2018  3:06 PM Creation Time:  3/14/2018  3:06 PM    Status:  Attested :  Vicki Sena PA-C (Physician Assistant - C)    Cosigner:  Philip Javier MD at 3/15/2018  3:26 PM        Attestation signed by Philip Javier MD at 3/15/2018  3:26 PM        Physician Attestation   IPhilip, saw and evaluated Estefany An as part of a shared visit.  I have reviewed and discussed with the advanced practice provider their history, physical and plan.    I personally reviewed the vital signs, medications, labs and imaging.    My key history or physical exam findings: Patient seen and examined, case discussed with physician assistant.  An 87-year-old female with history of chronic A. fib on flecainide hypertension anxiety who presented with generalized weakness and shortness of breath a day after she was discharged from transitional care unit.  Upon discharge from the hospital patient was taken off Coumadin but it was restarted at transitional care unit, but was again to stop upon discharge.    Key management decisions made by me: Patient presented with increased shortness of breath and lower extremity swelling consistent with congestive heart failure likely diastolic based on results of echocardiogram her ejection fraction was normal but indeterminate left ventricular filling pressure.  -IV Lasix  -Daily weight, input and output  -Daily BMP  -Continue Coumadin for now, and will discuss with family and patient.  -She had echocardiogram on 1/8/2018, there is no need to repeat it.  I discussed at length with patient the plan of  care.  She is DNR/I      Philip Slater Edkedar  Date of Service (when I saw the patient): 3/14/18                               History and Physical     Estefany An MRN# [id number]   YOB: 1930 Age: 87 year old      Date of Admission:  3/14/2018    Primary care provider: Ricki Blake          Assessment and Plan:   Estefany An is a 87 year old female with a PMH significant for[AK1.1] chronic A. fib, hypertension, GERD and anxiety[AK1.2], who presents with[AK1.1] generalized weakness and shortness of breath.    1. Acute diastolic congestive heart failure -increase shortness of breath since discharging from TCU yesterday, did eat half of a [employer] meal last evening for dinner.  Was not sent home from TCU on Lasix.  BNP is 5500, chest x-ray shows small bilateral pleural effusions with mild vascular congestion.  She is not hypoxic but does appear to be mildly tachypneic and with increased cough.  Patient endorses increased lower extremity edema, orthopnea, and weight gain since last hospital discharge.  Received 40 mg of IV Lasix in the ED.  Continue Lasix 20 mg IV twice daily, strict I's and O's, 2 g sodium diet as needed duo nebs.  Last echo was done 1/2018, EF 55-60%.  Repeat echo is not indicated at this time.  2. Chronic A. Fib -rate controlled with metoprolol and nifedipine.  Anticoagulated on Coumadin.  Per chart review, at discharge from last hospitalization in January, patient was discontinued from Coumadin due to severe anemia requiring blood transfusion at that time.  Patient states she has been on Coumadin throughout her TCU stay but was not discharged on it yesterday.  Is unclear when exactly her Coumadin was resumed.  Her hemoglobin is currently stable, will ask pharmacy to dose Coumadin.  Heart rates are mildly elevated in ED, possibly due to fluid overload per #1 versus elevated heart rates causing #1.  Will monitor heart rates on telemetry and consider titrating rate controlling  medications.  3. HTN -resume home meds with parameters  4. GERD -resume omeprazole[AK1.2]    Prophylaxis -[AK1.1]on Coumadin[AK1.2]  Code status -[AK1.1] DNR / DNI[AK1.2]  Dispo -[AK1.1]admit inpatient.  Anticipate at least 2 nights for further management of symptoms.[AK1.2]                Chief Complaint:[AK1.1]   Weakness and shortness of breath[AK1.2]         History of Present Illness:   Estefany An is a 87 year old female with a PMH significant for[AK1.1] chronic A. fib, hypertension, GERD and anxiety[AK1.2], who presents with[AK1.1] generalized weakness and shortness of breath.  Patient states she discharged from a TCU yesterday .  She states she was not feeling 100% but wanted to go home.  She states that upon returning home she had a half of [employer] meal for dinner.  Since returning home she has become increasingly weak and short of breath.  Of note she was hospitalized in January after a fall in the nonsurgical pelvic fracture as well as influenza A.  She was discharged home but returned shortly after that with acute diastolic heart failure.  She was discharged from that hospital stay to TCU.  During the hospitalization she had severe anemia that required blood transfusion.  She was on Coumadin he for chronic A. fib and this was stopped even though the patient reports the Coumadin was continued throughout her TCU stay.  She states she was on Lasix during TCU stay as well but states she was not discharged home on either Lasix or Coumadin.  She notes increasing lower extremity edema as well as weight gain since her last discharge from the hospital, 180 up to 208 now.  She does note orthopnea and increased cough.  She denies chest pain, fever, chills, nausea, vomiting, diarrhea, or dysuria.[AK1.2]  In the ED,[AK1.1] mild tachycardia in the 110s, vital signs otherwise stable.  BMP is fairly unremarkable.  CBC -otherwise unremarkable.Hgb 9.9, BNP 5522, troponin <0.015, INR 2.16.  UA unremarkable. CXR shows  small bilateral pleural effusions and mild vascular congestion.  EKG A. fib with RVR.  She received 40 mg of IV Lasix and 50 mg PO Tramadol.  She will be admitted to inpatient for further management of acute diastolic heart failure.    Hx obtained by speaking with ED physician, chart review and pt interview.               Past Medical History:[AK1.2]     Past Medical History:   Diagnosis Date     Anxiety      Arthritis      Chronic atrial fibrillation (H)      Gastroesophageal reflux disease      Hypertension                Past Surgical History:     Past Surgical History:   Procedure Laterality Date     CHOLECYSTECTOMY                 Social History:     Social History     Social History     Marital status:      Spouse name: N/A     Number of children: N/A     Years of education: N/A     Occupational History     Not on file.     Social History Main Topics     Smoking status: Never Smoker     Smokeless tobacco: Never Used     Alcohol use No     Drug use: Not on file     Sexual activity: Not on file     Other Topics Concern     Not on file     Social History Narrative               Family History:[AK1.1]   Reviewed and noncontributory[AK1.2]         Allergies:    No Known Allergies            Medications:     Prior to Admission medications    Medication Sig Last Dose Taking? Auth Provider   polyethylene glycol (MIRALAX) powder Take 17 g (1 capful) by mouth daily   Luis Felipe Lyle MD   HYDROcodone-acetaminophen (NORCO) 5-325 MG per tablet Take 1 tablet by mouth every 4 hours as needed for moderate to severe pain (Max 3 does per day)   Jarocho Antoine, DO   LORazepam (ATIVAN) 0.5 MG tablet Take 1 tablet (0.5 mg) by mouth At Bedtime   Jarocho Antoine,    ipratropium - albuterol 0.5 mg/2.5 mg/3 mL (DUONEB) 0.5-2.5 (3) MG/3ML neb solution Take 1 vial (3 mLs) by nebulization 3 times daily   Jarocho Antoine DO   furosemide (LASIX) 40 MG tablet Take 1 tablet (40 mg) by mouth daily   Jarocho Antoine, DO    Acetaminophen (TYLENOL PO) Take 650 mg by mouth 3 times daily   Reported, Patient   metoprolol tartrate (LOPRESSOR) 50 MG tablet Take 1 tablet (50 mg) by mouth 2 times daily   Hernandez Stovall MD   losartan (COZAAR) 100 MG tablet Take 50 mg by mouth daily    Unknown, Entered By History   NIFEdipine ER (ADALAT CC) 90 MG TB24 Take 90 mg by mouth daily   Unknown, Entered By History              Review of Systems:   A Comprehensive greater than 10 system review of systems was carried out.  Pertinent positives and negatives are noted above.  Otherwise negative for contributory information.     Review Of Systems  Skin:[AK1.1] negative[AK1.2]  Eyes:[AK1.1] negative[AK1.2]  Ears/Nose/Throat:[AK1.1] negative[AK1.2]  Respiratory:[AK1.1] No shortness of breath, dyspnea on exertion, cough, or hemoptysis[AK1.2]  Cardiovascular:[AK1.1] negative[AK1.2]  Gastrointestinal:[AK1.1] negative[AK1.2]  Genitourinary:[AK1.1] negative[AK1.2]  Musculoskeletal:[AK1.1] negative[AK1.2]  Neurologic:[AK1.1] negative[AK1.2]  Psychiatric:[AK1.1] negative[AK1.2]  Hematologic/Lymphatic/Immunologic:[AK1.1] negative[AK1.2]  Endocrine:[AK1.1] negative[AK1.2]             Physical Exam:   Blood pressure 130/69, pulse 112, temperature 98.4  F (36.9  C), temperature source Oral, resp. rate 12, weight 94.3 kg (208 lb), SpO2 93 %.  Exam:  GENERAL:  Comfortable.  PSYCH: pleasant, oriented, No acute distress.  HEENT:  Atraumatic, normocephalic. Normal conjunctiva, normal hearing, and oropharynx is normal.  NECK:  Supple, no neck vein distention  HEART:  Normal S1, S2 with no murmur, no pericardial rub, gallops or S3 or S4.  LUNGS:[AK1.1]  Rhonchi and occasional wheezing, improved some with subsequent breaths, mildly tachypneic[AK1.2]  GI:  Soft, normal bowel sounds. Non-tender, non distended.   EXTREMITIES:[AK1.1] 2+ bilateral lower extremity[AK1.2] edema, +2 pulses bilateral and equal.  SKIN:  Dry to touch, No rash, wound or ulcerations.  NEUROLOGIC:  CN  2-12 intact, BL 5/5 symmetric upper and lower extremity strength, sensation is intact with no focal deficits.               Data:[AK1.1]       Recent Labs  Lab 03/14/18  1247   NTBNPI [id number]*       Recent Labs  Lab 03/14/18  1247   WBC 5.1   HGB 9.9*   HCT 30.9*   MCV 94          Recent Labs  Lab 03/14/18  1247      POTASSIUM 4.0   CHLORIDE 101   CO2 27   ANIONGAP 6   *   BUN 22   CR 0.91   GFRESTIMATED 58*   GFRESTBLACK 71   KACI 9.3       Recent Labs  Lab 03/14/18  1247   INR 2.16*       Recent Labs  Lab 03/14/18  1247   TROPI <0.015       Recent Labs  Lab 03/14/18  1321   COLOR Yellow   APPEARANCE Clear   URINEGLC Negative   URINEBILI Negative   URINEKETONE Negative   SG 1.013   UBLD Negative   URINEPH 7.0   PROTEIN Negative   NITRITE Negative   LEUKEST Negative   RBCU 1   WBCU 1       Recent Results (from the past 48 hour(s))   Chest XR,  PA & LAT    Narrative    XR CHEST 2 VW 3/14/2018 1:55 PM    HISTORY: Dyspnea.    COMPARISON: 1/29/2018    FINDINGS: Small bilateral pleural effusions. No airspace consolidation  or pneumothorax. Stable heart size. Severe degenerative change at the  shoulders and a subacute right clavicular head fracture.      Impression    IMPRESSION: Small bilateral effusions.    TORY SABILLON MD[AK1.3]         Vicki Sena PA-C    This patient was seen and discussed with [AK1.1] Concepcion[AK1.2] who agrees with the current plans as outlined above.[AK1.1]      Revision History        User Key Date/Time User Provider Type Action    > AK1.3 3/14/2018  5:00 PM Vicki Sena PA-C Physician Assistant - FLORA Sign     AK1.2 3/14/2018  4:43 PM Vicki Sena PA-C Physician Assistant - FLORA      AK1.1 3/14/2018  3:06 PM Vicki Sena PA-C Physician Assistant - FLORA                   Discharge Summaries     No notes of this type exist for this encounter.         Consult Notes      Consults by White, Corinne C, LSW at 3/20/2018 10:16 AM     Author:  White, Corinne C, LSW  Service:  (none) Author Type:      Filed:  3/20/2018 11:21 AM Date of Service:  3/20/2018 10:16 AM Creation Time:  3/20/2018 10:14 AM    Status:  Addendum :  White, Corinne C, LSW ()     Consult Orders:    1. Social Work IP Consult [[id number]] ordered by Hernandez Stovall MD at 03/20/18 1002                Discharge Planner   Discharge Plans in progress: Following for Dc planning   Barriers to discharge plan: [facility] is following and aware of dc tomorrow  Follow up plan: Will send DC orders once completed.        Entered by: Corinne C. White 03/20/2018 10:14 AM[CW1.1]     Called [facility] and set up WC transport for Wed @ 1030[CW1.2]     Revision History        User Key Date/Time User Provider Type Action    > CW1.2 3/20/2018 11:21 AM White, Corinne C, LSW  Addend     CW1.1 3/20/2018 10:16 AM White, Corinne C, LSW  Sign            Consults by Shannon West RN at 3/19/2018  8:50 AM     Author:  Shannon West RN Service:  C.O.R.E. Author Type:  Registered Nurse    Filed:  3/19/2018  8:53 AM Date of Service:  3/19/2018  8:50 AM Creation Time:  3/19/2018  8:49 AM    Status:  Signed :  Shannon West RN (Registered Nurse)     Consult Orders:    1. [facility] Clinic Evaluation IP Consult: Patient to be seen: Routine - within 24 hours; Outpatient C[facility] Clinic Evaluation to be completed by CORE RN. RN to coordinate heart failure transition and follow-up to outpatient care in CORE clinic.; Consultant ... [[id number]] ordered by Vicki Sena PA-C at 03/14/18 1510                [facility] Clinic referral received from Vicki GOMEZ     Estefany is not currently established in the [facility] Clinic and has not been seen by a cardiologist from our group. She will need to establish care with a CORE MD.     Nutrition assistance appreciated. Hospital nurse, please give pt C[facility] Clinic/HF education.       [facility] Clinic appointment made for:    Tuesday 4/10 with  BMP at 1145 and a visit with Dr. Chang at 1245. (first available)    Note that Estefany may dc to TCU and she should follow with TCU provider during interim.          We look forward to seeing Estefany in the clinic.   Please call with questions.     Shannon West RN, BSN  [facility] Care Coordinator  [phone]      C.O.R.E. Clinic: Cardiomyopathy, Optimization, Rehabilitation, Education   The C.O.R.E. Clinic is a heart failure specialty clinic within the [facility] where you will work with your cardiologist, nurse practitioners, physician assistants and registered nurses who specialize in heart failure care. They are dedicated to helping patients with heart failure to carefully adjust medications, receive education, and learn who and when to call if symptoms develop. They specialize in helping you better understand your condition, slow the progression of your disease, improve the length and quality of your life, help you detect future heart problems before they become life threatening, and avoid hospitalizations.[AW1.1]                 Revision History        User Key Date/Time User Provider Type Action    > AW1.1 3/19/2018  8:53 AM Shannon West, RN Registered Nurse Sign            Consults by White, Corinne C, LSW at 3/16/2018  2:12 PM     Author:  White, Corinne C, LSW Service:  (none) Author Type:      Filed:  3/16/2018  3:16 PM Date of Service:  3/16/2018  2:12 PM Creation Time:  3/16/2018  2:02 PM    Status:  Addendum :  White, Corinne C, LSW ()     Consult Orders:    1. Social Work IP Consult [[id number]] ordered by Philip Javier MD at 03/16/18 8965                Care Transition Initial Assessment -   Reason For Consult: care coordination/care conference, discharge planning  Met with: Patient and Family    Active Problems:    Acute diastolic congestive heart failure (H)    CHF (congestive heart failure) (H)       DATA  Lives  With: facility resident- [facility].  PT is in Independent living . Does have cleaning support   Living Arrangements: independent living facility  Description of Support System: Supportive, Involved  Who is your support system?: Children, Facility resident(s)/Staff  Plan was to meet with [facility] to have assessment for assisted support services.  PT was also to have Home care through [facility] but was not home long enough to begin her services.   Identified issues/concerns regarding health management:   New CHF at this time.  PT will need to possible return to TCU at [place]      Resources List: Home Care  Had orders for [facility] but sent in by RN         Transportation Available: car, family or friend will provide  Family plan to make decision about transport on day of dc  ASSESSMENT  Cognitive Status:  awake, alert and oriented  Concerns to be addressed: spoke with pt and daughter about dc planning. Both are aware of possible need for TCU and that pt will start on day 47.  Spoke with pt and family about benefit coverage for her TCU days.  Pt has a PASS that is still valid as she has not been home for 30 day.  PT was home about one day from TCU prior to her readmission.  PT and daughter would prefer a private room and aware of the additional fee  PT has home walker.  She does have cleaning help and generally attends breakfast but maker her own meals otherwise .     PLAN  Will send referral back to [facility] for TCU.. Prefer private room but will take shared and be placed in list if needed.[CW1.1]     PT has been accepted for TCU at [facility] for DC planning[CW1.2]      Revision History        User Key Date/Time User Provider Type Action    > CW1.2 3/16/2018  3:16 PM White, Corinne C, LSW  Addend     CW1.1 3/16/2018  2:12 PM White, Corinne C, LSW  Sign            Consults by Michelle Allen CM at 3/15/2018  4:15 PM     Author:  Michelle Allen CM Service:  Care Coordinator Author Type:  Case  Manager    Filed:  3/15/2018  4:15 PM Date of Service:  3/15/2018  4:15 PM Creation Time:  3/15/2018  4:09 PM    Status:  Signed :  Michelle Allen CM ()     Consult Orders:    1. Care Coordinator IP Consult [[id number]] ordered by Vicki Sena PA-C at 03/14/18 1510                Care Transition Initial Assessment - RN    Reason For Consult: care coordination/care conference, discharge planning   Spoke with: daughterMaylin.  DATA   Active Problems:    Acute diastolic congestive heart failure (H)    CHF (congestive heart failure) (H)     CHF Packet reviewed with patient & daughter at bedside.   Cognitive Status: awake, alert and oriented.  [facility]: [facility]  Primary Care MD Name: Dr. Ricki Blake  Contact information and PCP information verified: Yes  Lives With: facility resident  Living Arrangements: independent living facility     Description of Support System: Supportive, Involved   Who is your support system?: Children, Facility resident(s)/Staff       Insurance concerns: No Insurance issues identified  ASSESSMENT  Patient currently receives the following services:  Meals, housekeeping & laundry. Her daughter sets up her medications.         Identified issues/concerns regarding health management: Patient discharged last time with neb machine. Daughter stated patient has difficult time manipulating the medication container and spills the medication. She is noncompliant with a low sodium diet and has not been weighing herself daily. CM provided patient with a new scale with large readout.     PLAN  Financial costs for the patient were not discussed.  Patient given options and choices for discharge.  Patient/family is agreeable to the plan?  Yes  Patient anticipates discharging back to either [facility]a or TCU.     Patient anticipates needs for home equipment: No  Discharge Planner   Discharge Plans in progress: Yes   Barriers to discharge plan: Ongoing IV  diuresis  Plan/Disposition: TBD   Appointments: Daughter stated she will make any f/u appointment.     CM will continue to follow patient until discharge for any additional needs.     Lesli Allen RN, BSN, CTS  [facility]  [phone][BS1.1]           Revision History        User Key Date/Time User Provider Type Action    > BS1.1 3/15/2018  4:15 PM Michelle Allen CM  Sign            Consults by Abi Link RD, LD at 3/15/2018 10:00 AM     Author:  Abi Link RD, LD Service:  Nutrition Author Type:  Registered Dietitian    Filed:  3/15/2018  2:55 PM Date of Service:  3/15/2018 10:00 AM Creation Time:  3/15/2018  9:48 AM    Status:  Signed :  Abi Link RD, LD (Registered Dietitian)     Consult Orders:    1. Nutrition Services Adult IP Consult [[id number]] ordered by Vicki Sena PA-C at 03/14/18 1510                CLINICAL NUTRITION SERVICES  -  ASSESSMENT NOTE      Malnutrition:[MS1.1] Does not meet criteria at this time[MS1.2]        REASON FOR ASSESSMENT  Estefany An is a 87 year old female seen by Registered Dietitian for Provider Order - Nutrition Education[MS1.1] (x2)[MS1.3] - 2 gm Na.      NUTRITION HISTORY[MS1.1]  - Information obtained from patient and chart though limited as falling asleep during conversation.[MS1.2]  - Patient with a h/o diastolic CHF[MS1.1].[MS1.3]  - Recent TCU d/c (3/13/2018).[MS1.1]  - Has met with this writer x 2 during previous admissions, but never for low sodium education, consulted d/t LOS.  - Previous education:[MS1.3]  Patient denies previous diet education.  Reports a regular diet at home and does not verbalize sodium restrictions recommended daily.  Does mention she does not add salt at home.[MS1.2]  - Weighs self:[MS1.3] Patient denies weighing self at home.[MS1.2]  - Grocery shopping and meal preparation:[MS1.3] Patient reports she prepares most of her own meals but may also go down to dining boss at  "[facility] to receive meals.[MS1.2]  -[MS1.3] Reports her appetite has been decreased over the past \"few weeks\" but could not specify further.  - Breakfast: Scrambled eggs, 2 pieces toast with butter.  Sometimes saez.  - Lunch: Ham sandwich.  - Dinner: Sometimes has cold cereal (likes Cheerios) with milk and fruit.  Or may go down to dining boss (could not provide further specifics of common foods consumed).   - NKFA.[MS1.2]       CURRENT NUTRITION ORDERS  Diet Order:[MS1.1]     2 gm Na/No caffeine[MS1.3]    Current Intake/Tolerance:[MS1.1]  % intakes since admission.[MS1.3]      PHYSICAL FINDINGS  Observed[MS1.1]  Suspect chronic muscle losses associated with aging  Speech seeming somewhat garbled, could just be tired, discussed with RN[MS1.2]  Obtained from Chart/Interdisciplinary Team[MS1.1]  +2 BLE edema[MS1.3]    ANTHROPOMETRICS  Height: 5' 8\"  Weight:[MS1.1] 92.7 kg ([MS1.2]204[MS1.1]#)[MS1.2]  Body mass index is 31.14 kg/(m^2).  Weight Status:[MS1.1]  Obesity Grade I BMI 30-34.9[MS1.2]  IBW:[MS1.1] 63.6 kg (140#)[MS1.2]  % IBW:[MS1.1] 146%[MS1.2]  Weight History:[MS1.1]  Wt Readings from Last 10 Encounters:   03/15/18 92.9 kg (204 lb 12.8 oz)   01/31/18 88.9 kg (196 lb)   01/13/18 88.7 kg (195 lb 8 oz)   11/02/14 81.6 kg (180 lb)   12/03/13 81.6 kg (180 lb)[MS1.4]   - Wt gain 2/2 fluid retention since 1/2018.[MS1.3]      LABS  Labs reviewed    MEDICATIONS  Medications reviewed[MS1.1]:  - Lasix[MS1.3]    Dosing Weight[MS1.1] 70.9 kg - adjusted weight[MS1.2]     ASSESSED NUTRITION NEEDS PER APPROVED PRACTICE GUIDELINES:  Estimated Energy Needs:[MS1.1] 2020-1615[MS1.2] kcals ([MS1.1]25-30 Kcal/Kg[MS1.2])  Justification:[MS1.1] maintenance[MS1.2]  Estimated Protein Needs:[MS1.1] [MS1.2] grams protein ([MS1.1]1.2-1.5 g pro/Kg[MS1.2])  Justification:[MS1.1] preservation of lean body mass[MS1.2]  Estimated Fluid Needs:[MS1.1] 5806-1024[MS1.2]  mL ([MS1.1]1 mL/Kcal[MS1.2])  Justification:[MS1.1] " "maintenance and per provider pending fluid status[MS1.2]    MALNUTRITION:  % Weight Loss:[MS1.1]  None noted[MS1.2]  % Intake:[MS1.1]  Decreased intake does not meet criteria for malnutrition --> patient reported waxes and wanes, sometimes eating at baseline but not consistently[MS1.2]  Subcutaneous Fat Loss:[MS1.1]  None observed[MS1.2]  Muscle Loss:[MS1.1]  Acromion bone region mild depletion and Dorsal hand region mild depletion --> suspect component of aging[MS1.2]  Fluid Retention:[MS1.1]  Mild --> not using as indicator as do not suspect masking true wt loss[MS1.2]    Malnutrition Diagnosis:[MS1.1] Patient does not meet two of the above criteria necessary for diagnosing malnutrition but is at risk[MS1.2]    NUTRITION DIAGNOSIS:[MS1.1]  Predicted inadequate nutrient intake (energy/protein)[MS1.2] related to[MS1.1] report of decreased appetite without consistent decrease in oral intakes, potential for decline.[MS1.2]      NUTRITION INTERVENTIONS  Recommendations / Nutrition Prescription[MS1.1]  Continue 2 gm Na diet restriction, caffeine restriction per MD.     Addition of Ensure supplement with meals.[MS1.2]       Implementation  Nutrition education:[MS1.1] Provided education on 2 gm Na diet:    Assessed learning needs, learning preferences, and willingness to learn    Nutrition Education (Content):  a) Provided handout \"low sodium nutrition therapy\"  b) Discussed common foods high in sodium to avoid, appropriate substitutes  c) Discussed how to read nutrition facts labels and recommendations for </=2000 mg sodium daily    Nutrition Education (Application):  a) Discussed eating habits and recommended alternative food choices.   b) Encouraged patient to avoid saez and to track sodium intake for at least 1 week at d/c.     Patient unable to verbalize understanding of diet.    Anticipate poor to fair compliance.    Diet Education - refer to Education Flowsheet    Collaboration and Referral of Nutrition care: " Discussed POC with team during rounds.  Discussed speech with RN.[MS1.2]      Nutrition Goals[MS1.1]  Patient to consume at least 50-75% of meals TID or the equivalent with supplement use.[MS1.2]      MONITORING AND EVALUATION:[MS1.1]  Progress towards goals will be monitored and evaluated per protocol and Practice Guidelines[MS1.2]      Abi Link RD, LD  Clinical Dietitian  3rd floor/ICU: [phone]  All other floors: [phone]  Weekend/holiday: [phone][MS1.1]     Revision History        User Key Date/Time User Provider Type Action    > MS1.2 3/15/2018  2:55 PM Abi Likn RD, LD Registered Dietitian Sign     MS1.4 3/15/2018 12:32 PM Abi Link RD, LD Registered Dietitian      MS1.3 3/15/2018 12:27 PM Abi Link RD, NATALI Registered Dietitian      MS1.1 3/15/2018  9:48 AM Abi Link RD, NATALI Registered Dietitian                      Progress Notes - Physician (Notes for yesterday and today)      Progress Notes by Luis Miguel Childress DO at 3/22/2018  3:49 PM     Author:  Luis Miguel Childress DO Service:  Hospitalist Author Type:  Physician    Filed:  3/22/2018  3:56 PM Date of Service:  3/22/2018  3:49 PM Creation Time:  3/22/2018  3:49 PM    Status:  Signed :  Luis Miguel Childress DO (Physician)         [facility]  Hospitalist Progress Note  Luis Miguel Childress DO 03/22/2018    Reason for Stay (Diagnosis): Pneumonia         Assessment and Plan:      Summary of Stay: Estefany An is a 87 year old female with history of chronic A. fib, hypertension, gastroesophageal reflux disease, anxiety, presented with generalized weakness and shortness of breath and admitted on 3/14/2018. Patient was admitted to telemetry bed and closely monitored with IV diuresis, oxygen supplementation and close monitoring.   Problem List:   1. Acute hypoxic respiratory failure.  Likely multifactorial.  Continue supplemental oxygen as needed.  2. Acute on chronic  "diastolic CHF exacerbation.  Initially on IV Lasix.  Restarted oral Lasix today.  Add Spironolactone 25 mg/day.  Continue to monitor weight daily and strict intake and output.  3. Pneumonia.  Pro-calcitonin previously low.  Now completed a 5 day course of antibiotics.  4. COPD exacerbation.  Likely due to pneumonia.  Continue prednisone 60 mg a day.  Continue duo nebs 4 times a day.  Albuterol as needed.  Continue Breo Ellipta.  5. Atrial fibrillation.  Increase metoprolol to 100 mg twice a day.  Continue warfarin.  6. Hypertension.  Increase metoprolol to 100 mg twice a day.  Decrease nifedipine to 60 mg/day.  Add spironolactone 25 mg a day.  Continue Lasix and losartan.  7. Chronic kidney disease.  Creatinine improved today.  Monitor with need for diuresis.  Avoid nephrotoxins as able.  8. GERD.  Continue omeprazole.  9. Hyponatremia.  Sodium 128.  Continue to monitor.  10. Deconditioning.  Physical therapy.  11. Anemia.  Hemoglobin has been stable for the past several days.  No signs of ongoing hemorrhage.  DVT Prophylaxis: Warfarin  Code Status: DNR / DNI  Discharge Dispo: TCU  Estimated Disch Date / # of Days until Disch: 1-2        Interval History (Subjective):      Short of breath at times.  Feels weak.  Denies chest pain, fevers, chills, nausea, vomiting, or diarrhea.                  Physical Exam:      Last Vital Signs:  /63 (BP Location: Right arm)  Pulse 71  Temp 95.8  F (35.4  C) (Oral)  Resp 20  Ht 1.727 m (5' 8\")  Wt 93.7 kg (206 lb 8 oz)  SpO2 95%  BMI 31.4 kg/m2    Gen:  NAD, A&Ox3.  Eyes:  PERRL, sclera anicteric.  OP:  MMM, no lesions.  Neck:  Supple.  CV:  Mildly irregular, no murmurs.  Lung:  Crackles at bases b/l, occasional wheeze b/l, normal effort.  Ab:  +BS, soft.  Skin:  Warm, dry to touch.  No rash.  Ext:  1-2+ pitting edema LE b/l.           Medications:      All current medications were reviewed with changes reflected in problem list.         Data:      All new lab and " "imaging data was reviewed.   Labs:[KR1.1]    Recent Labs  Lab 03/22/18  0759   *   POTASSIUM 3.8   CHLORIDE 92*   CO2 30   ANIONGAP 6   *   BUN 18   CR 0.89   GFRESTIMATED 60*   GFRESTBLACK 72   KACI 9.1       Recent Labs  Lab 03/21/18  0701   WBC 5.8   HGB 9.7*   HCT 29.7*   MCV 92   *[KR1.2]      Imaging:[KR1.1]   No results found for this or any previous visit (from the past 24 hour(s)).[KR1.2]       Revision History        User Key Date/Time User Provider Type Action    > KR1.2 3/22/2018  3:56 PM Luis Miguel Childress, DO Physician Sign     KR1.1 3/22/2018  3:49 PM Luis Miguel Childress, DO Physician                   Procedure Notes     No notes of this type exist for this encounter.      Progress Notes - Therapies (Notes from 03/20/18 through 03/23/18)     No notes of this type exist for this encounter.                                          INTERAGENCY TRANSFER FORM - LAB / IMAGING / EKG / EMG RESULTS   3/14/2018                       [place] [address] MEDICAL SURGICAL: [phone]            Unresulted Labs (24h ago through future)    Start       Ordered    03/15/18 0600  INR  (warfarin (COUMADIN) Pharmacy Consult-INITIAL ORDER)  DAILY,   Routine      03/14/18 1637    Unscheduled  Potassium  CONDITIONAL X 1 STAT,   STAT     Comments:  RN to release order IF the pronounced diuretic diuresics response is greater than 1000 mL or if there is new onset of arrhythmia.    03/14/18 1637    Unscheduled  Potassium  (Potassium Replacement - \"High\" - Replacement for all levels less than 4.1 mmol/L - UU,UR,UA,RH,SH,PH,WY )  CONDITIONAL (SPECIFY),   Routine     Comments:  Obtain Potassium Level for these conditions:  *IF no potassium result within 24 hrs before initiation of order set, draw potassium level with next lab collect.    *2 HOURS AFTER last IV potassium replacement dose and 4 hours after an oral replacement dose when potassium replacement given for level less than 3.4.  *Next morning after " "potassium dose.     Repeat Potassium Replacement if necessary.    03/16/18 0818    Unscheduled  Magnesium  (Magnesium Replacement - Adult - \"High\" - Replacement for all levels less than or equal to 2 mg/dL)  CONDITIONAL (SPECIFY),   Routine     Comments:  Obtain Magnesium Level for these conditions:  *IF no magnesium result within 24 hrs before initiation of order set, draw magnesium level with next lab collect.    *2 HOURS AFTER last magnesium replacement dose when magnesium replacement given for level less than 1.6  *Next morning after magnesium dose.     Repeat Magnesium Replacement if necessary.    03/16/18 0818         Lab Results - 3 Days      Basic metabolic panel [[id number]] (Abnormal)  Resulted: 03/23/18 0710, Result status: Final result    Ordering provider: Luis Miguel Childress DO  03/23/18 0000 Resulting lab: [facility]    Specimen Information    Type Source Collected On   Blood  03/23/18 0631          Components       Value Reference Range Flag Lab   Sodium 128 133 - 144 mmol/L L FrRdHs   Potassium 4.1 3.4 - 5.3 mmol/L  FrRdHs   Chloride 93 94 - 109 mmol/L L FrRdHs   Carbon Dioxide 31 20 - 32 mmol/L  FrRdHs   Anion Gap 4 3 - 14 mmol/L  FrRdHs   Glucose 117 70 - 99 mg/dL H FrRdHs   Urea Nitrogen 24 7 - 30 mg/dL  FrRdHs   Creatinine 0.83 0.52 - 1.04 mg/dL  FrRdHs   GFR Estimate 65 >60 mL/min/1.7m2  FrRd   Comment:  Non  GFR Calc   GFR Estimate If Black 79 >60 mL/min/1.7m2  FrRd   Comment:  African American GFR Calc   Calcium 9.0 8.5 - 10.1 mg/dL  FrRdHs            INR [[id number]] (Abnormal)  Resulted: 03/23/18 0710, Result status: Final result    Ordering provider: Vicki Sena PA-C  03/23/18 0000 Resulting lab: [facility]    Specimen Information    Type Source Collected On   Blood  03/23/18 0631          Components       Value Reference Range Flag Lab   INR 4.51 0.86 - 1.14 H FrRd   Comment:  Reviewed: OK with previous            Gram stain " [[id number]] (Abnormal)  Resulted: 03/22/18 2212, Result status: Final result    Ordering provider: Hernandez Stovall MD  03/22/18 1708 Resulting lab: [facility]    Specimen Information    Type Source Collected On   Sputum  03/22/18 1708          Components       Value Reference Range Flag Lab   Specimen Description Sputum      Special Requests Screen   75   Gram Stain <10 Squamous epithelial cells/low power field   226   Gram Stain >25 PMNs/low power field   226   Gram Stain --   226   Result:         Few  Mixed gram positive jennifer     Gram Stain --  A 226   Result:         Rare  Yeast seen              Sputum Culture Aerobic Bacterial [[id number]]  Resulted: 03/22/18 2057, Result status: In process    Ordering provider: Hernandez Stovall MD  03/20/18 1737 Resulting lab: [facility]    Specimen Information    Type Source Collected On   Sputum  03/22/18 1708            Blood culture [[id number]]  Resulted: 03/22/18 1515, Result status: Preliminary result    Ordering provider: Hernandez Stovall MD  03/17/18 1058 Resulting lab: [facility]    Specimen Information    Type Source Collected On   Blood  03/17/18 1301   Comment:  Left Hand          Components       Value Reference Range Flag Lab   Specimen Description Blood Left Hand      Special Requests Aerobic and anaerobic bottles received   75   Culture Micro No growth after 5 days   225            Blood culture [[id number]]  Resulted: 03/22/18 1515, Result status: Preliminary result    Ordering provider: Hernandez Stovall MD  03/17/18 1058 Resulting lab: [facility]    Specimen Information    Type Source Collected On   Blood  03/17/18 1256   Comment:  Left Arm          Components       Value Reference Range Flag Lab   Specimen Description Blood Left Arm      Special Requests Aerobic and anaerobic bottles received   75   Culture Micro No growth after 5 days   225            Lactic acid level STAT  [[id number]]  Resulted: 03/22/18 1033, Result status: Final result    Ordering provider: Luis Miguel Childress, DO  03/22/18 1016 Resulting lab: [facility]    Specimen Information    Type Source Collected On   Blood  03/22/18 1027          Components       Value Reference Range Flag Lab   Lactic Acid 1.7 0.7 - 2.0 mmol/L  FrRd            Basic metabolic panel [[id number]] (Abnormal)  Resulted: 03/22/18 0845, Result status: Final result    Ordering provider: Luis Miguel Childress, DO  03/22/18 0000 Resulting lab: [facility]    Specimen Information    Type Source Collected On   Blood  03/22/18 0759          Components       Value Reference Range Flag Lab   Sodium 128 133 - 144 mmol/L L FrRdHs   Potassium 3.8 3.4 - 5.3 mmol/L  FrRdHs   Chloride 92 94 - 109 mmol/L L FrRdHs   Carbon Dioxide 30 20 - 32 mmol/L  FrRdHs   Anion Gap 6 3 - 14 mmol/L  FrRdHs   Glucose 126 70 - 99 mg/dL H FrRdHs   Urea Nitrogen 18 7 - 30 mg/dL  FrRdHs   Creatinine 0.89 0.52 - 1.04 mg/dL  FrRdHs   GFR Estimate 60 >60 mL/min/1.7m2 L FrRdHs   Comment:  Non  GFR Calc   GFR Estimate If Black 72 >60 mL/min/1.7m2  FrRdHs   Comment:  African American GFR Calc   Calcium 9.1 8.5 - 10.1 mg/dL  FrRdHs            INR [[id number]] (Abnormal)  Resulted: 03/22/18 0831, Result status: Final result    Ordering provider: Vicki Sena PA-C  03/22/18 0000 Resulting lab: [facility]    Specimen Information    Type Source Collected On   Blood  03/22/18 0759          Components       Value Reference Range Flag Lab   INR 4.65 0.86 - 1.14 H FrRdHs            Lactic acid level STAT [[id number]]  Resulted: 03/21/18 0928, Result status: Final result    Ordering provider: Luis Miguel Childress, DO  03/21/18 0859 Resulting lab: [facility]    Specimen Information    Type Source Collected On   Blood  03/21/18 0920          Components       Value Reference Range Flag Lab   Lactic Acid 1.5 0.7 - 2.0 mmol/L   FrRdHs            INR [[id number]] (Abnormal)  Resulted: 03/21/18 0732, Result status: Final result    Ordering provider: Vicki Sena PA-C  03/21/18 0000 Resulting lab: [facility]    Specimen Information    Type Source Collected On   Blood  03/21/18 0701          Components       Value Reference Range Flag Lab   INR 3.83 0.86 - 1.14 H FrRdHs   Comment:  Reviewed: OK with previous            Basic metabolic panel [[id number]] (Abnormal)  Resulted: 03/21/18 0725, Result status: Final result    Ordering provider: Hernandez Stovall MD  03/21/18 0000 Resulting lab: [facility]    Specimen Information    Type Source Collected On   Blood  03/21/18 0701          Components       Value Reference Range Flag Lab   Sodium 130 133 - 144 mmol/L L FrRdHs   Potassium 3.4 3.4 - 5.3 mmol/L  FrRdHs   Chloride 94 94 - 109 mmol/L  FrRdHs   Carbon Dioxide 30 20 - 32 mmol/L  FrRdHs   Anion Gap 6 3 - 14 mmol/L  FrRdHs   Glucose 90 70 - 99 mg/dL  FrRdHs   Urea Nitrogen 19 7 - 30 mg/dL  FrRdHs   Creatinine 1.08 0.52 - 1.04 mg/dL H FrRdHs   GFR Estimate 48 >60 mL/min/1.7m2 L FrRdHs   Comment:  Non  GFR Calc   GFR Estimate If Black 58 >60 mL/min/1.7m2 L FrRdHs   Comment:  African American GFR Calc   Calcium 8.6 8.5 - 10.1 mg/dL  FrRdHs            CBC with platelets differential [[id number]] (Abnormal)  Resulted: 03/21/18 0712, Result status: Final result    Ordering provider: Hernandez Stovall MD  03/21/18 0000 Resulting lab: [facility]    Specimen Information    Type Source Collected On   Blood  03/21/18 0701          Components       Value Reference Range Flag Lab   WBC 5.8 4.0 - 11.0 10e9/L  FrRdHs   RBC Count 3.23 3.8 - 5.2 10e12/L L FrRdHs   Hemoglobin 9.7 11.7 - 15.7 g/dL L FrRdHs   Hematocrit 29.7 35.0 - 47.0 % L FrRdHs   MCV 92 78 - 100 fl  FrRdHs   MCH 30.0 26.5 - 33.0 pg  FrRdHs   MCHC 32.7 31.5 - 36.5 g/dL  FrRdHs   RDW 15.7 10.0 - 15.0 % H FrRdHs   Platelet Count 117  150 - 450 10e9/L L FrRdHs   Diff Method Automated Method   FrRdHs   % Neutrophils 69.9 %  FrRdHs   % Lymphocytes 17.1 %  FrRdHs   % Monocytes 10.9 %  FrRdHs   % Eosinophils 1.6 %  FrRdHs   % Basophils 0.2 %  FrRdHs   % Immature Granulocytes 0.3 %  FrRdHs   Nucleated RBCs 0 0 /100  FrRdHs   Absolute Neutrophil 4.1 1.6 - 8.3 10e9/L  FrRdHs   Absolute Lymphocytes 1.0 0.8 - 5.3 10e9/L  FrRdHs   Absolute Monocytes 0.6 0.0 - 1.3 10e9/L  FrRdHs   Absolute Eosinophils 0.1 0.0 - 0.7 10e9/L  FrRdHs   Absolute Basophils 0.0 0.0 - 0.2 10e9/L  FrRdHs   Abs Immature Granulocytes 0.0 0 - 0.4 10e9/L  FrRdHs   Absolute Nucleated RBC 0.0   FrRdHs            Sputum Culture Aerobic Bacterial [[id number]] (Abnormal)  Resulted: 03/20/18 1647, Result status: Final result    Ordering provider: Hernandez Stovall MD  03/17/18 1058 Resulting lab: [facility]    Specimen Information    Type Source Collected On   Sputum  03/20/18 1200          Components       Value Reference Range Flag Lab   Specimen Description Sputum      Culture Micro Canceled, Test credited   226   Culture Micro --  A 226   Result:         >10 Squamous epithelial cells/low power field indicates oral contamination. Please   recollect.     Culture Micro --   226   Result:         Notification of test cancellation was given to  Hermila Rosado RN @ [phone] 3/20/18 CS              Gram stain [[id number]] (Abnormal)  Resulted: 03/20/18 1644, Result status: Final result    Ordering provider: Hernandez Stovall MD  03/17/18 1058 Resulting lab: [facility]    Specimen Information    Type Source Collected On   Sputum  03/20/18 1200          Components       Value Reference Range Flag Lab   Specimen Description Sputum      Special Requests Screen   75   Gram Stain --  A 226   Result:         >10 Squamous epithelial cells/low power field indicates oral contamination. Please   recollect.     Gram Stain <25 PMNs/low power field   226   Result:     Gram Stain --   226    Result:         Few  Mixed gram positive jennifer              Folate [[id number]]  Resulted: 03/20/18 1433, Result status: Final result    Ordering provider: Vicki Sena PA-C  03/20/18 0826 Resulting lab: [facility]    Specimen Information    Type Source Collected On     03/20/18 0826          Components       Value Reference Range Flag Lab   Folate 24.8 >5.4 ng/mL  51            Vitamin B12 [[id number]] (Abnormal)  Resulted: 03/20/18 1407, Result status: Final result    Ordering provider: Vicki Sena PA-C  03/20/18 0826 Resulting lab: [facility]    Specimen Information    Type Source Collected On     03/20/18 0826          Components       Value Reference Range Flag Lab   Vitamin B12 1650 193 - 986 pg/mL H 51            TSH with free T4 reflex [[id number]]  Resulted: 03/20/18 1119, Result status: Final result    Ordering provider: Vicki Sena PA-C  03/20/18 0826 Resulting lab: [facility]    Specimen Information    Type Source Collected On     03/20/18 0826          Components       Value Reference Range Flag Lab   TSH 2.06 0.40 - 4.00 mU/L  FrRdHs            Iron and iron binding capacity [[id number]] (Abnormal)  Resulted: 03/20/18 1115, Result status: Final result    Ordering provider: Vicki Sena PA-C  03/20/18 0826 Resulting lab: [facility]    Specimen Information    Type Source Collected On     03/20/18 0826          Components       Value Reference Range Flag Lab   Iron 26 35 - 180 ug/dL L FrRdHs   Iron Binding Cap 279 240 - 430 ug/dL  FrRdHs   Iron Saturation Index 9 15 - 46 % L FrRdHs            Ferritin [[id number]]  Resulted: 03/20/18 1115, Result status: Final result    Ordering provider: Vicki Sena PA-C  03/20/18 0826 Resulting lab: [facility]    Specimen Information    Type Source Collected On     03/20/18 0826          Components       Value Reference  Range Flag Lab   Ferritin 139 8 - 252 ng/mL  FrRdHs            CBC with platelets differential [[id number]] (Abnormal)  Resulted: 03/20/18 0901, Result status: Final result    Ordering provider: Hernandez Stovall MD  03/20/18 0000 Resulting lab: [facility]    Specimen Information    Type Source Collected On   Blood  03/20/18 0826          Components       Value Reference Range Flag Lab   WBC 5.6 4.0 - 11.0 10e9/L  FrRdHs   RBC Count 3.23 3.8 - 5.2 10e12/L L FrRdHs   Hemoglobin 9.9 11.7 - 15.7 g/dL L FrRdHs   Hematocrit 29.9 35.0 - 47.0 % L FrRdHs   MCV 93 78 - 100 fl  FrRdHs   MCH 30.7 26.5 - 33.0 pg  FrRdHs   MCHC 33.1 31.5 - 36.5 g/dL  FrRdHs   RDW 15.6 10.0 - 15.0 % H FrRdHs   Platelet Count 118 150 - 450 10e9/L L FrRdHs   Diff Method Automated Method   FrRdHs   % Neutrophils 65.8 %  FrRdHs   % Lymphocytes 20.8 %  FrRdHs   % Monocytes 11.4 %  FrRdHs   % Eosinophils 1.4 %  FrRdHs   % Basophils 0.4 %  FrRdHs   % Immature Granulocytes 0.2 %  FrRdHs   Nucleated RBCs 0 0 /100  FrRdHs   Absolute Neutrophil 3.7 1.6 - 8.3 10e9/L  FrRdHs   Absolute Lymphocytes 1.2 0.8 - 5.3 10e9/L  FrRdHs   Absolute Monocytes 0.6 0.0 - 1.3 10e9/L  FrRdHs   Absolute Eosinophils 0.1 0.0 - 0.7 10e9/L  FrRdHs   Absolute Basophils 0.0 0.0 - 0.2 10e9/L  FrRdHs   Abs Immature Granulocytes 0.0 0 - 0.4 10e9/L  FrRdHs   Absolute Nucleated RBC 0.0   FrRdHs   Anisocytosis Slight   FrRdHs   Target Cells Slight   FrRdHs   Reactive Lymphs Present   FrRdHs   Platelet Estimate Automated count confirmed.  Platelet morphology is normal.   FrRdHs            Basic metabolic panel [[id number]] (Abnormal)  Resulted: 03/20/18 0852, Result status: Final result    Ordering provider: Hernandez Stovall MD  03/20/18 0000 Resulting lab: [facility]    Specimen Information    Type Source Collected On   Blood  03/20/18 0826          Components       Value Reference Range Flag Lab   Sodium 131 133 - 144 mmol/L L FrRdHs   Potassium 3.6 3.4 - 5.3  mmol/L  FrRdHs   Chloride 93 94 - 109 mmol/L L FrRdHs   Carbon Dioxide 30 20 - 32 mmol/L  FrRdHs   Anion Gap 8 3 - 14 mmol/L  FrRdHs   Glucose 97 70 - 99 mg/dL  FrRdHs   Urea Nitrogen 20 7 - 30 mg/dL  FrRdHs   Creatinine 1.19 0.52 - 1.04 mg/dL H FrRdHs   GFR Estimate 43 >60 mL/min/1.7m2 L FrRdHs   Comment:  Non  GFR Calc   GFR Estimate If Black 52 >60 mL/min/1.7m2 L FrRdHs   Comment:  African American GFR Calc   Calcium 8.6 8.5 - 10.1 mg/dL  FrRdHs            INR [[id number]] (Abnormal)  Resulted: 03/20/18 0845, Result status: Final result    Ordering provider: Vicki Sena PA-C  03/20/18 0000 Resulting lab: [facility]    Specimen Information    Type Source Collected On   Blood  03/20/18 0826          Components       Value Reference Range Flag Lab   INR 3.39 0.86 - 1.14 H FrRdHs            Testing Performed By     Lab - Abbreviation Name Director Address Valid Date Range    12 - [facility] Unknown [address]  [facility] [address] 05/08/15 1057 - Present    45 - [facility] Unknown Unknown 01/28/02 0000 - Present    51 - Unknown [facility] Unknown [address] [facility] [address] 12/31/14 1010 - Present    75 - Unknown [facility] Unknown [address] [facility] [address] 01/15/15 1019 - Present    225 - Unknown [facility] Unknown [address] [facility] [address] 12/19/14 0954 - Present    226 - Unknown [facility] Unknown [address] [facility] [address] 12/19/14 0955 - Present            Encounter-Level Documents:     There are no encounter-level documents.      Order-Level Documents:     There are no order-level documents.

## 2018-03-14 NOTE — LETTER
Transition Communication Hand-off for Care Transitions to Next Level of Care Provider    Hand-off for Care Transitions to Next Level of Care Provider  Name: Estefany An  : 1930  MRN #: [id number]  Reason for Hospitalization:  Chronic atrial fibrillation (H) [I48.2]  Acute diastolic congestive heart failure (H) [I50.31]  Admit Date/Time: 3/14/2018 12:33 PM  Discharge Date: 3/23/2018    Reason for Communication Hand-off Referral: Admission diagnoses: CHF    Discharge Plan:  [facility]                   Patient agreeable to post-hospital support suggestions:  Yes  Discharge Plan:  TCU - [facility]           Patient is on new medications:   Yes           MTM follow up recommended: No           Tel-Assurance program:  Declined           Patient will receive  TCU  at:  [facility]  Follow-up specialty is recommended: Yes. Follow up with Occupational & Physical Therapy while at TCU.   Follow-up plan:  Future Appointments  Date Time Provider Department Center   4/10/2018 11:45 AM RU LAB LAB [facility]   4/10/2018 12:45 PM Tano Chang MD [facility]     Any outstanding tests or procedures:  No.  Give two-step Mantoux (PPD) Per Facility Policy     Procedures     Future Labs/Procedures    Oxygen - Nasal cannula     Comments:    2 Lpm by nasal cannula to keep O2 sats 92% or greater.          Referrals     Future Labs/Procedures    Occupational Therapy Adult Consult     Comments:    Evaluate and treat as clinically indicated.    Reason:  Weakness    Physical Therapy Adult Consult     Comments:    Evaluate and treat as clinically indicated.    Reason:  Weakness          Key Recommendations:  Patient discharged last time with neb machine. Daughter stated patient has difficult time manipulating the medication container and spills the medication. She is noncompliant with a low sodium diet and has not been weighing herself daily. CM provided patient with a new scale with large readout.     Communicated  handoff via EPIC Comm Mgt to Dr. Ricki Blake's CC at [phone].      Lesli Allen RN, BSN, CTS  [facility]  [phone]    AVS/Discharge Summary is the source of truth; this is a helpful guide for improved communication of patient story

## 2018-03-14 NOTE — IP AVS SNAPSHOT
` [place] [address] MEDICAL SURGICAL: [phone]            Medication Administration Report for Estefany An as of 03/21/18 0903   Legend:    Given Hold Not Given Due Canceled Entry Other Actions    Time Time (Time) Time  Time-Action       Inactive    Active    Linked        Medications 03/15/18 03/16/18 03/17/18 03/18/18 03/19/18 03/20/18 03/21/18    acetaminophen (TYLENOL) tablet 1,000 mg  Dose: 1,000 mg  Freq: 3 TIMES DAILY Route: PO  Start: 03/14/18 1645   Admin Instructions: Maximum acetaminophen dose from all sources = 75 mg/kg/day not to exceed 4 gram     0900 (1,000 mg)-Given       1609 (1,000 mg)-Given       2146 (1,000 mg)-Given        0958 (1,000 mg)-Given       1553 (1,000 mg)-Given       2224 (1,000 mg)-Given        0912 (1,000 mg)-Given       1606 (1,000 mg)-Given       2152 (1,000 mg)-Given        0813 (1,000 mg)-Given       1604 (1,000 mg)-Given       2106 (1,000 mg)-Given        0856 (1,000 mg)-Given       1616 (1,000 mg)-Given       2151 (1,000 mg)-Given        0836 (1,000 mg)-Given       1611 (1,000 mg)-Given       2249 (1,000 mg)-Given        0747 (1,000 mg)-Given              [ ] 1600       [ ] 2200           azithromycin (ZITHROMAX) 250 mg in sodium chloride 0.9 % 250 mL intermittent infusion  Dose: 250 mg  Freq: EVERY 24 HOURS Route: IV  Indications of Use: COMMUNITY ACQUIRED PNEUMONIA  Indications Comment: possible pseudomonas  Start: 03/18/18 1200   End: 03/22/18 1159   Admin Instructions: Schedule subsequent doses 24 hours from first dose.  May switch to oral when taking PO and not in ICU.        1309 (250 mg)-New Bag        1235 (250 mg)-New Bag        1159 (250 mg)-New Bag        [ ] 1200           Continuing ACE inhibitor/ARB/ARNI from home medication list OR ACE inhibitor/ARB/ARNI order already placed during this visit  Freq: DOES NOT GO TO MAR Route: XX  PRN Reason: other  Start: 03/14/18 0027              Continuing beta blocker from home medication list OR beta blocker  "order already placed during this visit  Freq: DOES NOT GO TO MAR Route: XX  PRN Reason: other  Start: 03/14/18 1637   Admin Instructions: Continuing beta blocker from home medication list OR beta blocker order already placed during this visit               fluticasone-vilanterol (BREO ELLIPTA) 200-25 MCG/INH oral inhaler 1 puff  Dose: 1 puff  Freq: DAILY Route: IN  Start: 03/14/18 2000   Admin Instructions: *Do not use more frequently than twice daily.*  Rinse mouth after use.<br>May use own home if available<br><br>Formulary alternate for ADVAIR<br>     0745 (1 puff)-Given        0756 (1 puff)-Given        0919 (1 puff)-Given        0742 (1 puff)-Given        0721 (1 puff)-Given        0737 (1 puff)-Given        0729 (1 puff)-Given           ipratropium - albuterol 0.5 mg/2.5 mg/3 mL (DUONEB) neb solution 3 mL  Dose: 1 vial  Freq: 3 TIMES DAILY Route: NEBULIZATION  Start: 03/17/18 0945      (1027)-Not Given [C]       1513 (3 mL)-Given       1935 (3 mL)-Given        0742 (3 mL)-Given       1527 (3 mL)-Given       2005 (3 mL)-Given        0715 (3 mL)-Given       1400 (3 mL)-Given       1930 (3 mL)-Given        0736 (3 mL)-Given       1350 (3 mL)-Given       2016 (3 mL)-Given        0729 (3 mL)-Given       [ ] 1400       [ ] 2000           ipratropium - albuterol 0.5 mg/2.5 mg/3 mL (DUONEB) neb solution 3 mL  Dose: 3 mL  Freq: EVERY 4 HOURS PRN Route: NEBULIZATION  PRN Reason: wheezing  Start: 03/14/18 1637    0747 (3 mL)-Given        0039 (3 mL)-Given        0919 (3 mL)-Given               lidocaine (LMX4) kit  Freq: EVERY 1 HOUR PRN Route: Top  PRN Reason: pain  PRN Comment: with VAD insertion or accessing implanted port.  Start: 03/14/18 1637   Admin Instructions: Do NOT give if patient has a history of allergy to any local anesthetic or any \"radha\" product.   Apply 30 minutes prior to VAD insertion or port access.  MAX Dose:  2.5 g (  of 5 g tube)               lidocaine 1 % 1 mL  Dose: 1 mL  Freq: EVERY 1 HOUR " "PRN Route: OTHER  PRN Comment: mild pain with VAD insertion or accessing implanted port  Start: 03/14/18 1637   Admin Instructions: Do NOT give if patient has a history of allergy to any local anesthetic or any \"radha\" product. MAX dose 1 mL subcutaneous OR intradermal in divided doses.               LORazepam (ATIVAN) tablet 0.5 mg  Dose: 0.5 mg  Freq: AT BEDTIME Route: PO  Start: 03/14/18 2200    2146 (0.5 mg)-Given        2224 (0.5 mg)-Given        2153 (0.5 mg)-Given        2106 (0.5 mg)-Given        2151 (0.5 mg)-Given        2249 (0.5 mg)-Given        [ ] 2200           losartan (COZAAR) tablet 50 mg  Dose: 50 mg  Freq: DAILY Route: PO  Start: 03/15/18 0900   Admin Instructions: Hold for SBP less than 120     0900 (50 mg)-Given        0958 (50 mg)-Given        0912 (50 mg)-Given        0813 (50 mg)-Given        0857 (50 mg)-Given        0836 (50 mg)-Given        0748 (50 mg)-Given                  magnesium sulfate 2 g in NS intermittent infusion ([employer]ar[employer] or [facility] Cmpd)  Dose: 2 g  Freq: DAILY PRN Route: IV  PRN Reason: magnesium supplementation  Start: 03/16/18 0818   Admin Instructions: For Serum Mg++ 1.6 - 2 mg/dL  Give 2 g and recheck magnesium level next AM.               magnesium sulfate 4 g in 100 mL sterile water (premade)  Dose: 4 g  Freq: EVERY 4 HOURS PRN Route: IV  PRN Reason: magnesium supplementation  Start: 03/16/18 0818   Admin Instructions: For serum Mg++ less than 1.6 mg/dL  Give 4 g and recheck magnesium level 2 hours after dose, and next AM.               melatonin tablet 1 mg  Dose: 1 mg  Freq: AT BEDTIME PRN Route: PO  PRN Reason: sleep  Start: 03/14/18 1637   Admin Instructions: Do not give unless at least 6 hours of uninterrupted sleep is expected.               Menthol (Topical Analgesic) 4 % GEL 5 g  Dose: 5 g  Freq: EVERY 4 HOURS PRN Route: EX  PRN Comment: pain  Start: 03/17/18 1956   Admin Instructions: Pharmacy does not stock.  Patient to use own medication from home after " verification by pharmacist.       2014 (5 g)-Given        1122 (5 g)-Given         0139 (5 g)-Given            metoprolol tartrate (LOPRESSOR) tablet 50 mg  Dose: 50 mg  Freq: 2 TIMES DAILY Route: PO  Start: 03/14/18 2100   Admin Instructions: Hold for SBP less than 100     0901 (50 mg)-Given       2034 (50 mg)-Given        0958 (50 mg)-Given       2022 (50 mg)-Given        0912 (50 mg)-Given       2152 (50 mg)-Given        0813 (50 mg)-Given       2106 (50 mg)-Given        0857 (50 mg)-Given       2001 (50 mg)-Given        0835 (50 mg)-Given       2120 (50 mg)-Given        0748 (50 mg)-Given              [ ] 2100           miconazole (MICATIN; MICRO GUARD) 2 % powder  Freq: EVERY 1 HOUR PRN Route: Top  PRN Reason: other  PRN Comment: topical candidiasis  Start: 03/15/18 1138   Admin Instructions: Apply to affected area.       1936 ( )-Given        1538 ( )-Given        1815 ( )-Given        2119 ( )-Given              naloxone (NARCAN) injection 0.1-0.4 mg  Dose: 0.1-0.4 mg  Freq: EVERY 2 MIN PRN Route: IV  PRN Reason: opioid reversal  Start: 03/14/18 1637   Admin Instructions: For respiratory rate LESS than or EQUAL to 8.  Partial reversal dose:  0.1 mg titrated q 2 minutes for Analgesia Side Effects Monitoring Sedation Level of 3 (frequently drowsy, arousable, drifts to sleep during conversation).Full reversal dose:  0.4 mg bolus for Analgesia Side Effects Monitoring Sedation Level of 4 (somnolent, minimal or no response to stimulation).  For ordered doses up to 2mg give IVP. Give each 0.4mg over 15 seconds in emergency situations. For non-emergent situations further dilute in 9mL of NS to facilitate titration of response.               NIFEdipine ER osmotic (PROCARDIA XL) 24 hr tablet 90 mg  Dose: 90 mg  Freq: DAILY Route: PO  Start: 03/15/18 0900   Admin Instructions: DO NOT CRUSH.     0900 (90 mg)-Given        0958 (90 mg)-Given        0912 (90 mg)-Given        0813 (90 mg)-Given        0856 (90 mg)-Given         0835 (90 mg)-Given        0748 (90 mg)-Given                  omeprazole (priLOSEC) CR capsule 20 mg  Dose: 20 mg  Freq: DAILY Route: PO  Start: 03/14/18 2000    0901 (20 mg)-Given        0958 (20 mg)-Given        0912 (20 mg)-Given        0813 (20 mg)-Given        0857 (20 mg)-Given        0836 (20 mg)-Given        0748 (20 mg)-Given                  ondansetron (ZOFRAN-ODT) ODT tab 4 mg  Dose: 4 mg  Freq: EVERY 6 HOURS PRN Route: PO  PRN Reasons: nausea,vomiting  Start: 03/14/18 1637   Admin Instructions: This is Step 1 of nausea and vomiting management.  If nausea not resolved in 15 minutes, go to Step 2 prochlorperazine (COMPAZINE). Do not push through foil backing. Peel back foil and gently remove. Place on tongue immediately. Administration with liquid unnecessary  With dry hands, peel back foil backing and gently remove tablet; do not push oral disintegrating tablet through foil backing; administer immediately on tongue and oral disintegrating tablet dissolves in seconds; then swallow with saliva; liquid not required.      0555 (4 mg)-Given               Or  ondansetron (ZOFRAN) injection 4 mg  Dose: 4 mg  Freq: EVERY 6 HOURS PRN Route: IV  PRN Reasons: nausea,vomiting  Start: 03/14/18 1637   Admin Instructions: This is Step 1 of nausea and vomiting management.  If nausea not resolved in 15 minutes, go to Step 2 prochlorperazine (COMPAZINE).  Irritant. For ordered doses up to 4 mg, give IV Push undiluted over 2-5 minutes.                      Patient is already receiving anticoagulation with heparin, enoxaparin (LOVENOX), warfarin (COUMADIN)  or other anticoagulant medication  Freq: CONTINUOUS PRN Route: XX  Start: 03/14/18 1637              piperacillin-tazobactam (ZOSYN) infusion 3.375 g  Dose: 3.375 g  Freq: EVERY 6 HOURS Route: IV  Indications of Use: COMMUNITY ACQUIRED PNEUMONIA  Indications Comment: Possible pseudomonas  Last Dose: 3.375 g (03/21/18 0411)  Start: 03/20/18 1600   Admin Instructions:  Pharmacy adjusted dose per renal dosing protocol for est crcl 40 ml/min.          1620 (3.375 g)-New Bag       2249 (3.375 g)-New Bag        0411 (3.375 g)-New Bag       [ ] 1000       [ ] 1600       [ ] 2200           polyethylene glycol (MIRALAX/GLYCOLAX) Packet 17 g  Dose: 17 g  Freq: DAILY PRN Route: PO  PRN Reason: constipation  Start: 03/14/18 1637   Admin Instructions: Give in 8oz of  water, juice, or soda. Hold for loose stools.  This is the second step of a three step constipation treatment.  1 Packet = 17 grams. Mixed prescribed dose in 8 ounces of water. Follow with 8 oz. of water.     0913 (17 g)-Given           0910 (17 g)-Given             potassium chloride (KLOR-CON) Packet 20-40 mEq  Dose: 20-40 mEq  Freq: EVERY 2 HOURS PRN Route: ORAL OR FEED  PRN Reason: potassium supplementation  Start: 03/16/18 0818   Admin Instructions: Use if unable to tolerate tablets.    If Serum K+ 3.4-4.0, dose = 20 mEq x1. Recheck K+ level the next AM.  If Serum K+ 3.0-3.3, dose = 60 mEq po total dose (40 mEq x 1 followed in 2 hours by 20 mEq X1). Recheck K+ level 4 hours after dose and the next AM.  If Serum K+ 2.5-2.9, dose = 80 mEq po total dose (40 mEq Q2H x2). Recheck K+ level 4 hours after dose and the next AM.  If Serum K+ less than 2.5, See IV order.  Dissolve packet contents in 4-8 ounces of cold water or juice.               potassium chloride 10 mEq in 100 mL intermittent infusion with 10 mg lidocaine  Dose: 10 mEq  Freq: EVERY 1 HOUR PRN Route: IV  PRN Reason: potassium supplementation  Start: 03/16/18 0818   Admin Instructions: Infuse via PERIPHERAL LINE. Use potassium with lidocaine for pain with peripheral administration.  If Serum K+ 3.4-4.0, dose = 10 mEq/hr x2 doses. Recheck K+ level the next AM.  If Serum K+ 3.0-3.3, dose = 10 mEq/hr x4 doses (40 mEq IV total dose). Recheck K+ level 2 hours after dose and the next AM.  If Serum K+ less than 3.0, dose = 10 mEq/hr x6 doses (60 mEq IV total dose). Recheck  K+ level 2 hours after dose and the next AM.               potassium chloride 10 mEq in 100 mL sterile water intermittent infusion (premix)  Dose: 10 mEq  Freq: EVERY 1 HOUR PRN Route: IV  PRN Reason: potassium supplementation  Start: 03/16/18 0818   Admin Instructions: Infuse via PERIPHERAL LINE or CENTRAL LINE. Use for central line replacement if patient weight less than 65 kg, if patient is on TPN with high potassium content or if unit does not stock 20 mEq bags.  If Serum K+ 3.4-4.0, dose = 10 mEq/hr x2 doses. Recheck K+ level the next AM.  If Serum K+ 3.0-3.3, dose = 10 mEq/hr x4 doses (40 mEq IV total dose). Recheck K+ level 2 hours after dose and the next AM.  If Serum K+ less than 3.0, dose = 10 mEq/hr x6 doses (60 mEq IV total dose). Recheck K+ level 2 hours after dose and the next AM.               potassium chloride 20 mEq in 50 mL intermittent infusion  Dose: 20 mEq  Freq: EVERY 1 HOUR PRN Route: IV  PRN Reason: potassium supplementation  Start: 03/16/18 0818   Admin Instructions: Infuse via CENTRAL LINE Only.  May need EKG if less than 65 kg or on TPN - Max rate is 0.3 mEq/kg/hr for patients not on EKG monitoring.    If Serum K+ 3.4-4.0, dose = 20 mEq/hr x1 doses. Recheck K+ level the next AM.  If Serum K+ 3.0-3.3, dose = 20 mEq/hr x2 doses (40 mEq IV total dose).  Recheck K+ level 2 hours after dose and the next AM.  If Serum K+ less than 3.0, dose = 20 mEq/hr x3 doses (60 mEq IV total dose). Recheck K+ level 2 hours after dose and the next AM.               potassium chloride SA (K-DUR/KLOR-CON M) CR tablet 20-40 mEq  Dose: 20-40 mEq  Freq: EVERY 2 HOURS PRN Route: PO  PRN Reason: potassium supplementation  Start: 03/16/18 0818   Admin Instructions: Use if able to take PO.   If Serum K+ 3.4-4.0, dose = 20 mEq x1. Recheck K+ level the next AM.  If Serum K+ 3.0-3.3, dose = 60 mEq po total dose (40 mEq x1 followed in 2 hours by 20 mEq x1). Recheck K+ level 4 hours after dose and the next AM.  If Serum K+  2.5-2.9, dose = 80 mEq po total dose (40 mEq Q2H x2). Recheck K+ level 4 hours after dose and the next AM.  If Serum K+ less than 2.5, See IV order.  DO NOT CRUSH      1435 (20 mEq)-Given         1420 (20 mEq)-Given        0856 (40 mEq)-Given       1153 (20 mEq)-Given [C]        0947 (20 mEq)-Given            rOPINIRole (REQUIP) tablet 0.25 mg  Dose: 0.25 mg  Freq: AT BEDTIME Route: PO  Start: 03/20/18 2000   Admin Instructions: 2 hours before sleep          1930 (0.25 mg)-Given        [ ] 2000           senna-docusate (SENOKOT-S;PERICOLACE) 8.6-50 MG per tablet 1 tablet  Dose: 1 tablet  Freq: 2 TIMES DAILY PRN Route: PO  PRN Reason: constipation  Start: 03/14/18 1637   Admin Instructions: If no bowel movement in 24 hours, increase to 2 tablets PO.  Hold for loose stools.  This is the first step of a three step constipation treatment.              Or  senna-docusate (SENOKOT-S;PERICOLACE) 8.6-50 MG per tablet 2 tablet  Dose: 2 tablet  Freq: 2 TIMES DAILY PRN Route: PO  PRN Reason: constipation  Start: 03/14/18 1637   Admin Instructions: Hold for loose stools.  This is the first step of a three step constipation treatment.               sodium chloride (PF) 0.9% PF flush 3 mL  Dose: 3 mL  Freq: EVERY 8 HOURS Route: IK  Start: 03/14/18 1645   Admin Instructions: And Q1H PRN, to lock peripheral IV dormant line.     0007 (3 mL)-Given       0901 (3 mL)-Given       1610 (3 mL)-Given               0903 (3 mL)-Given       1556 (3 mL)-Given        0132 (3 mL)-Given       0846 (3 mL)-Given       1607 (3 mL)-Given        0058 (3 mL)-Given       0814 (3 mL)-Given       1604 (3 mL)-Given        0027 (3 mL)-Given       0856 (3 mL)-Given               0044 (3 mL)-Given       0836 (3 mL)-Given       1610 (3 mL)-Given        0003 (3 mL)-Given       0750 (3 mL)-Given       [ ] 1645           sodium chloride (PF) 0.9% PF flush 3 mL  Dose: 3 mL  Freq: EVERY 1 HOUR PRN Route: IK  PRN Reason: line flush  PRN Comment: for peripheral IV  flush post IV meds  Start: 03/14/18 1637    1016 (3 mL)-Given                 traMADol (ULTRAM) tablet 50 mg  Dose: 50 mg  Freq: 3 TIMES DAILY PRN Route: PO  PRN Reason: moderate pain  Start: 03/16/18 1600     2022 (50 mg)-Given        1823 (50 mg)-Given        1604 (50 mg)-Given        0002 (50 mg)-Given       1623 (50 mg)-Given        0004 (50 mg)-Given       0947 (50 mg)-Given       2120 (50 mg)-Given        0747 (50 mg)-Given           Warfarin Therapy Reminder (Check START DATE - warfarin may be starting in the FUTURE)  Dose: 1 each  Freq: CONTINUOUS PRN Route: XX  Start: 03/14/18 1637   Admin Instructions: *Note to reorder warfarin daily*  Pharmacy Warfarin Dosing Service  Patient is on Warfarin Therapy - check for daily order              Completed Medications  Medications 03/15/18 03/16/18 03/17/18 03/18/18 03/19/18 03/20/18 03/21/18         Dose: 20 mg  Freq: EVERY 8 HOURS Route: IV  Start: 03/20/18 1600   End: 03/21/18 0750   Admin Instructions: For ordered doses up to 40 mg, give IV Push undiluted over 1-2 minutes.          1611 (20 mg)-Given        0003 (20 mg)-Given       0749 (20 mg)-Given             Dose: 1 mg  Freq: ONCE AT 6PM Route: PO  Start: 03/20/18 1800   End: 03/20/18 1829         1829 (1 mg)-Given           Discontinued Medications  Medications 03/15/18 03/16/18 03/17/18 03/18/18 03/19/18 03/20/18 03/21/18         Dose: 20 mg  Freq: 2 TIMES DAILY. Route: IV  Start: 03/19/18 1600   End: 03/20/18 0957   Admin Instructions: For ordered doses up to 40 mg, give IV Push undiluted over 1-2 minutes.         1616 (20 mg)-Given        0836 (20 mg)-Given       0957-Med Discontinued          Dose: 40 mg  Freq: 2 TIMES DAILY Route: IV  Start: 03/18/18 0945   End: 03/19/18 1047   Admin Instructions: For ordered doses up to 40 mg, give IV Push undiluted over 1-2 minutes.               2106 (40 mg)-Given        0857 (40 mg)-Given       1047-Med Discontinued           Dose: 4.5 g  Freq: EVERY 6 HOURS Route:  IV  Indications of Use: COMMUNITY ACQUIRED PNEUMONIA  Indications Comment: possible pseudomonas  Last Dose: 4.5 g (03/20/18 1000)  Start: 03/17/18 1100   End: 03/20/18 1010   Admin Instructions: FIRST DOSE STAT       1135 (4.5 g)-New Bag       1606 (4.5 g)-New Bag       2200 (4.5 g)-New Bag        0456 (4.5 g)-New Bag       1149 (4.5 g)-New Bag       1604 (4.5 g)-New Bag       2126 (4.5 g)-New Bag        0435 (4.5 g)-New Bag       1142 (4.5 g)-New Bag       1722 (4.5 g)-New Bag       2226 (4.5 g)-New Bag        0507 (4.5 g)-New Bag       1000 (4.5 g)-New Bag       1010-Med Discontinued                Dose: 1 each  Freq: NO DOSE TODAY (WARFARIN) Route: XX  Start: 03/19/18 1242   End: 03/19/18 2341   Admin Instructions: No dose of Warfarin due today per order         2341-Med Discontinued           Dose: 1 each  Freq: NO DOSE TODAY (WARFARIN) Route: XX  Start: 03/18/18 1247   End: 03/18/18 2346   Admin Instructions: No dose of Warfarin due today per order        2346-Med Discontinued       Medications 03/15/18 03/16/18 03/17/18 03/18/18 03/19/18 03/20/18 03/21/18

## 2018-03-14 NOTE — ED NOTES
[facility]  ED Nurse Handoff Report    Estefany An is a 87 year old female   ED Chief complaint: Generalized Weakness  . ED Diagnosis:   Final diagnoses:   None     Allergies: No Known Allergies    Code Status: DNR/DNI  Activity level - Baseline/Home:  Stand with Assist of 2. Activity Level - Current:   Assist of 2. Lift room needed: No. Bariatric: No   Needed: No   Isolation: No. Infection: Not Applicable.     Vital Signs:   Vitals:    03/14/18 1245 03/14/18 1300 03/14/18 1315 03/14/18 1330   BP: 130/75 125/70 128/82 125/75   Pulse:       Resp: 14 19 15 8   Temp:       TempSrc:       SpO2: 96% 95% 91% 94%   Weight:           Cardiac Rhythm:  ,   Cardiac  Cardiac Rhythm: Atrial fibrillation  Pain level:    Patient confused: No. Patient Falls Risk: Yes.   Elimination Status: Has voided   Patient Report - Initial Complaint: weakness. Focused Assessment:  Estefany An is a 87 year old female with arthritis, diastolic heart failure, and history of atrial fibrillation who presents to the emergency department via EMS for evaluation of generalized weakness. The patient had a fall on 01/09/18 in which she fractured her pelvis and her collarbone. She was then at [facility] transitional care unit until discharged yesterday. While there the patient was on Coumadin and prescribed this for home, but has not yet gotten the prescription filled. The patient reports she initially felt improved in the TCU, but realized this morning that she could not care for herself. She reports not feeling well since January with increased weakness, fatigue, episodes of productive coughing, and increased swelling to her lower legs bilaterally. Her daughter endorses these symptoms. The patient also notes that her weight increased from 180 lbs to 208 while in the care unit, and is currently still around 208. The patient further complains of pain to her pelvic bone, collar bone, right hip, and back, but attributed this to her  recent fractures and arthritis. She denies any recent fevers or urinary symptoms other than frequency.      Tests Performed: lab, xray. Abnormal Results:   Labs Ordered and Resulted from Time of ED Arrival Up to the Time of Departure from the ED   CBC WITH PLATELETS DIFFERENTIAL - Abnormal; Notable for the following:        Result Value    RBC Count 3.28 (*)     Hemoglobin 9.9 (*)     Hematocrit 30.9 (*)     RDW 16.0 (*)     Absolute Lymphocytes 0.7 (*)     All other components within normal limits   BASIC METABOLIC PANEL - Abnormal; Notable for the following:     Glucose 119 (*)     GFR Estimate 58 (*)     All other components within normal limits   NT PROBNP INPATIENT - Abnormal; Notable for the following:     N-Terminal Pro BNP Inpatient 5522 (*)     All other components within normal limits   INR - Abnormal; Notable for the following:     INR 2.16 (*)     All other components within normal limits   TROPONIN I   ROUTINE UA WITH MICROSCOPIC   PERIPHERAL IV CATHETER   CARDIAC CONTINUOUS MONITORING   STRICT INTAKE AND OUTPUT     Chest XR,  PA & LAT   Final Result   IMPRESSION: Small bilateral effusions.      TORY SABILLON MD      pt has sore bottom. From what I saw, no breakdown, but very red.   .   Treatments provided:   Family Comments: at bedside  OBS brochure/video discussed/provided to patient:  N/A  ED Medications:   Medications   furosemide (LASIX) injection 40 mg (not administered)     Drips infusing:  No  For the majority of the shift, the patient's behavior Green. Interventions performed were na.     Severe Sepsis OR Septic Shock Diagnosis Present: No      ED Nurse Name/Phone Number: Spring Jeremias,   2:22 PM      RECEIVING UNIT ED HANDOFF REVIEW    Above ED Nurse Handoff Report was reviewed: Yes  Reviewed by: KYLAH DONG on March 14, 2018 at 4:14 PM

## 2018-03-14 NOTE — IP AVS SNAPSHOT
` `     [place] [address] MED[facility]GICAL: [phone]                 INTERAGENCY TRANSFER FORM - NOTES (H&P, Discharge Summary, Consults, Procedures, Therapies)   3/14/2018                    Hospital Admission Date: 3/14/2018  ESTEFANY GRANT   : 1930  Sex: Female        Patient PCP Information     Provider PCP Type    Ricki Blake MD General         History & Physicals      H&P by Vicki Sena PA-C at 3/14/2018  3:06 PM     Author:  Vicki Sena PA-C Service:  Internal Medicine Author Type:  Physician Assistant - C    Filed:  3/14/2018  5:00 PM Date of Service:  3/14/2018  3:06 PM Creation Time:  3/14/2018  3:06 PM    Status:  Attested :  Vicki Sena PA-C (Physician Assistant - FLORA)    Cosigner:  Philip Javier MD at 3/15/2018  3:26 PM        Attestation signed by Philip Javier MD at 3/15/2018  3:26 PM        Physician Attestation   IPhilip, saw and evaluated Estefany Grant as part of a shared visit.  I have reviewed and discussed with the advanced practice provider their history, physical and plan.    I personally reviewed the vital signs, medications, labs and imaging.    My key history or physical exam findings: Patient seen and examined, case discussed with physician assistant.  An 87-year-old female with history of chronic A. fib on flecainide hypertension anxiety who presented with generalized weakness and shortness of breath a day after she was discharged from transitional care unit.  Upon discharge from the hospital patient was taken off Coumadin but it was restarted at transitional care unit, but was again to stop upon discharge.    Key management decisions made by me: Patient presented with increased shortness of breath and lower extremity swelling consistent with congestive heart failure likely diastolic based on results of echocardiogram her ejection fraction was normal but indeterminate left ventricular filling pressure.  -IV  Lasix  -Daily weight, input and output  -Daily BMP  -Continue Coumadin for now, and will discuss with family and patient.  -She had echocardiogram on 1/8/2018, there is no need to repeat it.  I discussed at length with patient the plan of care.  She is DNR/I      Philip Javier  Date of Service (when I saw the patient): 3/14/18                               History and Physical     Esteafny An MRN# [id number]   YOB: 1930 Age: 87 year old      Date of Admission:  3/14/2018    Primary care provider: Ricki Blake          Assessment and Plan:   Estefany An is a 87 year old female with a PMH significant for[AK1.1] chronic A. fib, hypertension, GERD and anxiety[AK1.2], who presents with[AK1.1] generalized weakness and shortness of breath.    1. Acute diastolic congestive heart failure -increase shortness of breath since discharging from TCU yesterday, did eat half of a [employer] meal last evening for dinner.  Was not sent home from TCU on Lasix.  BNP is 5500, chest x-ray shows small bilateral pleural effusions with mild vascular congestion.  She is not hypoxic but does appear to be mildly tachypneic and with increased cough.  Patient endorses increased lower extremity edema, orthopnea, and weight gain since last hospital discharge.  Received 40 mg of IV Lasix in the ED.  Continue Lasix 20 mg IV twice daily, strict I's and O's, 2 g sodium diet as needed duo nebs.  Last echo was done 1/2018, EF 55-60%.  Repeat echo is not indicated at this time.  2. Chronic A. Fib -rate controlled with metoprolol and nifedipine.  Anticoagulated on Coumadin.  Per chart review, at discharge from last hospitalization in January, patient was discontinued from Coumadin due to severe anemia requiring blood transfusion at that time.  Patient states she has been on Coumadin throughout her TCU stay but was not discharged on it yesterday.  Is unclear when exactly her Coumadin was resumed.  Her hemoglobin is currently  stable, will ask pharmacy to dose Coumadin.  Heart rates are mildly elevated in ED, possibly due to fluid overload per #1 versus elevated heart rates causing #1.  Will monitor heart rates on telemetry and consider titrating rate controlling medications.  3. HTN -resume home meds with parameters  4. GERD -resume omeprazole[AK1.2]    Prophylaxis -[AK1.1]on Coumadin[AK1.2]  Code status -[AK1.1] DNR / DNI[AK1.2]  Dispo -[AK1.1]admit inpatient.  Anticipate at least 2 nights for further management of symptoms.[AK1.2]                Chief Complaint:[AK1.1]   Weakness and shortness of breath[AK1.2]         History of Present Illness:   Estefany An is a 87 year old female with a PMH significant for[AK1.1] chronic A. fib, hypertension, GERD and anxiety[AK1.2], who presents with[AK1.1] generalized weakness and shortness of breath.  Patient states she discharged from a TCU yesterday .  She states she was not feeling 100% but wanted to go home.  She states that upon returning home she had a half of [employer] meal for dinner.  Since returning home she has become increasingly weak and short of breath.  Of note she was hospitalized in January after a fall in the nonsurgical pelvic fracture as well as influenza A.  She was discharged home but returned shortly after that with acute diastolic heart failure.  She was discharged from that hospital stay to TCU.  During the hospitalization she had severe anemia that required blood transfusion.  She was on Coumadin he for chronic A. fib and this was stopped even though the patient reports the Coumadin was continued throughout her TCU stay.  She states she was on Lasix during TCU stay as well but states she was not discharged home on either Lasix or Coumadin.  She notes increasing lower extremity edema as well as weight gain since her last discharge from the hospital, 180 up to 208 now.  She does note orthopnea and increased cough.  She denies chest pain, fever, chills, nausea, vomiting,  diarrhea, or dysuria.[AK1.2]  In the ED,[AK1.1] mild tachycardia in the 110s, vital signs otherwise stable.  BMP is fairly unremarkable.  CBC -otherwise unremarkable.Hgb 9.9, BNP 5522, troponin <0.015, INR 2.16.  UA unremarkable. CXR shows small bilateral pleural effusions and mild vascular congestion.  EKG A. fib with RVR.  She received 40 mg of IV Lasix and 50 mg PO Tramadol.  She will be admitted to inpatient for further management of acute diastolic heart failure.    Hx obtained by speaking with ED physician, chart review and pt interview.               Past Medical History:[AK1.2]     Past Medical History:   Diagnosis Date     Anxiety      Arthritis      Chronic atrial fibrillation (H)      Gastroesophageal reflux disease      Hypertension                Past Surgical History:     Past Surgical History:   Procedure Laterality Date     CHOLECYSTECTOMY                 Social History:     Social History     Social History     Marital status:      Spouse name: N/A     Number of children: N/A     Years of education: N/A     Occupational History     Not on file.     Social History Main Topics     Smoking status: Never Smoker     Smokeless tobacco: Never Used     Alcohol use No     Drug use: Not on file     Sexual activity: Not on file     Other Topics Concern     Not on file     Social History Narrative               Family History:[AK1.1]   Reviewed and noncontributory[AK1.2]         Allergies:    No Known Allergies            Medications:     Prior to Admission medications    Medication Sig Last Dose Taking? Auth Provider   polyethylene glycol (MIRALAX) powder Take 17 g (1 capful) by mouth daily   Luis Felipe Lyle MD   HYDROcodone-acetaminophen (NORCO) 5-325 MG per tablet Take 1 tablet by mouth every 4 hours as needed for moderate to severe pain (Max 3 does per day)   Jarocho Antoine, DO   LORazepam (ATIVAN) 0.5 MG tablet Take 1 tablet (0.5 mg) by mouth At Bedtime   Jarocho Antoine, DO   ipratropium -  albuterol 0.5 mg/2.5 mg/3 mL (DUONEB) 0.5-2.5 (3) MG/3ML neb solution Take 1 vial (3 mLs) by nebulization 3 times daily   Jarocho Antoine DO   furosemide (LASIX) 40 MG tablet Take 1 tablet (40 mg) by mouth daily   Jarocho Antoine DO   Acetaminophen (TYLENOL PO) Take 650 mg by mouth 3 times daily   Reported, Patient   metoprolol tartrate (LOPRESSOR) 50 MG tablet Take 1 tablet (50 mg) by mouth 2 times daily   Hernandez Stovall MD   losartan (COZAAR) 100 MG tablet Take 50 mg by mouth daily    Unknown, Entered By History   NIFEdipine ER (ADALAT CC) 90 MG TB24 Take 90 mg by mouth daily   Unknown, Entered By History              Review of Systems:   A Comprehensive greater than 10 system review of systems was carried out.  Pertinent positives and negatives are noted above.  Otherwise negative for contributory information.     Review Of Systems  Skin:[AK1.1] negative[AK1.2]  Eyes:[AK1.1] negative[AK1.2]  Ears/Nose/Throat:[AK1.1] negative[AK1.2]  Respiratory:[AK1.1] No shortness of breath, dyspnea on exertion, cough, or hemoptysis[AK1.2]  Cardiovascular:[AK1.1] negative[AK1.2]  Gastrointestinal:[AK1.1] negative[AK1.2]  Genitourinary:[AK1.1] negative[AK1.2]  Musculoskeletal:[AK1.1] negative[AK1.2]  Neurologic:[AK1.1] negative[AK1.2]  Psychiatric:[AK1.1] negative[AK1.2]  Hematologic/Lymphatic/Immunologic:[AK1.1] negative[AK1.2]  Endocrine:[AK1.1] negative[AK1.2]             Physical Exam:   Blood pressure 130/69, pulse 112, temperature 98.4  F (36.9  C), temperature source Oral, resp. rate 12, weight 94.3 kg (208 lb), SpO2 93 %.  Exam:  GENERAL:  Comfortable.  PSYCH: pleasant, oriented, No acute distress.  HEENT:  Atraumatic, normocephalic. Normal conjunctiva, normal hearing, and oropharynx is normal.  NECK:  Supple, no neck vein distention  HEART:  Normal S1, S2 with no murmur, no pericardial rub, gallops or S3 or S4.  LUNGS:[AK1.1]  Rhonchi and occasional wheezing, improved some with subsequent breaths, mildly  tachypneic[AK1.2]  GI:  Soft, normal bowel sounds. Non-tender, non distended.   EXTREMITIES:[AK1.1] 2+ bilateral lower extremity[AK1.2] edema, +2 pulses bilateral and equal.  SKIN:  Dry to touch, No rash, wound or ulcerations.  NEUROLOGIC:  CN 2-12 intact, BL 5/5 symmetric upper and lower extremity strength, sensation is intact with no focal deficits.               Data:[AK1.1]       Recent Labs  Lab 03/14/18  1247   NTBNPI 5522*       Recent Labs  Lab 03/14/18  1247   WBC 5.1   HGB 9.9*   HCT 30.9*   MCV 94          Recent Labs  Lab 03/14/18  1247      POTASSIUM 4.0   CHLORIDE 101   CO2 27   ANIONGAP 6   *   BUN 22   CR 0.91   GFRESTIMATED 58*   GFRESTBLACK 71   KACI 9.3       Recent Labs  Lab 03/14/18  1247   INR 2.16*       Recent Labs  Lab 03/14/18  1247   TROPI <0.015       Recent Labs  Lab 03/14/18  1321   COLOR Yellow   APPEARANCE Clear   URINEGLC Negative   URINEBILI Negative   URINEKETONE Negative   SG 1.013   UBLD Negative   URINEPH 7.0   PROTEIN Negative   NITRITE Negative   LEUKEST Negative   RBCU 1   WBCU 1       Recent Results (from the past 48 hour(s))   Chest XR,  PA & LAT    Narrative    XR CHEST 2 VW 3/14/2018 1:55 PM    HISTORY: Dyspnea.    COMPARISON: 1/29/2018    FINDINGS: Small bilateral pleural effusions. No airspace consolidation  or pneumothorax. Stable heart size. Severe degenerative change at the  shoulders and a subacute right clavicular head fracture.      Impression    IMPRESSION: Small bilateral effusions.    TORY SABILLON MD[AK1.3]         Vicki Sena PA-C    This patient was seen and discussed with [AK1.1] Concepcion[AK1.2] who agrees with the current plans as outlined above.[AK1.1]      Revision History        User Key Date/Time User Provider Type Action    > AK1.3 3/14/2018  5:00 PM Vicki Sena PA-C Physician Assistant - FLORA Sign     AK1.2 3/14/2018  4:43 PM Vicki Sena PA-C Physician Assistant - C      AK1.1 3/14/2018  3:06 PM Vicki Sena  HUYEN Tim Physician Assistant - C                   Discharge Summaries     No notes of this type exist for this encounter.         Consult Notes      Consults by White, Corinne C, LSW at 3/20/2018 10:16 AM     Author:  White, Corinne C, LSW Service:  (none) Author Type:      Filed:  3/20/2018 11:21 AM Date of Service:  3/20/2018 10:16 AM Creation Time:  3/20/2018 10:14 AM    Status:  Addendum :  White, Corinne C, LSW ()     Consult Orders:    1. Social Work IP Consult [[id number]] ordered by Hernandez Stovall MD at 03/20/18 1002                Discharge Planner   Discharge Plans in progress: Following for Dc planning   Barriers to discharge plan: [facility] is following and aware of dc tomorrow  Follow up plan: Will send DC orders once completed.        Entered by: Corinne C. White 03/20/2018 10:14 AM[CW1.1]     Called [facility] and set up WC transport for Wed @ 1030[CW1.2]     Revision History        User Key Date/Time User Provider Type Action    > CW1.2 3/20/2018 11:21 AM White, Corinne C, LSW  Addend     CW1.1 3/20/2018 10:16 AM White, Corinne C, LSW  Sign            Consults by Shannon West RN at 3/19/2018  8:50 AM     Author:  Shannon West RN Service:  C.O.R.E. Author Type:  Registered Nurse    Filed:  3/19/2018  8:53 AM Date of Service:  3/19/2018  8:50 AM Creation Time:  3/19/2018  8:49 AM    Status:  Signed :  Shannon West, RN (Registered Nurse)     Consult Orders:    1. [facility] Clinic Evaluation IP Consult: Patient to be seen: Routine - within 24 hours; Outpatient [facility] Clinic Evaluation to be completed by CORE RN. RN to coordinate heart failure transition and follow-up to outpatient care in CORE clinic.; Consultant ... [[id number]] ordered by Vicki Sena PA-C at 03/14/18 1510                [facility] Clinic referral received from Vicki Allison is not currently established in the [facility] Clinic and has not been  seen by a cardiologist from our group. She will need to establish care with a CORE MD.     Nutrition assistance appreciated. Hospital nurse, please give pt C[facility] Clinic/HF education.       [facility] appointment made for:    Tuesday 4/10 with BMP at 1145 and a visit with Dr. Chang at 1245. (first available)    Note that sEtefany may dc to TCU and she should follow with TCU provider during interim.          We look forward to seeing Estefany in the clinic.   Please call with questions.     Shannon West RN, BSN  [facility] Care Coordinator  [phone]      C.O.R.E. Clinic: Cardiomyopathy, Optimization, Rehabilitation, Education   The C.O.R.E. Clinic is a heart failure specialty clinic within the [facility] where you will work with your cardiologist, nurse practitioners, physician assistants and registered nurses who specialize in heart failure care. They are dedicated to helping patients with heart failure to carefully adjust medications, receive education, and learn who and when to call if symptoms develop. They specialize in helping you better understand your condition, slow the progression of your disease, improve the length and quality of your life, help you detect future heart problems before they become life threatening, and avoid hospitalizations.[AW1.1]                 Revision History        User Key Date/Time User Provider Type Action    > AW1.1 3/19/2018  8:53 AM Shannon West, RN Registered Nurse Sign            Consults by White, Corinne C, LSW at 3/16/2018  2:12 PM     Author:  White, Corinne C, LSW Service:  (none) Author Type:      Filed:  3/16/2018  3:16 PM Date of Service:  3/16/2018  2:12 PM Creation Time:  3/16/2018  2:02 PM    Status:  Addendum :  White, Corinne C, LSW ()     Consult Orders:    1. Social Work IP Consult [[id number]] ordered by Philip Javier MD at 03/16/18 1605                Care Transition Initial  Assessment -   Reason For Consult: care coordination/care conference, discharge planning  Met with: Patient and Family    Active Problems:    Acute diastolic congestive heart failure (H)    CHF (congestive heart failure) (H)       DATA  Lives With: facility resident- [facility].  PT is in Independent living . Does have cleaning support   Living Arrangements: independent living facility  Description of Support System: Supportive, Involved  Who is your support system?: Children, Facility resident(s)/Staff  Plan was to meet with [facility] to have assessment for LOUISE support services.  PT was also to have Home care through [facility] but was not home long enough to begin her services.   Identified issues/concerns regarding health management:   New CHF at this time.  PT will need to possible return to TCU at [facility]      Resources List: Home Care  Had orders for [facility] but sent in by RN         Transportation Available: car, family or friend will provide  Family plan to make decision about transport on day of dc  ASSESSMENT  Cognitive Status:  awake, alert and oriented  Concerns to be addressed: spoke with pt and daughter about dc planning. Both are aware of possible need for TCU and that pt will start on day 47.  Spoke with pt and family about benefit coverage for her TCU days.  Pt has a PASS that is still valid as she has not been home for 30 day.  PT was home about one day from TCU prior to her readmission.  PT and daughter would prefer a private room and aware of the additional fee  PT has home walker.  She does have cleaning help and generally attends breakfast but maker her own meals otherwise .     PLAN  Will send referral back to [facility] for TCU.. Prefer private room but will take shared and be placed in list if needed.[CW1.1]     PT has been accepted for TCU at [facility] for DC planning[CW1.2]      Revision History        User Key Date/Time User Provider Type Action    > CW1.2 3/16/2018  3:16 PM White, Corinne  MIREILLE HINES  Addend     CW1.1 3/16/2018  2:12 PM White, Corinne C, LSW  Sign            Consults by Michelle Allen CM at 3/15/2018  4:15 PM     Author:  Michelle Allen CM Service:  Care Coordinator Author Type:      Filed:  3/15/2018  4:15 PM Date of Service:  3/15/2018  4:15 PM Creation Time:  3/15/2018  4:09 PM    Status:  Signed :  Michelle Allen CM ()     Consult Orders:    1. Care Coordinator IP Consult [[id number]] ordered by Vicki Sena PA-C at 03/14/18 1510                Care Transition Initial Assessment - RN    Reason For Consult: care coordination/care conference, discharge planning   Spoke with: daughterMaylin.  DATA   Active Problems:    Acute diastolic congestive heart failure (H)    CHF (congestive heart failure) (H)     CHF Packet reviewed with patient & daughter at bedside.   Cognitive Status: awake, alert and oriented.  [facility]  Primary Care MD Name: Dr. Ricki Blake  Contact information and PCP information verified: Yes  Lives With: facility resident  Living Arrangements: independent living facility     Description of Support System: Supportive, Involved   Who is your support system?: Children, Facility resident(s)/Staff       Insurance concerns: No Insurance issues identified  ASSESSMENT  Patient currently receives the following services:  Meals, housekeeping & laundry. Her daughter sets up her medications.         Identified issues/concerns regarding health management: Patient discharged last time with neb machine. Daughter stated patient has difficult time manipulating the medication container and spills the medication. She is noncompliant with a low sodium diet and has not been weighing herself daily. CM provided patient with a new scale with large readout.     PLAN  Financial costs for the patient were not discussed.  Patient given options and choices for discharge.  Patient/family is agreeable to  the plan?  Yes  Patient anticipates discharging back to either [facility] or TCU.     Patient anticipates needs for home equipment: No  Discharge Planner   Discharge Plans in progress: Yes   Barriers to discharge plan: Ongoing IV diuresis  Plan/Disposition: TBD   Appointments: Daughter stated she will make any f/u appointment.     CM will continue to follow patient until discharge for any additional needs.     Lesli Allen, RN, BSN, CTS  [facility]  [phone][BS1.1]           Revision History        User Key Date/Time User Provider Type Action    > BS1.1 3/15/2018  4:15 PM Michelle Allen CM  Sign            Consults by Abi Link RD, LD at 3/15/2018 10:00 AM     Author:  Abi Link RD, LD Service:  Nutrition Author Type:  Registered Dietitian    Filed:  3/15/2018  2:55 PM Date of Service:  3/15/2018 10:00 AM Creation Time:  3/15/2018  9:48 AM    Status:  Signed :  Abi Link RD, LD (Registered Dietitian)     Consult Orders:    1. Nutrition Services Adult IP Consult [[id number]] ordered by Vicki Sena PA-C at 03/14/18 1510                CLINICAL NUTRITION SERVICES  -  ASSESSMENT NOTE      Malnutrition:[MS1.1] Does not meet criteria at this time[MS1.2]        REASON FOR ASSESSMENT  Estefany An is a 87 year old female seen by Registered Dietitian for Provider Order - Nutrition Education[MS1.1] (x2)[MS1.3] - 2 gm Na.      NUTRITION HISTORY[MS1.1]  - Information obtained from patient and chart though limited as falling asleep during conversation.[MS1.2]  - Patient with a h/o diastolic CHF[MS1.1].[MS1.3]  - Recent TCU d/c (3/13/2018).[MS1.1]  - Has met with this writer x 2 during previous admissions, but never for low sodium education, consulted d/t LOS.  - Previous education:[MS1.3]  Patient denies previous diet education.  Reports a regular diet at home and does not verbalize sodium restrictions recommended daily.  Does mention she does not add  "salt at home.[MS1.2]  - Weighs self:[MS1.3] Patient denies weighing self at home.[MS1.2]  - Grocery shopping and meal preparation:[MS1.3] Patient reports she prepares most of her own meals but may also go down to dining boss at [facility] to receive meals.[MS1.2]  -[MS1.3] Reports her appetite has been decreased over the past \"few weeks\" but could not specify further.  - Breakfast: Scrambled eggs, 2 pieces toast with butter.  Sometimes saez.  - Lunch: Ham sandwich.  - Dinner: Sometimes has cold cereal (likes Cheerios) with milk and fruit.  Or may go down to dining boss (could not provide further specifics of common foods consumed).   - NKFA.[MS1.2]       CURRENT NUTRITION ORDERS  Diet Order:[MS1.1]     2 gm Na/No caffeine[MS1.3]    Current Intake/Tolerance:[MS1.1]  % intakes since admission.[MS1.3]      PHYSICAL FINDINGS  Observed[MS1.1]  Suspect chronic muscle losses associated with aging  Speech seeming somewhat garbled, could just be tired, discussed with RN[MS1.2]  Obtained from Chart/Interdisciplinary Team[MS1.1]  +2 BLE edema[MS1.3]    ANTHROPOMETRICS  Height: 5' 8\"  Weight:[MS1.1] 92.7 kg ([MS1.2]204[MS1.1]#)[MS1.2]  Body mass index is 31.14 kg/(m^2).  Weight Status:[MS1.1]  Obesity Grade I BMI 30-34.9[MS1.2]  IBW:[MS1.1] 63.6 kg (140#)[MS1.2]  % IBW:[MS1.1] 146%[MS1.2]  Weight History:[MS1.1]  Wt Readings from Last 10 Encounters:   03/15/18 92.9 kg (204 lb 12.8 oz)   01/31/18 88.9 kg (196 lb)   01/13/18 88.7 kg (195 lb 8 oz)   11/02/14 81.6 kg (180 lb)   12/03/13 81.6 kg (180 lb)[MS1.4]   - Wt gain 2/2 fluid retention since 1/2018.[MS1.3]      LABS  Labs reviewed    MEDICATIONS  Medications reviewed[MS1.1]:  - Lasix[MS1.3]    Dosing Weight[MS1.1] 70.9 kg - adjusted weight[MS1.2]     ASSESSED NUTRITION NEEDS PER APPROVED PRACTICE GUIDELINES:  Estimated Energy Needs:[MS1.1] 6816-6166[MS1.2] kcals ([MS1.1]25-30 Kcal/Kg[MS1.2])  Justification:[MS1.1] maintenance[MS1.2]  Estimated Protein Needs:[MS1.1] " "[MS1.2] grams protein ([MS1.1]1.2-1.5 g pro/Kg[MS1.2])  Justification:[MS1.1] preservation of lean body mass[MS1.2]  Estimated Fluid Needs:[MS1.1] 6163-4955[MS1.2]  mL ([MS1.1]1 mL/Kcal[MS1.2])  Justification:[MS1.1] maintenance and per provider pending fluid status[MS1.2]    MALNUTRITION:  % Weight Loss:[MS1.1]  None noted[MS1.2]  % Intake:[MS1.1]  Decreased intake does not meet criteria for malnutrition --> patient reported waxes and wanes, sometimes eating at baseline but not consistently[MS1.2]  Subcutaneous Fat Loss:[MS1.1]  None observed[MS1.2]  Muscle Loss:[MS1.1]  Acromion bone region mild depletion and Dorsal hand region mild depletion --> suspect component of aging[MS1.2]  Fluid Retention:[MS1.1]  Mild --> not using as indicator as do not suspect masking true wt loss[MS1.2]    Malnutrition Diagnosis:[MS1.1] Patient does not meet two of the above criteria necessary for diagnosing malnutrition but is at risk[MS1.2]    NUTRITION DIAGNOSIS:[MS1.1]  Predicted inadequate nutrient intake (energy/protein)[MS1.2] related to[MS1.1] report of decreased appetite without consistent decrease in oral intakes, potential for decline.[MS1.2]      NUTRITION INTERVENTIONS  Recommendations / Nutrition Prescription[MS1.1]  Continue 2 gm Na diet restriction, caffeine restriction per MD.     Addition of Ensure supplement with meals.[MS1.2]       Implementation  Nutrition education:[MS1.1] Provided education on 2 gm Na diet:    Assessed learning needs, learning preferences, and willingness to learn    Nutrition Education (Content):  a) Provided handout \"low sodium nutrition therapy\"  b) Discussed common foods high in sodium to avoid, appropriate substitutes  c) Discussed how to read nutrition facts labels and recommendations for </=2000 mg sodium daily    Nutrition Education (Application):  a) Discussed eating habits and recommended alternative food choices.   b) Encouraged patient to avoid saez and to track sodium intake " for at least 1 week at d/c.     Patient unable to verbalize understanding of diet.    Anticipate poor to fair compliance.    Diet Education - refer to Education Flowsheet    Collaboration and Referral of Nutrition care: Discussed POC with team during rounds.  Discussed speech with RN.[MS1.2]      Nutrition Goals[MS1.1]  Patient to consume at least 50-75% of meals TID or the equivalent with supplement use.[MS1.2]      MONITORING AND EVALUATION:[MS1.1]  Progress towards goals will be monitored and evaluated per protocol and Practice Guidelines[MS1.2]      Abi Link RD, NATALI  Clinical Dietitian  3rd floor/ICU: [phone]  All other floors: [phone]  Weekend/holiday: [phone][MS1.1]     Revision History        User Key Date/Time User Provider Type Action    > MS1.2 3/15/2018  2:55 PM Abi Link RD, LD Registered Dietitian Sign     MS1.4 3/15/2018 12:32 PM Abi Link RD, LD Registered Dietitian      MS1.3 3/15/2018 12:27 PM Abi Link RD, LD Registered Dietitian      MS1.1 3/15/2018  9:48 AM Abi Link RD, LD Registered Dietitian                      Progress Notes - Physician (Notes from 03/18/18 through 03/21/18)      Progress Notes by Hernandez Stovall MD at 3/20/2018  3:49 PM     Author:  Hernandez Stovall MD Service:  Hospitalist Author Type:  Physician    Filed:  3/20/2018  3:51 PM Date of Service:  3/20/2018  3:49 PM Creation Time:  3/20/2018  3:50 PM    Status:  Signed :  Hernandez Stovall MD (Physician)         [facility]  Hospitalist Progress Note[MA1.1]  Hernandez Stovall MD 03/20/2018[MA1.2]    Reason for Stay (Diagnosis):     Shortness of breath of multifactorial etiology including congestive heart failure, community-acquired pneumonia    Acute encephalopathy         Assessment and Plan:        Summary of Stay: Estefany An is a 87 year old female with history of chronic A. fib, hypertension, gastroesophageal reflux disease,  anxiety, presented with generalized weakness and shortness of breath and admitted on 3/14/2018.   Patient was admitted to telemetry bed and closely monitored with IV diuresis, oxygen supplementation and close monitoring.      Problem List:   #1.  Acute hypoxic respiratory failure: Suspect multifactorial etiology including acute diastolic congestive heart failure and pulmonary infection:  --Continue supplemental oxygen, VBG showed normal pH but elevated PCO2 and compensatory bicarb elevation.  Continue antibiotic for pulmonary infection  --Patient is improving, continue to monitor    #2.  Acute on chronic heart failure with preserved ejection fraction:Her echocardiogram on 01/08/18 showed EF f 55-60%, indeterminate left ventricular filling pressure.  --Patient is improving,Continue intravenous Lasix for one more day[MA1.1]   I/O last 3 completed shifts:  In: 720 [P.O.:520; I.V.:200]  Out: 700 [Urine:700]  Wt Readings from Last 5 Encounters:   03/20/18 92.5 kg (203 lb 14.8 oz)   01/31/18 88.9 kg (196 lb)   01/13/18 88.7 kg (195 lb 8 oz)   11/02/14 81.6 kg (180 lb)   12/03/13 81.6 kg (180 lb)[MA1.2]        #3.  Chronic atrial fibrillation-rate controlled on metoprolol.  -Rate controlled, continue to monitor, INR is supratherapeutic today, pharmacy is consulted to assist with dosing.  May need to hold Coumadin today    #4.  Community-acquired pneumonia: Patient developed focal area of infiltrate in the left midlung since March 14, 2018  --Intravenous antibiotic with Zosyn and azithromycin started March 17, 2018, continue to monitor vitals and cultures.  --Patient is afebrile, continue antibiotic for now  #5.  Acute encephalopathy: Suspect multifactorial including hypoxia and hypercapnia and congestive heart failure: Patient is at risk of delirium, continue to treat underlying pathology, monitor for now.  #6.  Hypertension: Controlled, continue current medications  #7.  Generalized weakness and physical deconditioning:  "Patient Has been admitted several times recently, will get PT, OT as well as care coordinators consider palliative care consultation to assist with further goal of care planning.  Patient is DNR and DNI.      #8.  Bilateral lower extremity pain-controlled with Ultram 3 times a day as needed, patient takes it Ultram 50 mg at 1600, 2000, and at midnight  Mostly she does require it in the morning.  # Pain Assessment:[MA1.1]   Current Pain Score 3/20/2018 3/20/2018 3/19/2018   Patient currently in pain? - yes denies   Pain score (0-10) 4 8 0   Pain location (No Data) Back -   Pain descriptors Aching Aching -[MA1.2]   - Estefany is experiencing pain due to bilateral lower extremity. Pain management was discussed and the plan was created in a collaborative fashion.  Estefany's response to the current recommendations: compliant  - Please see the plan for pain management as documented above    DVT Prophylaxis: Warfarin  Code Status: DNR / DNI  Discharge Dispo: Likely TCU, pending PT evaluation  Estimated Disch Date / # of Days until Disch: may discharge to memory care unit tomorrow        Interval History (Subjective):              Patient was seen and examined by me this morning.  She feels weak, lower extremity edema persisted.  She has been having restless leg syndrome not taking any medication now.  Patient is a 99 acute distress at rest, on nasal oxygen supplement.  Plan for today is to get her iron store and replenish if that will alleviate her restless leg, start Requip 0.25 mg 2 hours before bedtime.  I requested PT, OT and social service to assist with discharge recommendations.  We will continue Lasix for another day.                       Physical Exam:      Last Vital Signs:[MA1.1]  /53 (BP Location: Right arm)  Pulse 112  Temp 95.6  F (35.3  C) (Oral)  Resp 16  Ht 1.727 m (5' 8\")  Wt 92.5 kg (203 lb 14.8 oz)  SpO2 95%  BMI 31.01 kg/m2    I/O last 3 completed shifts:  In: 720 [P.O.:520; I.V.:200]  Out: " 700 [Urine:700]  Wt Readings from Last 1 Encounters:   03/20/18 92.5 kg (203 lb 14.8 oz)     Current Facility-Administered Medications   Medication     furosemide (LASIX) injection 20 mg     rOPINIRole (REQUIP) tablet 0.25 mg     piperacillin-tazobactam (ZOSYN) infusion 3.375 g     warfarin (COUMADIN) tablet 1 mg     ipratropium - albuterol 0.5 mg/2.5 mg/3 mL (DUONEB) neb solution 3 mL     azithromycin (ZITHROMAX) 250 mg in sodium chloride 0.9 % 250 mL intermittent infusion     Menthol (Topical Analgesic) 4 % GEL 5 g     potassium chloride SA (K-DUR/KLOR-CON M) CR tablet 20-40 mEq     potassium chloride (KLOR-CON) Packet 20-40 mEq     potassium chloride 10 mEq in 100 mL sterile water intermittent infusion (premix)     potassium chloride 10 mEq in 100 mL intermittent infusion with 10 mg lidocaine     potassium chloride 20 mEq in 50 mL intermittent infusion     magnesium sulfate 2 g in NS intermittent infusion (PharMEDium or FV Cmpd)     magnesium sulfate 4 g in 100 mL sterile water (premade)     traMADol (ULTRAM) tablet 50 mg     miconazole (MICATIN; MICRO GUARD) 2 % powder     naloxone (NARCAN) injection 0.1-0.4 mg     melatonin tablet 1 mg     lidocaine 1 % 1 mL     lidocaine (LMX4) kit     sodium chloride (PF) 0.9% PF flush 3 mL     sodium chloride (PF) 0.9% PF flush 3 mL     Patient is already receiving anticoagulation with heparin, enoxaparin (LOVENOX), warfarin (COUMADIN)  or other anticoagulant medication     senna-docusate (SENOKOT-S;PERICOLACE) 8.6-50 MG per tablet 1 tablet    Or     senna-docusate (SENOKOT-S;PERICOLACE) 8.6-50 MG per tablet 2 tablet     polyethylene glycol (MIRALAX/GLYCOLAX) Packet 17 g     ondansetron (ZOFRAN-ODT) ODT tab 4 mg    Or     ondansetron (ZOFRAN) injection 4 mg     Continuing ACE inhibitor/ARB/ARNI from home medication list OR ACE inhibitor/ARB/ARNI order already placed during this visit     Continuing beta blocker from home medication list OR beta blocker order already placed  during this visit     ipratropium - albuterol 0.5 mg/2.5 mg/3 mL (DUONEB) neb solution 3 mL     Warfarin Therapy Reminder (Check START DATE - warfarin may be starting in the FUTURE)     acetaminophen (TYLENOL) tablet 1,000 mg     fluticasone-vilanterol (BREO ELLIPTA) 200-25 MCG/INH oral inhaler 1 puff     LORazepam (ATIVAN) tablet 0.5 mg     losartan (COZAAR) tablet 50 mg     metoprolol tartrate (LOPRESSOR) tablet 50 mg     omeprazole (priLOSEC) CR capsule 20 mg     NIFEdipine ER osmotic (PROCARDIA XL) 24 hr tablet 90 mg[MA1.2]       Constitutional: Awake, awake but somnolent   Respiratory:  Patient has decreased air entry, diffuse crackles posteriorly and basally.   Cardiovascular: Regular rate and rhythm, normal S1 and S2, and no murmur noted   Abdomen: Normal bowel sounds, soft, non-distended, non-tender   Skin: No rashes, no cyanosis, dry to touch   Neuro: Alert and oriented x3, no weakness, numbness, memory loss   Extremities: ++edema, normal range of motion   Other(s):HEENT        All other systems: Negative          Data:      All new lab and imaging data was reviewed.   Labs:[MA1.1]    Recent Labs  Lab 03/17/18  1301 03/17/18  1256   CULT No growth after 3 days No growth after 3 days       Recent Labs  Lab 03/20/18  0826 03/19/18  1557 03/19/18  0635 03/18/18  0616   *  --  133 132*   POTASSIUM 3.6 3.9 3.1* 3.9   CHLORIDE 93*  --  94 94   CO2 30  --  33* 34*   ANIONGAP 8  --  6 4   GLC 97  --  97 97   BUN 20  --  21 17   CR 1.19*  --  1.13* 1.01   GFRESTIMATED 43*  --  46* 52*   GFRESTBLACK 52*  --  55* 63   KACI 8.6  --  8.4* 8.4*       Recent Labs  Lab 03/20/18  0826 03/19/18  0635 03/18/18  0616   WBC 5.6 4.9 3.9*   HGB 9.9* 9.3* 9.5*   HCT 29.9* 29.5* 29.8*   MCV 93 94 97   * 117* 135*       Recent Labs  Lab 03/20/18  0826 03/19/18  0635 03/18/18  0616 03/17/18  1226 03/17/18  0645 03/16/18  0721   GLC 97 97 97  --  90 98   BGM  --   --   --  108*  --   --[MA1.2]       Imaging:   Results for  orders placed or performed during the hospital encounter of 03/14/18   Chest XR,  PA & LAT    Narrative    XR CHEST 2 VW 3/14/2018 1:55 PM    HISTORY: Dyspnea.    COMPARISON: 1/29/2018    FINDINGS: Small bilateral pleural effusions. No airspace consolidation  or pneumothorax. Stable heart size. Severe degenerative change at the  shoulders and a subacute right clavicular head fracture.      Impression    IMPRESSION: Small bilateral effusions.    TORY SABILLON MD[MA1.1]          Revision History        User Key Date/Time User Provider Type Action    > MA1.2 3/20/2018  3:51 PM Hernandez Stovall MD Physician Sign     MA1.1 3/20/2018  3:49 PM Hernandez Stovall MD Physician             Progress Notes by Michelle Allen CM at 3/20/2018  2:23 PM     Author:  Michelle Allen CM Service:  Care Coordinator Author Type:      Filed:  3/20/2018  2:59 PM Date of Service:  3/20/2018  2:23 PM Creation Time:  3/20/2018  2:23 PM    Status:  Addendum :  Michelle Allen CM ()         SHARDA observed patient's ODALYS score is elevated.[BS1.1] CM saw patient early in her admission for CHF.[BS1.2] She is discharging to TCU ([facility]) so no f/u appt was made.[BS1.3]       CM will continue to follow patient until discharge for any additional needs.     Lesli Allen RN, BSN, CTS  [facility]  [phone][BS1.1]         Revision History        User Key Date/Time User Provider Type Action    > BS1.2 3/20/2018  2:59 PM Michelle Allen CM  Addend     BS1.3 3/20/2018  2:32 PM Michelle Allen CM  Sign     BS1.1 3/20/2018  2:23 PM Michelle Allen CM              Progress Notes by Pretty Kohler RD, LD at 3/20/2018  9:34 AM     Author:  Pretty Kohler RD, LD Service:  Nutrition Author Type:  Registered Dietitian    Filed:  3/20/2018  1:07 PM Date of Service:  3/20/2018  9:34 AM Creation Time:  3/20/2018  9:34 AM    Status:  Signed :  Pretty Kohler RD, LD  "(Registered Dietitian)         CLINICAL NUTRITION SERVICES - REASSESSMENT NOTE    EVALUATION OF PROGRESS TOWARD GOALS   Diet: 2g Na  Intake: % intakes recorded per RN documentation. Meal review shows smaller meals ordered TID most days[AK1.1], though patient feels she is eating adequate portions. \"They've been giving me enough food for 4!\". Enjoys the food here, ordered burger with fruit for lunch today.   N[AK1.2]ote 2g Na diet ed given on 3/15.[AK1.1] Pt declines need for additional education.   Pt recalls food from last TCU stay \"No salt diet\" \"tasted very bland but I could tolerate it\".[AK1.2]    Dosing Weight 70.9 kg - adjusted weight      ASSESSED NUTRITION NEEDS PER APPROVED PRACTICE GUIDELINES:  Estimated Energy Needs: 3279-2236 kcals (25-30 Kcal/Kg)  Justification: maintenance  Estimated Protein Needs:  grams protein (1.2-1.5 g pro/Kg)  Justification: preservation of lean body mass  Estimated Fluid Needs: 1861-6070  mL (1 mL/Kcal)  Justification: maintenance and per provider pending fluid status    NEW FINDINGS:[AK1.1]   - D/c to TCU 3/21[AK1.2]    Previous Goals:[AK1.1]   Patient to consume at least 50-75% of meals TID or the equivalent with supplement use.[AK1.2]  Evaluation:[AK1.1] Met[AK1.2]    Previous Nutrition Diagnosis:[AK1.1]   Predicted inadequate nutrient intake (energy/protein) related to report of decreased appetite without consistent decrease in oral intakes, potential for decline.[AK1.2]  Evaluation:[AK1.1] No change[AK1.2]    CURRENT NUTRITION DIAGNOSIS[AK1.1]  Predicted inadequate nutrient intake (energy/protein) related to report of decreased appetite without consistent decrease in oral intakes, potential for decline.[AK1.2]    INTERVENTIONS  Recommendations / Nutrition Prescription[AK1.1]  Continue 2 gm Na diet restriction, caffeine restriction per MD.      Continue Ensure supplement with meals.[AK1.2]     Implementation[AK1.1]  Collaboration and Referral of Nutrition care: pt " discussed during interdisciplinary rounds.   Nutrition Education: ongoing encouragement of sodium restriction, assessed ed needs.[AK1.2]     Goals[AK1.1]  Pt to tolerate at least 75% of meals TID or equivalent w/ meals.[AK1.2]       MONITORING AND EVALUATION:[AK1.1]  Progress towards goals will be monitored and evaluated per protocol and Practice Guidelines    Pretty Kohler RD, LD  3rd floor/ICU: [phone]  All other floors: [phone]  Weekend/holiday: [phone]  Office: [phone][AK1.2]         Revision History        User Key Date/Time User Provider Type Action    > AK1.2 3/20/2018  1:07 PM Pretty Kohler RD, LD Registered Dietitian Sign     AK1.1 3/20/2018  9:34 AM Pretty Kohler RD, LD Registered Dietitian             Progress Notes by Hernandez Stovall MD at 3/20/2018 10:02 AM     Author:  Hernandez Stovall MD Service:  Hospitalist Author Type:  Physician    Filed:  3/20/2018 10:04 AM Date of Service:  3/20/2018 10:02 AM Creation Time:  3/20/2018 10:02 AM    Status:  Signed :  Hernandez Stovall MD (Physician)         Patient was seen and examined by me this morning.  She feels weak, lower extremity edema persisted.  She has been having restless leg syndrome not taking any medication now.  Patient is a 99 acute distress at rest, on nasal oxygen supplement.  Plan for today is to get her iron store and replenish if that will alleviate her restless leg, start Requip 0.25 mg 2 hours before bedtime.  I requested PT, OT and social service to assist with discharge recommendations.  We will continue Lasix for another day.[MA1.1]     Revision History        User Key Date/Time User Provider Type Action    > MA1.1 3/20/2018 10:04 AM Hernandez Stovall MD Physician Sign            Progress Notes by Hernandez Stovall MD at 3/19/2018  3:36 PM     Author:  Hernandez Stovall MD Service:  Hospitalist Author Type:  Physician    Filed:  3/19/2018  3:38 PM Date of Service:  3/19/2018  3:36 PM Creation Time:   3/19/2018  3:36 PM    Status:  Signed :  Hernandez Stovall MD (Physician)         [facility]  Hospitalist Progress Note  Hernandez Stovall MD 03/19/2018    Reason for Stay (Diagnosis):     Shortness of breath of multifactorial etiology including congestive heart failure, community-acquired pneumonia    Acute encephalopathy         Assessment and Plan:        Summary of Stay: Estefany An is a 87 year old female with history of chronic A. fib, hypertension, gastroesophageal reflux disease, anxiety, presented with generalized weakness and shortness of breath and admitted on 3/14/2018.   Patient was admitted to telemetry bed and closely monitored with IV diuresis, oxygen supplementation and close monitoring.      Problem List:   #1.  Acute hypoxic respiratory failure: Suspect multifactorial etiology including acute diastolic congestive heart failure and pulmonary infection:  --Continue supplemental oxygen, VBG showed normal pH but elevated PCO2 and compensatory bicarb elevation.  Continue antibiotic for pulmonary infection  --Patient is improving, continue to monitor    #2.  Acute on chronic heart failure with preserved ejection fraction:Her echocardiogram on 01/08/18 showed EF f 55-60%, indeterminate left ventricular filling pressure.  --Patient is improving,Continue intravenous Lasix for another day  I/O last 3 completed shifts:  In: 780 [P.O.:780]  Out: 800 [Urine:800]  Wt Readings from Last 5 Encounters:   03/18/18 90 kg (198 lb 8 oz)   01/31/18 88.9 kg (196 lb)   01/13/18 88.7 kg (195 lb 8 oz)   11/02/14 81.6 kg (180 lb)   12/03/13 81.6 kg (180 lb)        #3.  Chronic atrial fibrillation-rate controlled on metoprolol.  -Rate controlled, continue to monitor, INR is supratherapeutic today, pharmacy is consulted to assist with dosing.  May need to hold Coumadin today    #4.  Community-acquired pneumonia: Patient developed focal area of infiltrate in the left midlung since March 14,  2018  --Intravenous antibiotic with Zosyn and azithromycin started March 17, 2018, continue to monitor vitals and cultures.  --Patient is afebrile, continue antibiotic for now  #5.  Acute encephalopathy: Suspect multifactorial including hypoxia and hypercapnia and congestive heart failure: Patient is at risk of delirium, continue to treat underlying pathology, monitor for now.  #6.  Hypertension: Controlled, continue current medications  #7.  Generalized weakness and physical deconditioning: Patient Has been admitted several times recently, will get PT, OT as well as care coordinators consider palliative care consultation to assist with further goal of care planning.  Patient is DNR and DNI.      #8.  Bilateral lower extremity pain-controlled with Ultram 3 times a day as needed, patient takes it Ultram 50 mg at 1600, 2000, and at midnight  Mostly she does require it in the morning.  # Pain Assessment:   Current Pain Score 3/19/2018 3/19/2018 3/19/2018   Patient currently in pain? yes yes denies   Pain score (0-10) 4 4 0   Pain location Leg Leg -   Pain descriptors Aching Aching -   - Estefany is experiencing pain due to bilateral lower extremity. Pain management was discussed and the plan was created in a collaborative fashion.  Estefany's response to the current recommendations: compliant  - Please see the plan for pain management as documented above    DVT Prophylaxis: Warfarin  Code Status: DNR / DNI  Discharge Dispo: Likely TCU, pending PT evaluation  Estimated Disch Date / # of Days until Disch: Continue current care, may need any additional 1-2 days in the hospital until her condition improves.        Interval History (Subjective):                 Patient was seen and examined by me this morning.  She is a little alert today, communicating better.  She does not appear to be in respiratory distress.  She is requiring less oxygen today.  No chest pain.  She is up in chair.  Lung sounds are diminished with crackles  "posteriorly.  She has peripheral edema.  Plan is to continue diuretic therapy at a lower dose, continue antibiotic, care coordination and discharge planning as patient is at risk of getting readmitted unless we have a safe discharge plan.  She needs to stay in the hospital for the next 1 or 2 days.  Patient's daughter was contacted and questions and concerns addressed           Physical Exam:      Last Vital Signs:  /53 (BP Location: Right arm)  Pulse 112  Temp 95.7  F (35.4  C) (Oral)  Resp 20  Ht 1.727 m (5' 8\")  Wt 90 kg (198 lb 8 oz)  SpO2 96%  BMI 30.18 kg/m2    I/O last 3 completed shifts:  In: 780 [P.O.:780]  Out: 800 [Urine:800]  Wt Readings from Last 1 Encounters:   03/18/18 90 kg (198 lb 8 oz)     Current Facility-Administered Medications   Medication     furosemide (LASIX) injection 20 mg     warfarin-No DOSE today     ipratropium - albuterol 0.5 mg/2.5 mg/3 mL (DUONEB) neb solution 3 mL     piperacillin-tazobactam (ZOSYN) intermittent infusion 4.5 g     azithromycin (ZITHROMAX) 250 mg in sodium chloride 0.9 % 250 mL intermittent infusion     Menthol (Topical Analgesic) 4 % GEL 5 g     potassium chloride SA (K-DUR/KLOR-CON M) CR tablet 20-40 mEq     potassium chloride (KLOR-CON) Packet 20-40 mEq     potassium chloride 10 mEq in 100 mL sterile water intermittent infusion (premix)     potassium chloride 10 mEq in 100 mL intermittent infusion with 10 mg lidocaine     potassium chloride 20 mEq in 50 mL intermittent infusion     magnesium sulfate 2 g in NS intermittent infusion (PharMEDium or FV Cmpd)     magnesium sulfate 4 g in 100 mL sterile water (premade)     traMADol (ULTRAM) tablet 50 mg     miconazole (MICATIN; MICRO GUARD) 2 % powder     naloxone (NARCAN) injection 0.1-0.4 mg     melatonin tablet 1 mg     lidocaine 1 % 1 mL     lidocaine (LMX4) kit     sodium chloride (PF) 0.9% PF flush 3 mL     sodium chloride (PF) 0.9% PF flush 3 mL     Patient is already receiving anticoagulation with " heparin, enoxaparin (LOVENOX), warfarin (COUMADIN)  or other anticoagulant medication     senna-docusate (SENOKOT-S;PERICOLACE) 8.6-50 MG per tablet 1 tablet    Or     senna-docusate (SENOKOT-S;PERICOLACE) 8.6-50 MG per tablet 2 tablet     polyethylene glycol (MIRALAX/GLYCOLAX) Packet 17 g     ondansetron (ZOFRAN-ODT) ODT tab 4 mg    Or     ondansetron (ZOFRAN) injection 4 mg     Continuing ACE inhibitor/ARB/ARNI from home medication list OR ACE inhibitor/ARB/ARNI order already placed during this visit     Continuing beta blocker from home medication list OR beta blocker order already placed during this visit     ipratropium - albuterol 0.5 mg/2.5 mg/3 mL (DUONEB) neb solution 3 mL     Warfarin Therapy Reminder (Check START DATE - warfarin may be starting in the FUTURE)     acetaminophen (TYLENOL) tablet 1,000 mg     fluticasone-vilanterol (BREO ELLIPTA) 200-25 MCG/INH oral inhaler 1 puff     LORazepam (ATIVAN) tablet 0.5 mg     losartan (COZAAR) tablet 50 mg     metoprolol tartrate (LOPRESSOR) tablet 50 mg     omeprazole (priLOSEC) CR capsule 20 mg     NIFEdipine ER osmotic (PROCARDIA XL) 24 hr tablet 90 mg       Constitutional: Awake, awake but somnolent   Respiratory:  Patient has decreased air entry, diffuse crackles posteriorly and basally.   Cardiovascular: Regular rate and rhythm, normal S1 and S2, and no murmur noted   Abdomen: Normal bowel sounds, soft, non-distended, non-tender   Skin: No rashes, no cyanosis, dry to touch   Neuro: Alert and oriented x3, no weakness, numbness, memory loss   Extremities: ++edema, normal range of motion   Other(s):HEENT        All other systems: Negative          Data:      All new lab and imaging data was reviewed.   Labs:    Recent Labs  Lab 03/17/18  1301 03/17/18  1256   CULT No growth after 2 days No growth after 2 days       Recent Labs  Lab 03/19/18  0635 03/18/18  0616 03/17/18  0645    132* 133   POTASSIUM 3.1* 3.9 4.1   CHLORIDE 94 94 97   CO2 33* 34* 32    ANIONGAP 6 4 4   GLC 97 97 90   BUN 21 17 19   CR 1.13* 1.01 1.03   GFRESTIMATED 46* 52* 51*   GFRESTBLACK 55* 63 61   KACI 8.4* 8.4* 8.8       Recent Labs  Lab 03/19/18  0635 03/18/18  0616 03/15/18  0720   WBC 4.9 3.9* 4.6   HGB 9.3* 9.5* 9.6*   HCT 29.5* 29.8* 29.5*   MCV 94 97 95   * 135* 169       Recent Labs  Lab 03/19/18  0635 03/18/18  0616 03/17/18  1226 03/17/18  0645 03/16/18  0721 03/15/18  0720   GLC 97 97  --  90 98 89   BGM  --   --  108*  --   --   --       Imaging:   Results for orders placed or performed during the hospital encounter of 03/14/18   Chest XR,  PA & LAT    Narrative    XR CHEST 2 VW 3/14/2018 1:55 PM    HISTORY: Dyspnea.    COMPARISON: 1/29/2018    FINDINGS: Small bilateral pleural effusions. No airspace consolidation  or pneumothorax. Stable heart size. Severe degenerative change at the  shoulders and a subacute right clavicular head fracture.      Impression    IMPRESSION: Small bilateral effusions.    TORY SABILLON MD[MA1.1]          Revision History        User Key Date/Time User Provider Type Action    > MA1.1 3/19/2018  3:38 PM Hernandez Stovall MD Physician Sign            Progress Notes by White, Corinne C, LSW at 3/19/2018 11:41 AM     Author:  White, Corinne C, LSW Service:  (none) Author Type:      Filed:  3/19/2018 11:42 AM Date of Service:  3/19/2018 11:41 AM Creation Time:  3/19/2018 11:41 AM    Status:  Signed :  White, Corinne C, LSW ()         Discharge Planner   Discharge Plans in progress: Called [facility] to update that pt si not ready to dc today   Barriers to discharge plan: none.   Follow up plan:[CW1.1]      03/16/18 1300   Final Resources   Transitional Care [facility] [phone]   PAS Number [id number][CW1.2]   Following        Entered by: Corinne C. White 03/19/2018 11:41 AM[CW1.1]          Revision History        User Key Date/Time User Provider Type Action    > CW1.2 3/19/2018 11:42 AM  White, Corinne C, LSW  Sign     CW1.1 3/19/2018 11:41 AM White, Corinne C, LSW              Progress Notes by Hernandez Stovall MD at 3/19/2018 10:38 AM     Author:  Hernandez Stovall MD Service:  Hospitalist Author Type:  Physician    Filed:  3/19/2018 10:41 AM Date of Service:  3/19/2018 10:38 AM Creation Time:  3/19/2018 10:38 AM    Status:  Addendum :  Hernandez Stovall MD (Physician)         Patient was seen and examined by me this morning.  She is a little alert today, communicating better.  She does not appear to be in respiratory distress.  She is requiring less oxygen today.  No chest pain.  She is up in chair.  Lung sounds are diminished with crackles posteriorly.  She has peripheral edema.  Plan is to continue diuretic therapy at a lower dose, continue antibiotic, care coordination and discharge planning as patient is at risk of getting readmitted unless we have a safe discharge plan.  She needs to stay in the hospital for the next 1 or 2 days.[MA1.1]  Patient's daughter multiple was contacted and questions and concerns addressed[MA1.2]     Revision History        User Key Date/Time User Provider Type Action    > MA1.2 3/19/2018 10:41 AM Hernandez Stovall MD Physician Addend     MA1.1 3/19/2018 10:39 AM Hernandez Stovall MD Physician Sign            Progress Notes by Hernandez Stovall MD at 3/18/2018  3:19 PM     Author:  Hernandez Stovall MD Service:  Hospitalist Author Type:  Physician    Filed:  3/18/2018  3:22 PM Date of Service:  3/18/2018  3:19 PM Creation Time:  3/18/2018  3:19 PM    Status:  Signed :  Hernandez Stovall MD (Physician)         [facility]  Hospitalist Progress Note  Hernandez Stovall MD 03/18/2018    Reason for Stay (Diagnosis):     Shortness of breath of multifactorial etiology including congestive heart failure, community-acquired pneumonia    Acute encephalopathy         Assessment and Plan:        Summary of Stay: Estefany  EMILY An is a 87 year old female with history of chronic A. fib, hypertension, gastroesophageal reflux disease, anxiety, presented with generalized weakness and shortness of breath and admitted on 3/14/2018.   Patient was admitted to telemetry bed and closely monitored with IV diuresis, oxygen supplementation and close monitoring.      Problem List:   #1.  Acute hypoxic respiratory failure: Suspect multifactorial etiology including acute diastolic congestive heart failure and pulmonary infection:  --Continue supplemental oxygen, VBG showed normal pH but elevated PCO2 and compensatory bicarb elevation.  Continue antibiotic for pulmonary infection  --Patient is improving, continue to monitor    #2.  Acute on chronic heart failure with preserved ejection fraction:Her echocardiogram on 01/08/18 showed EF f 55-60%, indeterminate left ventricular filling pressure.  --Patient is improving, decrease Lasix to twice a day.  I doubt if documented weight is accurate.  I/O last 3 completed shifts:  In: 440 [P.O.:440]  Out: 1625 [Urine:1625]  Wt Readings from Last 5 Encounters:   03/18/18 90 kg (198 lb 8 oz)   01/31/18 88.9 kg (196 lb)   01/13/18 88.7 kg (195 lb 8 oz)   11/02/14 81.6 kg (180 lb)   12/03/13 81.6 kg (180 lb)        #3.  Chronic atrial fibrillation-rate controlled on metoprolol.  -Rate controlled, continue to monitor, INR is supratherapeutic today, pharmacy is consulted to assist with dosing.  May need to hold Coumadin today  #4.  Community-acquired pneumonia: Patient developed focal area of infiltrate in the left midlung since March 14, 2018  --Intravenous antibiotic with Zosyn and azithromycin started March 17, 2018, continue to monitor vitals and cultures.    #5.  Acute encephalopathy: Suspect multifactorial including hypoxia and hypercapnia and congestive heart failure: Patient is at risk of delirium, continue to treat underlying pathology, monitor for now.  #6.  Hypertension: Controlled, continue current  "medications  #7.  Generalized weakness and physical deconditioning: Patient Has been admitted several times recently, will get PT, OT as well as care coordinators consider palliative care consultation to assist with further goal of care planning.  Patient is DNR and DNI.      #8.  Bilateral lower extremity pain-controlled with Ultram 3 times a day as needed, patient takes it Ultram 50 mg at 1600, 2000, and at midnight  Mostly she does require it in the morning.  # Pain Assessment:   Current Pain Score 3/18/2018 3/18/2018 3/18/2018   Patient currently in pain? yes yes yes   Pain score (0-10) 2 4 3   Pain location - Leg Leg   Pain descriptors - Aching Aching   - Estefany is experiencing pain due to bilateral lower extremity. Pain management was discussed and the plan was created in a collaborative fashion.  Estefany's response to the current recommendations: compliant  - Please see the plan for pain management as documented above    DVT Prophylaxis: Warfarin  Code Status: DNR / DNI  Discharge Dispo: Likely TCU, pending PT evaluation  Estimated Disch Date / # of Days until Disch: Continue current care, may need any additional 2 days in the hospital until her condition improves.        Interval History (Subjective):        Patient was seen and examined by me this morning.  She is feeling much better today.  But still a little somnolent.  She ate her breakfast, up in chair.  Requiring 2 L of oxygen.  Plan is to avoid narcotic medications, OT consultation and discharge planning.                  Physical Exam:      Last Vital Signs:  /53 (BP Location: Left arm)  Pulse 112  Temp 96.2  F (35.7  C) (Oral)  Resp 20  Ht 1.727 m (5' 8\")  Wt 90 kg (198 lb 8 oz)  SpO2 95%  BMI 30.18 kg/m2    I/O last 3 completed shifts:  In: 440 [P.O.:440]  Out: 1625 [Urine:1625]  Wt Readings from Last 1 Encounters:   03/18/18 90 kg (198 lb 8 oz)     Current Facility-Administered Medications   Medication     furosemide (LASIX) injection 40 " mg     warfarin-No DOSE today     ipratropium - albuterol 0.5 mg/2.5 mg/3 mL (DUONEB) neb solution 3 mL     piperacillin-tazobactam (ZOSYN) intermittent infusion 4.5 g     azithromycin (ZITHROMAX) 250 mg in sodium chloride 0.9 % 250 mL intermittent infusion     Menthol (Topical Analgesic) 4 % GEL 5 g     potassium chloride SA (K-DUR/KLOR-CON M) CR tablet 20-40 mEq     potassium chloride (KLOR-CON) Packet 20-40 mEq     potassium chloride 10 mEq in 100 mL sterile water intermittent infusion (premix)     potassium chloride 10 mEq in 100 mL intermittent infusion with 10 mg lidocaine     potassium chloride 20 mEq in 50 mL intermittent infusion     magnesium sulfate 2 g in NS intermittent infusion (PharMEDium or FV Cmpd)     magnesium sulfate 4 g in 100 mL sterile water (premade)     traMADol (ULTRAM) tablet 50 mg     miconazole (MICATIN; MICRO GUARD) 2 % powder     naloxone (NARCAN) injection 0.1-0.4 mg     melatonin tablet 1 mg     lidocaine 1 % 1 mL     lidocaine (LMX4) kit     sodium chloride (PF) 0.9% PF flush 3 mL     sodium chloride (PF) 0.9% PF flush 3 mL     Patient is already receiving anticoagulation with heparin, enoxaparin (LOVENOX), warfarin (COUMADIN)  or other anticoagulant medication     senna-docusate (SENOKOT-S;PERICOLACE) 8.6-50 MG per tablet 1 tablet    Or     senna-docusate (SENOKOT-S;PERICOLACE) 8.6-50 MG per tablet 2 tablet     polyethylene glycol (MIRALAX/GLYCOLAX) Packet 17 g     ondansetron (ZOFRAN-ODT) ODT tab 4 mg    Or     ondansetron (ZOFRAN) injection 4 mg     Continuing ACE inhibitor/ARB/ARNI from home medication list OR ACE inhibitor/ARB/ARNI order already placed during this visit     Continuing beta blocker from home medication list OR beta blocker order already placed during this visit     ipratropium - albuterol 0.5 mg/2.5 mg/3 mL (DUONEB) neb solution 3 mL     Warfarin Therapy Reminder (Check START DATE - warfarin may be starting in the FUTURE)     acetaminophen (TYLENOL) tablet 1,000  mg     fluticasone-vilanterol (BREO ELLIPTA) 200-25 MCG/INH oral inhaler 1 puff     LORazepam (ATIVAN) tablet 0.5 mg     losartan (COZAAR) tablet 50 mg     metoprolol tartrate (LOPRESSOR) tablet 50 mg     omeprazole (priLOSEC) CR capsule 20 mg     NIFEdipine ER osmotic (PROCARDIA XL) 24 hr tablet 90 mg       Constitutional: Awake, awake but somnolent   Respiratory:  Patient has decreased air entry, diffuse crackles posteriorly and basally.   Cardiovascular: Regular rate and rhythm, normal S1 and S2, and no murmur noted   Abdomen: Normal bowel sounds, soft, non-distended, non-tender   Skin: No rashes, no cyanosis, dry to touch   Neuro: Alert and oriented x3, no weakness, numbness, memory loss   Extremities: ++edema, normal range of motion   Other(s):HEENT        All other systems: Negative          Data:      All new lab and imaging data was reviewed.   Labs:    Recent Labs  Lab 03/17/18  1301 03/17/18  1256   CULT No growth after 20 hours No growth after 20 hours       Recent Labs  Lab 03/18/18  0616 03/17/18  0645 03/16/18  0721   * 133 134   POTASSIUM 3.9 4.1 3.5   CHLORIDE 94 97 98   CO2 34* 32 28   ANIONGAP 4 4 8   GLC 97 90 98   BUN 17 19 17   CR 1.01 1.03 0.88   GFRESTIMATED 52* 51* 61   GFRESTBLACK 63 61 74   KACI 8.4* 8.8 8.9       Recent Labs  Lab 03/18/18  0616 03/15/18  0720 03/14/18  1247   WBC 3.9* 4.6 5.1   HGB 9.5* 9.6* 9.9*   HCT 29.8* 29.5* 30.9*   MCV 97 95 94   * 169 206       Recent Labs  Lab 03/18/18  0616 03/17/18  1226 03/17/18  0645 03/16/18  0721 03/15/18  0720 03/14/18  1247   GLC 97  --  90 98 89 119*   BGM  --  108*  --   --   --   --       Imaging:   Results for orders placed or performed during the hospital encounter of 03/14/18   Chest XR,  PA & LAT    Narrative    XR CHEST 2 VW 3/14/2018 1:55 PM    HISTORY: Dyspnea.    COMPARISON: 1/29/2018    FINDINGS: Small bilateral pleural effusions. No airspace consolidation  or pneumothorax. Stable heart size. Severe degenerative  change at the  shoulders and a subacute right clavicular head fracture.      Impression    IMPRESSION: Small bilateral effusions.    TORY SABILLON MD[MA1.1]          Revision History        User Key Date/Time User Provider Type Action    > MA1.1 3/18/2018  3:22 PM Hernandez Stovall MD Physician Sign            Progress Notes by Hernandez Stovall MD at 3/18/2018  9:43 AM     Author:  Hernandez Stovall MD Service:  Hospitalist Author Type:  Physician    Filed:  3/18/2018  9:45 AM Date of Service:  3/18/2018  9:43 AM Creation Time:  3/18/2018  9:43 AM    Status:  Signed :  Hernandez Stovall MD (Physician)         Patient was seen and examined by me this morning.  She is feeling much better today.  But still a little somnolent.  She ate her breakfast, up in chair.  Requiring 2 L of oxygen.  Plan is to avoid narcotic medications, OT consultation and discharge planning.[MA1.1]     Revision History        User Key Date/Time User Provider Type Action    > MA1.1 3/18/2018  9:45 AM Hernandez Stovall MD Physician Sign                  Procedure Notes     No notes of this type exist for this encounter.      Progress Notes - Therapies (Notes from 03/18/18 through 03/21/18)     No notes of this type exist for this encounter.

## 2018-03-15 NOTE — PLAN OF CARE
Problem: Patient Care Overview  Goal: Plan of Care/Patient Progress Review    PT:  Eval complete, treatment initiated.  Pt admitted 3/14 with weakness, fatigue, acute CHF exacerbation.  Pt just returned to ILF from TCU on 3/13.  Was at TCU for extended stay following Influenza with fall, pelvic and clavicle fractures.  At baseline, pt is ambulatory with 4WW independently.  Per daughter, plan to was to increase services at living facility.    Discharge Planner PT   Patient plan for discharge: daughter thinking TCU again  Current status: Initiated mobility training, SBA bed mobility, CGA/min A with sit <> stand transfers.  Amb x 75ft with FWW and CGA.  SOB noted, SaO2 90% on RA, 94% at rest.  HR up to 124bpm with activity.  Functional weakness evident in LEs due to current illness, pitting edema noted BLEs.  Ed pt on energy conservation techniques, posture, PLB, activity guidelines.  Pt also has cardiac rehab orders, will incorporate CR education/training with future PT sessions.  Recommend pt is assisted up to chair several times/day, walk with nsg staff into hallways 3x/day.  Barriers to return to prior living situation: lives alone in ILF, deconditioning, level of assist  Recommendations for discharge: TCU  Rationale for recommendations: Pt will benefit from ongoing skilled PT/CR education and training to improve tolerance and safety with functional mobility needs.  Appears she will benefit from another TCU stay, but consider return home to ILF with increased services if continued progress to point of independence with mobility skills.       Entered by: Ministerio Cintron 03/15/2018 12:02 PM

## 2018-03-15 NOTE — PROVIDER NOTIFICATION
Pt requesting something in addition for pain, states she takes tramadol at home. Please advise. Thank you

## 2018-03-15 NOTE — PROGRESS NOTES
"Date:3/15/2018  Admission Dx:Acute diastolic congestive heart failure and bilateral pleural effusions  Pulmonary History: none  Home Nebulizer/MDI Use: Duoneb TID new since last discharge and Advair   Home Oxygen: none    Acuity Level (RCAT flow sheet): 3    Aerosol Therapy initiated: Duoneb Q4 prn    Pulmonary Hygiene initiated: Deep breathe and cough    Volume Expansion initiated: IS    Current Oxygen Requirements: Room air     Current SpO2: 94%    Re-evaluation date: 18 March    Patient Education: Patient scores for QID nebulizers, but I administered her X1 nebulizer and no change in patient's respiratory status or breath sounds. Patient made aware I am leaving them prn per MD order.     Joanna George RRT  3/15/2018      See \"RT Assessments\" flow sheet for patient assessment scoring and Acuity Level Details.           "

## 2018-03-15 NOTE — PROGRESS NOTES
[facility]  Hospitalist Progress Note  Philip Javier MD 03/15/2018    Reason for Stay (Diagnosis): Shortness of breath.         Assessment and Plan:      Summary of Stay: Estefany An is a 87 year old female with history of chronic A. fib, hypertension, gastroesophageal reflux disease, anxiety, presented with generalized weakness and shortness of breath and admitted on 3/14/2018.   Problem List:   1. Suspected acute diastolic congestive heart failure-that echocardiogram at the beginning of this year, which did not evaluate daily to left ventricular filling pressure.  Patient seems to have more of diastolic congestive heart failure clinic.  -Continue IV diuretics, Lasix increased from 20 mg twice daily to 40 mg IV twice daily  -Daily weight, input and output, low-salt diet.  -Chest x-ray showed some bilateral small pleural effusion with minimal vascular congestion.  -Check daily BMP to make sure creatinine is stable and electrolytes are okay.    2. Chronic atrial fibrillation-rate controlled on metoprolol.  -Patient was on Coumadin at the transitional care units but was not discharged on medication.  Her INR is therapeutic  -Last time she was in the hospital that was stopped due to anemia.  -Continue Coumadin for now given stable hemoglobin and she has been on it at transitional care unit.    3. Hypertension-controlled.    # Pain Assessment:   Current Pain Score 3/15/2018 3/15/2018 3/14/2018   Patient currently in pain? yes sleeping: patient not able to self report yes   Pain score (0-10) 4 - 8   Pain location Leg - Leg   Pain descriptors Aching - Aching   - Estefany is experiencing pain due to bilateral lower extremity. Pain management was discussed and the plan was created in a collaborative fashion.  Estefany's response to the current recommendations: compliant  - Please see the plan for pain management as documented above    DVT Prophylaxis: Warfarin  Code Status: DNR / DNI  Discharge Dispo:  "Likely TCU, pending PT evaluation  Estimated Disch Date / # of Days until Disch: Expect 2-3 nights stay in the hospital        Interval History (Subjective):      Patient seen and examined, feels better, shortness of breath improved, denied nausea or vomiting, tolerating oral intake.                  Physical Exam:      Last Vital Signs:  /62 (BP Location: Left arm)  Pulse 112  Temp 96.2  F (35.7  C) (Oral)  Resp 18  Ht 1.727 m (5' 8\")  Wt 92.9 kg (204 lb 12.8 oz)  SpO2 93%  BMI 31.14 kg/m2    I/O last 3 completed shifts:  In: 918 [P.O.:915; I.V.:3]  Out: 1400 [Urine:1400]  Wt Readings from Last 1 Encounters:   03/15/18 92.9 kg (204 lb 12.8 oz)     Current Facility-Administered Medications   Medication     furosemide (LASIX) injection 40 mg     miconazole (MICATIN; MICRO GUARD) 2 % powder     warfarin (COUMADIN) tablet 2.5 mg     naloxone (NARCAN) injection 0.1-0.4 mg     melatonin tablet 1 mg     HOLD: nitroGLYcerin IF     lidocaine 1 % 1 mL     lidocaine (LMX4) kit     sodium chloride (PF) 0.9% PF flush 3 mL     sodium chloride (PF) 0.9% PF flush 3 mL     Patient is already receiving anticoagulation with heparin, enoxaparin (LOVENOX), warfarin (COUMADIN)  or other anticoagulant medication     senna-docusate (SENOKOT-S;PERICOLACE) 8.6-50 MG per tablet 1 tablet    Or     senna-docusate (SENOKOT-S;PERICOLACE) 8.6-50 MG per tablet 2 tablet     polyethylene glycol (MIRALAX/GLYCOLAX) Packet 17 g     ondansetron (ZOFRAN-ODT) ODT tab 4 mg    Or     ondansetron (ZOFRAN) injection 4 mg     Continuing ACE inhibitor/ARB/ARNI from home medication list OR ACE inhibitor/ARB/ARNI order already placed during this visit     Continuing beta blocker from home medication list OR beta blocker order already placed during this visit     ipratropium - albuterol 0.5 mg/2.5 mg/3 mL (DUONEB) neb solution 3 mL     Warfarin Therapy Reminder (Check START DATE - warfarin may be starting in the FUTURE)     acetaminophen (TYLENOL) " tablet 1,000 mg     fluticasone-vilanterol (BREO ELLIPTA) 200-25 MCG/INH oral inhaler 1 puff     hydrochlorothiazide (HYDRODIURIL) tablet 25 mg     LORazepam (ATIVAN) tablet 0.5 mg     losartan (COZAAR) tablet 50 mg     metoprolol tartrate (LOPRESSOR) tablet 50 mg     omeprazole (priLOSEC) CR capsule 20 mg     NIFEdipine ER osmotic (PROCARDIA XL) 24 hr tablet 90 mg     traMADol (ULTRAM) tablet 50 mg       Constitutional: Awake, alert, cooperative, no apparent distress   Respiratory: Clear to auscultation bilaterally, no crackles or wheezing   Cardiovascular: Regular rate and rhythm, normal S1 and S2, and no murmur noted   Abdomen: Normal bowel sounds, soft, non-distended, non-tender   Skin: No rashes, no cyanosis, dry to touch   Neuro: Alert and oriented x3, no weakness, numbness, memory loss   Extremities: ++edema, normal range of motion   Other(s):HEENT        All other systems: Negative          Medications:      All current medications were reviewed with changes reflected in problem list.         Data:      All new lab and imaging data was reviewed.   Labs:  No results for input(s): CULT in the last 168 hours.    Recent Labs  Lab 03/15/18  0720 03/14/18  1247    134   POTASSIUM 3.7 4.0   CHLORIDE 100 101   CO2 29 27   ANIONGAP 6 6   GLC 89 119*   BUN 17 22   CR 0.92 0.91   GFRESTIMATED 58* 58*   GFRESTBLACK 70 71   KACI 8.9 9.3       Recent Labs  Lab 03/15/18  0720 03/14/18  1247   WBC 4.6 5.1   HGB 9.6* 9.9*   HCT 29.5* 30.9*   MCV 95 94    206       Recent Labs  Lab 03/15/18  0720 03/14/18  1247   GLC 89 119*      Imaging:   Results for orders placed or performed during the hospital encounter of 03/14/18   Chest XR,  PA & LAT    Narrative    XR CHEST 2 VW 3/14/2018 1:55 PM    HISTORY: Dyspnea.    COMPARISON: 1/29/2018    FINDINGS: Small bilateral pleural effusions. No airspace consolidation  or pneumothorax. Stable heart size. Severe degenerative change at the  shoulders and a subacute right  clavicular head fracture.      Impression    IMPRESSION: Small bilateral effusions.    TORY SABILLON MD

## 2018-03-15 NOTE — PROGRESS NOTES
CM: Aleshia from [facility] Called. PT was in TCU and DC to home Tuesday. [phone]. They would like and update on needs for dc planning.     CM: Called [facility] to inquire about day used while in TCU Used 46 Medicare days

## 2018-03-15 NOTE — CONSULTS
"CLINICAL NUTRITION SERVICES  -  ASSESSMENT NOTE      Malnutrition: Does not meet criteria at this time        REASON FOR ASSESSMENT  Estefany An is a 87 year old female seen by Registered Dietitian for Provider Order - Nutrition Education (x2) - 2 gm Na.      NUTRITION HISTORY  - Information obtained from patient and chart though limited as falling asleep during conversation.  - Patient with a h/o diastolic CHF.  - Recent TCU d/c (3/13/2018).  - Has met with this writer x 2 during previous admissions, but never for low sodium education, consulted d/t LOS.  - Previous education:  Patient denies previous diet education.  Reports a regular diet at home and does not verbalize sodium restrictions recommended daily.  Does mention she does not add salt at home.  - Weighs self: Patient denies weighing self at home.  - Grocery shopping and meal preparation: Patient reports she prepares most of her own meals but may also go down to dining boss at [facility] to receive meals.  - Reports her appetite has been decreased over the past \"few weeks\" but could not specify further.  - Breakfast: Scrambled eggs, 2 pieces toast with butter.  Sometimes saez.  - Lunch: Ham sandwich.  - Dinner: Sometimes has cold cereal (likes Cheerios) with milk and fruit.  Or may go down to dining boss (could not provide further specifics of common foods consumed).   - NKFA.       CURRENT NUTRITION ORDERS  Diet Order:     2 gm Na/No caffeine    Current Intake/Tolerance:  % intakes since admission.      PHYSICAL FINDINGS  Observed  Suspect chronic muscle losses associated with aging  Speech seeming somewhat garbled, could just be tired, discussed with RN  Obtained from Chart/Interdisciplinary Team  +2 BLE edema    ANTHROPOMETRICS  Height: 5' 8\"  Weight: 92.7 kg (204#)  Body mass index is 31.14 kg/(m^2).  Weight Status:  Obesity Grade I BMI 30-34.9  IBW: 63.6 kg (140#)  % IBW: 146%  Weight History:  Wt Readings from Last 10 Encounters:   03/15/18 " "92.9 kg (204 lb 12.8 oz)   01/31/18 88.9 kg (196 lb)   01/13/18 88.7 kg (195 lb 8 oz)   11/02/14 81.6 kg (180 lb)   12/03/13 81.6 kg (180 lb)   - Wt gain 2/2 fluid retention since 1/2018.      LABS  Labs reviewed    MEDICATIONS  Medications reviewed:  - Lasix    Dosing Weight 70.9 kg - adjusted weight     ASSESSED NUTRITION NEEDS PER APPROVED PRACTICE GUIDELINES:  Estimated Energy Needs: 3756-5796 kcals (25-30 Kcal/Kg)  Justification: maintenance  Estimated Protein Needs:  grams protein (1.2-1.5 g pro/Kg)  Justification: preservation of lean body mass  Estimated Fluid Needs: 1299-3372  mL (1 mL/Kcal)  Justification: maintenance and per provider pending fluid status    MALNUTRITION:  % Weight Loss:  None noted  % Intake:  Decreased intake does not meet criteria for malnutrition --> patient reported waxes and wanes, sometimes eating at baseline but not consistently  Subcutaneous Fat Loss:  None observed  Muscle Loss:  Acromion bone region mild depletion and Dorsal hand region mild depletion --> suspect component of aging  Fluid Retention:  Mild --> not using as indicator as do not suspect masking true wt loss    Malnutrition Diagnosis: Patient does not meet two of the above criteria necessary for diagnosing malnutrition but is at risk    NUTRITION DIAGNOSIS:  Predicted inadequate nutrient intake (energy/protein) related to report of decreased appetite without consistent decrease in oral intakes, potential for decline.      NUTRITION INTERVENTIONS  Recommendations / Nutrition Prescription  Continue 2 gm Na diet restriction, caffeine restriction per MD.     Addition of Ensure supplement with meals.       Implementation  Nutrition education: Provided education on 2 gm Na diet:    Assessed learning needs, learning preferences, and willingness to learn    Nutrition Education (Content):  a) Provided handout \"low sodium nutrition therapy\"  b) Discussed common foods high in sodium to avoid, appropriate " substitutes  c) Discussed how to read nutrition facts labels and recommendations for </=2000 mg sodium daily    Nutrition Education (Application):  a) Discussed eating habits and recommended alternative food choices.   b) Encouraged patient to avoid saez and to track sodium intake for at least 1 week at d/c.     Patient unable to verbalize understanding of diet.    Anticipate poor to fair compliance.    Diet Education - refer to Education Flowsheet    Collaboration and Referral of Nutrition care: Discussed POC with team during rounds.  Discussed speech with RN.      Nutrition Goals  Patient to consume at least 50-75% of meals TID or the equivalent with supplement use.      MONITORING AND EVALUATION:  Progress towards goals will be monitored and evaluated per protocol and Practice Guidelines      Abi Link RD, LD  Clinical Dietitian  3rd floor/ICU: [phone]  All other floors: [phone]  Weekend/holiday: [phone]

## 2018-03-15 NOTE — PHARMACY-ANTICOAGULATION SERVICE
Clinical Pharmacy - Warfarin Dosing Consult     Pharmacy has been consulted to manage this patient s warfarin therapy.  Indication: Atrial Fibrillation  Therapy Goal: INR 2-3  Warfarin Prior to Admission: Yes  Warfarin PTA Regimen:  (2.5mg daily)  Recent documented change in oral intake/nutrition: Unknown    INR   Date Value Ref Range Status   03/14/2018 2.16 (H) 0.86 - 1.14 Final       Recommend warfarin 2.5 mg today.  Pharmacy will monitor Estefany An daily and order warfarin doses to achieve specified goal.      Please contact pharmacy as soon as possible if the warfarin needs to be held for a procedure or if the warfarin goals change.

## 2018-03-15 NOTE — CONSULTS
Care Transition Initial Assessment - RN    Reason For Consult: care coordination/care conference, discharge planning   Spoke with: daughterMaylin.  DATA   Active Problems:    Acute diastolic congestive heart failure (H)    CHF (congestive heart failure) (H)     CHF Packet reviewed with patient & daughter at bedside.   Cognitive Status: awake, alert and oriented.  Primary Care Clinic Name: [place] [facility]  Primary Care MD Name: Dr. Ricki Blake  Contact information and PCP information verified: Yes  Lives With: facility resident  Living Arrangements: independent living facility     Description of Support System: Supportive, Involved   Who is your support system?: Children, Facility resident(s)/Staff       Insurance concerns: No Insurance issues identified  ASSESSMENT  Patient currently receives the following services:  Meals, housekeeping & laundry. Her daughter sets up her medications.         Identified issues/concerns regarding health management: Patient discharged last time with neb machine. Daughter stated patient has difficult time manipulating the medication container and spills the medication. She is noncompliant with a low sodium diet and has not been weighing herself daily. CM provided patient with a new scale with large readout.     PLAN  Financial costs for the patient were not discussed.  Patient given options and choices for discharge.  Patient/family is agreeable to the plan?  Yes  Patient [facility]ates [facility] or [facility]CU.     Patient anticipates needs for home equipment: No  Discharge Planner   Discharge Plans in progress: Yes   Barriers to discharge plan: Ongoing IV diuresis  Plan/Disposition: TBD   Appointments: Daughter stated she will make any f/u appointment.     CM will continue to follow patient until discharge for any additional needs.     Lesli Allen, RN, BSN, CTS  [facility]  [phone]

## 2018-03-15 NOTE — PROGRESS NOTES
03/15/18 1206   Quick Adds   Type of Visit Initial PT Evaluation   Living Environment   Lives With facility resident   Living Arrangements independent living facility   Home Accessibility no concerns   Number of Stairs to Enter Home 0   Number of Stairs Within Home 0   Stair Railings at Home none   Living Environment Comment Pt admitted from [facility], but just returned home from TCU.  Per daughter, plan was to increase services, possibly to [facility] but has not completed this yet due to just returning home.   Self-Care   Dominant Hand right   Usual Activity Tolerance moderate   Current Activity Tolerance fair   Equipment Currently Used at Home grab bar;shower chair;walker, rolling   Activity/Exercise/Self-Care Comment Uses 4WW at baseline, was using FWW at TCU.  Has grab bars and RTS, shower chair.  Was performing ADLs and mobility mod I prior to TCU admission.   Functional Level Prior   Ambulation 1-->assistive equipment   Transferring 1-->assistive equipment   Toileting 1-->assistive equipment   Bathing 1-->assistive equipment   Dressing 0-->independent   Eating 0-->independent   Communication 0-->understands/communicates without difficulty   Swallowing 0-->swallows foods/liquids without difficulty   Cognition 0 - no cognition issues reported   Fall history within last six months yes   Number of times patient has fallen within last six months 1   Which of the above functional risks had a recent onset or change? ambulation;transferring;fall history;toileting;bathing   Prior Functional Level Comment Was amb x 200ft distances at TCU, per pt and daughter report   General Information   Onset of Illness/Injury or Date of Surgery - Date 03/15/18   Referring Physician Philip Javier MD   Patient/Family Goals Statement daughter reports she thinks pt will need another TCU stay   Pertinent History of Current Problem (include personal factors and/or comorbidities that impact the POC) Pt admitted 3/14 with weakness, fatigue,  acute CHF exacerbation.  Pt just returned to ILF from TCU on 3/13.  Was at TCU for extended stay following Influenza with fall, pelvic and clavicle fractures.  At baseline, pt is ambulatory with 4WW independently.  Per daughter, plan to was to increase services at living facility.  Appears new onset CHF since last 2 months.   Precautions/Limitations fall precautions   General Observations pt awake and alert, VSS at rest on RA   Cognitive Status Examination   Orientation orientation to person, place and time   Pain Assessment   Patient Currently in Pain Yes, see Vital Sign flowsheet  (mild R clavicular pain, r knee pain)   Integumentary/Edema   Integumentary/Edema Comments pitting edema BLEs, bruising noted R medial knee   Posture    Posture Forward head position   Range of Motion (ROM)   ROM Comment WFL BLEs, R knee to ~100 deg flexion only.  Limited AROM shoulder flexion, R shoulder pain with lifitng > 70 deg.   Strength   Strength Comments Functional weakness, 3+/5 B hip flexion, 4/5 B knee ext, 4-/5 B ankle DF   Bed Mobility   Bed Mobility Comments SBA with use of bed railing.   Transfer Skills   Transfer Comments Sit <> stand transfers with FWW and CGA/min A.   Gait   Gait Comments Amb with FWW and CGA, SOB noted.  Limited distances to in-room currently.   Balance   Balance Comments Compensating well with FWW, CGA for safety.   Sensory Examination   Sensory Perception no deficits were identified   General Therapy Interventions   Planned Therapy Interventions balance training;bed mobility training;gait training;neuromuscular re-education;strengthening;ROM;transfer training;risk factor education;home program guidelines;progressive activity/exercise   Clinical Impression   Criteria for Skilled Therapeutic Intervention yes, treatment indicated   PT Diagnosis Impaired functional mobility and activity tolerance   Influenced by the following impairments Weakness, edema, SOB, impaired balance   Functional limitations  "due to impairments Impaired functional activity tolerance, MUNOZ   Clinical Presentation Stable/Uncomplicated   Clinical Presentation Rationale stable medical status, PLOF, PMH, age, recent TCU stay   Clinical Decision Making (Complexity) Low complexity   Therapy Frequency` daily   Predicted Duration of Therapy Intervention (days/wks) 3-5 days   Anticipated Discharge Disposition Transitional Care Facility;Home with Assist;Home with Home Therapy  (pending progress and LOS)   Risk & Benefits of therapy have been explained Yes   Patient, Family & other staff in agreement with plan of care Yes   [facility] TM \"6 Clicks\"   2016, Trustees of [facility], under license to [employer].  All rights reserved.   6 Clicks Short Forms Basic Mobility Inpatient Short Form   [facility]  \"6 Clicks\" V.2 Basic Mobility Inpatient Short Form   1. Turning from your back to your side while in a flat bed without using bedrails? 4 - None   2. Moving from lying on your back to sitting on the side of a flat bed without using bedrails? 3 - A Little   3. Moving to and from a bed to a chair (including a wheelchair)? 3 - A Little   4. Standing up from a chair using your arms (e.g., wheelchair, or bedside chair)? 3 - A Little   5. To walk in hospital room? 3 - A Little   6. Climbing 3-5 steps with a railing? 3 - A Little   Basic Mobility Raw Score (Score out of 24.Lower scores equate to lower levels of function) 19   Total Evaluation Time   Total Evaluation Time (Minutes) 15     "

## 2018-03-15 NOTE — PLAN OF CARE
Problem: Patient Care Overview  Goal: Plan of Care/Patient Progress Review  Outcome: No Change  [id number]: Pt resting comfortably. VSS. A&O. On scheduled tylenol. MD notified for something PRN for pain, tramadol ordered. Transfers with a lift. Tele reads Afib RVR. Incontinent of urine- has pure wick in place. Will continue to monitor.

## 2018-03-16 NOTE — PROGRESS NOTES
CM: Following for dc planning. Pt was admitted from [facility] after being in TCU at [facility].  PT has used 46 days of her Medicare benefit.. Left VM message for daughter to discuss TCU placement.   Attempted to see pt who was sleeping  I.P: Will attempted to see pt later today about TCU needs.

## 2018-03-16 NOTE — PROGRESS NOTES
[facility]  Hospitalist Progress Note  Philip Javier MD 03/16/2018    Reason for Stay (Diagnosis): Shortness of breath.         Assessment and Plan:      Summary of Stay: Estefany An is a 87 year old female with history of chronic A. fib, hypertension, gastroesophageal reflux disease, anxiety, presented with generalized weakness and shortness of breath and admitted on 3/14/2018.   Problem List:   1. Acute diastolic congestive heart failure- Her echocardiogram on 01/08/18 showed EF f 55-60%, indeterminate left ventricular filling pressure.  Patient seems to have more of diastolic congestive heart failure clinically.  -Continue IV diuretics, Lasix increased from 20 mg twice daily to 40 mg IV twice daily  -Please check weight daily, not done today daily, monitor input and output, low-salt diet.  -Chest x-ray showed some bilateral small pleural effusion with minimal vascular congestion.  -Check daily BMP to make sure creatinine is stable and electrolytes are okay.    2. Chronic atrial fibrillation-rate controlled on metoprolol.  -Patient was on Coumadin at the transitional care units but was not discharged on medication.  Her INR is therapeutic  -Last time she was in the hospital that was stopped due to anemia.  -Continue Coumadin for now given stable hemoglobin and she has been on it at transitional care unit.    3. Hypertension-controlled.  4.   5. Bilateral lower extremity pain-controlled with Ultram 3 times a day as needed, patient takes it Ultram 50 mg at 1600, 2000, and at midnight  Mostly she does require it in the morning.  # Pain Assessment:   Current Pain Score 3/16/2018 3/16/2018 3/16/2018   Patient currently in pain? yes yes -   Pain score (0-10) 3 4 6   Pain location - Shoulder -   Pain descriptors - Aching -   - Estefany is experiencing pain due to bilateral lower extremity. Pain management was discussed and the plan was created in a collaborative fashion.  Estefany's response to the  "current recommendations: compliant  - Please see the plan for pain management as documented above    DVT Prophylaxis: Warfarin  Code Status: DNR / DNI  Discharge Dispo: Likely TCU, pending PT evaluation  Estimated Disch Date / # of Days until Disch: Expect 2-3 nights stay in the hospital  Discussed with patient and nursing staff the plan of care.      Interval History (Subjective):      Patient seen and examined, feels better, shortness of breath improved, denied nausea or vomiting, tolerating oral intake.                  Physical Exam:      Last Vital Signs:  /61 (BP Location: Right arm)  Pulse 112  Temp 96.1  F (35.6  C) (Oral)  Resp 16  Ht 1.727 m (5' 8\")  Wt 92.9 kg (204 lb 12.8 oz)  SpO2 93%  BMI 31.14 kg/m2    I/O last 3 completed shifts:  In: 240 [P.O.:240]  Out: 800 [Urine:800]  Wt Readings from Last 1 Encounters:   03/15/18 92.9 kg (204 lb 12.8 oz)     Current Facility-Administered Medications   Medication     potassium chloride SA (K-DUR/KLOR-CON M) CR tablet 20-40 mEq     potassium chloride (KLOR-CON) Packet 20-40 mEq     potassium chloride 10 mEq in 100 mL sterile water intermittent infusion (premix)     potassium chloride 10 mEq in 100 mL intermittent infusion with 10 mg lidocaine     potassium chloride 20 mEq in 50 mL intermittent infusion     magnesium sulfate 2 g in NS intermittent infusion (PharMEDium or FV Cmpd)     magnesium sulfate 4 g in 100 mL sterile water (premade)     warfarin (COUMADIN) tablet 2.5 mg     furosemide (LASIX) injection 40 mg     miconazole (MICATIN; MICRO GUARD) 2 % powder     naloxone (NARCAN) injection 0.1-0.4 mg     melatonin tablet 1 mg     lidocaine 1 % 1 mL     lidocaine (LMX4) kit     sodium chloride (PF) 0.9% PF flush 3 mL     sodium chloride (PF) 0.9% PF flush 3 mL     Patient is already receiving anticoagulation with heparin, enoxaparin (LOVENOX), warfarin (COUMADIN)  or other anticoagulant medication     senna-docusate (SENOKOT-S;PERICOLACE) 8.6-50 MG " per tablet 1 tablet    Or     senna-docusate (SENOKOT-S;PERICOLACE) 8.6-50 MG per tablet 2 tablet     polyethylene glycol (MIRALAX/GLYCOLAX) Packet 17 g     ondansetron (ZOFRAN-ODT) ODT tab 4 mg    Or     ondansetron (ZOFRAN) injection 4 mg     Continuing ACE inhibitor/ARB/ARNI from home medication list OR ACE inhibitor/ARB/ARNI order already placed during this visit     Continuing beta blocker from home medication list OR beta blocker order already placed during this visit     ipratropium - albuterol 0.5 mg/2.5 mg/3 mL (DUONEB) neb solution 3 mL     Warfarin Therapy Reminder (Check START DATE - warfarin may be starting in the FUTURE)     acetaminophen (TYLENOL) tablet 1,000 mg     fluticasone-vilanterol (BREO ELLIPTA) 200-25 MCG/INH oral inhaler 1 puff     LORazepam (ATIVAN) tablet 0.5 mg     losartan (COZAAR) tablet 50 mg     metoprolol tartrate (LOPRESSOR) tablet 50 mg     omeprazole (priLOSEC) CR capsule 20 mg     NIFEdipine ER osmotic (PROCARDIA XL) 24 hr tablet 90 mg     traMADol (ULTRAM) tablet 50 mg       Constitutional: Awake, alert, cooperative, no apparent distress   Respiratory: Clear to auscultation bilaterally, no crackles or wheezing   Cardiovascular: Regular rate and rhythm, normal S1 and S2, and no murmur noted   Abdomen: Normal bowel sounds, soft, non-distended, non-tender   Skin: No rashes, no cyanosis, dry to touch   Neuro: Alert and oriented x3, no weakness, numbness, memory loss   Extremities: ++edema, normal range of motion   Other(s):HEENT        All other systems: Negative          Data:      All new lab and imaging data was reviewed.   Labs:  No results for input(s): CULT in the last 168 hours.    Recent Labs  Lab 03/16/18  0721 03/15/18  0720 03/14/18  1247    135 134   POTASSIUM 3.5 3.7 4.0   CHLORIDE 98 100 101   CO2 28 29 27   ANIONGAP 8 6 6   GLC 98 89 119*   BUN 17 17 22   CR 0.88 0.92 0.91   GFRESTIMATED 61 58* 58*   GFRESTBLACK 74 70 71   KACI 8.9 8.9 9.3       Recent Labs  Lab  03/15/18  0720 03/14/18  1247   WBC 4.6 5.1   HGB 9.6* 9.9*   HCT 29.5* 30.9*   MCV 95 94    206       Recent Labs  Lab 03/16/18  0721 03/15/18  0720 03/14/18  1247   GLC 98 89 119*      Imaging:   Results for orders placed or performed during the hospital encounter of 03/14/18   Chest XR,  PA & LAT    Narrative    XR CHEST 2 VW 3/14/2018 1:55 PM    HISTORY: Dyspnea.    COMPARISON: 1/29/2018    FINDINGS: Small bilateral pleural effusions. No airspace consolidation  or pneumothorax. Stable heart size. Severe degenerative change at the  shoulders and a subacute right clavicular head fracture.      Impression    IMPRESSION: Small bilateral effusions.    TORY SABILLON MD

## 2018-03-16 NOTE — CONSULTS
Care Transition Initial Assessment -   Reason For Consult: care coordination/care conference, discharge planning  Met with: Patient and Family    Active Problems:    Acute diastolic congestive heart failure (H)    CHF (congestive heart failure) (H)       DATA  Lives With: facility resident- [facility].  PT is in Independent living . Does have cleaning support   Living Arrangements: independent living facility  Description of Support System: Supportive, Involved  Who is your support system?: Children, Facility resident(s)/Staff  Plan was to meet with [facility] to have assessment for LOUISE support services.  PT was also to have Home care through [facility] but was not home long enough to begin her services.   Identified issues/concerns regarding health management:   New CHF at this time.  PT will need to possible return to TCU at [facility]      Resources List: Home Care  Had orders for [facility] but sent in by RN         Transportation Available: car, family or friend will provide  Family plan to make decision about transport on day of dc  ASSESSMENT  Cognitive Status:  awake, alert and oriented  Concerns to be addressed: spoke with pt and daughter about dc planning. Both are aware of possible need for TCU and that pt will start on day 47.  Spoke with pt and family about benefit coverage for her TCU days.  Pt has a PASS that is still valid as she has not been home for 30 day.  PT was home about one day from TCU prior to her readmission.  PT and daughter would prefer a private room and aware of the additional fee  PT has home walker.  She does have cleaning help and generally attends breakfast but maker her own meals otherwise .     PLAN  Will send referral back to [facility] for TCU.. Prefer private room but will take shared and be placed in list if needed.     PT has been accepted for TCU at [facility] for DC planning

## 2018-03-16 NOTE — PLAN OF CARE
"Problem: Patient Care Overview  Goal: Plan of Care/Patient Progress Review  Outcome: Improving  /60 (BP Location: Right arm)  Pulse 112  Temp 96.1  F (35.6  C) (Oral)  Resp 16  Ht 1.727 m (5' 8\")  Wt 92.9 kg (204 lb 12.8 oz)  SpO2 93%  BMI 31.14 kg/m2  A/O. Incontinent of urine, purewick in place with good output. C/O pain 6/10 in hips, back, and BLE. Iris ORELLANA for another prn option at 0030, no orders given, gave PRN tramadol for pain at 0230. Denies SOB. Calls appropriately. Will continue to monitor.          "

## 2018-03-16 NOTE — PLAN OF CARE
Problem: Patient Care Overview  Goal: Plan of Care/Patient Progress Review  Outcome: Improving  Here with CHF exacerbation. Tele monitoring- a-fib CVR. Other vital signs stable. LS coarse, pt has loose occasionally productive cough, dyspnea upon exertion and SOB at rest, O2 sats stable on RA. Per pt, SOB has improved since admission. Lasix IV BID. Trace edema in BLE. Potassium replaced orally, recheck in the AM. Up with Ax1 and walker.

## 2018-03-16 NOTE — PLAN OF CARE
Problem: Patient Care Overview  Goal: Plan of Care/Patient Progress Review  Outcome: Improving  A/O. Incontinent of urine, purewick in place with good output. PRN tramadol for pain. Up with assist x1 and walker. Up to chair for meals. Calls appropriately. Will continue to monitor.

## 2018-03-17 NOTE — PLAN OF CARE
Problem: Patient Care Overview  Goal: Plan of Care/Patient Progress Review  Outcome: No Change  Requiring 2-3L O2 to keep sats >92%. Tele monitoring showed a-fib, HR . LS coarse, breathing more labored today than yesterday. CXR showed left lung PNA. Started on IV zosyn and zithromax. IV lasix changed to q8hrs from BID. Lethargic, poor appetite today. Lactic acid 0.5, blood cultures drawn, VBGs unremarkable-MD notified. Up with Ax2 and walker from the chair to the bed. Writer spoke with daughter both on the phone and in person about pt's change in alertness/condition.

## 2018-03-17 NOTE — PLAN OF CARE
Problem: Patient Care Overview  Goal: Plan of Care/Patient Progress Review  Outcome: No Change  Pt up in chair x2 this shift. Alert oriented and cooperative. C/o being weak and refused to ambulate out of room. VSS afebrile, LS crackles coarse with expiratory wheezes. RA O2 Sat 91%. Intermittent cough with small clear sputum. On IV Lasix voiding. Incontinent of bladder, external catheter pure wick in place. Tele A-Fib CVR. On coumadin and metoprolol. C/O bilateral LE pain prn Ultram given with pt report decrease in pain. Per Pt uses Biofreeze gel at home for leg pain, pt has own medication in room MD notified waiting order to apply topical gel if okay. No c/o chest pain, sob or distress.

## 2018-03-17 NOTE — PLAN OF CARE
Problem: Fluid Volume Excess (Adult)  Goal: Optimal Fluid Balance  Patient will demonstrate the desired outcomes by discharge/transition of care.   Outcome: No Change  Patient is alert and oriented x4. VSS.  Lungs are coarse throughout.  Upon shift start patient spo2 found to be in low 80's.  3L oxy mask placed on patient with return off o2 sats >94%.  Patient tolerating well.  Stiff edema noted to BLE up to waist.  Pt is a1 and position independently.  Struggles with incontinence of urine.   Purewick in place.  Brief check at beginning of shift was saturated.  Brief and purewick changed. Kita area is negative for s/s of redness or breakdown.  No BM thus far.  Tramadol on order for BLE pain.  K protocol 3/16, replaced.  Recheck 3/17 AM.  Will continue to monitor

## 2018-03-17 NOTE — PROGRESS NOTES
Difficult to measure intake and output accurately as patient is incontinent of urine and purewick is only partially effective.  Briefs are commonly found saturated.

## 2018-03-17 NOTE — PROVIDER NOTIFICATION
Pt becoming increasingly more lethargic, unable to keep eyes open. Second antibiotic infusing now. MD notified.   BG checked just to ensure this isn't the issue- .     VBGs ordered STAT

## 2018-03-17 NOTE — PLAN OF CARE
Problem: Patient Care Overview  Goal: Plan of Care/Patient Progress Review    Discharge Planner PT   Patient plan for discharge: daughter thinking TCU again  Current status: Patient supine upon arrival.  Transferred to standing at EOB with verbal cueing and environmental set up.  Patient amb 150 feet with 1 seated rest break, maintained O2 sats over 92% on RA, SOB present during ambulation.  Up in bedside chair at end of session.   Recommend pt is assisted up to chair several times/day, walk with nsg staff into hallways 3x/day.  Barriers to return to prior living situation: lives alone in ILF, deconditioning, level of assist  Recommendations for discharge: TCU  Rationale for recommendations: Pt will benefit from ongoing skilled PT/CR education and training to improve tolerance and safety with functional mobility needs.  Appears she will benefit from another TCU stay, but consider return home to ILF with increased services if continued progress to point of independence with mobility skills.       Entered by: Saida River 03/16/2018 11:33 PM

## 2018-03-17 NOTE — PROGRESS NOTES
[facility]  Hospitalist Progress Note  Hernandez Stovall MD 03/17/2018    Reason for Stay (Diagnosis):     Shortness of breath of multifactorial etiology including congestive heart failure, community-acquired pneumonia    Acute encephalopathy         Assessment and Plan:        Summary of Stay: Estefany An is a 87 year old female with history of chronic A. fib, hypertension, gastroesophageal reflux disease, anxiety, presented with generalized weakness and shortness of breath and admitted on 3/14/2018.   Patient was admitted to telemetry bed and closely monitored with IV diuresis, oxygen supplementation and close monitoring.      Problem List:   #1.  Acute hypoxic respiratory failure: Suspect multifactorial etiology including acute diastolic congestive heart failure and pulmonary infection:  --Continue supplemental oxygen, VBG showed normal pH but elevated PCO2 and compensatory bicarb elevation.  Continue antibiotic for pulmonary infection    #2.  Acute on chronic heart failure with preserved ejection fraction:Her echocardiogram on 01/08/18 showed EF f 55-60%, indeterminate left ventricular filling pressure.  --Has been on IV diuretic therapy which was increased to 40 twice a day, patient needs more diuresis, will increase Lasix to 40 mg IV every 8 hours for the next 24 hours, monitor intake and output and daily weight.  --Follow electrolytes  I/O last 3 completed shifts:  In: 270 [P.O.:270]  Out: 1325 [Urine:1325]  Wt Readings from Last 5 Encounters:   03/17/18 92.7 kg (204 lb 4.8 oz)   01/31/18 88.9 kg (196 lb)   01/13/18 88.7 kg (195 lb 8 oz)   11/02/14 81.6 kg (180 lb)   12/03/13 81.6 kg (180 lb)        #3.  Chronic atrial fibrillation-rate controlled on metoprolol.  -Rate controlled, continue to monitor, INR is therapeutic, continue Coumadin       #4.  Community-acquired pneumonia: Patient developed focal area of infiltrate in the left midlung since March 14, 2018  --Intravenous antibiotic with  Zosyn and azithromycin started, continue to monitor vitals and cultures.    #5.  Acute encephalopathy: Suspect multifactorial including hypoxia and hypercapnia and congestive heart failure: Patient is at risk of delirium, continue to treat underlying pathology, monitor for now.  #6.  Hypertension: Controlled, continue current medications  #7.  Generalized weakness and physical deconditioning: Patient Has been admitted several times recently, will get PT, OT as well as care coordinators consider palliative care consultation to assist with further goal of care planning.  Patient is DNR and DNI.      #8.  Bilateral lower extremity pain-controlled with Ultram 3 times a day as needed, patient takes it Ultram 50 mg at 1600, 2000, and at midnight  Mostly she does require it in the morning.  # Pain Assessment:   Current Pain Score 3/17/2018 3/17/2018 3/17/2018   Patient currently in pain? denies denies denies   Pain score (0-10) 0 - -   Pain location - - -   Pain descriptors - - -   - Estefany is experiencing pain due to bilateral lower extremity. Pain management was discussed and the plan was created in a collaborative fashion.  Estefany's response to the current recommendations: compliant  - Please see the plan for pain management as documented above    DVT Prophylaxis: Warfarin  Code Status: DNR / DNI  Discharge Dispo: Likely TCU, pending PT evaluation  Estimated Disch Date / # of Days until Disch: Patient needs another few days in the hospital for IV antibiotic treatment, IV diuretics and close monitoring.  Patient is unlikely to be discharged to independent living facility on discharge          Interval History (Subjective):      Patient care was assumed by me this morning.  She feels more short of breath, congested and coughing this morning.  She denies chest pain.   She is up in chair, alert and oriented ×3,   She required 3 L of oxygen, and diffusely decreased entry and rhonchi all over the lower lung fields.  Labs  "diagnostic studies and weight changes reviewed                  Physical Exam:      Last Vital Signs:  /59 (BP Location: Left arm)  Pulse 112  Temp 98.7  F (37.1  C) (Oral)  Resp 22  Ht 1.727 m (5' 8\")  Wt 92.7 kg (204 lb 4.8 oz)  SpO2 91%  BMI 31.06 kg/m2    I/O last 3 completed shifts:  In: 270 [P.O.:270]  Out: 1325 [Urine:1325]  Wt Readings from Last 1 Encounters:   03/17/18 92.7 kg (204 lb 4.8 oz)     Current Facility-Administered Medications   Medication     furosemide (LASIX) injection 40 mg     ipratropium - albuterol 0.5 mg/2.5 mg/3 mL (DUONEB) neb solution 3 mL     piperacillin-tazobactam (ZOSYN) intermittent infusion 4.5 g     [START ON 3/18/2018] azithromycin (ZITHROMAX) 250 mg in sodium chloride 0.9 % 250 mL intermittent infusion     warfarin (COUMADIN) half-tab 1.5 mg     potassium chloride SA (K-DUR/KLOR-CON M) CR tablet 20-40 mEq     potassium chloride (KLOR-CON) Packet 20-40 mEq     potassium chloride 10 mEq in 100 mL sterile water intermittent infusion (premix)     potassium chloride 10 mEq in 100 mL intermittent infusion with 10 mg lidocaine     potassium chloride 20 mEq in 50 mL intermittent infusion     magnesium sulfate 2 g in NS intermittent infusion (PharMEDium or FV Cmpd)     magnesium sulfate 4 g in 100 mL sterile water (premade)     traMADol (ULTRAM) tablet 50 mg     miconazole (MICATIN; MICRO GUARD) 2 % powder     naloxone (NARCAN) injection 0.1-0.4 mg     melatonin tablet 1 mg     lidocaine 1 % 1 mL     lidocaine (LMX4) kit     sodium chloride (PF) 0.9% PF flush 3 mL     sodium chloride (PF) 0.9% PF flush 3 mL     Patient is already receiving anticoagulation with heparin, enoxaparin (LOVENOX), warfarin (COUMADIN)  or other anticoagulant medication     senna-docusate (SENOKOT-S;PERICOLACE) 8.6-50 MG per tablet 1 tablet    Or     senna-docusate (SENOKOT-S;PERICOLACE) 8.6-50 MG per tablet 2 tablet     polyethylene glycol (MIRALAX/GLYCOLAX) Packet 17 g     ondansetron " (ZOFRAN-ODT) ODT tab 4 mg    Or     ondansetron (ZOFRAN) injection 4 mg     Continuing ACE inhibitor/ARB/ARNI from home medication list OR ACE inhibitor/ARB/ARNI order already placed during this visit     Continuing beta blocker from home medication list OR beta blocker order already placed during this visit     ipratropium - albuterol 0.5 mg/2.5 mg/3 mL (DUONEB) neb solution 3 mL     Warfarin Therapy Reminder (Check START DATE - warfarin may be starting in the FUTURE)     acetaminophen (TYLENOL) tablet 1,000 mg     fluticasone-vilanterol (BREO ELLIPTA) 200-25 MCG/INH oral inhaler 1 puff     LORazepam (ATIVAN) tablet 0.5 mg     losartan (COZAAR) tablet 50 mg     metoprolol tartrate (LOPRESSOR) tablet 50 mg     omeprazole (priLOSEC) CR capsule 20 mg     NIFEdipine ER osmotic (PROCARDIA XL) 24 hr tablet 90 mg       Constitutional: Awake, awake but lethargic this morning   Respiratory:  Patient has decreased air entry, diffuse crackles posteriorly and basally.   Cardiovascular: Regular rate and rhythm, normal S1 and S2, and no murmur noted   Abdomen: Normal bowel sounds, soft, non-distended, non-tender   Skin: No rashes, no cyanosis, dry to touch   Neuro: Alert and oriented x3, no weakness, numbness, memory loss   Extremities: ++edema, normal range of motion   Other(s):HEENT        All other systems: Negative          Data:      All new lab and imaging data was reviewed.   Labs:  No results for input(s): CULT in the last 168 hours.    Recent Labs  Lab 03/17/18  0645 03/16/18  0721 03/15/18  0720    134 135   POTASSIUM 4.1 3.5 3.7   CHLORIDE 97 98 100   CO2 32 28 29   ANIONGAP 4 8 6   GLC 90 98 89   BUN 19 17 17   CR 1.03 0.88 0.92   GFRESTIMATED 51* 61 58*   GFRESTBLACK 61 74 70   KACI 8.8 8.9 8.9       Recent Labs  Lab 03/15/18  0720 03/14/18  1247   WBC 4.6 5.1   HGB 9.6* 9.9*   HCT 29.5* 30.9*   MCV 95 94    206       Recent Labs  Lab 03/17/18  0645 03/16/18  0721 03/15/18  0720 03/14/18  1247   GLC 90  98 89 119*      Imaging:   Results for orders placed or performed during the hospital encounter of 03/14/18   Chest XR,  PA & LAT    Narrative    XR CHEST 2 VW 3/14/2018 1:55 PM    HISTORY: Dyspnea.    COMPARISON: 1/29/2018    FINDINGS: Small bilateral pleural effusions. No airspace consolidation  or pneumothorax. Stable heart size. Severe degenerative change at the  shoulders and a subacute right clavicular head fracture.      Impression    IMPRESSION: Small bilateral effusions.    TORY SABILLON MD

## 2018-03-17 NOTE — PROGRESS NOTES
Patient care was assumed by me this morning.  She feels more short of breath, congested and coughing this morning.  She denies chest pain.   She is up in chair, alert and oriented ×3,   She required 3 L of oxygen, and diffusely decreased entry and rhonchi all over the lower lung fields.  Labs diagnostic studies and weight changes reviewed  Plan is to continue diuretic therapy, I will increase to 40 mg every 8 hours for the next 24 hours, monitor intake and output, daily weights, get chest x-ray for further evaluation.

## 2018-03-18 NOTE — PLAN OF CARE
Problem: Patient Care Overview  Goal: Plan of Care/Patient Progress Review    Discharge Planner PT   Patient plan for discharge: daughter thinking TCU   Current status: Provided CHF education materials.  Amb x 90ft with FWW and CGA, SOB with activity and mild dizziness.  VSS at rest 111/57, HR 80, SaO2 95% on 3L nc.  With activity: 110/57, HR 88, SaO2 89-94% on 3L nc.  CGA sit <> stand transfers.  Encouraged amb with nsg staff 3x/day, up to chair 4x/day, keep legs elevated.  Barriers to return to prior living situation: lives alone in ILF, deconditioning, level of assist  Recommendations for discharge: TCU  Rationale for recommendations: Pt will benefit from ongoing skilled PT/CR education and training to improve tolerance and safety with functional mobility needs.  Appears she will benefit from another TCU stay.       Entered by: Ministerio Cintron 03/18/2018 12:31 PM

## 2018-03-18 NOTE — PLAN OF CARE
Problem: Fluid Volume Excess (Adult)  Goal: Identify Related Risk Factors and Signs and Symptoms  Related risk factors and signs and symptoms are identified upon initiation of Human Response Clinical Practice Guideline (CPG).   Outcome: No Change  Patient is alert and oriented x4 but lethargic. VSS.  Lungs are coarse throughout.  3L oxy mask remains o2 sats >94%.  Patient tolerating well.  BLE edema remains. A1 and position independently.  Struggles with incontinence of urine.   Purewick in place.  Brief check's thus far have been dry.  Purewick emptied and changed. Kita area is negative for s/s of redness or breakdown.  No BM thus far.  Tramadol on order for BLE pain. Will continue to monitor

## 2018-03-18 NOTE — PROGRESS NOTES
Patient was seen and examined by me this morning.  She is feeling much better today.  But still a little somnolent.  She ate her breakfast, up in chair.  Requiring 2 L of oxygen.  Plan is to avoid narcotic medications, OT consultation and discharge planning.

## 2018-03-18 NOTE — PROGRESS NOTES
[facility]  Hospitalist Progress Note  Hernandez Stovall MD 03/18/2018    Reason for Stay (Diagnosis):     Shortness of breath of multifactorial etiology including congestive heart failure, community-acquired pneumonia    Acute encephalopathy         Assessment and Plan:        Summary of Stay: Estefany An is a 87 year old female with history of chronic A. fib, hypertension, gastroesophageal reflux disease, anxiety, presented with generalized weakness and shortness of breath and admitted on 3/14/2018.   Patient was admitted to telemetry bed and closely monitored with IV diuresis, oxygen supplementation and close monitoring.      Problem List:   #1.  Acute hypoxic respiratory failure: Suspect multifactorial etiology including acute diastolic congestive heart failure and pulmonary infection:  --Continue supplemental oxygen, VBG showed normal pH but elevated PCO2 and compensatory bicarb elevation.  Continue antibiotic for pulmonary infection  --Patient is improving, continue to monitor    #2.  Acute on chronic heart failure with preserved ejection fraction:Her echocardiogram on 01/08/18 showed EF f 55-60%, indeterminate left ventricular filling pressure.  --Patient is improving, decrease Lasix to twice a day.  I doubt if documented weight is accurate.  I/O last 3 completed shifts:  In: 440 [P.O.:440]  Out: 1625 [Urine:1625]  Wt Readings from Last 5 Encounters:   03/18/18 90 kg (198 lb 8 oz)   01/31/18 88.9 kg (196 lb)   01/13/18 88.7 kg (195 lb 8 oz)   11/02/14 81.6 kg (180 lb)   12/03/13 81.6 kg (180 lb)        #3.  Chronic atrial fibrillation-rate controlled on metoprolol.  -Rate controlled, continue to monitor, INR is supratherapeutic today, pharmacy is consulted to assist with dosing.  May need to hold Coumadin today  #4.  Community-acquired pneumonia: Patient developed focal area of infiltrate in the left midlung since March 14, 2018  --Intravenous antibiotic with Zosyn and azithromycin started  March 17, 2018, continue to monitor vitals and cultures.    #5.  Acute encephalopathy: Suspect multifactorial including hypoxia and hypercapnia and congestive heart failure: Patient is at risk of delirium, continue to treat underlying pathology, monitor for now.  #6.  Hypertension: Controlled, continue current medications  #7.  Generalized weakness and physical deconditioning: Patient Has been admitted several times recently, will get PT, OT as well as care coordinators consider palliative care consultation to assist with further goal of care planning.  Patient is DNR and DNI.      #8.  Bilateral lower extremity pain-controlled with Ultram 3 times a day as needed, patient takes it Ultram 50 mg at 1600, 2000, and at midnight  Mostly she does require it in the morning.  # Pain Assessment:   Current Pain Score 3/18/2018 3/18/2018 3/18/2018   Patient currently in pain? yes yes yes   Pain score (0-10) 2 4 3   Pain location - Leg Leg   Pain descriptors - Aching Aching   - Estefany is experiencing pain due to bilateral lower extremity. Pain management was discussed and the plan was created in a collaborative fashion.  Estefany's response to the current recommendations: compliant  - Please see the plan for pain management as documented above    DVT Prophylaxis: Warfarin  Code Status: DNR / DNI  Discharge Dispo: Likely TCU, pending PT evaluation  Estimated Disch Date / # of Days until Disch: Continue current care, may need any additional 2 days in the hospital until her condition improves.        Interval History (Subjective):        Patient was seen and examined by me this morning.  She is feeling much better today.  But still a little somnolent.  She ate her breakfast, up in chair.  Requiring 2 L of oxygen.  Plan is to avoid narcotic medications, OT consultation and discharge planning.                  Physical Exam:      Last Vital Signs:  /53 (BP Location: Left arm)  Pulse 112  Temp 96.2  F (35.7  C) (Oral)  Resp 20   "Ht 1.727 m (5' 8\")  Wt 90 kg (198 lb 8 oz)  SpO2 95%  BMI 30.18 kg/m2    I/O last 3 completed shifts:  In: 440 [P.O.:440]  Out: 1625 [Urine:1625]  Wt Readings from Last 1 Encounters:   03/18/18 90 kg (198 lb 8 oz)     Current Facility-Administered Medications   Medication     furosemide (LASIX) injection 40 mg     warfarin-No DOSE today     ipratropium - albuterol 0.5 mg/2.5 mg/3 mL (DUONEB) neb solution 3 mL     piperacillin-tazobactam (ZOSYN) intermittent infusion 4.5 g     azithromycin (ZITHROMAX) 250 mg in sodium chloride 0.9 % 250 mL intermittent infusion     Menthol (Topical Analgesic) 4 % GEL 5 g     potassium chloride SA (K-DUR/KLOR-CON M) CR tablet 20-40 mEq     potassium chloride (KLOR-CON) Packet 20-40 mEq     potassium chloride 10 mEq in 100 mL sterile water intermittent infusion (premix)     potassium chloride 10 mEq in 100 mL intermittent infusion with 10 mg lidocaine     potassium chloride 20 mEq in 50 mL intermittent infusion     magnesium sulfate 2 g in NS intermittent infusion (PharMEDium or [facility]V Cmpd)     magnesium sulfate 4 g in 100 mL sterile water (premade)     traMADol (ULTRAM) tablet 50 mg     miconazole (MICATIN; MICRO GUARD) 2 % powder     naloxone (NARCAN) injection 0.1-0.4 mg     melatonin tablet 1 mg     lidocaine 1 % 1 mL     lidocaine (LMX4) kit     sodium chloride (PF) 0.9% PF flush 3 mL     sodium chloride (PF) 0.9% PF flush 3 mL     Patient is already receiving anticoagulation with heparin, enoxaparin (LOVENOX), warfarin (COUMADIN)  or other anticoagulant medication     senna-docusate (SENOKOT-S;PERICOLACE) 8.6-50 MG per tablet 1 tablet    Or     senna-docusate (SENOKOT-S;PERICOLACE) 8.6-50 MG per tablet 2 tablet     polyethylene glycol (MIRALAX/GLYCOLAX) Packet 17 g     ondansetron (ZOFRAN-ODT) ODT tab 4 mg    Or     ondansetron (ZOFRAN) injection 4 mg     Continuing ACE inhibitor/ARB/ARNI from home medication list OR ACE inhibitor/ARB/ARNI order already placed during this visit "     Continuing beta blocker from home medication list OR beta blocker order already placed during this visit     ipratropium - albuterol 0.5 mg/2.5 mg/3 mL (DUONEB) neb solution 3 mL     Warfarin Therapy Reminder (Check START DATE - warfarin may be starting in the FUTURE)     acetaminophen (TYLENOL) tablet 1,000 mg     fluticasone-vilanterol (BREO ELLIPTA) 200-25 MCG/INH oral inhaler 1 puff     LORazepam (ATIVAN) tablet 0.5 mg     losartan (COZAAR) tablet 50 mg     metoprolol tartrate (LOPRESSOR) tablet 50 mg     omeprazole (priLOSEC) CR capsule 20 mg     NIFEdipine ER osmotic (PROCARDIA XL) 24 hr tablet 90 mg       Constitutional: Awake, awake but somnolent   Respiratory:  Patient has decreased air entry, diffuse crackles posteriorly and basally.   Cardiovascular: Regular rate and rhythm, normal S1 and S2, and no murmur noted   Abdomen: Normal bowel sounds, soft, non-distended, non-tender   Skin: No rashes, no cyanosis, dry to touch   Neuro: Alert and oriented x3, no weakness, numbness, memory loss   Extremities: ++edema, normal range of motion   Other(s):HEENT        All other systems: Negative          Data:      All new lab and imaging data was reviewed.   Labs:    Recent Labs  Lab 03/17/18  1301 03/17/18  1256   CULT No growth after 20 hours No growth after 20 hours       Recent Labs  Lab 03/18/18  0616 03/17/18  0645 03/16/18  0721   * 133 134   POTASSIUM 3.9 4.1 3.5   CHLORIDE 94 97 98   CO2 34* 32 28   ANIONGAP 4 4 8   GLC 97 90 98   BUN 17 19 17   CR 1.01 1.03 0.88   GFRESTIMATED 52* 51* 61   GFRESTBLACK 63 61 74   KACI 8.4* 8.8 8.9       Recent Labs  Lab 03/18/18  0616 03/15/18  0720 03/14/18  1247   WBC 3.9* 4.6 5.1   HGB 9.5* 9.6* 9.9*   HCT 29.8* 29.5* 30.9*   MCV 97 95 94   * 169 206       Recent Labs  Lab 03/18/18  0616 03/17/18  1226 03/17/18  0645 03/16/18  0721 03/15/18  0720 03/14/18  1247   GLC 97  --  90 98 89 119*   BGM  --  108*  --   --   --   --       Imaging:   Results for  orders placed or performed during the hospital encounter of 03/14/18   Chest XR,  PA & LAT    Narrative    XR CHEST 2 VW 3/14/2018 1:55 PM    HISTORY: Dyspnea.    COMPARISON: 1/29/2018    FINDINGS: Small bilateral pleural effusions. No airspace consolidation  or pneumothorax. Stable heart size. Severe degenerative change at the  shoulders and a subacute right clavicular head fracture.      Impression    IMPRESSION: Small bilateral effusions.    TORY SABILLON MD

## 2018-03-18 NOTE — PLAN OF CARE
Problem: Patient Care Overview  Goal: Plan of Care/Patient Progress Review  Outcome: Declining  Initially at beginning of this shift pt lethargic arouses to gentle touch and shake. Eventually patient awaken and Alert oriented and cooperative but very weak. Up with assist of one sitting at bedside for dinner. C/O bilateral LE pain prn Ultram given with pt report decrease in pain. VSS afebrile, LS crackles coarse with expiratory wheezes. Requiring Oxygen 2-3L Oxymask Sat 95%. Dyspneic on exertion. Still has some intermittent cough with small clear sputum. IV Lasix increased voiding. Incontinent of bladder, external catheter pure wick in place. Tele A-Fib CVR. On coumadin and metoprolol.  No c/o chest pain or distress.

## 2018-03-18 NOTE — PROVIDER NOTIFICATION
"Provider notified \"Pt want to use Biofreeze gel for Bilateral LE pain. Per pt use this at home in addition to Ultram. Please advise\" pt c/o bilateral LE pain Ultram given x1 with pt report decrease pain. Pt has own Biofreeze gel in room provided to her by family but nurse waiting for order to apply medication.     @1950 spoke with Dr. Javier Telephone read back verbal Order for Biofreeze gel PRN. This nurse spoke with pharmacy Sohan will facilitate labels. Will apply medication as ordered.   "

## 2018-03-18 NOTE — PLAN OF CARE
Problem: Patient Care Overview  Goal: Plan of Care/Patient Progress Review  Outcome: Improving  Continuing to require 2L O2 to keep O2 sats >92%.  Other vital signs stable. LS ex wheezes/crackles, dyspnea upon exertion and SOB, cough loose/nonproductive today-unable to collect sample for sputum culture. Zosyn and zithromax IV for PNA. Lasix decreased to BID from q8hrs. Up with Ax1 and walker, ambulated in halls with PT. Pain in bilateral legs relieved with scheduled tylenol and topical gel, declines PRN ultram.

## 2018-03-19 NOTE — PROGRESS NOTES
[facility]  Hospitalist Progress Note  Hernandez Stovall MD 03/19/2018    Reason for Stay (Diagnosis):     Shortness of breath of multifactorial etiology including congestive heart failure, community-acquired pneumonia    Acute encephalopathy         Assessment and Plan:        Summary of Stay: Estefany An is a 87 year old female with history of chronic A. fib, hypertension, gastroesophageal reflux disease, anxiety, presented with generalized weakness and shortness of breath and admitted on 3/14/2018.   Patient was admitted to telemetry bed and closely monitored with IV diuresis, oxygen supplementation and close monitoring.      Problem List:   #1.  Acute hypoxic respiratory failure: Suspect multifactorial etiology including acute diastolic congestive heart failure and pulmonary infection:  --Continue supplemental oxygen, VBG showed normal pH but elevated PCO2 and compensatory bicarb elevation.  Continue antibiotic for pulmonary infection  --Patient is improving, continue to monitor    #2.  Acute on chronic heart failure with preserved ejection fraction:Her echocardiogram on 01/08/18 showed EF f 55-60%, indeterminate left ventricular filling pressure.  --Patient is improving,Continue intravenous Lasix for another day  I/O last 3 completed shifts:  In: 780 [P.O.:780]  Out: 800 [Urine:800]  Wt Readings from Last 5 Encounters:   03/18/18 90 kg (198 lb 8 oz)   01/31/18 88.9 kg (196 lb)   01/13/18 88.7 kg (195 lb 8 oz)   11/02/14 81.6 kg (180 lb)   12/03/13 81.6 kg (180 lb)        #3.  Chronic atrial fibrillation-rate controlled on metoprolol.  -Rate controlled, continue to monitor, INR is supratherapeutic today, pharmacy is consulted to assist with dosing.  May need to hold Coumadin today    #4.  Community-acquired pneumonia: Patient developed focal area of infiltrate in the left midlung since March 14, 2018  --Intravenous antibiotic with Zosyn and azithromycin started March 17, 2018, continue to monitor  vitals and cultures.  --Patient is afebrile, continue antibiotic for now  #5.  Acute encephalopathy: Suspect multifactorial including hypoxia and hypercapnia and congestive heart failure: Patient is at risk of delirium, continue to treat underlying pathology, monitor for now.  #6.  Hypertension: Controlled, continue current medications  #7.  Generalized weakness and physical deconditioning: Patient Has been admitted several times recently, will get PT, OT as well as care coordinators consider palliative care consultation to assist with further goal of care planning.  Patient is DNR and DNI.      #8.  Bilateral lower extremity pain-controlled with Ultram 3 times a day as needed, patient takes it Ultram 50 mg at 1600, 2000, and at midnight  Mostly she does require it in the morning.  # Pain Assessment:   Current Pain Score 3/19/2018 3/19/2018 3/19/2018   Patient currently in pain? yes yes denies   Pain score (0-10) 4 4 0   Pain location Leg Leg -   Pain descriptors Aching Aching -   - Estefany is experiencing pain due to bilateral lower extremity. Pain management was discussed and the plan was created in a collaborative fashion.  Estefany's response to the current recommendations: compliant  - Please see the plan for pain management as documented above    DVT Prophylaxis: Warfarin  Code Status: DNR / DNI  Discharge Dispo: Likely TCU, pending PT evaluation  Estimated Disch Date / # of Days until Disch: Continue current care, may need any additional 1-2 days in the hospital until her condition improves.        Interval History (Subjective):                 Patient was seen and examined by me this morning.  She is a little alert today, communicating better.  She does not appear to be in respiratory distress.  She is requiring less oxygen today.  No chest pain.  She is up in chair.  Lung sounds are diminished with crackles posteriorly.  She has peripheral edema.  Plan is to continue diuretic therapy at a lower dose, continue  "antibiotic, care coordination and discharge planning as patient is at risk of getting readmitted unless we have a safe discharge plan.  She needs to stay in the hospital for the next 1 or 2 days.  Patient's daughter was contacted and questions and concerns addressed           Physical Exam:      Last Vital Signs:  /53 (BP Location: Right arm)  Pulse 112  Temp 95.7  F (35.4  C) (Oral)  Resp 20  Ht 1.727 m (5' 8\")  Wt 90 kg (198 lb 8 oz)  SpO2 96%  BMI 30.18 kg/m2    I/O last 3 completed shifts:  In: 780 [P.O.:780]  Out: 800 [Urine:800]  Wt Readings from Last 1 Encounters:   03/18/18 90 kg (198 lb 8 oz)     Current Facility-Administered Medications   Medication     furosemide (LASIX) injection 20 mg     warfarin-No DOSE today     ipratropium - albuterol 0.5 mg/2.5 mg/3 mL (DUONEB) neb solution 3 mL     piperacillin-tazobactam (ZOSYN) intermittent infusion 4.5 g     azithromycin (ZITHROMAX) 250 mg in sodium chloride 0.9 % 250 mL intermittent infusion     Menthol (Topical Analgesic) 4 % GEL 5 g     potassium chloride SA (K-DUR/KLOR-CON M) CR tablet 20-40 mEq     potassium chloride (KLOR-CON) Packet 20-40 mEq     potassium chloride 10 mEq in 100 mL sterile water intermittent infusion (premix)     potassium chloride 10 mEq in 100 mL intermittent infusion with 10 mg lidocaine     potassium chloride 20 mEq in 50 mL intermittent infusion     magnesium sulfate 2 g in NS intermittent infusion (PharMEDium or FV Cmpd)     magnesium sulfate 4 g in 100 mL sterile water (premade)     traMADol (ULTRAM) tablet 50 mg     miconazole (MICATIN; MICRO GUARD) 2 % powder     naloxone (NARCAN) injection 0.1-0.4 mg     melatonin tablet 1 mg     lidocaine 1 % 1 mL     lidocaine (LMX4) kit     sodium chloride (PF) 0.9% PF flush 3 mL     sodium chloride (PF) 0.9% PF flush 3 mL     Patient is already receiving anticoagulation with heparin, enoxaparin (LOVENOX), warfarin (COUMADIN)  or other anticoagulant medication     senna-docusate " (SENOKOT-S;PERICOLACE) 8.6-50 MG per tablet 1 tablet    Or     senna-docusate (SENOKOT-S;PERICOLACE) 8.6-50 MG per tablet 2 tablet     polyethylene glycol (MIRALAX/GLYCOLAX) Packet 17 g     ondansetron (ZOFRAN-ODT) ODT tab 4 mg    Or     ondansetron (ZOFRAN) injection 4 mg     Continuing ACE inhibitor/ARB/ARNI from home medication list OR ACE inhibitor/ARB/ARNI order already placed during this visit     Continuing beta blocker from home medication list OR beta blocker order already placed during this visit     ipratropium - albuterol 0.5 mg/2.5 mg/3 mL (DUONEB) neb solution 3 mL     Warfarin Therapy Reminder (Check START DATE - warfarin may be starting in the FUTURE)     acetaminophen (TYLENOL) tablet 1,000 mg     fluticasone-vilanterol (BREO ELLIPTA) 200-25 MCG/INH oral inhaler 1 puff     LORazepam (ATIVAN) tablet 0.5 mg     losartan (COZAAR) tablet 50 mg     metoprolol tartrate (LOPRESSOR) tablet 50 mg     omeprazole (priLOSEC) CR capsule 20 mg     NIFEdipine ER osmotic (PROCARDIA XL) 24 hr tablet 90 mg       Constitutional: Awake, awake but somnolent   Respiratory:  Patient has decreased air entry, diffuse crackles posteriorly and basally.   Cardiovascular: Regular rate and rhythm, normal S1 and S2, and no murmur noted   Abdomen: Normal bowel sounds, soft, non-distended, non-tender   Skin: No rashes, no cyanosis, dry to touch   Neuro: Alert and oriented x3, no weakness, numbness, memory loss   Extremities: ++edema, normal range of motion   Other(s):HEENT        All other systems: Negative          Data:      All new lab and imaging data was reviewed.   Labs:    Recent Labs  Lab 03/17/18  1301 03/17/18  1256   CULT No growth after 2 days No growth after 2 days       Recent Labs  Lab 03/19/18  0635 03/18/18  0616 03/17/18  0645    132* 133   POTASSIUM 3.1* 3.9 4.1   CHLORIDE 94 94 97   CO2 33* 34* 32   ANIONGAP 6 4 4   GLC 97 97 90   BUN 21 17 19   CR 1.13* 1.01 1.03   GFRESTIMATED 46* 52* 51*   GFRESTBLACK  55* 63 61   KACI 8.4* 8.4* 8.8       Recent Labs  Lab 03/19/18  0635 03/18/18  0616 03/15/18  0720   WBC 4.9 3.9* 4.6   HGB 9.3* 9.5* 9.6*   HCT 29.5* 29.8* 29.5*   MCV 94 97 95   * 135* 169       Recent Labs  Lab 03/19/18  0635 03/18/18  0616 03/17/18  1226 03/17/18  0645 03/16/18  0721 03/15/18  0720   GLC 97 97  --  90 98 89   BGM  --   --  108*  --   --   --       Imaging:   Results for orders placed or performed during the hospital encounter of 03/14/18   Chest XR,  PA & LAT    Narrative    XR CHEST 2 VW 3/14/2018 1:55 PM    HISTORY: Dyspnea.    COMPARISON: 1/29/2018    FINDINGS: Small bilateral pleural effusions. No airspace consolidation  or pneumothorax. Stable heart size. Severe degenerative change at the  shoulders and a subacute right clavicular head fracture.      Impression    IMPRESSION: Small bilateral effusions.    TORY SABILLON MD

## 2018-03-19 NOTE — PLAN OF CARE
Problem: Patient Care Overview  Goal: Plan of Care/Patient Progress Review  PT: Patient just returned back to bed with RN staff. Declines participation at this time. Will continue to follow.

## 2018-03-19 NOTE — PLAN OF CARE
Problem: Patient Care Overview  Goal: Plan of Care/Patient Progress Review  Outcome: Improving  Pt up SBA with walker, up in chair for dinner remained weak unable to ambulate further. A/O cooperative this shift. C/O bilateral LE aching pain Ultram given with pt report decrease pain. VSS afebrile. LS crackles with expiratory and inspiratory wheezes. On 2L NC O2 Sat 96%, pt mouth breather O2 drops while a sleep but quickly bounces with use of Oxymask 2L. Dyspneic on exertion. Infrequent cough with small to no sputum. On IV lasix BID. Incontinent of bladder, using external catheter pure wick. Tele A-Fib CVR with ST depression. No c/o chest pain or distress. INR supratherapeutic at 3.57 no dose of coumadin given.

## 2018-03-19 NOTE — PLAN OF CARE
Problem: Patient Care Overview  Goal: Plan of Care/Patient Progress Review  Outcome: Improving  Continuing to require 1L NC to keep O2 sats >92%. Dyspnea upon exertion, slight SOB at rest, LS crackles/wheezes, nonproductive cough today, respirations labored at times. Zosyn and zithromax IV abx for PNA. Lasix IV BID, edema 2+ in BLE. LBM Friday- Miralax PRN. Potassium 3.1, replaced orally and recheck at 1600. Tele monitoring a-fib CVR. Ambulated in halls x1 with Ax1 and walker.

## 2018-03-19 NOTE — PROGRESS NOTES
Patient was seen and examined by me this morning.  She is a little alert today, communicating better.  She does not appear to be in respiratory distress.  She is requiring less oxygen today.  No chest pain.  She is up in chair.  Lung sounds are diminished with crackles posteriorly.  She has peripheral edema.  Plan is to continue diuretic therapy at a lower dose, continue antibiotic, care coordination and discharge planning as patient is at risk of getting readmitted unless we have a safe discharge plan.  She needs to stay in the hospital for the next 1 or 2 days.  Patient's daughter multiple was contacted and questions and concerns addressed

## 2018-03-19 NOTE — PROGRESS NOTES
Discharge Planner   Discharge Plans in progress: Called [facility] to update that pt si not ready to dc today   Barriers to discharge plan: none.   Follow up plan:      03/16/18 1300   [facility]   Transitional Care [facility] [phone]   PAS Number [id number]   Following        Entered by: Corinne C. White 03/19/2018 11:41 AM

## 2018-03-19 NOTE — PLAN OF CARE
Problem: Fluid Volume Excess (Adult)  Goal: Identify Related Risk Factors and Signs and Symptoms  Related risk factors and signs and symptoms are identified upon initiation of Human Response Clinical Practice Guideline (CPG).   Patient is alert and oriented x4 but lethargic. VSS.  Lungs are coarse throughout.  3L oxy mask remains o2 sats >94%. Patient still de-sating quickly off o2. Within 1-3 minutes off oxygen patient sats drop to mid to high 70's.  Patient tolerating well.  BLE edema remains. A1 and position independently.  Struggles with incontinence of urine.   Purewick in place. Tramadol administered for ble pain. Patient markedly better from yesterday.  Will continue to monitor.

## 2018-03-19 NOTE — CONSULTS
[facility] referral received from Vicki HIKNLE.     Estefany is not currently established in the [facility] and has not been seen by a cardiologist from our group. She will need to establish care with a [facility] MD.     Nutrition assistance appreciated. Hospital nurse, please give pt [facility]/HF education.       [facility] appointment made for:    Tuesday 4/10 with BMP at 1145 and a visit with Dr. Chang at 1245. (first available)    Note that Estefany may dc to TCU and she should follow with TCU provider during interim.          We look forward to seeing Estefany in the clinic.   Please call with questions.     Shannon West RN, BSN  [facility] Care Coordinator  [phone]      [facility]: Cardiomyopathy, Optimization, Rehabilitation, Education   The [facility] is a heart failure specialty clinic within the [facility] where you will work with your cardiologist, nurse practitioners, physician assistants and registered nurses who specialize in heart failure care. They are dedicated to helping patients with heart failure to carefully adjust medications, receive education, and learn who and when to call if symptoms develop. They specialize in helping you better understand your condition, slow the progression of your disease, improve the length and quality of your life, help you detect future heart problems before they become life threatening, and avoid hospitalizations.

## 2018-03-20 NOTE — PLAN OF CARE
Problem: Patient Care Overview  Goal: Plan of Care/Patient Progress Review  OT: Order received, chart reviewed pt admitted 3/14 with weakness, fatigue, acute CHF exacerbation.  Pt had just returned to ILF (~1 day) from TCU on 3/13.  Was at TCU for extended stay following Influenza with fall, pelvic and clavicle fractures.  At baseline, pt is ambulatory with 4WW independently and manages ADLs mod independence with DME.  Per daughter, plan to was to increase services at living facility. Pt has been following pt during this hospitalization, at this time OT consulted however not indicated to complete evaluation as MD anticipating pt transfer to TCU, likely tomorrow. Will complete order and defer OT to next level of care, anticipate pt to continue to be followed by PT services.    Discharge Planner OT   Patient plan for discharge: per chart: TCU  Current status: Pt requires assist for safety with mobility and self cares  Barriers to return to prior living situation: lives alone in apartment, below baseline independence and activity tolerance  Recommendations for discharge: per PT evaluation, TCU recommended, based on chart review agree with this recommendation and plan   Rationale for recommendations: will defer OT services to TCU as anticipated discharge and transfer in near future, likely tomorrow       Entered by: Michelle Valdovinos 03/20/2018 10:43 AM

## 2018-03-20 NOTE — PLAN OF CARE
Cough looser this afternoon and able to sent sputum sample.  Leg edema and some redness in lower legs tender and legs restless at times.  Tramadol helpful with discomfort. Ambulated x2 this shift with one assist + walker. Tele afib, controlled rate.

## 2018-03-20 NOTE — PLAN OF CARE
Problem: Patient Care Overview  Goal: Plan of Care/Patient Progress Review  Outcome: No Change  A&OX3, temp 95.5 other VSS. MUNOZ, on 1 L nasal cannula sating in the mid 90's. Denies SOB.Tele- A Fib CVR. Complains of back pain,PO ultram administered this shift with good relief. On 2 gm Na  Diet. Up with  assist of 1 with walker. Irwin used this shift.

## 2018-03-20 NOTE — CONSULTS
Discharge Planner   Discharge Plans in progress: Following for Dc planning   Barriers to discharge plan: [facility] is following and aware of dc tomorrow  Follow up plan: Will send DC orders once completed.        Entered by: Corinne C. White 03/20/2018 10:14 AM     Called [facility] and set up WC transport for Wed @ 1030

## 2018-03-20 NOTE — PROGRESS NOTES
"CLINICAL NUTRITION SERVICES - REASSESSMENT NOTE    EVALUATION OF PROGRESS TOWARD GOALS   Diet: 2g Na  Intake: % intakes recorded per RN documentation. Meal review shows smaller meals ordered TID most days, though patient feels she is eating adequate portions. \"They've been giving me enough food for 4!\". Enjoys the food here, ordered burger with fruit for lunch today.   Note 2g Na diet ed given on 3/15. Pt declines need for additional education.   Pt recalls food from last TCU stay \"No salt diet\" \"tasted very bland but I could tolerate it\".    Dosing Weight 70.9 kg - adjusted weight      ASSESSED NUTRITION NEEDS PER APPROVED PRACTICE GUIDELINES:  Estimated Energy Needs: 2114-5279 kcals (25-30 Kcal/Kg)  Justification: maintenance  Estimated Protein Needs:  grams protein (1.2-1.5 g pro/Kg)  Justification: preservation of lean body mass  Estimated Fluid Needs: 4160-7635  mL (1 mL/Kcal)  Justification: maintenance and per provider pending fluid status    NEW FINDINGS:   - D/c to TCU 3/21    Previous Goals:   Patient to consume at least 50-75% of meals TID or the equivalent with supplement use.  Evaluation: Met    Previous Nutrition Diagnosis:   Predicted inadequate nutrient intake (energy/protein) related to report of decreased appetite without consistent decrease in oral intakes, potential for decline.  Evaluation: No change    CURRENT NUTRITION DIAGNOSIS  Predicted inadequate nutrient intake (energy/protein) related to report of decreased appetite without consistent decrease in oral intakes, potential for decline.    INTERVENTIONS  Recommendations / Nutrition Prescription  Continue 2 gm Na diet restriction, caffeine restriction per MD.      Continue Ensure supplement with meals.     Implementation  Collaboration and Referral of Nutrition care: pt discussed during interdisciplinary rounds.   Nutrition Education: ongoing encouragement of sodium restriction, assessed ed needs.     Goals  Pt to tolerate at " least 75% of meals TID or equivalent w/ meals.       MONITORING AND EVALUATION:  Progress towards goals will be monitored and evaluated per protocol and Practice Guidelines    Pretty Kohler RD, LD  3rd floor/ICU: [phone]  All other floors: [phone]  Weekend/holiday: [phone]  Office: [phone]

## 2018-03-20 NOTE — PROGRESS NOTES
CM observed patient's ODALYS score is elevated. CM saw patient early in her admission for CHF. She is discharging to TCU ([facility]) so no f/u appt was made.       CM will continue to follow patient until discharge for any additional needs.     Lesli Allen RN, BSN, [facility]  [phone]

## 2018-03-20 NOTE — PLAN OF CARE
Problem: Patient Care Overview  Goal: Plan of Care/Patient Progress Review  Outcome: No Change  A&O X4. Up assist X1 with walker. VSS, weaned to RA 92-94%. On IV Lasix, purewick in place for incontinence. 2+ LE edema. IV Zosyn for PNA. PRN Tylenol, Ultram for back pain and restless legs symptoms. K 3.1, replaced with recheck 3.9. Tele A-fib CVR. INR 3.84, Coumadin on hold. PT/OT/Palliative.

## 2018-03-20 NOTE — PLAN OF CARE
Problem: Patient Care Overview  Goal: Plan of Care/Patient Progress Review  Outcome: No Change  VS stable. Tele: atrial fibrillation. 2 gram NA diet, no caffeine. Ambulates: with assist of 1 and a walker. Sputum culture taken today had too much saliva in it so they cancelled the test. New culture ordered. Incontinent of urine, purewick in place. IV lasix given. IV Zosyn given twice. Requip given for restless legs. Not much improvement, patient rated pain 6/10. PO Tramadol given for pain and restless legs. Dyspneic on exertion and some expiratory wheezes. Edema + 2 in lower extremities. Walked in boss with therapy this afternoon, tolerated that well. Will likely DC tomorrow to [facility] TCU. Will continue to monitor and review plan of care.

## 2018-03-20 NOTE — PROGRESS NOTES
[facility]  Hospitalist Progress Note  Hernandez Stovall MD 03/20/2018    Reason for Stay (Diagnosis):     Shortness of breath of multifactorial etiology including congestive heart failure, community-acquired pneumonia    Acute encephalopathy         Assessment and Plan:        Summary of Stay: Estefany An is a 87 year old female with history of chronic A. fib, hypertension, gastroesophageal reflux disease, anxiety, presented with generalized weakness and shortness of breath and admitted on 3/14/2018.   Patient was admitted to telemetry bed and closely monitored with IV diuresis, oxygen supplementation and close monitoring.      Problem List:   #1.  Acute hypoxic respiratory failure: Suspect multifactorial etiology including acute diastolic congestive heart failure and pulmonary infection:  --Continue supplemental oxygen, VBG showed normal pH but elevated PCO2 and compensatory bicarb elevation.  Continue antibiotic for pulmonary infection  --Patient is improving, continue to monitor    #2.  Acute on chronic heart failure with preserved ejection fraction:Her echocardiogram on 01/08/18 showed EF f 55-60%, indeterminate left ventricular filling pressure.  --Patient is improving,Continue intravenous Lasix for one more day   I/O last 3 completed shifts:  In: 720 [P.O.:520; I.V.:200]  Out: 700 [Urine:700]  Wt Readings from Last 5 Encounters:   03/20/18 92.5 kg (203 lb 14.8 oz)   01/31/18 88.9 kg (196 lb)   01/13/18 88.7 kg (195 lb 8 oz)   11/02/14 81.6 kg (180 lb)   12/03/13 81.6 kg (180 lb)        #3.  Chronic atrial fibrillation-rate controlled on metoprolol.  -Rate controlled, continue to monitor, INR is supratherapeutic today, pharmacy is consulted to assist with dosing.  May need to hold Coumadin today    #4.  Community-acquired pneumonia: Patient developed focal area of infiltrate in the left midlung since March 14, 2018  --Intravenous antibiotic with Zosyn and azithromycin started March 17, 2018,  continue to monitor vitals and cultures.  --Patient is afebrile, continue antibiotic for now  #5.  Acute encephalopathy: Suspect multifactorial including hypoxia and hypercapnia and congestive heart failure: Patient is at risk of delirium, continue to treat underlying pathology, monitor for now.  #6.  Hypertension: Controlled, continue current medications  #7.  Generalized weakness and physical deconditioning: Patient Has been admitted several times recently, will get PT, OT as well as care coordinators consider palliative care consultation to assist with further goal of care planning.  Patient is DNR and DNI.      #8.  Bilateral lower extremity pain-controlled with Ultram 3 times a day as needed, patient takes it Ultram 50 mg at 1600, 2000, and at midnight  Mostly she does require it in the morning.  # Pain Assessment:   Current Pain Score 3/20/2018 3/20/2018 3/19/2018   Patient currently in pain? - yes denies   Pain score (0-10) 4 8 0   Pain location (No Data) Back -   Pain descriptors Aching Aching -   - Estefany is experiencing pain due to bilateral lower extremity. Pain management was discussed and the plan was created in a collaborative fashion.  Estefany's response to the current recommendations: compliant  - Please see the plan for pain management as documented above    DVT Prophylaxis: Warfarin  Code Status: DNR / DNI  Discharge Dispo: Likely TCU, pending PT evaluation  Estimated Disch Date / # of Days until Disch: may discharge to memory care unit tomorrow        Interval History (Subjective):              Patient was seen and examined by me this morning.  She feels weak, lower extremity edema persisted.  She has been having restless leg syndrome not taking any medication now.  Patient is a 99 acute distress at rest, on nasal oxygen supplement.  Plan for today is to get her iron store and replenish if that will alleviate her restless leg, start Requip 0.25 mg 2 hours before bedtime.  I requested PT, OT and  "social service to assist with discharge recommendations.  We will continue Lasix for another day.                       Physical Exam:      Last Vital Signs:  /53 (BP Location: Right arm)  Pulse 112  Temp 95.6  F (35.3  C) (Oral)  Resp 16  Ht 1.727 m (5' 8\")  Wt 92.5 kg (203 lb 14.8 oz)  SpO2 95%  BMI 31.01 kg/m2    I/O last 3 completed shifts:  In: 720 [P.O.:520; I.V.:200]  Out: 700 [Urine:700]  Wt Readings from Last 1 Encounters:   03/20/18 92.5 kg (203 lb 14.8 oz)     Current Facility-Administered Medications   Medication     furosemide (LASIX) injection 20 mg     rOPINIRole (REQUIP) tablet 0.25 mg     piperacillin-tazobactam (ZOSYN) infusion 3.375 g     warfarin (COUMADIN) tablet 1 mg     ipratropium - albuterol 0.5 mg/2.5 mg/3 mL (DUONEB) neb solution 3 mL     azithromycin (ZITHROMAX) 250 mg in sodium chloride 0.9 % 250 mL intermittent infusion     Menthol (Topical Analgesic) 4 % GEL 5 g     potassium chloride SA (K-DUR/KLOR-CON M) CR tablet 20-40 mEq     potassium chloride (KLOR-CON) Packet 20-40 mEq     potassium chloride 10 mEq in 100 mL sterile water intermittent infusion (premix)     potassium chloride 10 mEq in 100 mL intermittent infusion with 10 mg lidocaine     potassium chloride 20 mEq in 50 mL intermittent infusion     magnesium sulfate 2 g in NS intermittent infusion (PharMEDium or FV Cmpd)     magnesium sulfate 4 g in 100 mL sterile water (premade)     traMADol (ULTRAM) tablet 50 mg     miconazole (MICATIN; MICRO GUARD) 2 % powder     naloxone (NARCAN) injection 0.1-0.4 mg     melatonin tablet 1 mg     lidocaine 1 % 1 mL     lidocaine (LMX4) kit     sodium chloride (PF) 0.9% PF flush 3 mL     sodium chloride (PF) 0.9% PF flush 3 mL     Patient is already receiving anticoagulation with heparin, enoxaparin (LOVENOX), warfarin (COUMADIN)  or other anticoagulant medication     senna-docusate (SENOKOT-S;PERICOLACE) 8.6-50 MG per tablet 1 tablet    Or     senna-docusate " (SENOKOT-S;PERICOLACE) 8.6-50 MG per tablet 2 tablet     polyethylene glycol (MIRALAX/GLYCOLAX) Packet 17 g     ondansetron (ZOFRAN-ODT) ODT tab 4 mg    Or     ondansetron (ZOFRAN) injection 4 mg     Continuing ACE inhibitor/ARB/ARNI from home medication list OR ACE inhibitor/ARB/ARNI order already placed during this visit     Continuing beta blocker from home medication list OR beta blocker order already placed during this visit     ipratropium - albuterol 0.5 mg/2.5 mg/3 mL (DUONEB) neb solution 3 mL     Warfarin Therapy Reminder (Check START DATE - warfarin may be starting in the FUTURE)     acetaminophen (TYLENOL) tablet 1,000 mg     fluticasone-vilanterol (BREO ELLIPTA) 200-25 MCG/INH oral inhaler 1 puff     LORazepam (ATIVAN) tablet 0.5 mg     losartan (COZAAR) tablet 50 mg     metoprolol tartrate (LOPRESSOR) tablet 50 mg     omeprazole (priLOSEC) CR capsule 20 mg     NIFEdipine ER osmotic (PROCARDIA XL) 24 hr tablet 90 mg       Constitutional: Awake, awake but somnolent   Respiratory:  Patient has decreased air entry, diffuse crackles posteriorly and basally.   Cardiovascular: Regular rate and rhythm, normal S1 and S2, and no murmur noted   Abdomen: Normal bowel sounds, soft, non-distended, non-tender   Skin: No rashes, no cyanosis, dry to touch   Neuro: Alert and oriented x3, no weakness, numbness, memory loss   Extremities: ++edema, normal range of motion   Other(s):HEENT        All other systems: Negative          Data:      All new lab and imaging data was reviewed.   Labs:    Recent Labs  Lab 03/17/18  1301 03/17/18  1256   CULT No growth after 3 days No growth after 3 days       Recent Labs  Lab 03/20/18  0826 03/19/18  1557 03/19/18  0635 03/18/18  0616   *  --  133 132*   POTASSIUM 3.6 3.9 3.1* 3.9   CHLORIDE 93*  --  94 94   CO2 30  --  33* 34*   ANIONGAP 8  --  6 4   GLC 97  --  97 97   BUN 20  --  21 17   CR 1.19*  --  1.13* 1.01   GFRESTIMATED 43*  --  46* 52*   GFRESTBLACK 52*  --  55* 63    KACI 8.6  --  8.4* 8.4*       Recent Labs  Lab 03/20/18  0826 03/19/18  0635 03/18/18  0616   WBC 5.6 4.9 3.9*   HGB 9.9* 9.3* 9.5*   HCT 29.9* 29.5* 29.8*   MCV 93 94 97   * 117* 135*       Recent Labs  Lab 03/20/18  0826 03/19/18  0635 03/18/18  0616 03/17/18  1226 03/17/18  0645 03/16/18  0721   GLC 97 97 97  --  90 98   BGM  --   --   --  108*  --   --       Imaging:   Results for orders placed or performed during the hospital encounter of 03/14/18   Chest XR,  PA & LAT    Narrative    XR CHEST 2 VW 3/14/2018 1:55 PM    HISTORY: Dyspnea.    COMPARISON: 1/29/2018    FINDINGS: Small bilateral pleural effusions. No airspace consolidation  or pneumothorax. Stable heart size. Severe degenerative change at the  shoulders and a subacute right clavicular head fracture.      Impression    IMPRESSION: Small bilateral effusions.    TORY SABILLON MD

## 2018-03-20 NOTE — PLAN OF CARE
Problem: Patient Care Overview  Goal: Plan of Care/Patient Progress Review    Discharge Planner PT   Patient plan for discharge:TCU   Current status: Pt amb x 100ft with FWW and CGA, SOB with activity, on 1 liter with activity, sats 94%. Pt needing mod A for LEs for sit to supine. Pt needing increased assist from low toilet today mod A.  Barriers to return to prior living situation: lives alone in ILF, deconditioning, level of assist  Recommendations for discharge: TCU  Rationale for recommendations: Pt will benefit from ongoing skilled PT/CR education and training to improve tolerance and safety with functional mobility needs.  Appears she will benefit from another TCU stay.       Entered by: Brittnee Mcfarland 03/20/2018 3:59 PM

## 2018-03-20 NOTE — PROGRESS NOTES
Patient was seen and examined by me this morning.  She feels weak, lower extremity edema persisted.  She has been having restless leg syndrome not taking any medication now.  Patient is a 99 acute distress at rest, on nasal oxygen supplement.  Plan for today is to get her iron store and replenish if that will alleviate her restless leg, start Requip 0.25 mg 2 hours before bedtime.  I requested PT, OT and social service to assist with discharge recommendations.  We will continue Lasix for another day.

## 2018-03-21 NOTE — PROGRESS NOTES
Discharge Planner   Discharge Plans in progress: Update [facility] that pt will not dc till tomorrow  Barriers to discharge plan: none  Follow up plan: set up transport for 10:00 am for tomorrow. Left VM message for daughter        Entered by: Corinne C. White 03/21/2018 10:30 AM

## 2018-03-21 NOTE — PLAN OF CARE
Weak.  Up with walker in boss x2 and in chair for meals.  Dyspnic with activity and desats especiallly when sleeps..  02 1.5 L   on with activity or when sleeps.  Lungs course with exp. leonela  Plan for rehab tomorrow.if improved.

## 2018-03-21 NOTE — PLAN OF CARE
Problem: Patient Care Overview  Goal: Plan of Care/Patient Progress Review  Outcome: No Change  VS stable. 2 gram NA diet, no caffeine. Ambulates: with assist of 1 and a walker. Incontinent of urine, purewick in place. Started on PO antibiotics. Lungs coarse, diminished and fine crackles. Edema +2 in legs. Currently, on 1.5 liter of oxygen. Tramadol given for anxiety/restless legs.  IV and tele DC'd today.  Patient was going to DC today however, MD decided to keep patient overnight due to edema and SOB with exertion, may possibly DC tomorrow to [facility] TCU. Will continue to monitor and review plan of care.

## 2018-03-21 NOTE — PLAN OF CARE
Problem: Patient Care Overview  Goal: Plan of Care/Patient Progress Review  Discharge Planner PT   Patient plan for discharge: TCU  Current status: Sit to/from stand with CGA. Ambulates 150' with FWW and CGA. SOB noted. SpO2 remains >90% on RA, HR >140bpm post ambulation. Tolerates seated exercises with breaks.   Barriers to return to prior living situation: Lives alone, poor activity tolerance  Recommendations for discharge: TCU  Rationale for recommendations: Patient would benefit from continued PT services to address strength, balance and activity tolerance deficits prior to returning home independently.        Entered by: Linda Kaur 03/21/2018 8:51 AM

## 2018-03-21 NOTE — PLAN OF CARE
Problem: Patient Care Overview  Goal: Plan of Care/Patient Progress Review  Outcome: No Change  A&OX3 disoriented on time, temp 95.7 other VSS.MUNOZ, on 1 L nasal cannula sating in the mid 90's. Denies SOB/CP.Tele-A Fib CVR. On 2 gm Na diet. Up with assist of 1 with walker.On IV Zithromax,  Zosyn, and Lasix.Irwin used this shift.

## 2018-03-21 NOTE — PROGRESS NOTES
[facility]  Hospitalist Progress Note  Luis Miguel Childress, DO 03/21/2018    Reason for Stay (Diagnosis): Pneumonia.         Assessment and Plan:      Summary of Stay: Estefany An is a 87 year old female with history of chronic A. fib, hypertension, gastroesophageal reflux disease, anxiety, presented with generalized weakness and shortness of breath and admitted on 3/14/2018.   Patient was admitted to telemetry bed and closely monitored with IV diuresis, oxygen supplementation and close monitoring.   Problem List:   1. Acute hypoxic respiratory failure.  Likely multifactorial.  Continue supplemental oxygen as needed.  2. Acute on chronic diastolic CHF exacerbation.  Has been on IV Lasix to this point.  Volume status appears improved today.  Restart oral Lasix tomorrow.  Continue to monitor weight daily and strict intake and output.  3. Pneumonia.  Pro-calcitonin previously low.  Will target a 5 day course of antibiotics.  Today will be day 5 of antibiotics.  Change azithromycin to oral.  Change IV Zosyn to oral Augmentin for 1 day.  4. COPD exacerbation.  Likely due to pneumonia.  Having profuse bronchospasm today.  Start prednisone 60 mg a day.  Continue duo nebs 4 times a day.  Albuterol as needed.  Continue Breo Ellipta.  5. Atrial fibrillation.  Increase metoprolol to 75 mg twice a day.  Continue warfarin.  6. Hypertension.  Increase metoprolol to 75 mg twice a day.  Decrease losartan to 12.5 mg a day.  Restart Lasix 40 mg a day tomorrow.  Continue nifedipine.  7. Chronic kidney disease.  Creatinine slightly higher than at time of admission.  Likely showing effective diuresis.  Stop IV diuretics.  Restart oral Lasix tomorrow.  Decrease Cozaar to 12.5 mg a day.  Avoid nephrotoxins as able.  8. GERD.  Continue omeprazole.  9. Hyponatremia.  Stop IV Lasix.  Recheck metabolic panel tomorrow.  Plan to restart oral Lasix tomorrow.  10. Deconditioning.  Physical therapy.  11. Anemia.  Hemoglobin has been  "stable for the past several days.  No signs of ongoing hemorrhage.  DVT Prophylaxis: Warfarin  Code Status: DNR / DNI  Discharge Dispo: TCU  Estimated Disch Date / # of Days until Disch: 1-3        Interval History (Subjective):      Short of breath.  Occasional cough.  Feels weak.  Does not notice skipped or fast heartbeats.  No chest pain, fevers, chills, nausea, vomiting, or diarrhea.                  Physical Exam:      Last Vital Signs:  /66  Pulse 112  Temp 96.8  F (36  C) (Oral)  Resp 22  Ht 1.727 m (5' 8\")  Wt 92.2 kg (203 lb 4.2 oz)  SpO2 92%  BMI 30.91 kg/m2    Gen:  NAD, A&Ox2 to person and place.  Trouble with time.  Eyes:  PERRL, sclera anicteric.  OP:  MMM, no lesions.  Neck:  Supple.  CV: Irregular, no murmurs.  Lung: Extensive wheeze b/l, normal effort.  Ab:  +BS, soft.  Skin:  Warm, dry to touch.  No rash.  Ext:  1+ pitting edema LE b/l.           Medications:      All current medications were reviewed with changes reflected in problem list.         Data:      All new lab and imaging data was reviewed.   Labs:    Recent Labs  Lab 03/21/18  0701   *   POTASSIUM 3.4   CHLORIDE 94   CO2 30   ANIONGAP 6   GLC 90   BUN 19   CR 1.08*   GFRESTIMATED 48*   GFRESTBLACK 58*   KACI 8.6       Recent Labs  Lab 03/21/18  0701   WBC 5.8   HGB 9.7*   HCT 29.7*   MCV 92   *      Imaging:   No results found for this or any previous visit (from the past 24 hour(s)).    "

## 2018-03-22 NOTE — PROGRESS NOTES
[facility]  Hospitalist Progress Note  Luis Miguel Childress, DO 03/22/2018    Reason for Stay (Diagnosis): Pneumonia         Assessment and Plan:      Summary of Stay: Estefany An is a 87 year old female with history of chronic A. fib, hypertension, gastroesophageal reflux disease, anxiety, presented with generalized weakness and shortness of breath and admitted on 3/14/2018. Patient was admitted to telemetry bed and closely monitored with IV diuresis, oxygen supplementation and close monitoring.   Problem List:   1. Acute hypoxic respiratory failure.  Likely multifactorial.  Continue supplemental oxygen as needed.  2. Acute on chronic diastolic CHF exacerbation.  Initially on IV Lasix.  Restarted oral Lasix today.  Add Spironolactone 25 mg/day.  Continue to monitor weight daily and strict intake and output.  3. Pneumonia.  Pro-calcitonin previously low.  Now completed a 5 day course of antibiotics.  4. COPD exacerbation.  Likely due to pneumonia.  Continue prednisone 60 mg a day.  Continue duo nebs 4 times a day.  Albuterol as needed.  Continue Breo Ellipta.  5. Atrial fibrillation.  Increase metoprolol to 100 mg twice a day.  Continue warfarin.  6. Hypertension.  Increase metoprolol to 100 mg twice a day.  Decrease nifedipine to 60 mg/day.  Add spironolactone 25 mg a day.  Continue Lasix and losartan.  7. Chronic kidney disease.  Creatinine improved today.  Monitor with need for diuresis.  Avoid nephrotoxins as able.  8. GERD.  Continue omeprazole.  9. Hyponatremia.  Sodium 128.  Continue to monitor.  10. Deconditioning.  Physical therapy.  11. Anemia.  Hemoglobin has been stable for the past several days.  No signs of ongoing hemorrhage.  DVT Prophylaxis: Warfarin  Code Status: DNR / DNI  Discharge Dispo: TCU  Estimated Disch Date / # of Days until Disch: 1-2        Interval History (Subjective):      Short of breath at times.  Feels weak.  Denies chest pain, fevers, chills, nausea, vomiting, or  "diarrhea.                  Physical Exam:      Last Vital Signs:  /63 (BP Location: Right arm)  Pulse 71  Temp 95.8  F (35.4  C) (Oral)  Resp 20  Ht 1.727 m (5' 8\")  Wt 93.7 kg (206 lb 8 oz)  SpO2 95%  BMI 31.4 kg/m2    Gen:  NAD, A&Ox3.  Eyes:  PERRL, sclera anicteric.  OP:  MMM, no lesions.  Neck:  Supple.  CV:  Mildly irregular, no murmurs.  Lung:  Crackles at bases b/l, occasional wheeze b/l, normal effort.  Ab:  +BS, soft.  Skin:  Warm, dry to touch.  No rash.  Ext:  1-2+ pitting edema LE b/l.           Medications:      All current medications were reviewed with changes reflected in problem list.         Data:      All new lab and imaging data was reviewed.   Labs:    Recent Labs  Lab 03/22/18  0759   *   POTASSIUM 3.8   CHLORIDE 92*   CO2 30   ANIONGAP 6   *   BUN 18   CR 0.89   GFRESTIMATED 60*   GFRESTBLACK 72   KACI 9.1       Recent Labs  Lab 03/21/18  0701   WBC 5.8   HGB 9.7*   HCT 29.7*   MCV 92   *      Imaging:   No results found for this or any previous visit (from the past 24 hour(s)).    "

## 2018-03-22 NOTE — PLAN OF CARE
"Problem: Patient Care Overview  Goal: Plan of Care/Patient Progress Review  Outcome: Improving  Orientation: Alert and oriented x4. Awake, alert, cooperative, no apparent distress.    VSS: /63 (BP Location: Right arm)  Pulse 71  Temp 96.2  F (35.7  C) (Oral)  Resp 22  Ht 1.727 m (5' 8\")  Wt 93.7 kg (206 lb 8 oz)  SpO2 95%  BMI 31.4 kg/m2. On 1.5 LPM NC  IVF: No IV access  LS: clear and equal bilaterally. Clear to auscultation bilaterally but diminished throughout, air movement is fair at best. dyspnea on exertion, exp wheezing noted  GI: Passing gas. No BM. Denies N/V.  Normal bowel sounds, soft, non-distended, non-tender  : Adequate urine output. Clear yellow. Purwick cath in place for incontinence  Skin: bruising noted, Dry/flaky, Rash noted on dry patches, lotion applied, BLE edema, Skin otherwise intact.   Activity: SBA to assist of 1 w/walker. Pt slept comfortably throughout shift.   Pain: 7/10 leg/back/neck pain. Tramadol given. Pt sleeping.  DC Plan: Continue with current cares. Possible DC today        "

## 2018-03-22 NOTE — PLAN OF CARE
Seems overal better today but still has course lungs.  Dischg plan postponed . Continue with nebs,lasix , prednisone.  02 if needed, especially if sleeps.

## 2018-03-22 NOTE — PLAN OF CARE
Problem: Patient Care Overview  Goal: Plan of Care/Patient Progress Review  PT-  Attempted to see, was sleeping spoke to RN and wanted her to sleep. Was planned for TCU today but held by MD.

## 2018-03-23 NOTE — PROGRESS NOTES
CM: PT able to dc to TCU today set up transport for 1230     03/16/18 1300   Final Resources   Transitional Care [facility] [phone]   PAS Number [id number]   family aware of DC

## 2018-03-23 NOTE — PLAN OF CARE
Problem: Patient Care Overview  Goal: Plan of Care/Patient Progress Review  Outcome: Improving  Vitals stable. Up to bathroom with assist of one and walker. Incontinent of urine and using purewick with good results. Ate well. Sputum specimen obtained and sent to lab. Watching basketball on TV.

## 2018-03-23 NOTE — PLAN OF CARE
Physical Therapy Discharge Summary    Reason for therapy discharge:    Discharged to transitional care facility.    Progress towards therapy goal(s). See goals on Care Plan in [facility] electronic health record for goal details.  Goals partially met.  Barriers to achieving goals:   discharge from facility.    Therapy recommendation(s):    Patient would benefit from ongoing physical therapy to increase strength, activity tolerance and independence with mobility

## 2018-03-23 NOTE — PLAN OF CARE
dischged with [facility] wheelchair transport to TCU.  Reviewed dischg papers with pt and copy given.  Acknowledged understanding and acceptance of plan of care.  Transporter given packet to give to facility

## 2018-03-23 NOTE — PROGRESS NOTES
Hand-off for Care Transitions to Next Level of Care Provider  Name: Estefany An  : 1930  MRN #: [id number]  Reason for Hospitalization:  Chronic atrial fibrillation (H) [I48.2]  Acute diastolic congestive heart failure (H) [I50.31]  Admit Date/Time: 3/14/2018 12:33 PM  Discharge Date: 3/23/2018    Reason for Communication Hand-off Referral: Admission diagnoses: CHF    Discharge Plan:  [facility]                   Patient agreeable to post-hospital support suggestions:  Yes  Discharge Plan:  TCU - [facility]           Patient is on new medications:   Yes           MTM follow up recommended: No           Tel-Assurance program:  Declined           Patient will receive  TCU  at:  [facility]  Follow-up specialty is recommended: Yes. Follow up with Occupational & Physical Therapy while at TCU.   Follow-up plan:  Future Appointments  Date Time Provider Department Center   4/10/2018 11:45 AM [facility] LAB LAB P [facility]   4/10/2018 12:45 PM Tano Chang MD [facility]     Any outstanding tests or procedures:  No.  Give two-step Mantoux (PPD) Per Facility Policy     Procedures     Future Labs/Procedures    Oxygen - Nasal cannula     Comments:    2 Lpm by nasal cannula to keep O2 sats 92% or greater.          Referrals     Future Labs/Procedures    Occupational Therapy Adult Consult     Comments:    Evaluate and treat as clinically indicated.    Reason:  Weakness    Physical Therapy Adult Consult     Comments:    Evaluate and treat as clinically indicated.    Reason:  Weakness          Key Recommendations:  Patient discharged last time with neb machine. Daughter stated patient has difficult time manipulating the medication container and spills the medication. She is noncompliant with a low sodium diet and has not been weighing herself daily. CM provided patient with a new scale with large readout.     Communicated handoff via EPIC Comm Mgt to Dr. Ricki Blake's CC at [phone].      Lesli Allen RN,  BSN, CTS  [facility]  [phone]

## 2018-03-23 NOTE — DISCHARGE SUMMARY
Discharge Summary  Hospitalist Service      Estefany An MRN# [id number]   YOB: 1930 Age: 87 year old     Date of Admission:  3/14/2018  Date of Discharge:  3/23/2018  Admitting Physician:  Philip Javier MD  Discharge Physician: Luis Miguel Childress DO   Discharging Service: Hospitalist Service     Primary Provider: Ricki Blake  Primary Care Physician Phone Number: [phone]         Discharge Diagnoses/Problem Oriented Hospital Course (Providers):    Estefany An was admitted on 3/14/2018 by Philip Javier MD and I would refer you to their history and physical.  The following problems were addressed during her hospitalization:  1. Acute hypoxic respiratory failure.  Likely multifactorial.  Continue supplemental oxygen.  She should have a sleep study in the outpatient setting as soon as she has completely recovered from her acute illness.  2. Acute on chronic diastolic CHF exacerbation.  Initially on IV Lasix.  Now on oral Lasix.  Oral spironolactone also added during hospital stay.  Continue metoprolol and losartan.  Have ordered daily weights at the transitional care unit she is being transferred to.  Does need to continue following with her primary care physician.  3. Pneumonia.  Completed a 5 day course of antibiotics.  4. COPD exacerbation.  Likely due to pneumonia.  Improving by the time of discharge.  Taper prednisone over the next 8 days.  Continue Advair and DuoNeb's.  5. Atrial fibrillation.  Continue warfarin.  INR check in 3 days.  Continue metoprolol at increased dose of 100 mg twice a day.  6. Hypertension.  Continue metoprolol, losartan, nifedipine, that new doses.  Lasix restarted at previous dose.  Spironolactone 25 mg a day added during hospital stay.  7. Chronic kidney disease.  Avoid nephrotoxins as able.  Recheck metabolic panel in 1 week.  8. GERD.  Continue omeprazole.  9. Hyponatremia.  Mild.  Recheck metabolic panel in 1 week.  10. Deconditioning.   Physical therapy.  11. Anemia.  Hemoglobin has been stable for the past several days.  No signs of ongoing hemorrhage.    # Discharge Pain Plan:   - Patient currently has NO PAIN and is not being prescribed pain medications on discharge.            Code Status:      DNR / DNI         Pending Results:        Unresulted Labs Ordered in the Past 30 Days of this Admission     Date and Time Order Name Status Description    3/20/2018 1737 Sputum Culture Aerobic Bacterial In process     3/17/2018 1058 Blood culture Preliminary     3/17/2018 1058 Blood culture Preliminary                Discharge Instructions and Follow-Up:      Follow-up Appointments     Follow Up and recommended labs and tests       Follow up with primary care provider in 7 days.  The following labs/tests   are recommended: INR check in 3 days.  BMP in 7 days.                         Discharge Disposition:      Discharged to rehabilitation facility          Discharge Medications:        Current Discharge Medication List      START taking these medications    Details   rOPINIRole (REQUIP) 0.25 MG tablet Take 1 tablet (0.25 mg) by mouth At Bedtime  Qty: 30 tablet    Associated Diagnoses: Acute diastolic congestive heart failure (H)      spironolactone (ALDACTONE) 25 MG tablet Take 1 tablet (25 mg) by mouth daily  Qty: 30 tablet    Associated Diagnoses: Acute diastolic congestive heart failure (H)      predniSONE (DELTASONE) 10 MG tablet 4 tabs daily for 2 days, then 3 tabs daily for 2 days, then 2 tabs daily for 2 days, then 1 tab daily for 2 days, then stop  Qty: 20 tablet, Refills: 0    Associated Diagnoses: Acute diastolic congestive heart failure (H)         CONTINUE these medications which have CHANGED    Details   losartan (COZAAR) 25 MG tablet Take 0.5 tablets (12.5 mg) by mouth daily  Qty: 30 tablet    Associated Diagnoses: Acute diastolic congestive heart failure (H)      metoprolol tartrate (LOPRESSOR) 100 MG tablet Take 1 tablet (100 mg) by  "mouth 2 times daily  Qty: 60 tablet    Associated Diagnoses: Acute diastolic congestive heart failure (H)      NIFEdipine ER osmotic (ADALAT CC) 60 MG 24 hr tablet Take 1 tablet (60 mg) by mouth daily  Qty: 30 tablet    Associated Diagnoses: Acute diastolic congestive heart failure (H)      polyethylene glycol (MIRALAX/GLYCOLAX) Packet Take 17 g by mouth daily as needed for constipation  Qty: 30 packet    Associated Diagnoses: Acute diastolic congestive heart failure (H)         CONTINUE these medications which have NOT CHANGED    Details   Menthol, Topical Analgesic, 4 % GEL Externally apply topically 2 times daily      omeprazole (PRILOSEC) 20 MG CR capsule Take 20 mg by mouth daily      fluticasone-salmeterol (ADVAIR) 250-50 MCG/DOSE diskus inhaler Inhale 1 puff into the lungs every 12 hours      warfarin (COUMADIN) 2.5 MG tablet Take 2.5 mg by mouth daily      ipratropium - albuterol 0.5 mg/2.5 mg/3 mL (DUONEB) 0.5-2.5 (3) MG/3ML neb solution Take 1 vial (3 mLs) by nebulization 3 times daily  Qty: 360 mL    Associated Diagnoses: Acute respiratory failure with hypoxia (H)      furosemide (LASIX) 40 MG tablet Take 1 tablet (40 mg) by mouth daily    Associated Diagnoses: Chronic diastolic congestive heart failure (H)      Acetaminophen (TYLENOL PO) Take 1,000 mg by mouth 3 times daily          STOP taking these medications       hydrochlorothiazide (HYDRODIURIL) 25 MG tablet Comments:   Reason for Stopping:         LORazepam (ATIVAN) 0.5 MG tablet Comments:   Reason for Stopping:                    Allergies:       No Known Allergies         Condition and Physical Exam on Discharge:      Discharge condition: Stable   Discharge vitals: Blood pressure 130/69, pulse 71, temperature 95.8  F (35.4  C), temperature source Oral, resp. rate 20, height 1.727 m (5' 8\"), weight 94.4 kg (208 lb 1.6 oz), SpO2 96 %.   Gen:  NAD, A&Ox3.  Eyes:  PERRL, sclera anicteric.  OP:  MMM, no lesions.  Neck:  Supple.  CV:  Irregular, no " murmurs.  Lung:  CTA b/l, normal effort.  Ab:  +BS, soft.  Skin:  Warm, dry to touch.  No rash.  Ext:  1+ pitting edema LE b/l.          Discharge Orders for Skilled Facility (from Discharge Orders):        After Care Instructions     Activity - Up ad ladonna           Advance Diet as Tolerated       Follow this diet upon discharge: 2g salt            Daily weights       Call Provider for weight gain of more than 2 pounds per day or 5 pounds per week.            General info for SNF       Length of Stay Estimate: Short Term Care: Estimated # of Days <30  Condition at Discharge: Improving  Level of care:skilled   Rehabilitation Potential: Good  Admission H&P remains valid and up-to-date: Yes  Recent Chemotherapy: N/A  Use Nursing Home Standing Orders: Yes            Mantoux instructions       Give two-step Mantoux (PPD) Per Facility Policy Yes            Oxygen - Nasal cannula       2 Lpm by nasal cannula to keep O2 sats 92% or greater.                           Rehab orders for Skilled Facility (from Discharge Orders):      Referrals     Future Labs/Procedures    Occupational Therapy Adult Consult     Comments:    Evaluate and treat as clinically indicated.    Reason:  Weakness    Physical Therapy Adult Consult     Comments:    Evaluate and treat as clinically indicated.    Reason:  Weakness             Discharge Time:      I spent 40 minutes with Ms. An and working on discharge on 3/23/18.        Image Results From This Hospital Stay (For Non-EPIC Providers):        Results for orders placed or performed during the hospital encounter of 03/14/18   Chest XR,  PA & LAT    Narrative    XR CHEST 2 VW 3/14/2018 1:55 PM    HISTORY: Dyspnea.    COMPARISON: 1/29/2018    FINDINGS: Small bilateral pleural effusions. No airspace consolidation  or pneumothorax. Stable heart size. Severe degenerative change at the  shoulders and a subacute right clavicular head fracture.      Impression    IMPRESSION: Small bilateral  effusions.    TORY SABILLON MD   XR Chest Port 1 View    Narrative    XR CHEST PORT 1 VW 3/17/2018 9:55 AM    HISTORY: SOB;       Impression    IMPRESSION: Focal area of infiltrate has developed in the left midlung  compared to 3/14/2018.    FARHEEN HURLEY MD           Most Recent Lab Results In EPIC (For Non-EPIC Providers):    Most Recent 3 CBC's:  Recent Labs   Lab Test  03/21/18   0701  03/20/18   0826  03/19/18   0635   WBC  5.8  5.6  4.9   HGB  9.7*  9.9*  9.3*   MCV  92  93  94   PLT  117*  118*  117*      Most Recent 3 BMP's:  Recent Labs   Lab Test  03/23/18   0631  03/22/18   0759  03/21/18   0701   NA  128*  128*  130*   POTASSIUM  4.1  3.8  3.4   CHLORIDE  93*  92*  94   CO2  31  30  30   BUN  24  18  19   CR  0.83  0.89  1.08*   ANIONGAP  4  6  6   KACI  9.0  9.1  8.6   GLC  117*  126*  90     Most Recent 3 Troponin's:No lab results found.  Most Recent 3 INR's:  Recent Labs   Lab Test  03/23/18   0631  03/22/18   0759  03/21/18   0701   INR  4.51*  4.65*  3.83*     Most Recent 2 LFT's:  Recent Labs   Lab Test  03/18/18   0616  02/18/18   1410   AST  48*  44   ALT  26  60*   ALKPHOS  275*  321*   BILITOTAL  0.7  0.5     Most Recent Cholesterol Panel:No lab results found.  Most Recent 6 Bacteria Isolates From Any Culture (See EPIC Reports for Culture Details):  Recent Labs   Lab Test  03/20/18   1200  03/17/18   1301  03/17/18   1256  01/13/18   1217  01/13/18   1153  01/13/18   1139   CULT  Canceled, Test credited  >10 Squamous epithelial cells/low power field indicates oral contamination. Please   recollect.  *  Notification of test cancellation was given to  Hermila Rosado RN @ [id number] 3/20/18 CS    No growth after 5 days  No growth after 5 days  >100,000 colonies/mL  Proteus mirabilis  *  <10,000 colonies/mL  urogenital jennifer  Susceptibility testing not routinely done    No growth  No growth     Most Recent TSH, T4 and HgbA1c:  Recent Labs   Lab Test  03/20/18   0826   TSH  2.06

## 2018-03-24 NOTE — PHARMACY-ANTICOAGULATION SERVICE
Clinical Pharmacy- Warfarin Discharge Note  This patient is currently on warfarin for the treatment of Atrial fibrillation.  INR Goal= 2-3  Expected length of therapy undetermined.           Anticoagulation Dose History     Recent Dosing and Labs Latest Ref Rng & Units 3/17/2018 3/18/2018 3/19/2018 3/20/2018 3/21/2018 3/22/2018 3/23/2018    Warfarin 1 mg - - - - 1 mg - - -    Warfarin 1.5 mg - 1.5 mg - - - - - -    INR 0.86 - 1.14 2.90(H) 3.57(H) 3.84(H) 3.39(H) 3.83(H) 4.65(H) 4.51(H)          Pt was discharged to TCU and AVS indicated to resume pta warfarin regimen. [facility] missed making changes before discharge. On 3/24 TCU contacted. Discussed with RN that INR was 4.51 on 3/23. Advised RN to hold warfarin and resume after inr in 2-3 range. RN will contact on call physician for TCU and discuss pharmacy recommendations.

## 2018-03-26 NOTE — PROGRESS NOTES
Pt w/ HFpEF and multi factorial resp failure admit 3/14-3/23. She was dc'd to [facility] w/ plans to f/u in [facility] 4/10 w/ Dr. Chang.    Dr. Chang has availability this wk and we would prefer pt has sooner f/u.    Called [facility] ([phone]) and spoke w/ a nurse who was not the pt's nurse, but she said she could give message to pt's nurse. Told her pt is now scheduled to see Dr. Chang 3/29 at 9am and gave her [place] location/address and [facility] RN number. Nurse stated she would call me back w/ fax number so that I can fax previsit BMP order.    I reviewed pt's hospital dc orders and do not see that the pt was sent to [facility] w/ standard HF orders (low salt diet, daily wts, etc). Will fax such orders once fax number known.    Shannon West [facility] RN 2:41 PM 03/26/18  [phone]        Faxed order for: BMP 3/28/18 and clinic visit w/ Dr. Chang 3/29/18 [place] office to [phone].    Shannon West [facility] RN 9:59 AM 03/27/18  [phone]

## 2018-03-27 ENCOUNTER — RECORDS - HEALTHEAST (OUTPATIENT)
Dept: LAB | Facility: CLINIC | Age: 83
End: 2018-03-27

## 2018-03-28 LAB
ANION GAP SERPL CALCULATED.3IONS-SCNC: 9 MMOL/L (ref 5–18)
BNP SERPL-MCNC: 822 PG/ML (ref 0–167)
BUN SERPL-MCNC: 22 MG/DL (ref 8–28)
CALCIUM SERPL-MCNC: 9.2 MG/DL (ref 8.5–10.5)
CHLORIDE BLD-SCNC: 99 MMOL/L (ref 98–107)
CO2 SERPL-SCNC: 28 MMOL/L (ref 22–31)
CREAT SERPL-MCNC: 0.73 MG/DL (ref 0.6–1.1)
GFR SERPL CREATININE-BSD FRML MDRD: >60 ML/MIN/1.73M2
GLUCOSE BLD-MCNC: 81 MG/DL (ref 70–125)
POTASSIUM BLD-SCNC: 3.9 MMOL/L (ref 3.5–5)
SODIUM SERPL-SCNC: 136 MMOL/L (ref 136–145)

## 2018-03-29 ENCOUNTER — RECORDS - HEALTHEAST (OUTPATIENT)
Dept: LAB | Facility: CLINIC | Age: 83
End: 2018-03-29

## 2018-03-29 NOTE — PROGRESS NOTES
RYNE Lindsay from [facility] called to report that pt missed her OV with  this morning. The information for the appt did not get passed to their  earlier this week. We did received bmp results from 3/28. JOSR at [facility] will manage her furosemide dosing until she is evaluated by cardiologist at our clinic. She is currently taking 40 mg daily, but dose was increased to 80 mg daily for a day earlier this week. PT rescheduled to see  on 4/4 @ 1:30. She will need bmp,NT probnp, and CBC lab prior to OV. She will have done 4/3 at [facility]. Orders and appointment information faxed to RNYE Lindsay at [phone]. Will watch for lab results next week. Paola MICHELE March 29, 2018, 10:57 AM

## 2018-03-30 ENCOUNTER — RECORDS - HEALTHEAST (OUTPATIENT)
Dept: LAB | Facility: CLINIC | Age: 83
End: 2018-03-30

## 2018-03-30 LAB
ANION GAP SERPL CALCULATED.3IONS-SCNC: 7 MMOL/L (ref 5–18)
BUN SERPL-MCNC: 20 MG/DL (ref 8–28)
CALCIUM SERPL-MCNC: 9.1 MG/DL (ref 8.5–10.5)
CHLORIDE BLD-SCNC: 96 MMOL/L (ref 98–107)
CO2 SERPL-SCNC: 32 MMOL/L (ref 22–31)
CREAT SERPL-MCNC: 0.72 MG/DL (ref 0.6–1.1)
GFR SERPL CREATININE-BSD FRML MDRD: >60 ML/MIN/1.73M2
GLUCOSE BLD-MCNC: 77 MG/DL (ref 70–125)
POTASSIUM BLD-SCNC: 3.5 MMOL/L (ref 3.5–5)
SODIUM SERPL-SCNC: 135 MMOL/L (ref 136–145)

## 2018-04-02 LAB
ANION GAP SERPL CALCULATED.3IONS-SCNC: 13 MMOL/L (ref 5–18)
BNP SERPL-MCNC: 487 PG/ML (ref 0–167)
BUN SERPL-MCNC: 19 MG/DL (ref 8–28)
CALCIUM SERPL-MCNC: 9.4 MG/DL (ref 8.5–10.5)
CHLORIDE BLD-SCNC: 95 MMOL/L (ref 98–107)
CO2 SERPL-SCNC: 27 MMOL/L (ref 22–31)
CREAT SERPL-MCNC: 0.74 MG/DL (ref 0.6–1.1)
GFR SERPL CREATININE-BSD FRML MDRD: >60 ML/MIN/1.73M2
GLUCOSE BLD-MCNC: 92 MG/DL (ref 70–125)
POTASSIUM BLD-SCNC: 4.1 MMOL/L (ref 3.5–5)
SODIUM SERPL-SCNC: 135 MMOL/L (ref 136–145)

## 2018-04-07 ENCOUNTER — RECORDS - HEALTHEAST (OUTPATIENT)
Dept: LAB | Facility: CLINIC | Age: 83
End: 2018-04-07

## 2018-04-09 LAB
BACTERIA SPEC CULT: NORMAL
GRAM STAIN RESULT: NORMAL

## 2018-04-12 ENCOUNTER — RECORDS - HEALTHEAST (OUTPATIENT)
Dept: LAB | Facility: CLINIC | Age: 83
End: 2018-04-12

## 2018-04-12 LAB
SHIGA TOXIN 1: NEGATIVE
SHIGA TOXIN 2: NEGATIVE

## 2018-04-13 PROBLEM — J18.9 PNEUMONIA: Status: ACTIVE | Noted: 2018-01-01

## 2018-04-13 LAB
ANION GAP SERPL CALCULATED.3IONS-SCNC: 14 MMOL/L (ref 5–18)
BUN SERPL-MCNC: 41 MG/DL (ref 8–28)
CALCIUM SERPL-MCNC: 9.2 MG/DL (ref 8.5–10.5)
CHLORIDE BLD-SCNC: 91 MMOL/L (ref 98–107)
CO2 SERPL-SCNC: 22 MMOL/L (ref 22–31)
CREAT SERPL-MCNC: 1.57 MG/DL (ref 0.6–1.1)
GFR SERPL CREATININE-BSD FRML MDRD: 31 ML/MIN/1.73M2
GLUCOSE BLD-MCNC: 94 MG/DL (ref 70–125)
POTASSIUM BLD-SCNC: 4.5 MMOL/L (ref 3.5–5)
SODIUM SERPL-SCNC: 127 MMOL/L (ref 136–145)

## 2018-04-13 NOTE — ED NOTES
Attempted to call pts facility, but no location or number listed in chart, tried to call pts daughter who was apparently here for the majority of pts care in the ED. No answer and no name on voicemail, so no message left.

## 2018-04-13 NOTE — H&P
[facility]  Hospitalist Admission Note  Name: Estefany An    MRN: [id number]  YOB: 1930    Age: 87 year old  Date of admission: 4/13/2018  Primary care provider: Ricki Blake            Assessment and Plan:   Estefany An is a 87 year old female with numerous medical history and recurrent hospitalizations this year such as chronic Afib on OAC, diastolic CHF, COPD, hypertension, suspected STEF and previous hospitalizations for pneumonia, COPD exacerbation and CHF who presented here coming from a skilled facility due to increasing SOB, fever, wheezes and generalized weakness.    1. Severe sepsis secondary to suspected R sided pneumonia, likely a healthcare associated given recurrent hospitalizations, skilled setting placement.  - RML consolidation with mucous plugging  - r/o C difficile  2. CHARISSE secondary to #1 and diarrhea  3. Hyponatremia, hypochloremia from decrease oral intake, diarrhea.  4. Acute on chronic diastolic CHF in exacerabation  5. Afib with RVR  6. COPD  7. Severe physical deconditioning    Patient is high risk for clinical deterioration and will be needing inpatient care.  Discussed this with our patient and her daughter and they are aware of the current prognosis given multiple ongoing active issues on top of her numerous chronic medical condition.   Will continue with IV antibiotics, IVF support, holding any diuresis for now given current severe sepsis state and CHARISSE, hyponatremia.  If BP permitting and clinical condition improves then trial of diuresis in the next day or so.  O2 support. Breathing treatment. Incentive spirometer.   Perhaps chest physiotherapy if feasible for the mucous plugging. Patient and her daughter does now want to pursue any invasive interventions such as bronchoscopy.  They provided me with instructions that if she does not get any better in the next coming days then they are ready in pursuing hospice care and comfort measures.   Palliative care  input requested.  Check for c difficile. They are also aware that if this is positive then it would be hard to treat it as she is also on systemic antibiotics.  On warfarin to continue with pharm protocol for hx of Afib    Code status: DNR/DNI  Admit to inpatient  Prophylaxis: on warfarin  Disposition: will need > 2 days of inpatient stay          Chief Complaint:   Fever, shortness of breath and worsening wheezes       Source of Information:   Patient with fair  reliability, daughter at bedside  Discussion with ED physician  Review of E chart records         History of Present Illness:   Estefany An is a 87 year old female with numerous medical history and recurrent hospitalizations this year such as chronic Afib on OAC, diastolic CHF, COPD, hypertension, suspected STEF and previous hospitalizations for pneumonia, COPD exacerbation and CHF who presented here coming from a skilled facility due to increasing SOB, fever, wheezes and generalized weakness. This was accompanied with increasing weight gain of ~ 20 pounds. Yesterday she underwent a CT of chest that showed compelte consolidation of her RML and bilateral pleural effusions but ruled out for PE. Patient also stated that she has been having persistent diarrhea but no nausea/vomiting or any bleeding events.  In the ED she is found with fever, tachycardia,  Severe leucocytosis, lactic acidosis and with concomitant CHARISSE, suspicion for sepsis and pneumonia.  Started on IV antibiotics and referred to us for further care and inpatient management.            Past Medical History:     Past Medical History:   Diagnosis Date     Anxiety      Arthritis      Chronic atrial fibrillation (H)      Gastroesophageal reflux disease      Hypertension              Past Surgical History:     Past Surgical History:   Procedure Laterality Date     CHOLECYSTECTOMY               Social History:     Social History   Substance Use Topics     Smoking status: Never Smoker     Smokeless  tobacco: Never Used     Alcohol use No             Family History:   Family history was fully reviewed and non-contributory in this case.         Allergies:   No Known Allergies          Medications:     Prior to Admission medications    Medication Sig Last Dose Taking? Auth Provider   losartan (COZAAR) 25 MG tablet Take 0.5 tablets (12.5 mg) by mouth daily   Luis Miguel Childress DO   rOPINIRole (REQUIP) 0.25 MG tablet Take 1 tablet (0.25 mg) by mouth At Bedtime   Luis Miguel Childress DO   metoprolol tartrate (LOPRESSOR) 100 MG tablet Take 1 tablet (100 mg) by mouth 2 times daily   Luis Miguel Childress DO   NIFEdipine ER osmotic (ADALAT CC) 60 MG 24 hr tablet Take 1 tablet (60 mg) by mouth daily   Luis Miguel Childress DO   spironolactone (ALDACTONE) 25 MG tablet Take 1 tablet (25 mg) by mouth daily   Luis Miguel Childress DO   polyethylene glycol (MIRALAX/GLYCOLAX) Packet Take 17 g by mouth daily as needed for constipation   Luis Miguel Childress DO   predniSONE (DELTASONE) 10 MG tablet 4 tabs daily for 2 days, then 3 tabs daily for 2 days, then 2 tabs daily for 2 days, then 1 tab daily for 2 days, then stop   Luis iMguel Childress DO   Menthol, Topical Analgesic, 4 % GEL Externally apply topically 2 times daily   Unknown, Entered By History   omeprazole (PRILOSEC) 20 MG CR capsule Take 20 mg by mouth daily   Unknown, Entered By History   fluticasone-salmeterol (ADVAIR) 250-50 MCG/DOSE diskus inhaler Inhale 1 puff into the lungs every 12 hours   Unknown, Entered By History   warfarin (COUMADIN) 2.5 MG tablet Take 2.5 mg by mouth daily   Unknown, Entered By History   ipratropium - albuterol 0.5 mg/2.5 mg/3 mL (DUONEB) 0.5-2.5 (3) MG/3ML neb solution Take 1 vial (3 mLs) by nebulization 3 times daily   Jarocho Antoine DO   furosemide (LASIX) 40 MG tablet Take 1 tablet (40 mg) by mouth daily   Jarocho Antoine DO   Acetaminophen (TYLENOL PO) Take 1,000 mg by mouth 3 times daily    Reported, Patient             Review of  Systems:   A Comprehensive greater than 10 system review of systems was carried out.  Pertinent positives and negatives are noted above.  Otherwise negative for contributory information.           Physical Exam:   Blood pressure 101/51, temperature 98  F (36.7  C), temperature source Oral, resp. rate 24, SpO2 93 %.  Wt Readings from Last 1 Encounters:   03/23/18 94.4 kg (208 lb 1.6 oz)     Exam:  GENERAL: No apparent distress. Awake, alert, and fully oriented.  HEENT: Normocephalic, atraumatic. Extraocular movements intact.  CARDIOVASCULAR: irRegularly irregular rate and rhythm, no JVD, bilateral +2 pitting edema on both LE  PULMONARY: fair air entry, bilateral crackles R>L  ABDOMINAL: Soft, non tender, distended, no guarding  EXTREMITIES: No cyanosis or clubbing. No edema.  NEUROLOGICAL: CN 2-12 grossly intact, awake and alert x3, spontaneous and coherent speech. no focal neurological deficits.  DERMATOLOGICAL: No rash, ulcer, ecchymoses, jaundice.  Psych: not agitation, not combative           Data:   EKG:    No new EKG seen in [facility].  ER reported Afib with RVR at 109    Imaging:  Results for orders placed or performed during the hospital encounter of 04/13/18   XR Chest 2 Views    Narrative    XR CHEST 2 VW 4/13/2018 2:19 PM    HISTORY: Pain.    COMPARISON: March 17, 2018.      Impression    IMPRESSION: Bibasilar streaky lung opacities, possibly atelectasis or  infection. Small bilateral layering effusions as well. No  pneumothorax. Stable heart size.    JESSICA ADHIKARI MD     CT scan done earlier at outside facility  [facility]  CT CHEST PE STUDY  4/12/2018 4:45 PM    INDICATION: Shortness of breath. Assess for pulmonary embolism.  TECHNIQUE: Helical acquisition through the chest was performed during the  arterial phase of contrast enhancement using IV contrast. 2D and 3D  reconstructions were performed by the CT technologist. Dose reduction techniques  were used.   IV CONTRAST: Omnipaque 350,  100 mL.  COMPARISON: None.    FINDINGS:   ANGIOGRAM CHEST: Pulmonary arteries are only moderately well visualized. No  large central or proximal segmental emboli. The more peripheral segmental and  subsegmental pulmonary arteries are not optimally visualized due to breathing  motion artifact and IV contrast bolus timing. Thoracic aorta normal caliber. It  is not optimally opacified to assess for dissection.    RV/LV RATIO: N/A    LUNGS AND PLEURA: Small bilateral pleural effusions with dependent atelectasis  less likely infiltrate in both lower lobes. Consolidative infiltrate with  complete consolidation right middle lobe. Presumed mucus plugging in the right  middle lobe bronchi. This could be due to pneumonia. Follow-up recommended to  assure resolution and better exclude an obstructing endobronchial lesion. Lungs  otherwise clear.    MEDIASTINUM: No mediastinal or hilar adenopathy.    LIMITED UPPER ABDOMEN: Negative.    MUSCULOSKELETAL: Old right rib fractures. Osteoarthritis both shoulders.  Degenerative change thoracic spine. Compression with anterior wedging T6  vertebral body.    CONCLUSION:  1.  No central or proximal segmental pulmonary embolism. More peripheral  pulmonary arteries not optimally opacified and it is difficult to exclude  smaller peripheral emboli. If there is a strong concern for PE, consider  correlation with VQ scan.  2.  Complete consolidation and volume loss right middle lobe with presumed mucus  plugging in the central bronchi. Follow-up to assure resolution of this finding  recommended to better exclude a centrally obstructing endobronchial lesion.  3.  Small bilateral pleural effusions with dependent atelectasis  Labs:  No results for input(s): CULT in the last 168 hours.    Recent Labs  Lab 04/13/18  1340   *   POTASSIUM 4.5   CHLORIDE 91*   CO2 24   ANIONGAP 10   *   BUN 50*   CR 1.86*   GFRESTIMATED 26*   GFRESTBLACK 31*   KACI 8.9       Recent Labs  Lab 04/13/18  1340    *   POTASSIUM 4.5   CHLORIDE 91*   CO2 24   ANIONGAP 10   *   BUN 50*   CR 1.86*   GFRESTIMATED 26*   GFRESTBLACK 31*   KACI 8.9     No results for input(s): SED, CRP in the last 168 hours.    Recent Labs  Lab 04/13/18  1340   *       Recent Labs  Lab 04/13/18  1340   INR 4.39*       Recent Labs  Lab 04/13/18  1340   TROPI <0.015     No results for input(s): COLOR, APPEARANCE, URINEGLC, URINEBILI, URINEKETONE, SG, UBLD, URINEPH, PROTEIN, UROBILINOGEN, NITRITE, LEUKEST, RBCU, WBCU in the last 168 hours.

## 2018-04-13 NOTE — ED NOTES
"Patient arrived at around 13:00 complaining of fever and fatigue. Patient currently staying at [facility] for recovery after a CHF exacerbation. Patient reports low grad temperatures for past 5 days with temperature in low 100s today. Patient denies shortness of breath but noted increased labor of breathing with abdominal muscle use. Patient normally uses 2 liters nasal cannula at home. Upon arrival patient on 5 liters nasal cannula with oxygen saturaiton in low 90s. Patient states \"I just feel tired and want to lie down.\" Patient reports she is unable to sleep laying flat and requires sitting up to sleep. Reports \"they wrap my legs but right now I have stocking on\" for swelling that has been present \"for the past 2 months or so.\" Patient had CT of chest yesterday. Patient currently on contact solation precautions for possible c-diff. Results pending for stool sample at this time. ABCs intact. Alert and oriented X4. Daughter supportive at bedside. Oriented to room and call light.    "

## 2018-04-13 NOTE — PHARMACY-ANTICOAGULATION SERVICE
Clinical Pharmacy - Warfarin Dosing Consult     Pharmacy has been consulted to manage this patient s warfarin therapy.  Indication: Atrial Fibrillation  Therapy Goal: INR 2-3  Warfarin Prior to Admission: Yes  Warfarin PTA Regimen: 2.5mg daily   Dose Comments: INR= 4.39 (warfarin has been on Hold PTA)    INR   Date Value Ref Range Status   04/13/2018 4.39 (H) 0.86 - 1.14 Final     Comment:     Reviewed: OK with previous   03/23/2018 4.51 (H) 0.86 - 1.14 Final     Comment:     Reviewed: OK with previous       Recommend warfarin No dose today (supratherapeutic INR).  Pharmacy will monitor Estefany An daily and order warfarin doses to achieve specified goal.      Please contact pharmacy as soon as possible if the warfarin needs to be held for a procedure or if the warfarin goals change.

## 2018-04-13 NOTE — ED PROVIDER NOTES
History     Chief Complaint:  Fever and Hyperventilating    HPI   Estefany An is a 87 year old female with a history of COPD, hypertension, CHF, respiratory failure, and atrial fibrillation, anticoagulated on Coumadin, who presents via EMS with fever and hyperventilation. EMS and nursing staff report the patient has had fevers and fatigue for the past few days. They note a CT scan of her chest yesterday showed consolidation in the right lower lobe (see results below). She also has had elevated creatinine at 1.5 and INR of 4.2. They report the patient has oxygen saturations in the low 90s, fever of 100.3, wheezing, and has been using abdominal muscles to breathe. The patient reports she has been feeling much more short of breath than usual and is grunting in order to get air in. She states she is on 2 L of oxygen at baseline and has been requiring 4 L since her symptoms started. She states she feels her calves are swollen and has her legs wrapped usually, and she states she has gained 20 pounds recently. She also states she has had constant diarrhea and has a C. Diff test pending.      CT Chest PE Study from [facility] 4/12/2018:  1.  No central or proximal segmental pulmonary embolism. More peripheral  pulmonary arteries not optimally opacified and it is difficult to exclude  smaller peripheral emboli. If there is a strong concern for PE, consider  correlation with VQ scan.  2.  Complete consolidation and volume loss right middle lobe with presumed mucus  plugging in the central bronchi. Follow-up to assure resolution of this finding  recommended to better exclude a centrally obstructing endobronchial lesion.  3.  Small bilateral pleural effusions with dependent atelectasis.  Allergies:  No known drug allergies     Medications:    Cozaar  Requip  Metoprolol tartrate  Adalat  Spironolactone  Miralax PRN  Prilosec  Advair  Coumadin  Duoneb   Lasix    Past Medical History:    Anxiety  Arthritis  COPD  Sleep  apnea  GERD  Hypertension  Atrial fibrillation  CHF  Respiratory failure    Past Surgical History:    Cholecystectomy    Family History:    History reviewed. No pertinent family history.      Social History:  Smoking status: Never smoker  Alcohol use: No   Marital Status:   [5]     Review of Systems   Constitutional: Positive for fever and unexpected weight change.   Respiratory: Positive for shortness of breath.    Cardiovascular: Positive for leg swelling.   Gastrointestinal: Positive for diarrhea.   All other systems reviewed and are negative.    Physical Exam     Patient Vitals for the past 24 hrs:   BP Temp Temp src Heart Rate Resp SpO2   04/13/18 1536 - 98  F (36.7  C) Oral - - -   04/13/18 1305 101/51 - - 116 24 93 %      Physical Exam   Constitutional: She is cooperative.   HENT:   Mouth/Throat: Oropharynx is clear and moist and mucous membranes are normal.   Eyes: Conjunctivae are normal.   Neck: Normal range of motion.   Cardiovascular: Normal heart sounds.  An irregularly irregular rhythm present.   Pulmonary/Chest: Tachypnea noted. She has decreased breath sounds.   Abdominal: Soft. Normal appearance and bowel sounds are normal. There is no rebound and no guarding.   Musculoskeletal: Normal range of motion.   Lymphadenopathy:     She has no cervical adenopathy.   Neurological: She is alert.   Skin: Skin is warm and dry.       Emergency Department Course   ECG (13:52:39):  Rate 109 bpm. MN interval *. QRS duration 70. QT/QTc 272/366. P-R-T axes * 12 109. Atrial fibrillation with rapid ventricular response. Nonspecific ST and T wave abnormality. Abnormal ECG. Interpreted at 1359 by Ivon Holland MD.     Imaging:  Radiographic findings were communicated with the patient who voiced understanding of the findings.    XR Chest 2 Views:  Bibasilar streaky lung opacities, possibly atelectasis or  infection. Small bilateral layering effusions as well. No  pneumothorax. Stable heart size.  As read by  Radiology.      Laboratory:  D dimer: 5.0 (H)  INR: 4.39 (H)  BNP: 06695 (H)  CBC: WBC 37.9 (H), HGB 9.5 (L), o/w WNL ()   BMP:  (L), Chloride 91 (L), Glucose 125 (H), BUN 50 (H), Creatinine 1.86 (H), GFR estimate 26 (L), o/w WNL  Troponin: <0.015  Lactic acid: 2.4 (H)    Blood culture: in process    Interventions:  1505: Zosyn 4.5 g IV  1514: Tramadol 50 mg PO  Lactated ringers 500 mL IV Bolus     Emergency Department Course:  The patient arrived in the emergency department via EMS.  Past medical records, nursing notes, and vitals reviewed.  1309: I performed an exam of the patient and obtained history, as documented above.   IV inserted and blood drawn.  The patient was sent for a chest x-ray while in the emergency department, findings above.   EKG obtained, results above.     1442: I rechecked the patient. Explained findings to the patient.    1520: I spoke to Dr. Houston of the hospitalist service who accepts the patient for admission.     Findings and plan explained to the Patient who consents to admission.     Discussed the patient with Dr. Houston, who will admit the patient to a cardiac telemetry bed for further monitoring, evaluation, and treatment.      Impression & Plan    CMS Diagnoses:   The patient has signs of Severe Sepsis as evidenced by:    1. 2 SIRS criteria, AND  2. Suspected infection, AND   3. Organ dysfunction: Lactic Acid > 1.9    Time severe sepsis diagnosis confirmed = 1429 as this was the time when Lactate resulted, and the level was > 1.9    3 Hour Severe Sepsis Bundle Completion:  1. Initial Lactic Acid Result:   Recent Labs   Lab Test  04/13/18   1340  03/22/18   1027  03/21/18   0920   LACT  2.4*  1.7  1.5     2. Blood Cultures before Antibiotics: Yes  3. Broad Spectrum Antibiotics Administered: Yes     Anti-infectives (Future)    Start     Dose/Rate Route Frequency Ordered Stop    04/13/18 1323  piperacillin-tazobactam (ZOSYN) intermittent infusion 4.5 g      4.5 g  200  mL/hr over 30 Minutes Intravenous ONCE 04/13/18 1322          4. Full 30mL/kg bolus not administered due to CHF and acute Pulmonary Edema     Medical Decision Making:  Estefany An is a 87 year old female who arrives to the emergency room via ambulance from her nursing home with multiple concerns. As noted, she had an abnormal CT that showed an area of consolidation and possible mucous plugging. She has had an increased oxygen requirement recently, which maybe is a result of this, though there is also evidence of worsening congestive heart failure with weight gain, elevated BNP, and x-ray findings suggestive of pulmonary edema. Unfortunately, she also has elevated lactate and elevated BUN and creatinine suggestive of intravascular fluid depletion. She has hyponatremia. She also had a markedly elevated white blood cell count that could be consistent with an infectious process. Cultures have been obtained, and we have treated empirically with Zosyn. Because of her pulmonary edema, she was not given a full 30 mg/kg bolus but rather was given 500 mL for now. Will need to watch closely for worsening pulmonary edema. The patient arrived with an established DNR/DNI status, and daughter said they were considering hospice care. I have contacted Dr. Houston of the hospitalist service who has accepted the patient for admission and will further discuss limitations of care.     Diagnosis:    ICD-10-CM   1. Pneumonia of right middle lobe due to infectious organism (H) J18.1   2. Acute renal failure, unspecified acute renal failure type (H) N17.9     Disposition:  Admitted to cardiac telemetry    Tiffanie Diallo  4/13/2018   [facility] EMERGENCY DEPARTMENT    I, Tiffanie Diallo, am serving as a scribe at 1:09 PM on 4/13/2018 to document services personally performed by Ivon Holland MD based on my observations and the provider's statements to me.       Ivon Holland MD  04/14/18 5633

## 2018-04-13 NOTE — ED NOTES
[facility]  ED Nurse Handoff Report    Estefany An is a 87 year old female   ED Chief complaint: Fever and Hyperventilating  . ED Diagnosis:   Final diagnoses:   None     Allergies: No Known Allergies    Code Status: DNR / DNI  Activity level - Baseline/Home:  Stand with Assist. Activity Level - Current:   Stand with Assist of 2. Lift room needed: No. Bariatric: No   Needed: No   Isolation: Yes. Infection: C-diff Pending     Vital Signs:   Vitals:    04/13/18 1305   BP: 101/51   Resp: 24   SpO2: 93%       Cardiac Rhythm:  ,      Pain level: 0-10 Pain Scale: 7  Patient confused: No. Patient Falls Risk: Yes.   Elimination Status: Has voided, patient uses briefs. INC B&B  Patient Report - Initial Complaint: Fever. Focused Assessment: Patient was sent here from [facility] with c/o fever. Patient noted to be tachypneic with abdominal muscle use but denies sob. Complaining of fatigue, patient states she requires elevation to sleep comfortably. Sats are low 90s on 4L. Pt is normally on 2L at home. Had a CT scan 4/12/18 that was positive for pneumonia. A&Ox4. Most recent temp was 98.0. Patient is having frequent loose stools with noted redness/tenderness to skin. Open wounds noted in luis e area likely related to moisture. Barrier cream was applied.   Tests Performed: Labs, x-ray. Abnormal Results:  Labs Ordered and Resulted from Time of ED Arrival Up to the Time of Departure from the ED   CBC WITH PLATELETS DIFFERENTIAL - Abnormal; Notable for the following:        Result Value    WBC 37.9 (*)     RBC Count 3.22 (*)     Hemoglobin 9.5 (*)     Hematocrit 27.3 (*)     RDW 16.3 (*)     Absolute Neutrophil 34.8 (*)     Absolute Monocytes 1.7 (*)     All other components within normal limits   BASIC METABOLIC PANEL - Abnormal; Notable for the following:     Sodium 125 (*)     Chloride 91 (*)     Glucose 125 (*)     Urea Nitrogen 50 (*)     Creatinine 1.86 (*)     GFR Estimate 26 (*)     GFR Estimate If  Black 31 (*)     All other components within normal limits   D DIMER QUANTITATIVE - Abnormal; Notable for the following:     D Dimer 5.0 (*)     All other components within normal limits   INR - Abnormal; Notable for the following:     INR 4.39 (*)     All other components within normal limits   NT PROBNP INPATIENT - Abnormal; Notable for the following:     N-Terminal Pro BNP Inpatient 45698 (*)     All other components within normal limits   LACTIC ACID WHOLE BLOOD - Abnormal; Notable for the following:     Lactic Acid 2.4 (*)     All other components within normal limits   TROPONIN I   PULSE OXIMETRY NURSING   CARDIAC CONTINUOUS MONITORING   PERIPHERAL IV CATHETER   PATIENT CARE ORDER   BLOOD CULTURE   BLOOD CULTURE        XR Chest 2 Views   Final Result   IMPRESSION: Bibasilar streaky lung opacities, possibly atelectasis or   infection. Small bilateral layering effusions as well. No   pneumothorax. Stable heart size.      JESSICA ADHIKARI MD      Treatments provided: Oxygen, antibiotics, IV fluids  Family Comments: Daughter is supportive at beside, daughter has talked to provider about possibly going to hospice. Plan is to be admitted to inpatient care and later discuss further options.   OBS brochure/video discussed/provided to patient:  No  ED Medications:   Medications   piperacillin-tazobactam (ZOSYN) intermittent infusion 4.5 g (4.5 g Intravenous New Bag 4/13/18 1505)   lactated ringers BOLUS 500 mL (not administered)   traMADol (ULTRAM) tablet 50 mg (50 mg Oral Given 4/13/18 6244)     Drips infusing:  Yes  For the majority of the shift, the patient's behavior Green. Interventions performed were emotional support provided.     Severe Sepsis OR Septic Shock Diagnosis Present: No      ED Nurse Name/Phone Number: Susie Gr,   3:23 PM    RECEIVING UNIT ED HANDOFF REVIEW    Above ED Nurse Handoff Report was reviewed: Yes  Reviewed by: Emeli Mcfadden on April 13, 2018 at 4:57 PM

## 2018-04-13 NOTE — PHARMACY-ADMISSION MEDICATION HISTORY
Admission medication history interview status for this patient is complete. See [facility] admission navigator for allergy information, prior to admission medications and immunization status.     Medication history interview source(s):Patient  Medication history resources (including written lists, pill bottles, clinic record):[facility]  Primary pharmacy:-    Changes made to PTA medication list:  Added: tamiflu, tramadol,   Deleted: prednisone  Changed: duoneb to qid, lasix to 60 mg qd    Actions taken by pharmacist (provider contacted, etc):Called [facility] for MAR     Additional medication history information:None    Medication reconciliation/reorder completed by provider prior to medication history? No    Do you take OTC medications (eg tylenol, ibuprofen, fish oil, eye/ear drops, etc)? yes(Y/N)    For patients on insulin therapy: no (Y/N)  Lantus/levemir/NPH/Mix 70/30 dose:   (Y/N) (see Med list for doses)   Sliding scale Novolog Y/N  If Yes, do you have a baseline novolog pre-meal dose:  units with meals  Patients eat three meals a day:   Y/N    How many episodes of hypoglycemia do you have per week: _______  How many missed doses do you have per week: ______  How many times do you check your blood glucose per day: _______   Any Barriers to therapy - Be specific :  cost of medications, comfortable with giving injections (if applicable), comfortable and confident with current diabetes regimen: Y/N ______________      Prior to Admission medications    Medication Sig Last Dose Taking? Auth Provider   ipratropium - albuterol 0.5 mg/2.5 mg/3 mL (DUONEB) 0.5-2.5 (3) MG/3ML neb solution Take 1 vial by nebulization 4 times daily 4/13/2018 at x2 Yes Unknown, Entered By History   furosemide (LASIX) 20 MG tablet Take 60 mg by mouth daily 4/13/2018 at Unknown time Yes Unknown, Entered By History   rOPINIRole (REQUIP) 0.25 MG tablet Take 0.25 mg by mouth daily 4/12/2018 at 1600 Yes Unknown, Entered By History   oseltamivir (TAMIFLU) 75 MG  capsule Take 75 mg by mouth daily Starting 4/4/18 for 14 days 4/12/2018 at Unknown time Yes Unknown, Entered By History   traMADol (ULTRAM) 50 MG tablet Take 50 mg by mouth 3 times daily At 0000, 1600, 2000 4/13/2018 at x1 Yes Unknown, Entered By History   traMADol (ULTRAM) 50 MG tablet Take 50 mg by mouth daily as needed for severe pain  Yes Unknown, Entered By History   losartan (COZAAR) 25 MG tablet Take 0.5 tablets (12.5 mg) by mouth daily 4/13/2018 at Unknown time Yes Luis Miguel Childress, DO   metoprolol tartrate (LOPRESSOR) 100 MG tablet Take 1 tablet (100 mg) by mouth 2 times daily 4/13/2018 at x1 Yes Luis Miguel Childress, DO   NIFEdipine ER osmotic (ADALAT CC) 60 MG 24 hr tablet Take 1 tablet (60 mg) by mouth daily 4/13/2018 at Unknown time Yes Luis Miguel Childress,    spironolactone (ALDACTONE) 25 MG tablet Take 1 tablet (25 mg) by mouth daily 4/13/2018 at Unknown time Yes Luis Miguel Childress, DO   polyethylene glycol (MIRALAX/GLYCOLAX) Packet Take 17 g by mouth daily as needed for constipation  Yes Luis Miguel Childress, DO   Menthol, Topical Analgesic, 4 % GEL Externally apply topically 2 times daily 4/13/2018 at x1 Yes Unknown, Entered By History   omeprazole (PRILOSEC) 20 MG CR capsule Take 20 mg by mouth daily 4/13/2018 at Unknown time Yes Unknown, Entered By History   fluticasone-salmeterol (ADVAIR) 250-50 MCG/DOSE diskus inhaler Inhale 1 puff into the lungs every 12 hours 4/13/2018 at x1 Yes Unknown, Entered By History   Acetaminophen (TYLENOL PO) Take 1,000 mg by mouth 3 times daily At 0800,1200,1800 4/13/2018 at x2 Yes Reported, Patient   warfarin (COUMADIN) 2.5 MG tablet Take 2.5 mg by mouth daily ON HOLD  Unknown, Entered By History

## 2018-04-13 NOTE — ED NOTES
Bed: ED19  Expected date: 4/13/18  Expected time: 12:46 PM  Means of arrival: Ambulance  Comments:  dominick Bucio

## 2018-04-14 LAB — BACTERIA SPEC CULT: NORMAL

## 2018-04-14 NOTE — PLAN OF CARE
Problem: Patient Care Overview  Goal: Plan of Care/Patient Progress Review  Outcome: Declining  Pt status declining throughout night. Plans for INR reversal and placement of NG tube per overnight MD. Dr. Houston in to see patient in am. Spoke with daughter about patient declining status.  All interventions stopped per family and MD. Pt placed on comfort care. Telemetry d/c'd. Spiritual care in to see patient and daughter. Pt repositioned for comfort.

## 2018-04-14 NOTE — PROGRESS NOTES
"Cross cover paged to evaluate patient at bedside for worsening respiratory status.  Evaluated patient and reviewed chart.  She was admitted earlier today for severe sepsis secondary to a pneumonia and possible mucous plugging.  She has been on 4 L of oxygen is on Zosyn for antibiotics.  She has history of CHF and COPD as well.  The patient says she has been feeling more short of breath this evening.  Denies any abdominal pain although her abdomen looks a little distended and is firm.  She is on 4 L of oxygen via nasal cannula.    BP 98/47 (BP Location: Right arm)  Temp 97.7  F (36.5  C) (Oral)  Resp (!) 32  Ht 1.727 m (5' 8\")  Wt 91.1 kg (200 lb 14.4 oz)  SpO2 92%  BMI 30.55 kg/m2  She appears in mild respiratory distress with tachypnea and accessory muscle use.  She has a diffuse wheeze and forced expiratory phase.  Her abdomen is mildly distended and somewhat firm.  She does not have diffuse crackles in her lungs.  Overall she is really quite edematous with 2+ peripheral edema up to her hips.  She is tachycardic to rates of 100-120.    Sepsis protocol set off her lactate is rising up to 3.7.    A/P:    Respiratory failure: I suspect this is all mostly due to pneumonia after reviewing her x-ray and resenting findings with white blood cell count of 37 and she is on appropriate Zosyn for this.  Fortunately she has many comorbidities as above and overall I am concerned about her worsening respiratory status.  She is a DNR/DNI which I confirmed with her and her daughter Maylin on the phone.  Discussed possibility of BiPAP initiation to ease her work of breathing will treat her pneumonia and both were on board with this plan.  She will require a little bit of Ativan IV to help with anxiety while on the mask she is very quite anxious with respiratory failure.  She has an CHARISSE which is likely from her poor oral intake along with diuretic use and diarrhea prior to coming in.  She is significantly volume overloaded " systemically, however think she is probably intravascularly dry.  Her lactate is rising and unfortunately I feel the need to give an additional 500 mg saline bolus and monitor closely.  I do not think her respiratory status will benefit significantly from Lasix but I do think her kidney injury will get worse if we do this.  She is wheezing and has a forced expiratory phase so I have changed her nebs to Xopenex 4 times daily nebs given her tachycardia.  She is on inhalers at home so I also added IV Solu-Medrol 62 mg every 12 hours in case component of bronchospasm.  -bipap initiation  [place] [place]  -change duonebs to xopenex nebs  -start solumedrol 62.5mg IV q 12 hr  -500ml NS bolus over 2 hrs and follow up lactate  -continue zosyn    If patient continues to decline overnight she confirms that she would not want intubation or to be on a ventilator, which I also discussed with Maylin her daughter who was in agreement.  Will discuss with overnight hospitalist to follow-up and if patient getting significantly worse will notify Maylin who requests we call her home phone number as opposed to cell phone as she will be more likely to hear this.

## 2018-04-14 NOTE — PROGRESS NOTES
CM observed patient's ODALYS score has recalculated to high. SW is following for hospice planning. She lives [facility] with supportive services but was discharged to [facility] where she came from for admission for PNA. Her previous admissions were for CHF, abdominal pain, paroxymal Afib, new Afib. CM will monitor for discharge disposition to determine if follow up appt is appropriate.       CM will continue to follow patient until discharge for any additional needs.     Lesli Allen RN, BSN, CTS  [facility]  [phone]

## 2018-04-14 NOTE — PROGRESS NOTES
[facility]  Hospitalist Progress Note  Fletcher Houston MD, MD 04/14/2018  (Text Page)  Reason for Stay (Diagnosis): Septic shock         Assessment and Plan:      Summary of Stay: Estefany An is a 87 year old female with numerous medical history and recurrent hospitalizations this year such as chronic Afib on OAC, diastolic CHF, COPD, hypertension, suspected STEF and previous hospitalizations for pneumonia, COPD exacerbation and CHF who presented here coming from a skilled facility due to increasing SOB, fever, wheezes and generalized weakness.       Problem List:   1. Septic shock with multiorgan failure likely secondary to healthcare associated pneumonia with ongoing possible small bowel obstruction versus ischemic bowel versus ileus  2. Severe lactic acidosis secondary to #1  3. Acute renal failure with severe hyperkalemia  4. Supra therapeutic INR on Coumadin, suspect ongoing hepatic insufficiency given #1  5. Congestive heart failure, acute on chronic diastolic in exacerbation likely brought about by #1  6. Acute respiratory failure with hypoxia secondary to multi-organ failure.  7. History of COPD    Patient is critically ill, very poor prognosis.  She will not be a good surgical candidate as likely she will not survive any operative intervention.  I had a long discussion with patient's daughter and family regarding current rapid deterioration, clinical condition, working diagnosis, and severe poor prognosis.  Decisions were made not to further pursue any invasive procedures, intervention, and will proceed with comfort care to make her as comfortable as possible.  We will stop IV fluids boluses, K Centra, hyperkalemia regimen, antibiotics, and refrain from inserting NG tube, or Boss catheter.  Comfort Care.  We will need ,  services.    DVT Prophylaxis: comfort care with supratherapeutic INR  Code Status: Comfort Care  Discharge Dispo: Likely will not survive this  "hospitalization          Interval History (Subjective):      Continuing care today  Seen and examined  Patient had rapid deterioration overnight.  She is unresponsive, worsening abdominal distention, needing more O2 supplementation.  Worsening lactic acidosis, more coagulopathic. Unstable hemodynamics.  Daughter present and aware of the current condition.     # Pain Assessment:  Current Pain Score 4/14/2018   Patient currently in pain? Unable to participate   Pain score (0-10) -   Pain location    Pain descriptors -    - Estefany is unable to participate in a plan for pain management; however, the plan  was discussed in a collaborative fashion with her daughter.   - Please see the plan for pain management as documented above                Physical Exam:      Last Vital Signs:  BP (!) 99/35 (BP Location: Left arm)  Temp 97.7  F (36.5  C) (Axillary)  Resp (!) 40  Ht 1.727 m (5' 8\")  Wt 93.4 kg (205 lb 14.4 oz)  SpO2 (!) 87%  BMI 31.31 kg/m2    I/O last 3 completed shifts:  In: 360 [P.O.:360]  Out: -   Wt Readings from Last 1 Encounters:   04/14/18 93.4 kg (205 lb 14.4 oz)     Vitals:    04/13/18 1911 04/14/18 0639   Weight: 91.1 kg (200 lb 14.4 oz) 93.4 kg (205 lb 14.4 oz)       Constitutional: Unresponsive in respiratory distress   Respiratory: Tachpnea, bilateral crackles on both lung fields   Cardiovascular: Hypotnesive, tachycardic   Abdomen:  Firm, distended, bowel sounds heard   Skin:  Ecchymosis, bruises seen   Neuro:  Limited exam, patient is unresponsive   Extremities:  Bilateral +2 pitting edema   Other(s):psych  Cannot be assessed       All other systems: Negative          Medications:      All current medications were reviewed with changes reflected in problem list.         Data:      All new lab and imaging data was reviewed.   Labs:    Recent Labs  Lab 04/13/18  1508 04/13/18  1340   CULT No growth after 12 hours No growth after 15 hours       Recent Labs  Lab 04/14/18  0620 04/13/18  1340   WBC " 44.6* 37.9*   HGB 8.8* 9.5*   HCT 26.2* 27.3*   MCV 87 85   * 434       Recent Labs  Lab 04/14/18  0620 04/13/18  1340   * 125*   POTASSIUM 6.3* 4.5   CHLORIDE 90* 91*   CO2 18* 24   ANIONGAP 15* 10   GLC 99 125*   BUN 66* 50*   CR 2.57* 1.86*   GFRESTIMATED 18* 26*   GFRESTBLACK 21* 31*   KACI 8.8 8.9       Recent Labs  Lab 04/14/18  0620 04/13/18  1340   GLC 99 125*       Recent Labs  Lab 04/14/18  0620 04/13/18  1340   INR 6.89* 4.39*       Recent Labs  Lab 04/13/18  1340   TROPI <0.015     No results for input(s): COLOR, APPEARANCE, URINEGLC, URINEBILI, URINEKETONE, SG, UBLD, URINEPH, PROTEIN, UROBILINOGEN, NITRITE, LEUKEST, RBCU, WBCU in the last 168 hours.   Imaging:   Results for orders placed or performed during the hospital encounter of 04/13/18   XR Chest 2 Views    Narrative    XR CHEST 2 VW 4/13/2018 2:19 PM    HISTORY: Pain.    COMPARISON: March 17, 2018.      Impression    IMPRESSION: Bibasilar streaky lung opacities, possibly atelectasis or  infection. Small bilateral layering effusions as well. No  pneumothorax. Stable heart size.    JESSICA ADHIKARI MD   CT Abdomen Pelvis w/o Contrast    Narrative    CT ABDOMEN PELVIS W/O CONTRAST  4/14/2018 5:24 AM     HISTORY: Abdominal pain, increasing lactate.     TECHNIQUE: Noncontrast CT abdomen and pelvis was performed. Radiation  dose for this scan was reduced using automated exposure control,  adjustment of the mA and/or kV according to patient size, or iterative  reconstruction technique.    COMPARISON: 2/18/2018.    FINDINGS:  Abdomen: There are small bilateral pleural effusions. Consolidations  in both lower lobes, right greater than left. Evaluation of the solid  abdominal organs is limited by the lack of intravenous contrast. There  is contrast material within the kidneys from previous exam. Small cyst  at the upper pole of the left kidney. The liver, spleen, pancreas and  adrenal glands are normal in appearance. There is no abdominal  or  pelvic lymph node enlargement.    Pelvis: There is a large amount of stool in the ascending colon and a  small amount of stool in the remainder of the colon. There are  multiple dilated small bowel loops into the pelvis consistent with  obstruction. No cause of obstruction is evident. Small amount of free  fluid in the pelvis. No free intraperitoneal gas.      Impression    IMPRESSION:  1. Distal small bowel obstruction of uncertain etiology.  2. Large amount of stool in the ascending colon. No colonic  obstruction.  3. Small amount of ascites.  4. Bilateral lower lobe consolidations, right greater than left.  Pneumonia is a possibility.  5. Small bilateral pleural effusions.

## 2018-04-14 NOTE — PROGRESS NOTES
[facility]    Sepsis Evaluation Progress Note    Date of Service: 04/13/2018    I was called to see Estefany An due to abnormal vital signs triggering the Sepsis SIRS screening alert. She is known to have an infection.     Physical Exam    Vital Signs:  Temp: 97.7  F (36.5  C) Temp src: Oral BP: 98/47   Heart Rate: 108 Resp: (!) 32 SpO2: 92 % O2 Device: Nasal cannula Oxygen Delivery: 4 LPM    Lab:  Lactic Acid   Date Value Ref Range Status   04/13/2018 3.7 (H) 0.7 - 2.0 mmol/L Final       The patient is at baseline mental status.    The rest of their physical exam is significant for moderate respiratory distress with tachypnea and accessory muscle use.  She has a diffuse wheeze and forced expiratory phase.  Her abdomen is mildly distended and somewhat firm.  She does not have diffuse crackles in her lungs.  Overall she is really quite edematous with 2+ peripheral edema up to her hips.  She is tachycardic to rates of 100-120.    Assessment and Plan    The SIRS and exam findings are likely due to   sepsis.     ID: The patient is currently on the following antibiotics:  Anti-infectives (Future)    Start     Dose/Rate Route Frequency Ordered Stop    04/13/18 2000  piperacillin-tazobactam (ZOSYN) infusion 3.375 g      3.375 g  100 mL/hr over 30 Minutes Intravenous EVERY 6 HOURS 04/13/18 1801          Current antibiotic coverage is appropriate for source of infection.    Fluid: Fluid bolus ordered.  500ml NS only.  Severe edema and CHF, but intravascularly dry so do not do full sepsis 30ml/kg dosing.    Lab: Repeat lactic acid ordered for 2 hours from now.    Disposition: The patient will be transferred to [facility]. Attending Physician, Dr. Miles, was notified.    Kodak Miles MD

## 2018-04-14 NOTE — CONSULTS
[facility] Surgery Consultation    Estefany An MRN# [id number]   Age: 87 year old YOB: 1930     Date of Admission:  4/13/2018    Reason for consult: Abnormal abdominal CT       Requesting physician: Yaz       Level of consult: Consult, follow and place orders           Assessment and Plan:   Assessment:   Ileus vs Fulton's syndrome vs SBO vs ischemic bowel  Ileus/Ogilvies would be consistent with sepsis from pneumonia, SBO is possible but SB is mostly dilated without a transition and significant stool is in colon, mesenteric ischemia is possible as a primary problem or secondary to sepsis but no secondary signs of this are present on CT and she is anticoagulated making a mesenteric artery embolus unlikely  Patient Active Problem List    Diagnosis Date Noted     Pneumonia 04/13/2018     Priority: Medium     Acute diastolic congestive heart failure (H) 03/14/2018     Priority: Medium     CHF (congestive heart failure) (H) 03/14/2018     Priority: Medium     Acute on chronic respiratory failure with hypoxia (H) 01/21/2018     Priority: Medium     Hypoxia 01/07/2018     Priority: Medium           Plan:   Abdominal films as needed  NPO, would benefit from NG but with respiratory problems, this may not be appropriate  Laparotomy could be considered to search for mechanical cause after reversing INR, her ability to recover from any abdominal surgery in her current condition is questionable  Anticoagulated with INR >6 , vit K has been given  Discussed with hospitalist, pt is being placed on comfort care, no surgery appropriate  discussed with daughterMaylin, she agrees that no surgery is appropriate            Chief Complaint:   SOB, weakness, fever     History is obtained from the patient and electronic health record         History of Present Illness:   This patient is a 87 year old  female with a significant past medical history of A fib, CHF, pneumonia COPD, HTN who presents with the following  condition requiring a hospital admission:  Pneumonia with consolidation. She was admitted from a skilled nursing facility for weakness, wheezing, SOB and fever.           Past Medical History:     Past Medical History:   Diagnosis Date     Anxiety      Arthritis      Chronic atrial fibrillation (H)      Gastroesophageal reflux disease      Hypertension              Past Surgical History:     Past Surgical History:   Procedure Laterality Date     CHOLECYSTECTOMY               Social History:     Social History   Substance Use Topics     Smoking status: Never Smoker     Smokeless tobacco: Never Used     Alcohol use No             Family History:   No family history on file.          Immunizations:     VACCINE/DOSE   Diptheria   DPT   DTAP   HBIG   Hepatitis A   Hepatitis B   HIB   Influenza   Measles   Meningococcal   MMR   Mumps   Pneumococcal   Polio   Rubella   Small Pox   TDAP   Varicella   Zoster             Allergies:   No Known Allergies          Medications:     Current Facility-Administered Medications   Medication     calcium carbonate (TUMS) chewable tablet 1,000 mg     traMADol (ULTRAM) tablet 50 mg     traMADol (ULTRAM) tablet 50 mg     phytonadione (AQUA-MEPHYTON) 2 mg in sodium chloride 0.9 % 50 mL intermittent infusion     HYDROmorphone (PF) (DILAUDID) injection 0.2-0.4 mg     naloxone (NARCAN) injection 0.1-0.4 mg     melatonin tablet 1 mg     piperacillin-tazobactam (ZOSYN) infusion 3.375 g     Patient is already receiving anticoagulation with heparin, enoxaparin (LOVENOX), warfarin (COUMADIN)  or other anticoagulant medication     sodium chloride 0.9% infusion     acetaminophen (TYLENOL) tablet 650 mg     ondansetron (ZOFRAN-ODT) ODT tab 4 mg    Or     ondansetron (ZOFRAN) injection 4 mg     metoprolol tartrate (LOPRESSOR) tablet 25 mg     levalbuterol (XOPENEX) neb solution 0.63 mg     No lozenges or gum should be given while patient on BIPAP/AVAPS/AVAPS AE     hypromellose-dextran (ARTIFICAL  "TEARS) ophthalmic solution 1 drop     HOLD: All Oral Medications     LORazepam (ATIVAN) injection 0.5 mg     lidocaine 1 % 1 mL     lidocaine (LMX4) kit     sodium chloride (PF) 0.9% PF flush 3 mL     sodium chloride (PF) 0.9% PF flush 3 mL     nitroGLYcerin (NITROSTAT) sublingual tablet 0.4 mg     methylPREDNISolone sodium succinate (solu-MEDROL) injection 62.5 mg             Review of Systems:   E/M: NEGATIVE for ear, mouth and throat problems          Physical Exam:   All vitals have been reviewed  Patient Vitals for the past 24 hrs:   BP Temp Temp src Heart Rate Resp SpO2 Height Weight   04/14/18 0639 - - - - - - - 93.4 kg (205 lb 14.4 oz)   04/14/18 0535 91/78 - - 124 (!) 44 - - -   04/14/18 0314 91/48 97.6  F (36.4  C) Oral 99 (!) 40 92 % - -   04/14/18 0100 93/49 - - 102 (!) 40 95 % - -   04/14/18 0045 (!) 90/38 - - 91 (!) 41 92 % - -   04/14/18 0015 91/43 - - 113 (!) 37 95 % - -   04/13/18 2355 (!) 85/38 - - 109 (!) 42 94 % - -   04/13/18 2338 (!) 86/41 - - 128 (!) 42 91 % - -   04/13/18 2323 (!) 92/39 - - 104 (!) 41 - - -   04/13/18 2229 98/47 97.7  F (36.5  C) Oral 108 (!) 32 92 % - -   04/13/18 2139 102/44 98.6  F (37  C) Oral 125 (!) 32 93 % - -   04/13/18 2019 110/49 - - 122 (!) 40 91 % - -   04/13/18 2000 - - - - - 94 % - -   04/13/18 1911 - - - - - - - 91.1 kg (200 lb 14.4 oz)   04/13/18 1750 101/45 97.6  F (36.4  C) Oral 121 24 95 % 1.727 m (5' 8\") -   04/13/18 1712 - - - - 22 94 % - -   04/13/18 1702 - - - 128 - 95 % - -   04/13/18 1700 108/49 - - 117 - 94 % - -   04/13/18 1536 - 98  F (36.7  C) Oral - - - - -   04/13/18 1500 100/52 - - 115 - 93 % - -   04/13/18 1400 106/64 - - 120 - 90 % - -   04/13/18 1305 101/51 - - 116 24 93 % - -       Intake/Output Summary (Last 24 hours) at 04/14/18 0702  Last data filed at 04/13/18 1835   Gross per 24 hour   Intake              360 ml   Output                0 ml   Net              360 ml     Neck:   supple, symmetrical, trachea midline, skin normal and no " stridor     Chest / Breast:   Increased resp rate     Abdomen:   , firm, distended, cannot accurately evaluate for tenderness as pt is minimally responsive, voluntary guarding absent and no masses palpated             Data:   All laboratory data reviewed  Results for orders placed or performed during the hospital encounter of 04/13/18   XR Chest 2 Views    Narrative    XR CHEST 2 VW 4/13/2018 2:19 PM    HISTORY: Pain.    COMPARISON: March 17, 2018.      Impression    IMPRESSION: Bibasilar streaky lung opacities, possibly atelectasis or  infection. Small bilateral layering effusions as well. No  pneumothorax. Stable heart size.    JESSICA ADHIKARI MD   CT Abdomen Pelvis w/o Contrast    Narrative    CT ABDOMEN PELVIS W/O CONTRAST  4/14/2018 5:24 AM     HISTORY: Abdominal pain, increasing lactate.     TECHNIQUE: Noncontrast CT abdomen and pelvis was performed. Radiation  dose for this scan was reduced using automated exposure control,  adjustment of the mA and/or kV according to patient size, or iterative  reconstruction technique.    COMPARISON: 2/18/2018.    FINDINGS:  Abdomen: There are small bilateral pleural effusions. Consolidations  in both lower lobes, right greater than left. Evaluation of the solid  abdominal organs is limited by the lack of intravenous contrast. There  is contrast material within the kidneys from previous exam. Small cyst  at the upper pole of the left kidney. The liver, spleen, pancreas and  adrenal glands are normal in appearance. There is no abdominal or  pelvic lymph node enlargement.    Pelvis: There is a large amount of stool in the ascending colon and a  small amount of stool in the remainder of the colon. There are  multiple dilated small bowel loops into the pelvis consistent with  obstruction. No cause of obstruction is evident. Small amount of free  fluid in the pelvis. No free intraperitoneal gas.      Impression    IMPRESSION:  1. Distal small bowel obstruction of uncertain  etiology.  2. Large amount of stool in the ascending colon. No colonic  obstruction.  3. Small amount of ascites.  4. Bilateral lower lobe consolidations, right greater than left.  Pneumonia is a possibility.  5. Small bilateral pleural effusions.   CBC with platelets differential   Result Value Ref Range    WBC 37.9 (H) 4.0 - 11.0 10e9/L    RBC Count 3.22 (L) 3.8 - 5.2 10e12/L    Hemoglobin 9.5 (L) 11.7 - 15.7 g/dL    Hematocrit 27.3 (L) 35.0 - 47.0 %    MCV 85 78 - 100 fl    MCH 29.5 26.5 - 33.0 pg    MCHC 34.8 31.5 - 36.5 g/dL    RDW 16.3 (H) 10.0 - 15.0 %    Platelet Count 434 150 - 450 10e9/L    Diff Method Automated Method     % Neutrophils 91.9 %    % Lymphocytes 2.2 %    % Monocytes 4.5 %    % Eosinophils 0.0 %    % Basophils 0.2 %    % Immature Granulocytes 1.2 %    Nucleated RBCs 0 0 /100    Absolute Neutrophil 34.8 (H) 1.6 - 8.3 10e9/L    Absolute Lymphocytes 0.8 0.8 - 5.3 10e9/L    Absolute Monocytes 1.7 (H) 0.0 - 1.3 10e9/L    Absolute Eosinophils 0.0 0.0 - 0.7 10e9/L    Absolute Basophils 0.1 0.0 - 0.2 10e9/L    Abs Immature Granulocytes 0.4 0 - 0.4 10e9/L    Absolute Nucleated RBC 0.0    Basic metabolic panel   Result Value Ref Range    Sodium 125 (L) 133 - 144 mmol/L    Potassium 4.5 3.4 - 5.3 mmol/L    Chloride 91 (L) 94 - 109 mmol/L    Carbon Dioxide 24 20 - 32 mmol/L    Anion Gap 10 3 - 14 mmol/L    Glucose 125 (H) 70 - 99 mg/dL    Urea Nitrogen 50 (H) 7 - 30 mg/dL    Creatinine 1.86 (H) 0.52 - 1.04 mg/dL    GFR Estimate 26 (L) >60 mL/min/1.7m2    GFR Estimate If Black 31 (L) >60 mL/min/1.7m2    Calcium 8.9 8.5 - 10.1 mg/dL   Troponin I   Result Value Ref Range    Troponin I ES <0.015 0.000 - 0.045 ug/L   D dimer quantitative   Result Value Ref Range    D Dimer 5.0 (H) 0.0 - 0.50 ug/ml FEU   INR   Result Value Ref Range    INR 4.39 (H) 0.86 - 1.14   Nt probnp inpatient (BNP)   Result Value Ref Range    N-Terminal Pro BNP Inpatient 47744 (H) 0 - 1800 pg/mL   Lactic acid whole blood   Result Value  Ref Range    Lactic Acid 2.4 (H) 0.7 - 2.0 mmol/L   Lactic acid level STAT   Result Value Ref Range    Lactic Acid 3.7 (H) 0.7 - 2.0 mmol/L   Lactic acid whole blood   Result Value Ref Range    Lactic Acid 4.9 (HH) 0.7 - 2.0 mmol/L   Lactic acid whole blood   Result Value Ref Range    Lactic Acid 8.8 (HH) 0.7 - 2.0 mmol/L   INR   Result Value Ref Range    INR 6.89 (HH) 0.86 - 1.14   CBC with platelets differential   Result Value Ref Range    WBC 44.6 (H) 4.0 - 11.0 10e9/L    RBC Count 3.00 (L) 3.8 - 5.2 10e12/L    Hemoglobin 8.8 (L) 11.7 - 15.7 g/dL    Hematocrit 26.2 (L) 35.0 - 47.0 %    MCV 87 78 - 100 fl    MCH 29.3 26.5 - 33.0 pg    MCHC 33.6 31.5 - 36.5 g/dL    RDW 16.7 (H) 10.0 - 15.0 %    Platelet Count 462 (H) 150 - 450 10e9/L    Diff Method Automated Method     % Neutrophils 93.0 %    % Lymphocytes 2.0 %    % Monocytes 2.2 %    % Eosinophils 0.0 %    % Basophils 0.2 %    % Immature Granulocytes 2.6 %    Nucleated RBCs 0 0 /100    Absolute Neutrophil 41.5 (H) 1.6 - 8.3 10e9/L    Absolute Lymphocytes 0.9 0.8 - 5.3 10e9/L    Absolute Monocytes 1.0 0.0 - 1.3 10e9/L    Absolute Eosinophils 0.0 0.0 - 0.7 10e9/L    Absolute Basophils 0.1 0.0 - 0.2 10e9/L    Abs Immature Granulocytes 1.2 (H) 0 - 0.4 10e9/L    Absolute Nucleated RBC 0.0    Basic metabolic panel   Result Value Ref Range    Sodium 123 (L) 133 - 144 mmol/L    Potassium 6.3 (HH) 3.4 - 5.3 mmol/L    Chloride 90 (L) 94 - 109 mmol/L    Carbon Dioxide 18 (L) 20 - 32 mmol/L    Anion Gap 15 (H) 3 - 14 mmol/L    Glucose 99 70 - 99 mg/dL    Urea Nitrogen 66 (H) 7 - 30 mg/dL    Creatinine 2.57 (H) 0.52 - 1.04 mg/dL    GFR Estimate 18 (L) >60 mL/min/1.7m2    GFR Estimate If Black 21 (L) >60 mL/min/1.7m2    Calcium 8.8 8.5 - 10.1 mg/dL   EKG 12-lead, tracing only   Result Value Ref Range    Interpretation ECG Click View Image link to view waveform and result    Blood culture   Result Value Ref Range    Specimen Description Blood Right Hand     Special Requests  Aerobic and anaerobic bottles received     Culture Micro No growth after 2 hours    Blood culture   Result Value Ref Range    Specimen Description Blood Left Hand     Special Requests Aerobic and anaerobic bottles received     Culture Micro No growth after 3 hours         Attestation:  I have reviewed today's vital signs, notes, medications, labs and imaging.  Amount of time performed on this consult: 45 minutes.    Scar Dumont MD

## 2018-04-14 NOTE — PROGRESS NOTES
"X-cover    Called for worsening lactic acid despite IVF    Chart reviewed and patient discussed with evening hospitalist    Elevated lactic acid due to sepsis/hypoxia.    Hx of CHF, and worsening respiratory status, will avoid additional IVF for now, trial dose of albumin for better BP support and in the hopes of treating elevated lactate in one with severe sepsis.    Tenuous status    X-cover    Called as had some coffee ground like material draining out of her nose and mouth when being turned by nursing staff.  She has also decided to forgo bipap for now as it makes her uncomfortable.     She states to me that she has been having some abdominal pain since yesterday afternoon-but denied it earlier this evening.  She was evaluated by my partner who noted that she had some mild distension with some firmness.     When I walk into the room she clearly has significant distension, and is quite firm to palpation with diffuse pain, that in combination with rising lactate has me concerned re: an acute abdominal process, abdominal sounds are absent.  She is clearly sob with rhonchi throughout rare wheeze with prolonged phase but with shallow respirations suspect in part due to abdominal process. Nares with some dark bloody discharge, bed linen with small amount of dark bloody discharge.     BP 91/48 (BP Location: Left arm)  Temp 97.6  F (36.4  C) (Oral)  Resp (!) 40  Ht 1.727 m (5' 8\")  Wt 91.1 kg (200 lb 14.4 oz)  SpO2 92%  BMI 30.55 kg/m2     A/P    Concern for catastrophic abdominal process in setting of pna/respiratory failure further worsened by abdominal distension.  And with e/o epistaxis and ? UGI bleed    Stat ct abdomen and pelvis without contrast  IV albumin previously ordered for sepsis/hypotension.   Epistaxis/? Upper GIB-dc'd pharmacy warfarin consult until sorted out more thoroughly.     Addendum:  CT with distal SBO of uncertain etiology, lots of stool in ascending colon.  Will reverse INR with Vitamin " K then place NG in light of epistaxis.  NPO x ice chips, ask for surgery input    Also with e/o urinary retention so wall placed    Addendum:  Labs starting to return, worsening WBC now in the 44 range, lactic acid also worsening now 8.8.  Creat 2.6 complicated by K of 6.3.  Tele reviewed and not significantly different compared with last night.      cc bolus, single dose of calcium chloride x1, kayexylate 30 gm rectally x 1.  INR now 6.9.  Given rapid decline and potential need for surgical intervention will reverse INR with K-centra.      Updated daughter Maylin by phone, she again re-iterates to keep her as comfortable as possible. Increased hydromorphone.

## 2018-04-14 NOTE — PROGRESS NOTES
SPIRITUAL HEALTH SERVICES Progress Note  [facility] 3rd floor Med Surg    Patient passed away and her daughter, Maylin and son-in law were in the family lounge, where I met them for a time of grieving and emotional support. Maylin is a retired [employer] . We shared a time of support and grief together. Maylin has one brother who lives out of state and she has not been able to contact him as of yet.    Maylin indicated that her mother, Estefany, will be cremated and they will use the [facility]  home in [place]. Maylin stated that they will take her ashes to [place], where her extended family lives and where her  is buried for a burial at a later time.    No further   services are anticipated.      Rocío Higuera  Chaplain Resident  [phone]

## 2018-04-14 NOTE — PROGRESS NOTES
Dr. Houston at bedside to assess patient. He spoke with patient's daughter Maylin about plan of care and the patient's current condition. Daughter wants all interventions stopped. Pt to be placed on comfort care per MD. Orders for NGT and wall d/c'd. IV medications d/c'd.

## 2018-04-14 NOTE — PLAN OF CARE
Problem: Pneumonia (Adult)  Goal: Signs and Symptoms of Listed Potential Problems Will be Absent, Minimized or Managed (Pneumonia)  Signs and symptoms of listed potential problems will be absent, minimized or managed by discharge/transition of care (reference Pneumonia (Adult) CPG).  Outcome: No Change  RN SHIFT SUMMARY :  DX/STORY : repeat admit this shaun from ER with SOB, ( 5th admit this yr) -- r/o RML pneumonia , mucous plugging   , r/o sepsis ( BC pending )   HX : DNR/DNI, COPD- wears 02 @ 2l at baseline , AFIB-on coumadin , CHF, STEF, CHARISSE, ARTHRITIS   LABS/PROTOCOLS : wbc >30, , LA 2.4,  , INR-4.39 ( NO coumadin tis shaun)   TELE : chronic afib   ASSESS : a/o, weak, incont of soft stool on arrival to floor, needs C.Diff cult with next BM, loose NP cough ,   PAIN: STATES Chronic Low back/hip pain from arthritis - takes ultram /heat -goal <3   TEACHING : just arrived at end of shift ,  PLAN : at baseline is from [facility] . Needs IVF for CHARISSE, wears 02 at 2L at baseline , Palliative consult for symptom  Control . Plan ,

## 2018-04-14 NOTE — PROGRESS NOTES
Asked to pronounce pt as .    On arrival patient is unresponsive, pupils fixed and dilated, no pulses, no heart sounds, no respirations.    Time of Death : 2.50 pm.    Family notified.

## 2018-04-14 NOTE — PROGRESS NOTES
was requested to visit with Estefany and her daughter through an Epic consult and a nursing call to the  office due to Estefany's impending death. Her daughter,  Maylin was present with Estefany at her bedside. Prayers for the Dying were said and emotional support was offered to both Estefany and Maylin. Estefany's son lives [place] of [place] and was not able to be here at this time.    I will continue to follow with this family as needed.

## 2018-04-14 NOTE — PROVIDER NOTIFICATION
MD notified of worsening shortness of breath and elevated HR   2238-MD notified of lactic 3.7  0225-MD notified of lactic 4.9 on redraw after bolus.  0400-MD notified of coffee ground like liquid coming from nose and mouth; STAT CT ordered.  0640-MD paged about critical lactic; 8.8  0700-MD paged for critical labs; INR:6.89, Potassium: 6.3    Patient alert and oriented, uses lift in room. Denies chest pain. Has been extremely short of breath this shift. BiPap started around 2300, q2h vitals done this shift. Elevated HR and RR, soft BP. NS @100. Patient wanted BiPap removed around 0245, currently on 7L O2 via face mask sating at 91%. 0500 Patient was brought down for abd CT, awaiting results.

## 2018-04-14 NOTE — PROGRESS NOTES
Placed patient on BiPAP 14/7 60% for increased WOB and tachypnea, breath sounds are coarse.    Lucila Tate  April 13, 2018.11:06 PM

## 2018-04-14 NOTE — DISCHARGE SUMMARY
[facility]  Expiration Summary  Name: Estefany An    MRN: [id number]  YOB: 1930    Age: 87 year old  Date of Expiration:  2018  2:50 pm  Date of Admission: 2018  Primary Care Provider: Ricki Blake  Discharge Physician:  Fletcher Houston MD  Discharging Service:  Hospitalist      Discharge Diagnosis:  Septic shock with multi-organ failure secondary to healthcare associated pneumonia with SBO vs ileus vs ischemic bowel  Acute renal failure  CHF acute on chronic in exacerbation, diastolic type  Acute hypoxic respiratory failure from above pneumonia and CHF  Severe lactic acidosis     Other Diagnosis:  Past Medical History:   Diagnosis Date     Anxiety      Arthritis      Chronic atrial fibrillation (H)      Gastroesophageal reflux disease      Hypertension           Discharge Disposition:  Patient  during this admission     Allergies:  No Known Allergies     Discharge Medications:   Discharge Medication List as of 2018  4:55 PM      CONTINUE these medications which have NOT CHANGED    Details   ipratropium - albuterol 0.5 mg/2.5 mg/3 mL (DUONEB) 0.5-2.5 (3) MG/3ML neb solution Take 1 vial by nebulization 4 times daily, Historical      furosemide (LASIX) 20 MG tablet Take 60 mg by mouth daily, Historical      rOPINIRole (REQUIP) 0.25 MG tablet Take 0.25 mg by mouth daily, Historical      oseltamivir (TAMIFLU) 75 MG capsule Take 75 mg by mouth daily Starting 18 for 14 days, Historical      !! traMADol (ULTRAM) 50 MG tablet Take 50 mg by mouth 3 times daily At 0000, 1600, 2000, Historical      !! traMADol (ULTRAM) 50 MG tablet Take 50 mg by mouth daily as needed for severe pain, Historical      losartan (COZAAR) 25 MG tablet Take 0.5 tablets (12.5 mg) by mouth daily, Disp-30 tablet, Transitional      metoprolol tartrate (LOPRESSOR) 100 MG tablet Take 1 tablet (100 mg) by mouth 2 times daily, Disp-60 tablet, Transitional      NIFEdipine ER osmotic (ADALAT CC) 60 MG 24 hr  "tablet Take 1 tablet (60 mg) by mouth daily, Disp-30 tablet, Transitional      spironolactone (ALDACTONE) 25 MG tablet Take 1 tablet (25 mg) by mouth daily, Disp-30 tablet, Transitional      polyethylene glycol (MIRALAX/GLYCOLAX) Packet Take 17 g by mouth daily as needed for constipation, Disp-30 packet, Transitional      Menthol, Topical Analgesic, 4 % GEL Externally apply topically 2 times dailyHistorical      omeprazole (PRILOSEC) 20 MG CR capsule Take 20 mg by mouth daily, Historical      fluticasone-salmeterol (ADVAIR) 250-50 MCG/DOSE diskus inhaler Inhale 1 puff into the lungs every 12 hours, Historical      Acetaminophen (TYLENOL PO) Take 1,000 mg by mouth 3 times daily At 0800,1200,1800, Historical      warfarin (COUMADIN) 2.5 MG tablet Take 2.5 mg by mouth daily, Historical       !! - Potential duplicate medications found. Please discuss with provider.           Condition on Discharge:  Discharge condition: Stable   Discharge vitals: Blood pressure (!) 99/35, temperature 97.7  F (36.5  C), temperature source Axillary, resp. rate (!) 40, height 1.727 m (5' 8\"), weight 93.4 kg (205 lb 14.4 oz), SpO2 (!) 87 %.   Code status on discharge: Comfort Care     History of Present Illness:  See detailed admission note for full details.        Significant Physical Exam Findings Day of Discharge:  See my exam earlier.  Pronounced with absence of respiration, Heart sounds, no pulse, unresponisve    Procedures other than Imaging:  none     Imaging:  Results for orders placed or performed during the hospital encounter of 04/13/18   XR Chest 2 Views    Narrative    XR CHEST 2 VW 4/13/2018 2:19 PM    HISTORY: Pain.    COMPARISON: March 17, 2018.      Impression    IMPRESSION: Bibasilar streaky lung opacities, possibly atelectasis or  infection. Small bilateral layering effusions as well. No  pneumothorax. Stable heart size.    JESSICA ADHIKARI MD   CT Abdomen Pelvis w/o Contrast    Narrative    CT ABDOMEN PELVIS W/O CONTRAST  " 4/14/2018 5:24 AM     HISTORY: Abdominal pain, increasing lactate.     TECHNIQUE: Noncontrast CT abdomen and pelvis was performed. Radiation  dose for this scan was reduced using automated exposure control,  adjustment of the mA and/or kV according to patient size, or iterative  reconstruction technique.    COMPARISON: 2/18/2018.    FINDINGS:  Abdomen: There are small bilateral pleural effusions. Consolidations  in both lower lobes, right greater than left. Evaluation of the solid  abdominal organs is limited by the lack of intravenous contrast. There  is contrast material within the kidneys from previous exam. Small cyst  at the upper pole of the left kidney. The liver, spleen, pancreas and  adrenal glands are normal in appearance. There is no abdominal or  pelvic lymph node enlargement.    Pelvis: There is a large amount of stool in the ascending colon and a  small amount of stool in the remainder of the colon. There are  multiple dilated small bowel loops into the pelvis consistent with  obstruction. No cause of obstruction is evident. Small amount of free  fluid in the pelvis. No free intraperitoneal gas.      Impression    IMPRESSION:  1. Distal small bowel obstruction of uncertain etiology.  2. Large amount of stool in the ascending colon. No colonic  obstruction.  3. Small amount of ascites.  4. Bilateral lower lobe consolidations, right greater than left.  Pneumonia is a possibility.  5. Small bilateral pleural effusions.        Consultations:  Consultation during this admission received from surgery.     Recent Lab Results:    Recent Labs  Lab 04/14/18  0620 04/13/18  1340   WBC 44.6* 37.9*   HGB 8.8* 9.5*   HCT 26.2* 27.3*   MCV 87 85   * 434       Recent Labs  Lab 04/13/18  1508 04/13/18  1340   CULT No growth after 20 hours No growth after 23 hours       Recent Labs  Lab 04/14/18  0620 04/13/18  1340   * 125*   POTASSIUM 6.3* 4.5   CHLORIDE 90* 91*   CO2 18* 24   ANIONGAP 15* 10   GLC 99  125*   BUN 66* 50*   CR 2.57* 1.86*   GFRESTIMATED 18* 26*   GFRESTBLACK 21* 31*   KACI 8.8 8.9       Recent Labs  Lab 04/14/18  0620 04/13/18  1340   GLC 99 125*       Recent Labs  Lab 04/14/18  0620 04/14/18  0204 04/13/18  2221   LACT 8.8* 4.9* 3.7*       Recent Labs  Lab 04/13/18  1340   TROPI <0.015     No results for input(s): COLOR, APPEARANCE, URINEGLC, URINEBILI, URINEKETONE, SG, UBLD, URINEPH, PROTEIN, UROBILINOGEN, NITRITE, LEUKEST, RBCU, WBCU in the last 168 hours.       Pending Results:    Unresulted Labs Ordered in the Past 30 Days of this Admission     Date and Time Order Name Status Description    4/13/2018 1345 Blood culture Preliminary     4/13/2018 1322 Blood culture Preliminary          Hospital Course:   Estefany An is a 87 year old female with numerous medical history and recurrent hospitalizations this year such as chronic Afib on OAC, diastolic CHF, COPD, hypertension, suspected STEF and previous hospitalizations for pneumonia, COPD exacerbation and CHF who presented here coming from a skilled facility due to increasing SOB, fever, wheezes and generalized weakness.  She had a rapid deterioration overnight with increasing respirations, tachycardia, worsening hypoxia, worsening creatinine and complicated with hyperkalemia. Abdominal distention was worsening with earlier presentation of diarrhea.   Found with possible SBO vs ileus vs oglivies or ishemic bowel. Lactic acidosis worsening, INR increasing.  Seen by surgery and deemed to be very unstable and likely will not tolerate it.  I had a long discussion with her daughter during admission and today as well and provided her with current clinical status and very poor and stephanie prognosis.   Patient's case was transitioned to comfort care and no further restorative/interventions were administered.  Full comfort measures in place upon death.  Appreciate input from my colleague who pronounced Ms An at 2:50 pm of April 14,     Total time spent in  face to face contact with the patient and coordinating discharge was:  < 30 Minutes.

## 2018-04-14 NOTE — PROVIDER NOTIFICATION
The RNs went into room to check patient for incontinence. Pt having shallow agonal breathing and was very pale. Pt had a spasm and vomited a small amount of greenish brown fluid. The patient had another larger spasm and began vomiting copious greenish black fluid out of her mouth and nose. A yankauer on wall suction was used to remove the fluid from the patient's oral and nasal passages. Pt was then unarousable to voice. No response to sternal rub. Femoral pulse was palpated and there was no pulse. No rising or falling of the chest was noted either. Dr. Maloney was notified. Charge nurse was notified. Daughter and son-in-law were notified.

## 2018-04-15 LAB — INTERPRETATION ECG - MUSE: NORMAL

## 2018-04-19 LAB
BACTERIA SPEC CULT: NO GROWTH
BACTERIA SPEC CULT: NO GROWTH
Lab: NORMAL
Lab: NORMAL
SPECIMEN SOURCE: NORMAL
SPECIMEN SOURCE: NORMAL

## 2018-04-29 NOTE — IP AVS SNAPSHOT
"[place] [address] MEDICAL SURGICAL: [phone]                                              INTERAGENCY TRANSFER FORM - PHYSICIAN ORDERS   3/14/2018                    Hospital Admission Date: 3/14/2018  DENISSE GRANT   : 1930  Sex: Female        Attending Provider: Philip Javier MD     Allergies:  No Known Allergies    Infection:  None   Service:  GENERAL MEDI    Ht:  1.727 m (5' 8\")   Wt:  92.2 kg (203 lb 4.2 oz)   Admission Wt:  94.3 kg (208 lb)    BMI:  30.91 kg/m 2   BSA:  2.1 m 2            Patient PCP Information     Provider PCP Type    Ricki Blake MD General      ED Clinical Impression     Diagnosis Description Comment Added By Time Added    Acute diastolic congestive heart failure (H) [I50.31] Acute diastolic congestive heart failure (H) [I50.31]  Guanako Donaldson MD 3/14/2018  2:40 PM    Chronic atrial fibrillation (H) [I48.2] Chronic atrial fibrillation (H) [I48.2]  Guanako Donaldson MD 3/14/2018  2:40 PM      Hospital Problems as of 3/21/2018              Priority Class Noted POA    Acute diastolic congestive heart failure (H) Medium  3/14/2018 Unknown    CHF (congestive heart failure) (H) Medium  3/14/2018 Yes      Non-Hospital Problems as of 3/21/2018              Priority Class Noted    Hypoxia Medium  2018    Acute on chronic respiratory failure with hypoxia (H) Medium  2018      Code Status History     Date Active Date Inactive Code Status Order ID Comments User Context    2018  2:59 PM 2018  5:27 PM DNR/DNI [id number]  Jarocho Antoine DO Inpatient    2018  7:50 AM 2018  2:59 PM DNR/DNI [id number]  Jarocho Antoine DO Inpatient    2018  8:05 AM 2018  7:50 AM DNR/DNI [id number]  Aguila Moody MD Outpatient    2018  8:16 PM 2018  8:05 AM DNR/DNI [id number]  Carrillo Bhagat MD Inpatient    1/15/2018 12:11 PM 2018  8:16 PM DNR/DNI [id number]  Hernandez Stovall MD Outpatient    2018 10:49 AM " 1/15/2018 12:11 PM DNR/DNI [id number]  Fletcher Houston MD Inpatient    1/7/2018 12:53 PM 1/8/2018 10:49 AM Full Code [id number]  Hu Loyd MD Inpatient         Medication Review      UNREVIEWED medications. Ask your doctor about these medications        Dose / Directions Comments    COUMADIN 2.5 MG tablet   Generic drug:  warfarin        Dose:  2.5 mg   Take 2.5 mg by mouth daily   Refills:  0        fluticasone-salmeterol 250-50 MCG/DOSE diskus inhaler   Commonly known as:  ADVAIR        Dose:  1 puff   Inhale 1 puff into the lungs every 12 hours   Refills:  0        furosemide 40 MG tablet   Commonly known as:  LASIX   Used for:  Chronic diastolic congestive heart failure (H)        Dose:  40 mg   Take 1 tablet (40 mg) by mouth daily   Refills:  0        hydrochlorothiazide 25 MG tablet   Commonly known as:  HYDRODIURIL        Dose:  25 mg   Take 25 mg by mouth once   Refills:  0        ipratropium - albuterol 0.5 mg/2.5 mg/3 mL 0.5-2.5 (3) MG/3ML neb solution   Commonly known as:  DUONEB   Used for:  Acute respiratory failure with hypoxia (H)        Dose:  1 vial   Take 1 vial (3 mLs) by nebulization 3 times daily   Quantity:  360 mL   Refills:  0        LORazepam 0.5 MG tablet   Commonly known as:  ATIVAN   Used for:  Closed nondisplaced fracture of right clavicle, unspecified part of clavicle, initial encounter        Dose:  0.5 mg   Take 1 tablet (0.5 mg) by mouth At Bedtime   Quantity:  30 tablet   Refills:  0        losartan 100 MG tablet   Commonly known as:  COZAAR        Dose:  50 mg   Take 50 mg by mouth daily   Refills:  0        Menthol (Topical Analgesic) 4 % Gel        Externally apply topically 2 times daily   Refills:  0        metoprolol tartrate 50 MG tablet   Commonly known as:  LOPRESSOR   Used for:  New onset atrial fibrillation (H)        Dose:  50 mg   Take 1 tablet (50 mg) by mouth 2 times daily   Quantity:  60 tablet   Refills:  0        NIFEdipine ER 90 MG Tb24   Commonly known  as:  ADALAT CC        Dose:  90 mg   Take 90 mg by mouth daily   Refills:  0        omeprazole 20 MG CR capsule   Commonly known as:  priLOSEC        Dose:  20 mg   Take 20 mg by mouth daily   Refills:  0        polyethylene glycol Packet   Commonly known as:  MIRALAX/GLYCOLAX        Dose:  1 packet   Take 1 packet by mouth every 3 days   Refills:  0        TYLENOL PO        Dose:  1000 mg   Take 1,000 mg by mouth 3 times daily   Refills:  0                Your next 10 appointments already scheduled     Apr 10, 2018 11:45 AM CDT   LAB with RU LAB   [facility]    [address]  [place] [address]   [phone]           Please do not eat 10-12 hours before your appointment if you are coming in fasting for labs on lipids, cholesterol, or glucose (sugar). This does not apply to pregnant women. Water, hot tea and black coffee (with nothing added) are okay. Do not drink other fluids, diet soda or chew gum.            Apr 10, 2018 12:45 PM CDT   Core MD Reid with Tano Chang MD   [facility]    [address]  [place] [address]   [phone]                 no

## 2019-08-13 NOTE — PROGRESS NOTES
[facility]  Hospitalist Progress Note  Name: Estefany An    MRN: [id number]  YOB: 1930    Age: 87 year old  Date of admission: 1/7/2018  Primary care provider: Ricki Blake      Reason for Stay (Diagnosis):  Fall with left clavicular/right pubic rami fracture/atrial fibrillation         Assessment and Plan:      Summary of Stay:  Estefany An is a 87 year old female with a history of hypertension and osteoarthritis who now presents with weakness, dizziness and fall resulting in a left clavicular fracture, pubic rami fracture, hypoxemia, and atrial fibrillation.    Problem List/Plan:  1. Atrial fibrillation with controlled ventricular rate: Heart rate currently controlled.  Given multiple falls resulting in clavicular and pelvic fracture, not a good candidate for Coumadin.  TSH was otherwise normal and echocardiogram demonstrated a normal EF with normal size and no significant valvular disease.  Continue aspirin for now.  Increased metoprolol to 50 mg p.o. twice daily.    2. Mild hypoxemia secondary to influenza A: Chest x-ray was otherwise negative and CT scan for PE was negative.  Although BNP is elevated, there are no radiographic or clinical findings to suggest acute heart failure.  O2 as needed.  Continue Tamiflu.  Still some notable wheezing and chest congestion.  Continue prednisonsupplementation e 40 mg p.o. daily with scheduled DuoNeb's.  --Chest x-ray showed no evidence of acute pulmonary infiltrates  3. Left clavicular fracture and right pubic rami fracture: Symptomatic treatment for now.  PT/OT to evaluate and treat.    4. Hyponatremia: Likely secondary to respiratory tract infection is suspicious that hydrochlorothiazide is playing a major role.  Continue to hold hydrochlorothiazide for now.  Urine sodium is 11, continue to monitor sodium for now, if sodium level remains stable patient can be followed as an outpatient  5. Hypokalemia: Secondary to  "hydrochlorothiazide.  Replace per protocol.  6. Acute anemia without evidence of acute blood loss anemia: Probably in part dilution with a chronic component.  Monitor for now  Hemoglobin   Date Value Ref Range Status   01/14/2018 8.5 (L) 11.7 - 15.7 g/dL Final   ]    7. Urinary tract infection: Patient on ceftriaxone, cultures pending, continue antibiotic and monitor cultures.      DVT Prophylaxis: Pneumatic Compression Devices  Code Status: DNR / DNI  Discharge Dispo: TCU.  Patient[facility] as patient is from the [facility]  Estimated Disch Date / # of Days until Disch: Continue care for now, check complete blood count tomorrow and see if sepsis revolves patient can be discharged to TCU in the next 1 or 2 days        Interval History (Subjective):        Patient was seen and examined by me this morning.  She has some dyspepsia but denies any fever or shortness of breath.  Pain is controlled with pain medications.  Patient is ambulatory.  Her sodium is 125 today slightly down from 126 yesterday after Lasix.  Will hold Lasix for now, continue to monitor serum sodium.  Continue antibiotic, will check sodium level in the morning, I may discharge to TCU tomorrow if her serum sodium remains stable.                             Physical Exam:      Vital signs:  Temp: 97.6  F (36.4  C) Temp src: Oral BP: 147/60   Heart Rate: 77 Resp: 20 SpO2: 94 % O2 Device: None (Room air) Oxygen Delivery: 1 LPM Height: 172.7 cm (5' 8\") Weight: 88.7 kg (195 lb 8 oz)  Estimated body mass index is 29.73 kg/(m^2) as calculated from the following:    Height as of this encounter: 1.727 m (5' 8\").    Weight as of this encounter: 88.7 kg (195 lb 8 oz).      I/O last 3 completed shifts:  In: 900 [P.O.:840; I.V.:60]  Out: 1600 [Urine:1600]  Vitals:    01/08/18 0643 01/10/18 0451 01/11/18 0605 01/12/18 0708   Weight: 84.4 kg (186 lb) 90.7 kg (200 lb) 90.5 kg (199 lb 8 oz) 90.9 kg (200 lb 8 oz)    " 01/13/18 0416   Weight: 88.7 kg (195 lb 8 oz)       Constitutional: Awake, alert, cooperative, no apparent distress   Respiratory: Nl work of breathing. Clear to auscultation bilaterally, no crackles or wheezing   Cardiovascular:  Irregularly irregular, normal S1 and S2, and no murmur noted   Abdomen: Normal bowel sounds, soft, non-distended, non-tender   Skin: No rashes, no cyanosis, dry to touch   Neuro: CN 2-12 intact, no localizing weakness   Extremities: No edema, some right hip tenderness with range of motion as well as left clavicular tenderness with palpation.   HEENT Normocephalic, atraumatic, normal nasal turbinates; oropharynx clear   Neck Supple; nl inspection; trachea midline; no thryomegaly   Psychiatric: A+O x3. Normal affect          Medications:      All current medications were reviewed with changes reflected in problem list.         Data:      All new lab and imaging data was reviewed.   Labs:    Recent Labs  Lab 01/14/18  0651 01/13/18  0604 01/12/18  0827  01/10/18  0635   WBC 17.3* 22.2*  --   --  14.1*   HGB 8.5* 8.4* 8.3*  < > 8.6*   HCT 25.3* 25.0*  --   --  25.3*   MCV 91 92  --   --  91   * 442  --   --  316   < > = values in this interval not displayed.    Recent Labs  Lab 01/14/18  0651 01/13/18  1519 01/13/18  0604  01/11/18  0622   * 126* 128*  < > 129*   POTASSIUM 4.0  --  4.3  --  4.9   CHLORIDE 91*  --  94  --  95   CO2 28  --  26  --  26   ANIONGAP 6  --  8  --  8   GLC 95  --  98  --  116*   BUN 18  --  26  --  16   CR 0.72  --  0.73  --  0.68   GFRESTIMATED 77  --  75  --  82   GFRESTBLACK >90  --  >90  --  >90   KACI 8.7  --  8.0*  --  8.5   < > = values in this interval not displayed.   Imaging:   No results found for this or any previous visit (from the past 24 hour(s)).     Constricted

## 2023-05-04 NOTE — PLAN OF CARE
Patient called wanting to do urine test for uti symptoms   Problem: Patient Care Overview  Goal: Plan of Care/Patient Progress Review  Outcome: Improving  Neuro: WDL  VS:  VSS  Tele: Afib RVR - -110  Respiratory: LS - Course crackles.  Infrequent cough.   GI: No BM in 3 days per pt report. PRN miralax given this AM  : Incontinent of urine. Pure wick in place this AM. Removed for few hours this afternoon. Replaced at 1400. Draining clear yellow urine.   Skin: Redness noted to coccyx. Barrier cream applied. +2-+3 edema to LE bilaterally.   Pain: To legs bilaterally - baseline per pt report. Pt given scheduled tylenol with some relief.   IV: PIV WDL SL  Transfer: A1 with walker and gait belt. Activity encouraged.   Diet: 2 gm NA diet. Tolerating PO foods and fluids well.   Plan: TBD. IV lasix BID, activity encouraged. PT consulted. Hospitalist following.